# Patient Record
Sex: FEMALE | Race: WHITE | NOT HISPANIC OR LATINO | Employment: OTHER | URBAN - METROPOLITAN AREA
[De-identification: names, ages, dates, MRNs, and addresses within clinical notes are randomized per-mention and may not be internally consistent; named-entity substitution may affect disease eponyms.]

---

## 2017-06-09 PROBLEM — E66.9 MILD OBESITY: Status: ACTIVE | Noted: 2017-06-09

## 2017-10-30 DIAGNOSIS — Z12.31 VISIT FOR SCREENING MAMMOGRAM: Primary | ICD-10-CM

## 2017-11-27 ENCOUNTER — HOSPITAL ENCOUNTER (OUTPATIENT)
Dept: RADIOLOGY | Facility: CLINIC | Age: 72
Discharge: HOME | End: 2017-11-27
Attending: SPECIALIST
Payer: MEDICARE

## 2017-11-27 DIAGNOSIS — Z12.31 VISIT FOR SCREENING MAMMOGRAM: ICD-10-CM

## 2017-11-27 PROCEDURE — 77063 BREAST TOMOSYNTHESIS BI: CPT | Mod: 26,,, | Performed by: RADIOLOGY

## 2017-11-27 PROCEDURE — 77067 SCR MAMMO BI INCL CAD: CPT | Mod: TC,PO

## 2017-11-27 PROCEDURE — 77067 SCR MAMMO BI INCL CAD: CPT | Mod: 26,,, | Performed by: RADIOLOGY

## 2018-02-14 DIAGNOSIS — E78.00 HYPERCHOLESTEROLEMIA: ICD-10-CM

## 2018-02-15 RX ORDER — ATORVASTATIN CALCIUM 80 MG/1
TABLET, FILM COATED ORAL
Qty: 90 TABLET | Refills: 3 | Status: SHIPPED | OUTPATIENT
Start: 2018-02-15 | End: 2018-02-28 | Stop reason: SDUPTHER

## 2018-02-28 ENCOUNTER — OFFICE VISIT (OUTPATIENT)
Dept: CARDIOLOGY | Facility: CLINIC | Age: 73
End: 2018-02-28
Attending: INTERNAL MEDICINE
Payer: MEDICARE

## 2018-02-28 VITALS
DIASTOLIC BLOOD PRESSURE: 73 MMHG | BODY MASS INDEX: 32.65 KG/M2 | HEART RATE: 91 BPM | HEIGHT: 65 IN | SYSTOLIC BLOOD PRESSURE: 118 MMHG | WEIGHT: 196 LBS

## 2018-02-28 DIAGNOSIS — I70.1 ATHEROSCLEROSIS OF RENAL ARTERY: ICD-10-CM

## 2018-02-28 DIAGNOSIS — I15.0 RENOVASCULAR HYPERTENSION: ICD-10-CM

## 2018-02-28 DIAGNOSIS — E78.00 HYPERCHOLESTEROLEMIA: ICD-10-CM

## 2018-02-28 DIAGNOSIS — I65.23 BILATERAL CAROTID ARTERY STENOSIS: ICD-10-CM

## 2018-02-28 DIAGNOSIS — E66.9 MILD OBESITY: ICD-10-CM

## 2018-02-28 DIAGNOSIS — I35.9 AORTIC VALVE DISEASE: ICD-10-CM

## 2018-02-28 DIAGNOSIS — I77.1 SUBCLAVIAN ARTERY STENOSIS, LEFT: ICD-10-CM

## 2018-02-28 PROCEDURE — 99214 OFFICE O/P EST MOD 30 MIN: CPT | Mod: S$GLB,,, | Performed by: INTERNAL MEDICINE

## 2018-02-28 RX ORDER — ATORVASTATIN CALCIUM 80 MG/1
80 TABLET, FILM COATED ORAL DAILY
Qty: 90 TABLET | Refills: 3 | Status: SHIPPED | OUTPATIENT
Start: 2018-02-28 | End: 2018-08-31 | Stop reason: SDUPTHER

## 2018-02-28 RX ORDER — ASPIRIN 81 MG/1
81 TABLET ORAL DAILY
Qty: 90 TABLET | Refills: 3 | COMMUNITY
Start: 2018-02-28 | End: 2018-08-31 | Stop reason: SDUPTHER

## 2018-02-28 RX ORDER — AMLODIPINE BESYLATE 10 MG/1
10 TABLET ORAL DAILY
Qty: 90 TABLET | Refills: 3 | Status: SHIPPED | OUTPATIENT
Start: 2018-02-28 | End: 2018-08-31 | Stop reason: SDUPTHER

## 2018-02-28 RX ORDER — RAMIPRIL 10 MG/1
10 CAPSULE ORAL DAILY
Qty: 90 CAPSULE | Refills: 3 | Status: SHIPPED | OUTPATIENT
Start: 2018-02-28 | End: 2018-08-31 | Stop reason: SDUPTHER

## 2018-02-28 NOTE — PROGRESS NOTES
Subjective:     Albina Armenta is a 72 y.o. female with hypertension and hypercholesterolemia. She is mildly obese. She used to smoke but quit in 2007. Tells me she has a carotid bruit but ultrasounds have not revealed any significant stenoses. She was noted to have weak pulses in the left arm with 100 mmHg lower pressure in the left arm compared to the right. A Carotid Duplex study on 7/23/2015 revealed moderate plaquing in the left internal carotid with 50-60% stenosis. There was retrograde flow in the left vertebral consistent with left subclavian stenosis. An echocardiogram revealed aortic valve sclerosis. Because of severe hypertension she had a Renal Duplex study that suggested severe right renal artery stenosis. She underwent renal angiography on 8/20/2015 that revealed a severe right renal artery lesion that was stented with a good result. She has mild fatigue and discomfort in the left arm with activities. No exertional chest pain or exertional dyspnea. No palpitations or weak spells. No bleeding. Feelig well overall.    Hypertension   This is a chronic problem. The current episode started more than 1 year ago. The problem has been gradually improving since onset. The problem is controlled (usually 120-135/70-80 mmHg at home). Pertinent negatives include no anxiety, blurred vision, chest pain, headaches, malaise/fatigue, neck pain, orthopnea, palpitations, peripheral edema, PND, shortness of breath or sweats. There is no history of chronic renal disease.   Hyperlipidemia   This is a chronic problem. The current episode started more than 1 year ago. The problem is controlled. Recent lipid tests were reviewed and are normal. Exacerbating diseases include obesity. She has no history of chronic renal disease, diabetes, hypothyroidism, liver disease or nephrotic syndrome. Pertinent negatives include no chest pain, focal sensory loss, focal weakness, leg pain, myalgias or shortness of breath.       Review  of Systems   Constitution: Negative for chills, fever, weakness and malaise/fatigue.   HENT: Negative for nosebleeds.    Eyes: Negative for blurred vision and double vision.   Cardiovascular: Negative for chest pain, claudication, dyspnea on exertion, irregular heartbeat, leg swelling, near-syncope, orthopnea, palpitations, paroxysmal nocturnal dyspnea and syncope.   Respiratory: Negative for cough, hemoptysis, shortness of breath and wheezing.    Endocrine: Negative for cold intolerance and heat intolerance.   Hematologic/Lymphatic: Negative for bleeding problem. Does not bruise/bleed easily.   Skin: Negative for color change and rash.   Musculoskeletal: Negative for arthritis, back pain, falls, myalgias and neck pain.   Gastrointestinal: Negative for flatus, heartburn, hematemesis, hematochezia, hemorrhoids, jaundice, melena, nausea and vomiting.   Genitourinary: Negative for hematuria and menorrhagia.   Neurological: Negative for dizziness, focal weakness, headaches, light-headedness, loss of balance, numbness, paresthesias and vertigo.   Psychiatric/Behavioral: Negative for altered mental status, depression and memory loss. The patient is not nervous/anxious.    Allergic/Immunologic: Negative for hives and persistent infections.       Current Outpatient Prescriptions on File Prior to Visit   Medication Sig Dispense Refill    amlodipine (NORVASC) 10 MG tablet Take 1 tablet (10 mg total) by mouth once daily. 90 tablet 3    aspirin (ECOTRIN) 81 MG EC tablet Take 1 tablet (81 mg total) by mouth once daily. 90 tablet 3    atorvastatin (LIPITOR) 80 MG tablet TAKE 1 TABLET ONE TIME DAILY 90 tablet 3    carvedilol (COREG) 6.25 MG tablet Take 1 tablet (6.25 mg total) by mouth 2 (two) times daily. 180 tablet 3    citalopram (CELEXA) 10 MG tablet Take 10 mg by mouth once daily.      hydrochlorothiazide (MICROZIDE) 12.5 mg capsule Take 1 capsule (12.5 mg total) by mouth once daily. 90 capsule 3     "promethazine-codeine 6.25-10 mg/5 ml (PHENERGAN WITH CODEINE) 6.25-10 mg/5 mL syrup   0    ramipril (ALTACE) 10 MG capsule Take 1 capsule (10 mg total) by mouth once daily. 90 capsule 3     No current facility-administered medications on file prior to visit.        /73   Pulse 91   Ht 5' 5" (1.651 m)   Wt 88.9 kg (196 lb)   BMI 32.62 kg/m²       Objective:     Physical Exam   Constitutional: She is oriented to person, place, and time. She appears well-developed and well-nourished. She does not appear ill. No distress.   HENT:   Head: Normocephalic and atraumatic.   Nose: Nose normal.   Mouth/Throat: No oropharyngeal exudate.   Eyes: Right eye exhibits no discharge. Left eye exhibits no discharge. Right conjunctiva is not injected. Left conjunctiva is not injected. Right pupil is round. Left pupil is round. Pupils are equal.   Neck: No JVD present. Carotid bruit is present (bilateral soft bruits). No thyromegaly present.   Cardiovascular: Normal rate, regular rhythm, S1 normal and S2 normal.   No extrasystoles are present. PMI is not displaced.  Exam reveals gallop and S4. Exam reveals no S3.    Murmur heard.   Harsh crescendo-decrescendo midsystolic murmur is present with a grade of 3/6  at the upper right sternal border radiating to the neck  Pulses:       Carotid pulses are on the right side with bruit, and on the left side with bruit.       Radial pulses are 2+ on the right side, and 0 on the left side.        Femoral pulses are 2+ on the right side, and 2+ on the left side with bruit.       Popliteal pulses are 2+ on the right side, and 2+ on the left side.        Dorsalis pedis pulses are 1+ on the right side, and 1+ on the left side.        Posterior tibial pulses are 2+ on the right side, and 2+ on the left side.   Pulmonary/Chest: Effort normal and breath sounds normal.   Abdominal: Soft. Normal appearance. There is no hepatosplenomegaly. There is no tenderness.   Musculoskeletal:        Right " ankle: She exhibits no swelling, no ecchymosis and no deformity.        Left ankle: She exhibits no swelling, no ecchymosis and no deformity.   Lymphadenopathy:        Head (right side): No submandibular adenopathy present.        Head (left side): No submandibular adenopathy present.     She has no cervical adenopathy.   Neurological: She is alert and oriented to person, place, and time. She is not disoriented. No cranial nerve deficit or sensory deficit.   Skin: Skin is warm and intact. No rash noted. She is not diaphoretic. No cyanosis. Nails show no clubbing.   Psychiatric: She has a normal mood and affect. Her speech is normal and behavior is normal. Judgment and thought content normal. Cognition and memory are normal.       Assessment:     1. Bilateral carotid artery stenosis    2. Subclavian artery stenosis, left    3. Atherosclerosis of renal artery    4. Aortic valve disease    5. Renovascular hypertension    6. Hypercholesterolemia    7. Mild obesity        Plan:     1. Carotid Artery Stenosis   2005: Carotid bruit heard.   7/23/2015: Carotid Duplex: LIZETH: Moderate plaquing. LICA: Moderate plaquing - 1.4 m/s - 50-60%. Left vertebral: retrograde flow.   7/6/2016: Carotid Duplex: LIZETH: Moderate plaquing. LICA: Severe plaquing - 1.3 m/s - 50-60%. Left Vertebral: Retrograde flow.   6/30/2017: Carotid Duplex: LIZETH: Moderate plaquing - 1.1 m/s - <50%. LICA: Severe plaquing - 1.7 m/s - 60-70%.   7/2018: Plan next Carotid Duplex. Then yearly.    2. Left Subclavian Stenosis   2015: Right arm 200/90 mmHg. Left Arm: 110/60 mmHg.    7/23/2015: Carotid Duplex: LIZETH: Moderate plaquing. LICA: Moderate plaquing -1.4 m/s - 50-60%. Left vertebral: Retrograde flow.   Mild left arm symptoms.   Medical therapy for now.     3. Renal Artery Stenosis   7/27/2015: Renal Duplex: Right: Severe - 3.2 m/s. Left: About 60% stenosis - 1.7 m/s.   8/20/2015: [facility]: Renal Angio: Right: 80%. Stented. Left: 30%.   10/2015: Stopped  clopidogrel.   On aspirin 81 mg Q24.   Blood pressure very well controlled.      4. Aortic Valve Disease   7/16/2015: Loud systolic murmur.   7/23/2015: Echo: Normal left ventricular size and systolic function. Mild LVH. Mild diastolic dysfunction. Mild aortic sclerosis.   Followed.    5. Hypertension   1995: Diagnosed.   On amlodipine 10 mg Q24, ramipril 10 mg Q24 and hctz 12.5 mg Q24.   Keeping log at home.   Running fine.    6. Hypercholesterolemia   2005: Diagnosed. Began statin.   On simvastatin 20 mg Q24.   7/2015: Changed to atorvastatin 80 mg Q24.   8/13/2015: Chol 168. HDL 56. LDL 94. .   On atorvastatin 80 mg Q24.    Well controlled.    7. Mild Obesity   6/9/2017: 86 kg. BMI 31.   Encouraged to lose weight.    8. Primary Care   Dr. Shyam Gilmore.    F/u 6 months.    Thanh Acuna M.D.        7/11/2018 8:39 PM, Addendum:    7/11/2018: Carotid Duplex: LIZETH: Severe plaquing - 1.1 m/s - 50-60%. LICA: Severe plaquing - 2.1 m/s - 60-70%. Left vertebral retrograde.    I discussed the above test result and the implications of the findings over the phone.    Thanh Acuna M.D.      Copy:    Dr. Shyam Gilmore.

## 2018-07-11 ENCOUNTER — CLINICAL SUPPORT (OUTPATIENT)
Dept: CARDIOLOGY | Facility: CLINIC | Age: 73
End: 2018-07-11
Payer: MEDICARE

## 2018-07-11 DIAGNOSIS — I65.29 CAROTID ARTERY STENOSIS: ICD-10-CM

## 2018-07-11 PROCEDURE — 93880 EXTRACRANIAL BILAT STUDY: CPT | Mod: S$GLB,,, | Performed by: INTERNAL MEDICINE

## 2018-07-12 ENCOUNTER — TELEPHONE (OUTPATIENT)
Dept: CARDIOLOGY | Facility: CLINIC | Age: 73
End: 2018-07-12

## 2018-07-12 DIAGNOSIS — I65.23 BILATERAL CAROTID ARTERY STENOSIS: ICD-10-CM

## 2018-08-31 ENCOUNTER — OFFICE VISIT (OUTPATIENT)
Dept: CARDIOLOGY | Facility: CLINIC | Age: 73
End: 2018-08-31
Attending: INTERNAL MEDICINE
Payer: MEDICARE

## 2018-08-31 VITALS
HEIGHT: 65 IN | SYSTOLIC BLOOD PRESSURE: 119 MMHG | DIASTOLIC BLOOD PRESSURE: 69 MMHG | BODY MASS INDEX: 32.49 KG/M2 | HEART RATE: 83 BPM | WEIGHT: 195 LBS

## 2018-08-31 DIAGNOSIS — E78.00 HYPERCHOLESTEROLEMIA: ICD-10-CM

## 2018-08-31 DIAGNOSIS — I15.0 RENOVASCULAR HYPERTENSION: ICD-10-CM

## 2018-08-31 DIAGNOSIS — I65.23 BILATERAL CAROTID ARTERY STENOSIS: ICD-10-CM

## 2018-08-31 DIAGNOSIS — I77.1 SUBCLAVIAN ARTERY STENOSIS, LEFT: ICD-10-CM

## 2018-08-31 DIAGNOSIS — I70.1 ATHEROSCLEROSIS OF RENAL ARTERY: ICD-10-CM

## 2018-08-31 DIAGNOSIS — I35.9 AORTIC VALVE DISEASE: ICD-10-CM

## 2018-08-31 DIAGNOSIS — E66.9 MILD OBESITY: ICD-10-CM

## 2018-08-31 PROCEDURE — 99214 OFFICE O/P EST MOD 30 MIN: CPT | Mod: S$GLB,,, | Performed by: INTERNAL MEDICINE

## 2018-08-31 PROCEDURE — 3074F SYST BP LT 130 MM HG: CPT | Mod: CPTII,S$GLB,, | Performed by: INTERNAL MEDICINE

## 2018-08-31 PROCEDURE — 3078F DIAST BP <80 MM HG: CPT | Mod: CPTII,S$GLB,, | Performed by: INTERNAL MEDICINE

## 2018-08-31 RX ORDER — HYDROCHLOROTHIAZIDE 12.5 MG/1
12.5 CAPSULE ORAL DAILY
Qty: 90 CAPSULE | Refills: 3 | Status: SHIPPED | OUTPATIENT
Start: 2018-08-31 | End: 2019-03-01 | Stop reason: SDUPTHER

## 2018-08-31 RX ORDER — ASPIRIN 81 MG/1
81 TABLET ORAL DAILY
Qty: 90 TABLET | Refills: 3 | COMMUNITY
Start: 2018-08-31 | End: 2019-03-01 | Stop reason: SDUPTHER

## 2018-08-31 RX ORDER — RAMIPRIL 10 MG/1
10 CAPSULE ORAL DAILY
Qty: 90 CAPSULE | Refills: 3 | Status: SHIPPED | OUTPATIENT
Start: 2018-08-31 | End: 2019-03-01 | Stop reason: SDUPTHER

## 2018-08-31 RX ORDER — AMLODIPINE BESYLATE 10 MG/1
10 TABLET ORAL DAILY
Qty: 90 TABLET | Refills: 3 | Status: SHIPPED | OUTPATIENT
Start: 2018-08-31 | End: 2019-03-01 | Stop reason: SDUPTHER

## 2018-08-31 RX ORDER — ATORVASTATIN CALCIUM 80 MG/1
80 TABLET, FILM COATED ORAL DAILY
Qty: 90 TABLET | Refills: 3 | Status: SHIPPED | OUTPATIENT
Start: 2018-08-31 | End: 2019-03-01 | Stop reason: SDUPTHER

## 2018-08-31 NOTE — PROGRESS NOTES
Subjective:     Albina Armenta is a 73 y.o. female with hypertension and hypercholesterolemia. She is mildly obese. She used to smoke but quit in 2007. Tells me she has a carotid bruit but ultrasounds have not revealed any significant stenoses. She was noted to have weak pulses in the left arm with 100 mmHg lower pressure in the left arm compared to the right. A Carotid Duplex study on 7/23/2015 revealed moderate plaquing in the left internal carotid with 50-60% stenosis. There was retrograde flow in the left vertebral consistent with left subclavian stenosis. An echocardiogram revealed aortic valve sclerosis. Because of severe hypertension she had a Renal Duplex study that suggested severe right renal artery stenosis. She underwent renal angiography on 8/20/2015 that revealed a severe right renal artery lesion that was stented with a good result. She has mild fatigue and discomfort in the left arm with activities. No exertional chest pain or exertional dyspnea. No palpitations or weak spells. No bleeding. Feelig well overall.      Hypertension   This is a chronic problem. The current episode started more than 1 year ago. The problem has been gradually improving since onset. The problem is controlled (usually 120-130/70-80 mmHg at home). Pertinent negatives include no anxiety, blurred vision, chest pain, headaches, malaise/fatigue, neck pain, orthopnea, palpitations, peripheral edema, PND, shortness of breath or sweats. There is no history of chronic renal disease.   Hyperlipidemia   This is a chronic problem. The current episode started more than 1 year ago. The problem is controlled. Recent lipid tests were reviewed and are normal. Exacerbating diseases include obesity. She has no history of chronic renal disease, diabetes, hypothyroidism, liver disease or nephrotic syndrome. Pertinent negatives include no chest pain, focal sensory loss, focal weakness, leg pain, myalgias or shortness of breath.        Review of Systems   Constitution: Negative for chills, fever, weakness and malaise/fatigue.   HENT: Negative for nosebleeds.    Eyes: Negative for blurred vision and double vision.   Cardiovascular: Negative for chest pain, claudication, dyspnea on exertion, irregular heartbeat, leg swelling, near-syncope, orthopnea, palpitations, paroxysmal nocturnal dyspnea and syncope.   Respiratory: Negative for cough, hemoptysis, shortness of breath and wheezing.    Endocrine: Negative for cold intolerance and heat intolerance.   Hematologic/Lymphatic: Negative for bleeding problem. Does not bruise/bleed easily.   Skin: Negative for color change and rash.   Musculoskeletal: Negative for arthritis, back pain, falls, myalgias and neck pain.   Gastrointestinal: Negative for flatus, heartburn, hematemesis, hematochezia, hemorrhoids, jaundice, melena, nausea and vomiting.   Genitourinary: Negative for hematuria and menorrhagia.   Neurological: Negative for dizziness, focal weakness, headaches, light-headedness, loss of balance, numbness, paresthesias and vertigo.   Psychiatric/Behavioral: Negative for altered mental status, depression and memory loss. The patient is not nervous/anxious.    Allergic/Immunologic: Negative for hives and persistent infections.       Current Outpatient Medications on File Prior to Visit   Medication Sig Dispense Refill    amLODIPine (NORVASC) 10 MG tablet Take 1 tablet (10 mg total) by mouth once daily. 90 tablet 3    aspirin (ECOTRIN) 81 MG EC tablet Take 1 tablet (81 mg total) by mouth once daily. 90 tablet 3    atorvastatin (LIPITOR) 80 MG tablet Take 1 tablet (80 mg total) by mouth once daily. 90 tablet 3    citalopram (CELEXA) 10 MG tablet Take 10 mg by mouth once daily.      hydrochlorothiazide (MICROZIDE) 12.5 mg capsule Take 1 capsule (12.5 mg total) by mouth once daily. 90 capsule 3    promethazine-codeine 6.25-10 mg/5 ml (PHENERGAN WITH CODEINE) 6.25-10 mg/5 mL syrup   0     "ramipril (ALTACE) 10 MG capsule Take 1 capsule (10 mg total) by mouth once daily. 90 capsule 3     No current facility-administered medications on file prior to visit.        /69   Pulse 83   Ht 5' 5" (1.651 m)   Wt 88.5 kg (195 lb)   BMI 32.45 kg/m²       Objective:     Physical Exam   Constitutional: She is oriented to person, place, and time. She appears well-developed and well-nourished. She does not appear ill. No distress.   HENT:   Head: Normocephalic and atraumatic.   Nose: Nose normal.   Mouth/Throat: No oropharyngeal exudate.   Eyes: Right eye exhibits no discharge. Left eye exhibits no discharge. Right conjunctiva is not injected. Left conjunctiva is not injected. Right pupil is round. Left pupil is round. Pupils are equal.   Neck: No JVD present. Carotid bruit is present (bilateral soft bruits). No thyromegaly present.   Cardiovascular: Normal rate, regular rhythm, S1 normal and S2 normal.  No extrasystoles are present. PMI is not displaced. Exam reveals gallop and S4. Exam reveals no S3.   Murmur heard.   Harsh crescendo-decrescendo midsystolic murmur is present with a grade of 3/6 at the upper right sternal border radiating to the neck.  Pulses:       Carotid pulses are on the right side with bruit, and on the left side with bruit.       Radial pulses are 2+ on the right side, and 0 on the left side.        Femoral pulses are 2+ on the right side, and 2+ on the left side with bruit.       Popliteal pulses are 2+ on the right side, and 2+ on the left side.        Dorsalis pedis pulses are 1+ on the right side, and 1+ on the left side.        Posterior tibial pulses are 2+ on the right side, and 2+ on the left side.   Pulmonary/Chest: Effort normal and breath sounds normal.   Abdominal: Soft. Normal appearance. There is no hepatosplenomegaly. There is no tenderness.   Musculoskeletal:        Right ankle: She exhibits no swelling, no ecchymosis and no deformity.        Left ankle: She exhibits " no swelling, no ecchymosis and no deformity.   Lymphadenopathy:        Head (right side): No submandibular adenopathy present.        Head (left side): No submandibular adenopathy present.     She has no cervical adenopathy.   Neurological: She is alert and oriented to person, place, and time. She is not disoriented. No cranial nerve deficit or sensory deficit.   Skin: Skin is warm and intact. No rash noted. She is not diaphoretic. Nails show no clubbing.   Psychiatric: She has a normal mood and affect. Her speech is normal and behavior is normal. Judgment and thought content normal. Cognition and memory are normal.       Assessment:     1. Bilateral carotid artery stenosis    2. Subclavian artery stenosis, left    3. Atherosclerosis of renal artery    4. Aortic valve disease    5. Renovascular hypertension    6. Hypercholesterolemia    7. Mild obesity        Plan:     1. Carotid Artery Stenosis   2005: Carotid bruit heard.   7/23/2015: Carotid Duplex: LIZETH: Moderate plaquing. LICA: Moderate plaquing - 1.4 m/s - 50-60%. Left vertebral: retrograde flow.   7/6/2016: Carotid Duplex: LIZETH: Moderate plaquing. LICA: Severe plaquing - 1.3 m/s - 50-60%. Left Vertebral: Retrograde flow.   6/30/2017: Carotid Duplex: LIZETH: Moderate plaquing - 1.1 m/s - <50%. LICA: Severe plaquing - 1.7 m/s - 60-70%.   7/11/2018: Carotid Duplex: LIZETH: Severe plaquing - 1.1 m/s - 50-60%. LICA: Severe plaquing - 2.1 m/s - 60-70%. Left vertebral retrograde.   7/2019: Plan next Carotid Duplex. Then yearly.    2. Left Subclavian Stenosis   2015: Right arm 200/90 mmHg. Left Arm: 110/60 mmHg.    7/23/2015: Carotid Duplex: Left vertebral: Retrograde flow.   Mild left arm symptoms.   Medical therapy.     3. Renal Artery Stenosis   7/27/2015: Renal Duplex: Right: Severe - 3.2 m/s. Left: About 60% stenosis - 1.7 m/s.   8/20/2015: [facility]: Renal Angio: Right: 80%. Stented. Left: 30%.   10/2015: Stopped clopidogrel.   On aspirin 81 mg Q24.   Blood pressure  well controlled.      4. Aortic Valve Disease   7/16/2015: Loud systolic murmur.   7/23/2015: Echo: Normal left ventricular size and systolic function. Mild LVH. Mild diastolic dysfunction. Mild aortic sclerosis.   Followed.    5. Hypertension   1995: Diagnosed.   On amlodipine 10 mg Q24, ramipril 10 mg Q24 and hctz 12.5 mg Q24.   Keeping log at home.   Well controlled.    6. Hypercholesterolemia   2005: Diagnosed. Began statin.   On simvastatin 20 mg Q24.   7/2015: Changed to atorvastatin 80 mg Q24.   8/13/2015: Chol 168. HDL 56. LDL 94. .   On atorvastatin 80 mg Q24.    Well controlled.    7. Mild Obesity   6/9/2017: 86 kg. BMI 31.   Encouraged to lose weight.    8. Primary Care   Dr. Shyam Gilmore.    F/u 6 months.    Thanh Acuna M.D.

## 2018-09-04 RX ORDER — CARVEDILOL 6.25 MG/1
TABLET ORAL
Qty: 180 TABLET | Refills: 3 | Status: SHIPPED | OUTPATIENT
Start: 2018-09-04 | End: 2019-03-01

## 2018-11-13 DIAGNOSIS — Z12.39 SCREENING BREAST EXAMINATION: Primary | ICD-10-CM

## 2018-11-28 ENCOUNTER — HOSPITAL ENCOUNTER (OUTPATIENT)
Dept: RADIOLOGY | Facility: CLINIC | Age: 73
Discharge: HOME | End: 2018-11-28
Attending: SPECIALIST
Payer: MEDICARE

## 2018-11-28 DIAGNOSIS — Z12.39 SCREENING BREAST EXAMINATION: ICD-10-CM

## 2018-11-28 PROCEDURE — 77063 BREAST TOMOSYNTHESIS BI: CPT | Mod: 26,,, | Performed by: RADIOLOGY

## 2018-11-28 PROCEDURE — 77067 SCR MAMMO BI INCL CAD: CPT | Mod: 26,,, | Performed by: RADIOLOGY

## 2018-11-28 PROCEDURE — 77063 BREAST TOMOSYNTHESIS BI: CPT | Mod: TC,PO

## 2018-11-28 PROCEDURE — 77067 SCR MAMMO BI INCL CAD: CPT | Mod: TC,PO

## 2019-03-01 ENCOUNTER — OFFICE VISIT (OUTPATIENT)
Dept: CARDIOLOGY | Facility: CLINIC | Age: 74
End: 2019-03-01
Attending: INTERNAL MEDICINE
Payer: MEDICARE

## 2019-03-01 VITALS
DIASTOLIC BLOOD PRESSURE: 77 MMHG | BODY MASS INDEX: 31.65 KG/M2 | HEART RATE: 86 BPM | SYSTOLIC BLOOD PRESSURE: 116 MMHG | HEIGHT: 65 IN | WEIGHT: 190 LBS

## 2019-03-01 DIAGNOSIS — I35.9 AORTIC VALVE DISEASE: ICD-10-CM

## 2019-03-01 DIAGNOSIS — E66.9 MILD OBESITY: ICD-10-CM

## 2019-03-01 DIAGNOSIS — I65.23 BILATERAL CAROTID ARTERY STENOSIS: ICD-10-CM

## 2019-03-01 DIAGNOSIS — I15.0 RENOVASCULAR HYPERTENSION: ICD-10-CM

## 2019-03-01 DIAGNOSIS — E78.00 HYPERCHOLESTEROLEMIA: ICD-10-CM

## 2019-03-01 DIAGNOSIS — I70.1 ATHEROSCLEROSIS OF RENAL ARTERY: ICD-10-CM

## 2019-03-01 DIAGNOSIS — I77.1 SUBCLAVIAN ARTERY STENOSIS, LEFT: ICD-10-CM

## 2019-03-01 PROCEDURE — 3074F PR MOST RECENT SYSTOLIC BLOOD PRESSURE < 130 MM HG: ICD-10-PCS | Mod: CPTII,S$GLB,, | Performed by: INTERNAL MEDICINE

## 2019-03-01 PROCEDURE — 99214 OFFICE O/P EST MOD 30 MIN: CPT | Mod: S$GLB,,, | Performed by: INTERNAL MEDICINE

## 2019-03-01 PROCEDURE — 3074F SYST BP LT 130 MM HG: CPT | Mod: CPTII,S$GLB,, | Performed by: INTERNAL MEDICINE

## 2019-03-01 PROCEDURE — 3078F DIAST BP <80 MM HG: CPT | Mod: CPTII,S$GLB,, | Performed by: INTERNAL MEDICINE

## 2019-03-01 PROCEDURE — 99214 PR OFFICE/OUTPT VISIT, EST, LEVL IV, 30-39 MIN: ICD-10-PCS | Mod: S$GLB,,, | Performed by: INTERNAL MEDICINE

## 2019-03-01 PROCEDURE — 3078F PR MOST RECENT DIASTOLIC BLOOD PRESSURE < 80 MM HG: ICD-10-PCS | Mod: CPTII,S$GLB,, | Performed by: INTERNAL MEDICINE

## 2019-03-01 RX ORDER — RAMIPRIL 10 MG/1
10 CAPSULE ORAL DAILY
Qty: 90 CAPSULE | Refills: 3 | Status: ON HOLD | OUTPATIENT
Start: 2019-03-01 | End: 2019-08-15 | Stop reason: HOSPADM

## 2019-03-01 RX ORDER — ASPIRIN 81 MG/1
81 TABLET ORAL DAILY
Qty: 90 TABLET | Refills: 3 | Status: ON HOLD | COMMUNITY
Start: 2019-03-01 | End: 2019-08-15 | Stop reason: HOSPADM

## 2019-03-01 RX ORDER — ATORVASTATIN CALCIUM 80 MG/1
80 TABLET, FILM COATED ORAL DAILY
Qty: 90 TABLET | Refills: 3 | Status: SHIPPED | OUTPATIENT
Start: 2019-03-01 | End: 2020-03-17

## 2019-03-01 RX ORDER — AMLODIPINE BESYLATE 10 MG/1
10 TABLET ORAL DAILY
Qty: 90 TABLET | Refills: 3 | Status: ON HOLD | OUTPATIENT
Start: 2019-03-01 | End: 2019-08-15 | Stop reason: HOSPADM

## 2019-03-01 RX ORDER — HYDROCHLOROTHIAZIDE 12.5 MG/1
12.5 CAPSULE ORAL DAILY
Qty: 90 CAPSULE | Refills: 3 | Status: ON HOLD | OUTPATIENT
Start: 2019-03-01 | End: 2019-08-15 | Stop reason: HOSPADM

## 2019-03-01 NOTE — PROGRESS NOTES
Subjective:     Albina Armenta is a 73 y.o. female with hypertension and hypercholesterolemia. She is mildly obese. She used to smoke but quit in 2007. Tells me she has a carotid bruit but ultrasounds have not revealed any significant stenoses. She was noted to have weak pulses in the left arm with 100 mmHg lower pressure in the left arm compared to the right. A Carotid Duplex study on 7/23/2015 revealed moderate plaquing in the left internal carotid with 50-60% stenosis. There was retrograde flow in the left vertebral consistent with left subclavian stenosis. An echocardiogram revealed aortic valve sclerosis. Because of severe hypertension she had a Renal Duplex study that suggested severe right renal artery stenosis. She underwent renal angiography on 8/20/2015 that revealed a severe right renal artery lesion that was stented with a good result. She has mild fatigue and discomfort in the left arm with activities. No exertional chest pain or exertional dyspnea. No palpitations or weak spells. No bleeding. Feelig well overall.      Hypertension   This is a chronic problem. The current episode started more than 1 year ago. The problem has been gradually improving since onset. The problem is controlled (usually 120-130/70-80 mmHg at home). Pertinent negatives include no anxiety, blurred vision, chest pain, headaches, malaise/fatigue, neck pain, orthopnea, palpitations, peripheral edema, PND, shortness of breath or sweats. There is no history of chronic renal disease.   Hyperlipidemia   This is a chronic problem. The current episode started more than 1 year ago. The problem is controlled. Recent lipid tests were reviewed and are normal. Exacerbating diseases include obesity. She has no history of chronic renal disease, diabetes, hypothyroidism, liver disease or nephrotic syndrome. Pertinent negatives include no chest pain, focal sensory loss, focal weakness, leg pain, myalgias or shortness of breath.        Review of Systems   Constitution: Negative for chills, fever, weakness and malaise/fatigue.   HENT: Negative for nosebleeds.    Eyes: Negative for blurred vision, double vision, vision loss in left eye and vision loss in right eye.   Cardiovascular: Negative for chest pain, claudication, dyspnea on exertion, irregular heartbeat, leg swelling, near-syncope, orthopnea, palpitations, paroxysmal nocturnal dyspnea and syncope.   Respiratory: Negative for cough, hemoptysis, shortness of breath and wheezing.    Endocrine: Negative for cold intolerance and heat intolerance.   Hematologic/Lymphatic: Negative for bleeding problem. Does not bruise/bleed easily.   Skin: Negative for color change and rash.   Musculoskeletal: Negative for arthritis, back pain, falls, myalgias and neck pain.   Gastrointestinal: Negative for flatus, heartburn, hematemesis, hematochezia, hemorrhoids, jaundice, melena, nausea and vomiting.   Genitourinary: Negative for hematuria and menorrhagia.   Neurological: Negative for dizziness, focal weakness, headaches, light-headedness, loss of balance, numbness, paresthesias, tremors and vertigo.   Psychiatric/Behavioral: Negative for altered mental status, depression and memory loss. The patient is not nervous/anxious.    Allergic/Immunologic: Negative for hives and persistent infections.       Current Outpatient Medications on File Prior to Visit   Medication Sig Dispense Refill    amLODIPine (NORVASC) 10 MG tablet Take 1 tablet (10 mg total) by mouth once daily. 90 tablet 3    aspirin (ECOTRIN) 81 MG EC tablet Take 1 tablet (81 mg total) by mouth once daily. 90 tablet 3    atorvastatin (LIPITOR) 80 MG tablet Take 1 tablet (80 mg total) by mouth once daily. 90 tablet 3    carvedilol (COREG) 6.25 MG tablet TAKE 1 TABLET (6.25 MG TOTAL) BY MOUTH 2 (TWO) TIMES DAILY. 180 tablet 3    citalopram (CELEXA) 10 MG tablet Take 10 mg by mouth once daily.      hydroCHLOROthiazide (MICROZIDE) 12.5 mg  "capsule Take 1 capsule (12.5 mg total) by mouth once daily. 90 capsule 3    promethazine-codeine 6.25-10 mg/5 ml (PHENERGAN WITH CODEINE) 6.25-10 mg/5 mL syrup   0    ramipril (ALTACE) 10 MG capsule Take 1 capsule (10 mg total) by mouth once daily. 90 capsule 3     No current facility-administered medications on file prior to visit.        /77   Pulse 86   Ht 5' 5" (1.651 m)   Wt 86.2 kg (190 lb)   BMI 31.62 kg/m²       Objective:     Physical Exam   Constitutional: She is oriented to person, place, and time. She appears well-developed and well-nourished. She does not appear ill. No distress.   HENT:   Head: Normocephalic and atraumatic.   Nose: Nose normal.   Mouth/Throat: No oropharyngeal exudate.   Eyes: Right eye exhibits no discharge. Left eye exhibits no discharge. Right conjunctiva is not injected. Left conjunctiva is not injected. Right pupil is round. Left pupil is round. Pupils are equal.   Neck: No JVD present. Carotid bruit is present (bilateral soft bruits). No thyromegaly present.   Cardiovascular: Normal rate, regular rhythm, S1 normal and S2 normal.  No extrasystoles are present. PMI is not displaced. Exam reveals gallop and S4. Exam reveals no S3.   Murmur heard.   Harsh crescendo-decrescendo midsystolic murmur is present with a grade of 3/6 at the upper right sternal border radiating to the neck.  Pulses:       Carotid pulses are on the right side with bruit, and on the left side with bruit.       Radial pulses are 2+ on the right side, and 0 on the left side.        Femoral pulses are 2+ on the right side, and 2+ on the left side with bruit.       Popliteal pulses are 2+ on the right side, and 2+ on the left side.        Dorsalis pedis pulses are 1+ on the right side, and 1+ on the left side.        Posterior tibial pulses are 2+ on the right side, and 2+ on the left side.   Pulmonary/Chest: Effort normal and breath sounds normal.   Abdominal: Soft. Normal appearance. There is no " hepatosplenomegaly. There is no tenderness.   Musculoskeletal:        Right ankle: She exhibits no swelling, no ecchymosis and no deformity.        Left ankle: She exhibits no swelling, no ecchymosis and no deformity.   Lymphadenopathy:        Head (right side): No submandibular adenopathy present.        Head (left side): No submandibular adenopathy present.     She has no cervical adenopathy.   Neurological: She is alert and oriented to person, place, and time. She is not disoriented. No cranial nerve deficit or sensory deficit.   Skin: Skin is warm and intact. No rash noted. She is not diaphoretic.   Psychiatric: She has a normal mood and affect. Her speech is normal and behavior is normal. Judgment and thought content normal. Cognition and memory are normal.       Assessment:     1. Bilateral carotid artery stenosis    2. Subclavian artery stenosis, left    3. Atherosclerosis of renal artery    4. Aortic valve disease    5. Renovascular hypertension    6. Hypercholesterolemia    7. Mild obesity        Plan:     1. Carotid Artery Stenosis   2005: Carotid bruit heard.   7/23/2015: Carotid Duplex: LZIETH: Moderate plaquing. LICA: Moderate plaquing - 1.4 m/s - 50-60%. Left vertebral: retrograde flow.   7/6/2016: Carotid Duplex: LIZETH: Moderate plaquing. LICA: Severe plaquing - 1.3 m/s - 50-60%. Left Vertebral: Retrograde flow.   6/30/2017: Carotid Duplex: LIZETH: Moderate plaquing - 1.1 m/s - <50%. LICA: Severe plaquing - 1.7 m/s - 60-70%.   7/11/2018: Carotid Duplex: LIZETH: Severe plaquing - 1.1 m/s - 50-60%. LICA: Severe plaquing - 2.1 m/s - 60-70%. Left vertebral retrograde.   7/2019: Plan next Carotid Duplex. Then yearly.    2. Left Subclavian Stenosis   2015: Right arm 200/90 mmHg. Left Arm: 110/60 mmHg.    7/23/2015: Carotid Duplex: Left vertebral: Retrograde flow.   Mild left arm symptoms.   Medical therapy.     3. Renal Artery Stenosis   7/27/2015: Renal Duplex: Right: Severe - 3.2 m/s. Left: About 60% stenosis -  1.7 m/s.   8/20/2015: [facility]: Renal Angio: Right: 80%. Stented. Left: 30%.   10/2015: Stopped clopidogrel.   On aspirin 81 mg Q24.   Blood pressure well controlled.      4. Aortic Valve Disease   7/16/2015: Loud systolic murmur.   7/23/2015: Echo: Normal left ventricular size and systolic function. Mild LVH. Mild diastolic dysfunction. Mild aortic sclerosis.   Followed.    5. Hypertension   1995: Diagnosed.   On amlodipine 10 mg Q24, ramipril 10 mg Q24 and hctz 12.5 mg Q24.   Keeping log at home.   Well controlled.    6. Hypercholesterolemia   2005: Diagnosed. Began statin.   On simvastatin 20 mg Q24.   7/2015: Changed to atorvastatin 80 mg Q24.   8/13/2015: Chol 168. HDL 56. LDL 94. .   On atorvastatin 80 mg Q24.    Well controlled.    7. Mild Obesity   6/9/2017: 86 kg. BMI 31.   Encouraged to lose weight.    8. Primary Care   Dr. Shyam Gilmore.    F/u 6 months.    Thanh Acuna M.D.       7/4/2019 3:00 PM, Addendum:    7/3/2019: Carotid Duplex: LIZETH: Moderate plaquing - 1.0 m/s - <50%. LICA: Severe plaquing - 3.6 m/s - 80-90%. Left vertebral retrograde.    Marked progression on left side.    She is advised 4V and then possible CEA.    I discussed the above test result and the implications of the findings over the phone.    Thanh Acuna M.D.      Copy:    Dr. Shyam Gilmore.

## 2019-05-28 DIAGNOSIS — R06.02 SHORTNESS OF BREATH: Primary | ICD-10-CM

## 2019-05-29 DIAGNOSIS — R22.1 NECK MASS: Primary | ICD-10-CM

## 2019-05-30 ENCOUNTER — LAB VISIT (OUTPATIENT)
Dept: LAB | Facility: HOSPITAL | Age: 74
End: 2019-05-30
Attending: SPECIALIST
Payer: MEDICARE

## 2019-05-30 DIAGNOSIS — R22.1 NECK MASS: Primary | ICD-10-CM

## 2019-05-30 LAB
BUN SERPL-MCNC: 22 MG/DL (ref 8–23)
CREAT SERPL-MCNC: 0.7 MG/DL (ref 0.5–1.4)
EST. GFR  (AFRICAN AMERICAN): >60 ML/MIN/1.73 M^2
EST. GFR  (NON AFRICAN AMERICAN): >60 ML/MIN/1.73 M^2

## 2019-05-30 PROCEDURE — 82565 ASSAY OF CREATININE: CPT

## 2019-05-30 PROCEDURE — 84520 ASSAY OF UREA NITROGEN: CPT

## 2019-05-30 PROCEDURE — 36415 COLL VENOUS BLD VENIPUNCTURE: CPT

## 2019-05-31 ENCOUNTER — HOSPITAL ENCOUNTER (OUTPATIENT)
Dept: RADIOLOGY | Facility: HOSPITAL | Age: 74
Discharge: HOME | End: 2019-05-31
Attending: SPECIALIST
Payer: MEDICARE

## 2019-05-31 DIAGNOSIS — R06.02 SHORTNESS OF BREATH: ICD-10-CM

## 2019-06-04 ENCOUNTER — HOSPITAL ENCOUNTER (OUTPATIENT)
Dept: RADIOLOGY | Facility: HOSPITAL | Age: 74
Discharge: HOME | End: 2019-06-04
Attending: SPECIALIST
Payer: MEDICARE

## 2019-06-04 ENCOUNTER — HOSPITAL ENCOUNTER (OUTPATIENT)
Dept: RADIOLOGY | Facility: HOSPITAL | Age: 74
Discharge: HOME | End: 2019-06-04
Attending: OTOLARYNGOLOGY
Payer: MEDICARE

## 2019-06-04 DIAGNOSIS — R22.1 NECK MASS: ICD-10-CM

## 2019-06-04 PROCEDURE — 71250 CT THORAX DX C-: CPT | Mod: 26,,, | Performed by: RADIOLOGY

## 2019-06-04 PROCEDURE — 71250 CT THORAX DX C-: CPT | Mod: TC

## 2019-06-04 PROCEDURE — 70491 CT SOFT TISSUE NECK W/DYE: CPT | Mod: TC

## 2019-06-04 PROCEDURE — 71250 CT CHEST WITHOUT CONTRAST: ICD-10-PCS | Mod: 26,,, | Performed by: RADIOLOGY

## 2019-06-04 PROCEDURE — 25500020 PHARM REV CODE 255: Performed by: OTOLARYNGOLOGY

## 2019-06-04 PROCEDURE — 70491 CT SOFT TISSUE NECK WITH CONTRAST: ICD-10-PCS | Mod: 26,,, | Performed by: RADIOLOGY

## 2019-06-04 PROCEDURE — 70491 CT SOFT TISSUE NECK W/DYE: CPT | Mod: 26,,, | Performed by: RADIOLOGY

## 2019-06-04 RX ADMIN — IOHEXOL 75 ML: 350 INJECTION, SOLUTION INTRAVENOUS at 11:06

## 2019-07-03 ENCOUNTER — CLINICAL SUPPORT (OUTPATIENT)
Dept: CARDIOLOGY | Facility: CLINIC | Age: 74
End: 2019-07-03
Payer: MEDICARE

## 2019-07-03 DIAGNOSIS — I77.1 SUBCLAVIAN ARTERY STENOSIS, LEFT: ICD-10-CM

## 2019-07-03 DIAGNOSIS — I65.23 BILATERAL CAROTID ARTERY STENOSIS: ICD-10-CM

## 2019-07-03 DIAGNOSIS — I65.29 CAROTID ARTERY STENOSIS: ICD-10-CM

## 2019-07-03 PROCEDURE — 93880 EXTRACRANIAL BILAT STUDY: CPT | Mod: S$GLB,,, | Performed by: INTERNAL MEDICINE

## 2019-07-03 PROCEDURE — 93880 PR DUPLEX SCAN EXTRACRANIAL,BILAT: ICD-10-PCS | Mod: S$GLB,,, | Performed by: INTERNAL MEDICINE

## 2019-07-08 ENCOUNTER — OFFICE VISIT (OUTPATIENT)
Dept: CARDIOLOGY | Facility: CLINIC | Age: 74
End: 2019-07-08
Attending: INTERNAL MEDICINE
Payer: MEDICARE

## 2019-07-08 VITALS
DIASTOLIC BLOOD PRESSURE: 75 MMHG | SYSTOLIC BLOOD PRESSURE: 134 MMHG | WEIGHT: 188 LBS | HEIGHT: 65 IN | BODY MASS INDEX: 31.32 KG/M2 | HEART RATE: 102 BPM

## 2019-07-08 DIAGNOSIS — I65.23 BILATERAL CAROTID ARTERY STENOSIS: ICD-10-CM

## 2019-07-08 DIAGNOSIS — E78.00 HYPERCHOLESTEROLEMIA: ICD-10-CM

## 2019-07-08 DIAGNOSIS — I35.9 AORTIC VALVE DISEASE: ICD-10-CM

## 2019-07-08 DIAGNOSIS — I70.1 ATHEROSCLEROSIS OF RENAL ARTERY: ICD-10-CM

## 2019-07-08 DIAGNOSIS — I15.0 RENOVASCULAR HYPERTENSION: ICD-10-CM

## 2019-07-08 DIAGNOSIS — I77.1 SUBCLAVIAN ARTERY STENOSIS, LEFT: ICD-10-CM

## 2019-07-08 DIAGNOSIS — E66.9 MILD OBESITY: ICD-10-CM

## 2019-07-08 PROCEDURE — 3075F PR MOST RECENT SYSTOLIC BLOOD PRESS GE 130-139MM HG: ICD-10-PCS | Mod: CPTII,S$GLB,, | Performed by: INTERNAL MEDICINE

## 2019-07-08 PROCEDURE — 3078F DIAST BP <80 MM HG: CPT | Mod: CPTII,S$GLB,, | Performed by: INTERNAL MEDICINE

## 2019-07-08 PROCEDURE — 99214 PR OFFICE/OUTPT VISIT, EST, LEVL IV, 30-39 MIN: ICD-10-PCS | Mod: S$GLB,,, | Performed by: INTERNAL MEDICINE

## 2019-07-08 PROCEDURE — 3078F PR MOST RECENT DIASTOLIC BLOOD PRESSURE < 80 MM HG: ICD-10-PCS | Mod: CPTII,S$GLB,, | Performed by: INTERNAL MEDICINE

## 2019-07-08 PROCEDURE — 99214 OFFICE O/P EST MOD 30 MIN: CPT | Mod: S$GLB,,, | Performed by: INTERNAL MEDICINE

## 2019-07-08 PROCEDURE — 3075F SYST BP GE 130 - 139MM HG: CPT | Mod: CPTII,S$GLB,, | Performed by: INTERNAL MEDICINE

## 2019-07-08 NOTE — PROGRESS NOTES
Subjective:     Albina Armenta is a 74 y.o. female with hypertension and hypercholesterolemia. She is mildly obese. She used to smoke but quit in 2007. Tells me she has a carotid bruit but ultrasounds have not revealed any significant stenoses. She was noted to have weak pulses in the left arm with 100 mmHg lower pressure in the left arm compared to the right. A Carotid Duplex study on 7/23/2015 revealed moderate plaquing in the left internal carotid with 50-60% stenosis. There was retrograde flow in the left vertebral consistent with left subclavian stenosis. On 7/3/2019 she had a Carotid Duplex that revealed marked progression on the left side with a peak velocituy 3.6 m/s corresponding to an 80-90% stenosis. The left vertebral had retrograde flow. An Echocardiogram revealed aortic valve sclerosis. Because of severe hypertension she had a Renal Duplex study that suggested severe right renal artery stenosis. She underwent renal angiography on 8/20/2015 that revealed a severe right renal artery lesion that was stented with a good result. She has mild fatigue and discomfort in the left arm with activities. No exertional chest pain or exertional dyspnea. No palpitations or weak spells. No bleeding. Feelig well overall.      Hypertension   This is a chronic problem. The current episode started more than 1 year ago. The problem has been gradually improving since onset. The problem is controlled (usually 120-130/70-80 mmHg at home). Pertinent negatives include no anxiety, blurred vision, chest pain, headaches, malaise/fatigue, neck pain, orthopnea, palpitations, peripheral edema, PND, shortness of breath or sweats. There is no history of chronic renal disease.   Hyperlipidemia   This is a chronic problem. The current episode started more than 1 year ago. The problem is controlled. Recent lipid tests were reviewed and are normal. Exacerbating diseases include obesity. She has no history of chronic renal disease,  diabetes, hypothyroidism, liver disease or nephrotic syndrome. Pertinent negatives include no chest pain, focal sensory loss, focal weakness, leg pain, myalgias or shortness of breath.       Review of Systems   Constitution: Negative for chills, fever and malaise/fatigue.   HENT: Negative for nosebleeds.    Eyes: Negative for blurred vision, double vision, vision loss in left eye and vision loss in right eye.   Cardiovascular: Negative for chest pain, claudication, dyspnea on exertion, irregular heartbeat, leg swelling, near-syncope, orthopnea, palpitations, paroxysmal nocturnal dyspnea and syncope.   Respiratory: Negative for cough, hemoptysis, shortness of breath and wheezing.    Endocrine: Negative for cold intolerance and heat intolerance.   Hematologic/Lymphatic: Negative for bleeding problem. Does not bruise/bleed easily.   Skin: Negative for color change and rash.   Musculoskeletal: Negative for arthritis, back pain, falls, myalgias and neck pain.   Gastrointestinal: Negative for flatus, heartburn, hematemesis, hematochezia, hemorrhoids, jaundice, melena, nausea and vomiting.   Genitourinary: Negative for hematuria and menorrhagia.   Neurological: Negative for dizziness, focal weakness, headaches, light-headedness, loss of balance, numbness, paresthesias, tremors, vertigo and weakness.   Psychiatric/Behavioral: Negative for altered mental status, depression and memory loss. The patient is not nervous/anxious.    Allergic/Immunologic: Negative for hives and persistent infections.       Current Outpatient Medications on File Prior to Visit   Medication Sig Dispense Refill    amLODIPine (NORVASC) 10 MG tablet Take 1 tablet (10 mg total) by mouth once daily. 90 tablet 3    aspirin (ECOTRIN) 81 MG EC tablet Take 1 tablet (81 mg total) by mouth once daily. 90 tablet 3    atorvastatin (LIPITOR) 80 MG tablet Take 1 tablet (80 mg total) by mouth once daily. 90 tablet 3    citalopram (CELEXA) 10 MG tablet Take 10  "mg by mouth once daily.      hydroCHLOROthiazide (MICROZIDE) 12.5 mg capsule Take 1 capsule (12.5 mg total) by mouth once daily. 90 capsule 3    promethazine-codeine 6.25-10 mg/5 ml (PHENERGAN WITH CODEINE) 6.25-10 mg/5 mL syrup   0    ramipril (ALTACE) 10 MG capsule Take 1 capsule (10 mg total) by mouth once daily. 90 capsule 3     No current facility-administered medications on file prior to visit.        /75   Pulse 102   Ht 5' 5" (1.651 m)   Wt 85.3 kg (188 lb)   BMI 31.28 kg/m²       Objective:     Physical Exam   Constitutional: She is oriented to person, place, and time. She appears well-developed and well-nourished. She does not appear ill. No distress.   HENT:   Head: Normocephalic and atraumatic.   Nose: Nose normal.   Mouth/Throat: No oropharyngeal exudate.   Eyes: Right eye exhibits no discharge. Left eye exhibits no discharge. Right conjunctiva is not injected. Left conjunctiva is not injected. Right pupil is round. Left pupil is round. Pupils are equal.   Neck: No JVD present. Carotid bruit is present (bilateral soft bruits). No thyromegaly present.   Cardiovascular: Normal rate, regular rhythm, S1 normal and S2 normal.  No extrasystoles are present. PMI is not displaced. Exam reveals gallop and S4. Exam reveals no S3.   Murmur heard.   Harsh crescendo-decrescendo midsystolic murmur is present with a grade of 3/6 at the upper right sternal border radiating to the neck.  Pulses:       Carotid pulses are on the right side with bruit, and on the left side with bruit.       Radial pulses are 2+ on the right side, and 0 on the left side.        Femoral pulses are 2+ on the right side, and 2+ on the left side with bruit.       Popliteal pulses are 2+ on the right side, and 2+ on the left side.        Dorsalis pedis pulses are 1+ on the right side, and 1+ on the left side.        Posterior tibial pulses are 2+ on the right side, and 2+ on the left side.   Pulmonary/Chest: Effort normal and breath " sounds normal.   Abdominal: Soft. Normal appearance. There is no hepatosplenomegaly. There is no tenderness.   Musculoskeletal:        Right ankle: She exhibits no swelling, no ecchymosis and no deformity.        Left ankle: She exhibits no swelling, no ecchymosis and no deformity.   Lymphadenopathy:        Head (right side): No submandibular adenopathy present.        Head (left side): No submandibular adenopathy present.     She has no cervical adenopathy.   Neurological: She is alert and oriented to person, place, and time. She is not disoriented. No cranial nerve deficit or sensory deficit.   Skin: Skin is warm and intact. No rash noted. She is not diaphoretic.   Psychiatric: She has a normal mood and affect. Her speech is normal and behavior is normal. Judgment and thought content normal. Cognition and memory are normal.       Assessment:     1. Bilateral carotid artery stenosis    2. Subclavian artery stenosis, left    3. Atherosclerosis of renal artery    4. Aortic valve disease    5. Renovascular hypertension    6. Hypercholesterolemia    7. Mild obesity        Plan:     1. Carotid Artery Stenosis   2005: Carotid bruit heard.   7/23/2015: Carotid Duplex: LIZETH: Moderate plaquing. LICA: Moderate plaquing - 1.4 m/s - 50-60%. Left vertebral: retrograde flow.   7/6/2016: Carotid Duplex: LIZETH: Moderate plaquing. LICA: Severe plaquing - 1.3 m/s - 50-60%. Left Vertebral: Retrograde flow.   6/30/2017: Carotid Duplex: LIEZTH: Moderate plaquing - 1.1 m/s - <50%. LICA: Severe plaquing - 1.7 m/s - 60-70%.   7/11/2018: Carotid Duplex: LIZETH: Severe plaquing - 1.1 m/s - 50-60%. LICA: Severe plaquing - 2.1 m/s - 60-70%. Left vertebral retrograde.   7/3/2019: Carotid Duplex: LIZETH: Moderate plaquing - 1.0 m/s - <50%. LICA: Severe plaquing - 3.6 m/s - 80-90%. Left vertebral retrograde.    Marked progression on left side.    She is advised 4V and then possible CEA.     2. Left Subclavian Stenosis   2015: Right arm 200/90 mmHg.  Left Arm: 110/60 mmHg.    7/23/2015: Carotid Duplex: Left vertebral: Retrograde flow.   Mild left arm symptoms.   Medical therapy.     3. Renal Artery Stenosis   7/27/2015: Renal Duplex: Right: Severe - 3.2 m/s. Left: About 60% stenosis - 1.7 m/s.   8/20/2015: [facility]: Renal Angio: Right: 80%. Stented. Left: 30%.   10/2015: Stopped clopidogrel.   On aspirin 81 mg Q24.   Blood pressure well controlled.      4. Aortic Valve Disease   7/16/2015: Loud systolic murmur.   7/23/2015: Echo: Normal left ventricular size and systolic function. Mild LVH. Mild diastolic dysfunction. Mild aortic sclerosis.   Followed.    5. Hypertension   1995: Diagnosed.   On amlodipine 10 mg Q24, ramipril 10 mg Q24 and hctz 12.5 mg Q24.   Keeping log at home.   Well controlled.    6. Hypercholesterolemia   2005: Diagnosed. Began statin.   On simvastatin 20 mg Q24.   7/2015: Changed to atorvastatin 80 mg Q24.   8/13/2015: Chol 168. HDL 56. LDL 94. .   On atorvastatin 80 mg Q24.    Well controlled.    7. Mild Obesity   6/9/2017: 86 kg. BMI 31.   Encouraged to lose weight.    8. Primary Care   Dr. Shyam Gilmore.    The planned procedure was discussed in detail with the patient and the family members present. Risk, benefit and alternatives were reviewed. All questions answered. Consent was then signed.    If further questions or concerns arise the patient was encouraged to contact me prior to the planned procedure.     F/u 1 month.    Thanh Acuna M.D.

## 2019-08-12 ENCOUNTER — HOSPITAL ENCOUNTER (INPATIENT)
Facility: HOSPITAL | Age: 74
LOS: 4 days | Discharge: HOME HEALTH | DRG: 066 | End: 2019-08-16
Attending: EMERGENCY MEDICINE | Admitting: INTERNAL MEDICINE
Payer: MEDICARE

## 2019-08-12 DIAGNOSIS — I63.9 CVA (CEREBRAL VASCULAR ACCIDENT): ICD-10-CM

## 2019-08-12 DIAGNOSIS — I95.9 HYPOTENSION, UNSPECIFIED HYPOTENSION TYPE: ICD-10-CM

## 2019-08-12 DIAGNOSIS — E87.6 HYPOKALEMIA: ICD-10-CM

## 2019-08-12 DIAGNOSIS — R55 SYNCOPE: ICD-10-CM

## 2019-08-12 DIAGNOSIS — R55 SYNCOPE, UNSPECIFIED SYNCOPE TYPE: Primary | ICD-10-CM

## 2019-08-12 LAB
ABO + RH BLD: NORMAL
ALBUMIN SERPL BCP-MCNC: 3 G/DL (ref 3.5–5.2)
ALP SERPL-CCNC: 75 U/L (ref 55–135)
ALT SERPL W/O P-5'-P-CCNC: 18 U/L (ref 10–44)
ANION GAP SERPL CALC-SCNC: 10 MMOL/L (ref 8–16)
ANION GAP SERPL CALC-SCNC: 7 MMOL/L (ref 8–16)
AST SERPL-CCNC: 22 U/L (ref 10–40)
BASOPHILS # BLD AUTO: 0.05 K/UL (ref 0–0.2)
BASOPHILS NFR BLD: 0.4 % (ref 0–1.9)
BILIRUB SERPL-MCNC: 0.8 MG/DL (ref 0.1–1)
BILIRUB UR QL STRIP: NEGATIVE
BLD GP AB SCN CELLS X3 SERPL QL: NORMAL
BNP SERPL-MCNC: 40 PG/ML (ref 0–99)
BUN SERPL-MCNC: 15 MG/DL (ref 8–23)
BUN SERPL-MCNC: 18 MG/DL (ref 8–23)
CA-I BLDV-SCNC: 1.1 MMOL/L (ref 1.06–1.42)
CALCIUM SERPL-MCNC: 7.7 MG/DL (ref 8.7–10.5)
CALCIUM SERPL-MCNC: 7.9 MG/DL (ref 8.7–10.5)
CHLORIDE SERPL-SCNC: 104 MMOL/L (ref 95–110)
CHLORIDE SERPL-SCNC: 104 MMOL/L (ref 95–110)
CLARITY UR: CLEAR
CO2 SERPL-SCNC: 25 MMOL/L (ref 23–29)
CO2 SERPL-SCNC: 27 MMOL/L (ref 23–29)
COLOR UR: YELLOW
CREAT SERPL-MCNC: 0.6 MG/DL (ref 0.5–1.4)
CREAT SERPL-MCNC: 0.6 MG/DL (ref 0.5–1.4)
DIFFERENTIAL METHOD: ABNORMAL
EOSINOPHIL # BLD AUTO: 0.1 K/UL (ref 0–0.5)
EOSINOPHIL NFR BLD: 0.5 % (ref 0–8)
ERYTHROCYTE [DISTWIDTH] IN BLOOD BY AUTOMATED COUNT: 14.5 % (ref 11.5–14.5)
EST. GFR  (AFRICAN AMERICAN): >60 ML/MIN/1.73 M^2
EST. GFR  (AFRICAN AMERICAN): >60 ML/MIN/1.73 M^2
EST. GFR  (NON AFRICAN AMERICAN): >60 ML/MIN/1.73 M^2
EST. GFR  (NON AFRICAN AMERICAN): >60 ML/MIN/1.73 M^2
GLUCOSE SERPL-MCNC: 115 MG/DL (ref 70–110)
GLUCOSE SERPL-MCNC: 138 MG/DL (ref 70–110)
GLUCOSE UR QL STRIP: NEGATIVE
HCT VFR BLD AUTO: 32.3 % (ref 37–48.5)
HGB BLD-MCNC: 10 G/DL (ref 12–16)
HGB UR QL STRIP: NEGATIVE
IMM GRANULOCYTES # BLD AUTO: 0.06 K/UL (ref 0–0.04)
IMM GRANULOCYTES NFR BLD AUTO: 0.5 % (ref 0–0.5)
KETONES UR QL STRIP: NEGATIVE
LEUKOCYTE ESTERASE UR QL STRIP: NEGATIVE
LYMPHOCYTES # BLD AUTO: 1.4 K/UL (ref 1–4.8)
LYMPHOCYTES NFR BLD: 11 % (ref 18–48)
MCH RBC QN AUTO: 27.7 PG (ref 27–31)
MCHC RBC AUTO-ENTMCNC: 31 G/DL (ref 32–36)
MCV RBC AUTO: 90 FL (ref 82–98)
MONOCYTES # BLD AUTO: 1.2 K/UL (ref 0.3–1)
MONOCYTES NFR BLD: 9.1 % (ref 4–15)
NEUTROPHILS # BLD AUTO: 10 K/UL (ref 1.8–7.7)
NEUTROPHILS NFR BLD: 78.5 % (ref 38–73)
NITRITE UR QL STRIP: NEGATIVE
NRBC BLD-RTO: 0 /100 WBC
PH UR STRIP: 7 [PH] (ref 5–8)
PLATELET # BLD AUTO: 272 K/UL (ref 150–350)
PMV BLD AUTO: 11.3 FL (ref 9.2–12.9)
POTASSIUM SERPL-SCNC: 2.8 MMOL/L (ref 3.5–5.1)
POTASSIUM SERPL-SCNC: 3.2 MMOL/L (ref 3.5–5.1)
PROT SERPL-MCNC: 5.6 G/DL (ref 6–8.4)
PROT UR QL STRIP: ABNORMAL
RBC # BLD AUTO: 3.61 M/UL (ref 4–5.4)
SODIUM SERPL-SCNC: 138 MMOL/L (ref 136–145)
SODIUM SERPL-SCNC: 139 MMOL/L (ref 136–145)
SP GR UR STRIP: 1.01 (ref 1–1.03)
TROPONIN I SERPL DL<=0.01 NG/ML-MCNC: <0.03 NG/ML (ref 0.02–0.04)
URN SPEC COLLECT METH UR: ABNORMAL
UROBILINOGEN UR STRIP-ACNC: NEGATIVE EU/DL
WBC # BLD AUTO: 12.79 K/UL (ref 3.9–12.7)

## 2019-08-12 PROCEDURE — 80053 COMPREHEN METABOLIC PANEL: CPT

## 2019-08-12 PROCEDURE — 36415 COLL VENOUS BLD VENIPUNCTURE: CPT

## 2019-08-12 PROCEDURE — 82330 ASSAY OF CALCIUM: CPT

## 2019-08-12 PROCEDURE — 86850 RBC ANTIBODY SCREEN: CPT

## 2019-08-12 PROCEDURE — 63600175 PHARM REV CODE 636 W HCPCS: Performed by: EMERGENCY MEDICINE

## 2019-08-12 PROCEDURE — 63600175 PHARM REV CODE 636 W HCPCS: Performed by: NURSE PRACTITIONER

## 2019-08-12 PROCEDURE — 25000003 PHARM REV CODE 250: Performed by: EMERGENCY MEDICINE

## 2019-08-12 PROCEDURE — 96365 THER/PROPH/DIAG IV INF INIT: CPT

## 2019-08-12 PROCEDURE — 25000003 PHARM REV CODE 250: Performed by: INTERNAL MEDICINE

## 2019-08-12 PROCEDURE — 20000000 HC ICU ROOM

## 2019-08-12 PROCEDURE — 83880 ASSAY OF NATRIURETIC PEPTIDE: CPT

## 2019-08-12 PROCEDURE — 51702 INSERT TEMP BLADDER CATH: CPT

## 2019-08-12 PROCEDURE — 84484 ASSAY OF TROPONIN QUANT: CPT | Mod: 91

## 2019-08-12 PROCEDURE — 96375 TX/PRO/DX INJ NEW DRUG ADDON: CPT

## 2019-08-12 PROCEDURE — 99285 EMERGENCY DEPT VISIT HI MDM: CPT

## 2019-08-12 PROCEDURE — 81003 URINALYSIS AUTO W/O SCOPE: CPT

## 2019-08-12 PROCEDURE — 93005 ELECTROCARDIOGRAM TRACING: CPT

## 2019-08-12 PROCEDURE — 80048 BASIC METABOLIC PNL TOTAL CA: CPT

## 2019-08-12 PROCEDURE — 85025 COMPLETE CBC W/AUTO DIFF WBC: CPT

## 2019-08-12 PROCEDURE — 96361 HYDRATE IV INFUSION ADD-ON: CPT

## 2019-08-12 RX ORDER — PANTOPRAZOLE SODIUM 40 MG/1
40 TABLET, DELAYED RELEASE ORAL DAILY
Status: DISCONTINUED | OUTPATIENT
Start: 2019-08-13 | End: 2019-08-16 | Stop reason: HOSPADM

## 2019-08-12 RX ORDER — POTASSIUM CHLORIDE 7.45 MG/ML
40 INJECTION INTRAVENOUS
Status: DISCONTINUED | OUTPATIENT
Start: 2019-08-12 | End: 2019-08-16 | Stop reason: HOSPADM

## 2019-08-12 RX ORDER — ONDANSETRON HYDROCHLORIDE 4 MG/5ML
2 SOLUTION ORAL
Status: DISCONTINUED | OUTPATIENT
Start: 2019-08-12 | End: 2019-08-12

## 2019-08-12 RX ORDER — OMEPRAZOLE 40 MG/1
40 CAPSULE, DELAYED RELEASE ORAL EVERY MORNING
COMMUNITY
End: 2020-10-21 | Stop reason: ALTCHOICE

## 2019-08-12 RX ORDER — ONDANSETRON 2 MG/ML
4 INJECTION INTRAMUSCULAR; INTRAVENOUS
Status: DISCONTINUED | OUTPATIENT
Start: 2019-08-12 | End: 2019-08-12

## 2019-08-12 RX ORDER — SODIUM CHLORIDE 9 MG/ML
1000 INJECTION, SOLUTION INTRAVENOUS
Status: COMPLETED | OUTPATIENT
Start: 2019-08-12 | End: 2019-08-12

## 2019-08-12 RX ORDER — POTASSIUM CHLORIDE 7.45 MG/ML
10 INJECTION INTRAVENOUS ONCE
Status: COMPLETED | OUTPATIENT
Start: 2019-08-12 | End: 2019-08-12

## 2019-08-12 RX ORDER — POTASSIUM CHLORIDE 20 MEQ/15ML
10 SOLUTION ORAL ONCE
Status: DISCONTINUED | OUTPATIENT
Start: 2019-08-12 | End: 2019-08-12

## 2019-08-12 RX ORDER — MAGNESIUM SULFATE HEPTAHYDRATE 40 MG/ML
4 INJECTION, SOLUTION INTRAVENOUS
Status: DISCONTINUED | OUTPATIENT
Start: 2019-08-12 | End: 2019-08-16 | Stop reason: HOSPADM

## 2019-08-12 RX ORDER — SODIUM CHLORIDE 9 MG/ML
500 INJECTION, SOLUTION INTRAVENOUS
Status: COMPLETED | OUTPATIENT
Start: 2019-08-12 | End: 2019-08-12

## 2019-08-12 RX ORDER — ACETAMINOPHEN 325 MG/1
650 TABLET ORAL EVERY 4 HOURS PRN
Status: DISCONTINUED | OUTPATIENT
Start: 2019-08-12 | End: 2019-08-16 | Stop reason: HOSPADM

## 2019-08-12 RX ORDER — POTASSIUM CHLORIDE 20 MEQ/15ML
40 SOLUTION ORAL
Status: DISCONTINUED | OUTPATIENT
Start: 2019-08-12 | End: 2019-08-12

## 2019-08-12 RX ORDER — IPRATROPIUM BROMIDE AND ALBUTEROL SULFATE 2.5; .5 MG/3ML; MG/3ML
3 SOLUTION RESPIRATORY (INHALATION) EVERY 4 HOURS PRN
Status: DISCONTINUED | OUTPATIENT
Start: 2019-08-12 | End: 2019-08-16 | Stop reason: HOSPADM

## 2019-08-12 RX ORDER — CITALOPRAM 10 MG/1
10 TABLET ORAL EVERY MORNING
Status: DISCONTINUED | OUTPATIENT
Start: 2019-08-13 | End: 2019-08-16 | Stop reason: HOSPADM

## 2019-08-12 RX ORDER — SODIUM CHLORIDE 9 MG/ML
INJECTION, SOLUTION INTRAVENOUS CONTINUOUS
Status: DISCONTINUED | OUTPATIENT
Start: 2019-08-12 | End: 2019-08-14

## 2019-08-12 RX ORDER — SODIUM,POTASSIUM PHOSPHATES 280-250MG
2 POWDER IN PACKET (EA) ORAL
Status: DISCONTINUED | OUTPATIENT
Start: 2019-08-12 | End: 2019-08-16 | Stop reason: HOSPADM

## 2019-08-12 RX ORDER — SODIUM CHLORIDE 0.9 % (FLUSH) 0.9 %
10 SYRINGE (ML) INJECTION
Status: DISCONTINUED | OUTPATIENT
Start: 2019-08-12 | End: 2019-08-16 | Stop reason: HOSPADM

## 2019-08-12 RX ORDER — ATORVASTATIN CALCIUM 40 MG/1
80 TABLET, FILM COATED ORAL DAILY
Status: DISCONTINUED | OUTPATIENT
Start: 2019-08-13 | End: 2019-08-16 | Stop reason: HOSPADM

## 2019-08-12 RX ORDER — LANOLIN ALCOHOL/MO/W.PET/CERES
800 CREAM (GRAM) TOPICAL
Status: DISCONTINUED | OUTPATIENT
Start: 2019-08-12 | End: 2019-08-16 | Stop reason: HOSPADM

## 2019-08-12 RX ORDER — MAGNESIUM SULFATE HEPTAHYDRATE 40 MG/ML
2 INJECTION, SOLUTION INTRAVENOUS
Status: DISCONTINUED | OUTPATIENT
Start: 2019-08-12 | End: 2019-08-16 | Stop reason: HOSPADM

## 2019-08-12 RX ORDER — POTASSIUM CHLORIDE 20 MEQ/15ML
40 SOLUTION ORAL
Status: DISCONTINUED | OUTPATIENT
Start: 2019-08-12 | End: 2019-08-16 | Stop reason: HOSPADM

## 2019-08-12 RX ORDER — ONDANSETRON 2 MG/ML
4 INJECTION INTRAMUSCULAR; INTRAVENOUS
Status: COMPLETED | OUTPATIENT
Start: 2019-08-12 | End: 2019-08-12

## 2019-08-12 RX ORDER — ASPIRIN 325 MG
325 TABLET ORAL
Status: COMPLETED | OUTPATIENT
Start: 2019-08-12 | End: 2019-08-12

## 2019-08-12 RX ORDER — ONDANSETRON 2 MG/ML
4 INJECTION INTRAMUSCULAR; INTRAVENOUS EVERY 8 HOURS PRN
Status: DISCONTINUED | OUTPATIENT
Start: 2019-08-12 | End: 2019-08-16 | Stop reason: HOSPADM

## 2019-08-12 RX ORDER — POTASSIUM CHLORIDE 20 MEQ/15ML
60 SOLUTION ORAL
Status: DISCONTINUED | OUTPATIENT
Start: 2019-08-12 | End: 2019-08-12

## 2019-08-12 RX ORDER — ASPIRIN 81 MG/1
81 TABLET ORAL DAILY
Status: DISCONTINUED | OUTPATIENT
Start: 2019-08-13 | End: 2019-08-13

## 2019-08-12 RX ORDER — INSULIN ASPART 100 [IU]/ML
0-5 INJECTION, SOLUTION INTRAVENOUS; SUBCUTANEOUS
Status: DISCONTINUED | OUTPATIENT
Start: 2019-08-12 | End: 2019-08-16 | Stop reason: HOSPADM

## 2019-08-12 RX ADMIN — SODIUM CHLORIDE 1000 ML: 900 INJECTION INTRAVENOUS at 03:08

## 2019-08-12 RX ADMIN — POTASSIUM CHLORIDE 40 MEQ: 7.46 INJECTION, SOLUTION INTRAVENOUS at 11:08

## 2019-08-12 RX ADMIN — POTASSIUM CHLORIDE 10 MEQ: 7.46 INJECTION, SOLUTION INTRAVENOUS at 05:08

## 2019-08-12 RX ADMIN — ASPIRIN 325 MG ORAL TABLET 325 MG: 325 PILL ORAL at 02:08

## 2019-08-12 RX ADMIN — SODIUM CHLORIDE: 0.9 INJECTION, SOLUTION INTRAVENOUS at 05:08

## 2019-08-12 RX ADMIN — SODIUM CHLORIDE 1000 ML: 0.9 INJECTION, SOLUTION INTRAVENOUS at 02:08

## 2019-08-12 RX ADMIN — SODIUM CHLORIDE 500 ML: 0.9 INJECTION, SOLUTION INTRAVENOUS at 04:08

## 2019-08-12 RX ADMIN — CALCIUM GLUCONATE 1 G: 94 INJECTION, SOLUTION INTRAVENOUS at 11:08

## 2019-08-12 RX ADMIN — SODIUM CHLORIDE: 0.9 INJECTION, SOLUTION INTRAVENOUS at 06:08

## 2019-08-12 RX ADMIN — ONDANSETRON 4 MG: 2 INJECTION INTRAMUSCULAR; INTRAVENOUS at 02:08

## 2019-08-12 NOTE — ED NOTES
Is alert, Ox3, JT.   VS stable now, out of trendelenberg and maintaining BP after second liter NS infused.  SR on monitor with frequent multi-focal PVC's.  Resps regular and not labored

## 2019-08-12 NOTE — ED PROVIDER NOTES
Encounter Date: 8/12/2019       History     Chief Complaint   Patient presents with    Loss of Consciousness     presen to the ER with c/o syncope with trauma, home health nurse reports pt did not hit ground,      This 74-year-old female is brought to emergency room from residence by EMS after having had a syncopal episode while showering.  Patient has a history only of seemingly passed out for seconds duration.  Since that time she has had diaphoresis and nausea and was noted to have been hypotensive in the field with a systolic pressure of 80 and diastolic of 40.  She had no associated headache or abdominal pain. No complaints of chest pain. No anemia.  No complaints of any palpitations.  She does admit that she has not been eating or drinking much recently.  She has a past history of a left Bell's palsy.  The patient 5 days ago had a left carotid endarterectomy done at [facility] by Dr. Soto.  She does admit that she has taken all of her routine medications does for today.  Medications include Norvasc, alk takes, hydrochlorothiazide, Phenergan with codeine, atorvastatin and aspirin.        Review of patient's allergies indicates:  No Known Allergies  Past Medical History:   Diagnosis Date    Atherosclerosis of renal artery 8/10/2015    7/27/2015: Renal Duplex: Right: severe - 3.2 m/s. Left: about 60% stenosis - 1.7 m/s.    Carotid bruit 7/16/2015 2005: Carotid bruit heard.    Hypercholesterolemia 7/16/2015 2005: Diagnosed. Started statin.    Hypertension, benign 7/16/2015 1995: Diagnosed.    Mild obesity 6/9/2017 6/9/2017: 86 kg. BMI 31.    Subclavian artery stenosis, left 8/10/2015    7/2015: Diagnosed. 7/23/2015: Carotid Duplex: LIZETH: moderate plaquing. LICA: moderate plaquing -1.4 m/s - 50-60%. Left vertebral: retrograde flow.     Past Surgical History:   Procedure Laterality Date    APPENDECTOMY Right 1959    HYSTERECTOMY      OOPHORECTOMY       No family history on file.  Social  History     Tobacco Use    Smoking status: Former Smoker     Packs/day: 1.00     Years: 47.00     Pack years: 47.00     Start date: 1961     Last attempt to quit: 2008     Years since quittin.6    Smokeless tobacco: Never Used   Substance Use Topics    Alcohol use: Yes     Alcohol/week: 0.0 oz     Comment: Socially.    Drug use: Not on file     Review of Systems   Constitutional: Positive for diaphoresis. Negative for activity change, chills and fever.   HENT: Negative for sore throat.    Eyes: Negative.    Respiratory: Negative for cough, chest tightness, shortness of breath and wheezing.    Cardiovascular: Negative for chest pain, palpitations and leg swelling.   Gastrointestinal: Positive for vomiting. Negative for abdominal distention, abdominal pain and nausea.   Genitourinary: Negative for decreased urine volume, dysuria and flank pain.   Musculoskeletal: Negative for back pain, myalgias and neck pain.   Skin: Negative for rash.   Neurological: Positive for syncope, weakness and light-headedness. Negative for seizures, speech difficulty, numbness and headaches.   Hematological: Does not bruise/bleed easily.       Physical Exam     Initial Vitals [19 1426]   BP Pulse Resp Temp SpO2   92/73 88 20 -- 98 %      MAP       --         Physical Exam    Vitals reviewed.  Constitutional: She appears well-developed and well-nourished. She is not diaphoretic. She appears distressed.   HENT:   Head: Normocephalic and atraumatic.   Nose: Nose normal.   Mouth/Throat: Oropharynx is clear and moist. No oropharyngeal exudate.   Eyes: Conjunctivae are normal. Pupils are equal, round, and reactive to light. Right eye exhibits no discharge. Left eye exhibits no discharge. No scleral icterus.   Neck: Normal range of motion. Neck supple. No thyromegaly present. No tracheal deviation present. No JVD present.   Swelling to the left neck with a healing surgical site from her prior carotid endarterectomy 5 days  ago   Cardiovascular: Normal rate, regular rhythm, normal heart sounds and intact distal pulses. Exam reveals no gallop and no friction rub.    No murmur heard.  Pulmonary/Chest: Breath sounds normal. No stridor. No respiratory distress. She has no wheezes. She has no rhonchi. She has no rales. She exhibits no tenderness.   Abdominal: Soft. Bowel sounds are normal. She exhibits no distension and no mass. There is no tenderness. There is no rebound and no guarding.   Musculoskeletal: Normal range of motion. She exhibits no edema or tenderness.   Lymphadenopathy:     She has no cervical adenopathy.   Neurological: She is alert and oriented to person, place, and time. She has normal strength. No cranial nerve deficit or sensory deficit. GCS score is 15. GCS eye subscore is 4. GCS verbal subscore is 5. GCS motor subscore is 6.   Skin: Skin is warm and dry. Capillary refill takes less than 2 seconds. No rash noted. No erythema. No pallor.   Psychiatric: She has a normal mood and affect. Her behavior is normal. Judgment and thought content normal.         ED Course   Procedures  Labs Reviewed   CBC W/ AUTO DIFFERENTIAL   COMPREHENSIVE METABOLIC PANEL   TROPONIN I   B-TYPE NATRIURETIC PEPTIDE   TYPE & SCREEN          Imaging Results    None                       Attending Attestation:             Attending ED Notes:   This 74-year-old female was approximately 5 days s/p left carotid endarterectomy, had a syncopal episode her shower at home.  In the pre-hospital setting the patient was hypotensive with a systolic pressure of 80.  In the field the patient was being given normal saline IV and route.  At the time of the patient's presentation to the emergency room she received less than 500 cc.  As soon as she arrived in the ED her systolic pressures varied between 40 and 60.  The patient had additional IV saline given, 1 L with improved her blood pressure.  Systolic pressures improved to 118.  The diagnostic studies showed a  low potassium at 2.8 for which she started receiving potassium riders.  Temperature is only remarkable for mildly elevated blood sugar 138.  H and H is 10 and 32.  The cardiac enzyme tripped, troponin was normal.  The patient's chest x-ray was negative. EKG showed a left bundle with some inferior lateral ischemia.  Because of the of presentation and findings the patient will be admitted to ICU by hospital medicine service.  Dr. Greene will be admitting the patient.             Clinical Impression:       ICD-10-CM ICD-9-CM   1. Syncope, unspecified syncope type R55 780.2   2. Hypotension, unspecified hypotension type I95.9 458.9   3. Hypokalemia E87.6 276.8                                Balaji Murillo Jr., MD  08/12/19 2858

## 2019-08-12 NOTE — ED NOTES
Following staff in room assisting in pt care, nurse Hunter, nurse Radha, nurse juan jose, ER tech, pt remains in trendelenburg,

## 2019-08-13 ENCOUNTER — CLINICAL SUPPORT (OUTPATIENT)
Dept: CARDIOLOGY | Facility: HOSPITAL | Age: 74
DRG: 066 | End: 2019-08-13
Attending: EMERGENCY MEDICINE
Payer: MEDICARE

## 2019-08-13 VITALS — BODY MASS INDEX: 31.99 KG/M2 | HEIGHT: 64 IN | WEIGHT: 187.38 LBS

## 2019-08-13 PROBLEM — E87.6 HYPOKALEMIA: Status: ACTIVE | Noted: 2019-08-13

## 2019-08-13 PROBLEM — I95.9 HYPOTENSION: Status: ACTIVE | Noted: 2019-08-13

## 2019-08-13 PROBLEM — I63.9 CVA (CEREBRAL VASCULAR ACCIDENT): Status: ACTIVE | Noted: 2019-08-13

## 2019-08-13 LAB
ALBUMIN SERPL BCP-MCNC: 3 G/DL (ref 3.5–5.2)
ALBUMIN SERPL BCP-MCNC: 3.5 G/DL (ref 3.5–5.2)
ALP SERPL-CCNC: 77 U/L (ref 55–135)
ALP SERPL-CCNC: 98 U/L (ref 55–135)
ALT SERPL W/O P-5'-P-CCNC: 19 U/L (ref 10–44)
ALT SERPL W/O P-5'-P-CCNC: 24 U/L (ref 10–44)
ANION GAP SERPL CALC-SCNC: 4 MMOL/L (ref 8–16)
ANION GAP SERPL CALC-SCNC: 8 MMOL/L (ref 8–16)
AORTIC ROOT ANNULUS: 2.96 CM
AORTIC VALVE CUSP SEPERATION: 1.56 CM
AST SERPL-CCNC: 24 U/L (ref 10–40)
AST SERPL-CCNC: 30 U/L (ref 10–40)
AV INDEX (PROSTH): 0.5
AV MEAN GRADIENT: 10 MMHG
AV PEAK GRADIENT: 16 MMHG
AV VALVE AREA: 1.6 CM2
AV VELOCITY RATIO: 42.36
BASOPHILS # BLD AUTO: 0.04 K/UL (ref 0–0.2)
BASOPHILS NFR BLD: 0.4 % (ref 0–1.9)
BILIRUB SERPL-MCNC: 0.4 MG/DL (ref 0.1–1)
BILIRUB SERPL-MCNC: 0.6 MG/DL (ref 0.1–1)
BSA FOR ECHO PROCEDURE: 1.96 M2
BUN SERPL-MCNC: 11 MG/DL (ref 8–23)
BUN SERPL-MCNC: 8 MG/DL (ref 8–23)
CALCIUM SERPL-MCNC: 8.2 MG/DL (ref 8.7–10.5)
CALCIUM SERPL-MCNC: 8.5 MG/DL (ref 8.7–10.5)
CHLORIDE SERPL-SCNC: 103 MMOL/L (ref 95–110)
CHLORIDE SERPL-SCNC: 107 MMOL/L (ref 95–110)
CK MB SERPL-MCNC: 7.3 NG/ML (ref 0.1–6.5)
CK MB SERPL-MCNC: 8.9 NG/ML (ref 0.1–6.5)
CO2 SERPL-SCNC: 27 MMOL/L (ref 23–29)
CO2 SERPL-SCNC: 28 MMOL/L (ref 23–29)
CREAT SERPL-MCNC: 0.5 MG/DL (ref 0.5–1.4)
CREAT SERPL-MCNC: 0.6 MG/DL (ref 0.5–1.4)
CV ECHO LV RWT: 48.38 CM
DIFFERENTIAL METHOD: ABNORMAL
DOP CALC AO PEAK VEL: 2.02 M/S
DOP CALC AO VTI: 40.76 CM
DOP CALC LVOT AREA: 3.2 CM2
DOP CALC LVOT DIAMETER: 2.01 CM
DOP CALC LVOT PEAK VEL: 85.56 M/S
DOP CALC LVOT STROKE VOLUME: 65.17 CM3
DOP CALCLVOT PEAK VEL VTI: 20.55 CM
E WAVE DECELERATION TIME: 190.62 MSEC
E/A RATIO: 0.59
E/E' RATIO: 9 M/S
ECHO LV POSTERIOR WALL: 127 CM (ref 0.6–1.1)
EOSINOPHIL # BLD AUTO: 0.2 K/UL (ref 0–0.5)
EOSINOPHIL NFR BLD: 2.2 % (ref 0–8)
ERYTHROCYTE [DISTWIDTH] IN BLOOD BY AUTOMATED COUNT: 14.7 % (ref 11.5–14.5)
EST. GFR  (AFRICAN AMERICAN): >60 ML/MIN/1.73 M^2
EST. GFR  (AFRICAN AMERICAN): >60 ML/MIN/1.73 M^2
EST. GFR  (NON AFRICAN AMERICAN): >60 ML/MIN/1.73 M^2
EST. GFR  (NON AFRICAN AMERICAN): >60 ML/MIN/1.73 M^2
FRACTIONAL SHORTENING: 22 % (ref 28–44)
GLUCOSE SERPL-MCNC: 118 MG/DL (ref 70–110)
GLUCOSE SERPL-MCNC: 123 MG/DL (ref 70–110)
GLUCOSE SERPL-MCNC: 138 MG/DL (ref 70–110)
GLUCOSE SERPL-MCNC: 162 MG/DL (ref 70–110)
GLUCOSE SERPL-MCNC: 95 MG/DL (ref 70–110)
HCT VFR BLD AUTO: 29.7 % (ref 37–48.5)
HGB BLD-MCNC: 9.2 G/DL (ref 12–16)
IMM GRANULOCYTES # BLD AUTO: 0.04 K/UL (ref 0–0.04)
IMM GRANULOCYTES NFR BLD AUTO: 0.4 % (ref 0–0.5)
INTERVENTRICULAR SEPTUM: 1.27 CM (ref 0.6–1.1)
IVRT: 62.16 MSEC
LEFT ATRIUM SIZE: 3.08 CM
LEFT INTERNAL DIMENSION IN SYSTOLE: 4.09 CM (ref 2.1–4)
LEFT VENTRICLE DIASTOLIC VOLUME INDEX: 55.7 ML/M2
LEFT VENTRICLE DIASTOLIC VOLUME: 105.98 ML
LEFT VENTRICLE MASS INDEX: ABNORMAL G/M2
LEFT VENTRICLE SYSTOLIC VOLUME INDEX: 30.8 ML/M2
LEFT VENTRICLE SYSTOLIC VOLUME: 58.6 ML
LEFT VENTRICULAR INTERNAL DIMENSION IN DIASTOLE: 5.25 CM (ref 3.5–6)
LEFT VENTRICULAR MASS: ABNORMAL G
LV LATERAL E/E' RATIO: 9 M/S
LV SEPTAL E/E' RATIO: 9 M/S
LYMPHOCYTES # BLD AUTO: 2.3 K/UL (ref 1–4.8)
LYMPHOCYTES NFR BLD: 21.3 % (ref 18–48)
MAGNESIUM SERPL-MCNC: 1.6 MG/DL (ref 1.6–2.6)
MCH RBC QN AUTO: 27.5 PG (ref 27–31)
MCHC RBC AUTO-ENTMCNC: 31 G/DL (ref 32–36)
MCV RBC AUTO: 89 FL (ref 82–98)
MONOCYTES # BLD AUTO: 1 K/UL (ref 0.3–1)
MONOCYTES NFR BLD: 9.2 % (ref 4–15)
MV PEAK A VEL: 1.23 M/S
MV PEAK E VEL: 0.72 M/S
NEUTROPHILS # BLD AUTO: 7.2 K/UL (ref 1.8–7.7)
NEUTROPHILS NFR BLD: 66.5 % (ref 38–73)
NRBC BLD-RTO: 0 /100 WBC
PHOSPHATE SERPL-MCNC: 2.2 MG/DL (ref 2.7–4.5)
PISA TR MAX VEL: 2.74 M/S
PLATELET # BLD AUTO: 261 K/UL (ref 150–350)
PMV BLD AUTO: 11.6 FL (ref 9.2–12.9)
POTASSIUM SERPL-SCNC: 3.4 MMOL/L (ref 3.5–5.1)
POTASSIUM SERPL-SCNC: 3.4 MMOL/L (ref 3.5–5.1)
PROT SERPL-MCNC: 5.5 G/DL (ref 6–8.4)
PROT SERPL-MCNC: 6.5 G/DL (ref 6–8.4)
PV PEAK VELOCITY: 106.58 CM/S
RBC # BLD AUTO: 3.35 M/UL (ref 4–5.4)
RIGHT VENTRICULAR END-DIASTOLIC DIMENSION: 200 CM
SODIUM SERPL-SCNC: 138 MMOL/L (ref 136–145)
SODIUM SERPL-SCNC: 139 MMOL/L (ref 136–145)
TDI LATERAL: 0.08 M/S
TDI SEPTAL: 0.08 M/S
TDI: 0.08 M/S
TR MAX PG: 30 MMHG
WBC # BLD AUTO: 10.82 K/UL (ref 3.9–12.7)

## 2019-08-13 PROCEDURE — 95819 EEG AWAKE AND ASLEEP: CPT

## 2019-08-13 PROCEDURE — 20000000 HC ICU ROOM

## 2019-08-13 PROCEDURE — 82553 CREATINE MB FRACTION: CPT

## 2019-08-13 PROCEDURE — 94761 N-INVAS EAR/PLS OXIMETRY MLT: CPT

## 2019-08-13 PROCEDURE — 80053 COMPREHEN METABOLIC PANEL: CPT

## 2019-08-13 PROCEDURE — 94640 AIRWAY INHALATION TREATMENT: CPT

## 2019-08-13 PROCEDURE — 25000003 PHARM REV CODE 250: Performed by: INTERNAL MEDICINE

## 2019-08-13 PROCEDURE — 25000003 PHARM REV CODE 250

## 2019-08-13 PROCEDURE — 63600175 PHARM REV CODE 636 W HCPCS: Performed by: NURSE PRACTITIONER

## 2019-08-13 PROCEDURE — 84100 ASSAY OF PHOSPHORUS: CPT

## 2019-08-13 PROCEDURE — 85025 COMPLETE CBC W/AUTO DIFF WBC: CPT

## 2019-08-13 PROCEDURE — 25500020 PHARM REV CODE 255: Performed by: INTERNAL MEDICINE

## 2019-08-13 PROCEDURE — 25000003 PHARM REV CODE 250: Performed by: NURSE PRACTITIONER

## 2019-08-13 PROCEDURE — 93306 TTE W/DOPPLER COMPLETE: CPT

## 2019-08-13 PROCEDURE — 83735 ASSAY OF MAGNESIUM: CPT

## 2019-08-13 PROCEDURE — 82553 CREATINE MB FRACTION: CPT | Mod: 91

## 2019-08-13 PROCEDURE — 36415 COLL VENOUS BLD VENIPUNCTURE: CPT

## 2019-08-13 PROCEDURE — 99900035 HC TECH TIME PER 15 MIN (STAT)

## 2019-08-13 PROCEDURE — 80053 COMPREHEN METABOLIC PANEL: CPT | Mod: 91

## 2019-08-13 PROCEDURE — 92610 EVALUATE SWALLOWING FUNCTION: CPT

## 2019-08-13 RX ORDER — LACTULOSE 10 G/15ML
20 SOLUTION ORAL 3 TIMES DAILY
Status: DISPENSED | OUTPATIENT
Start: 2019-08-13 | End: 2019-08-15

## 2019-08-13 RX ORDER — MUPIROCIN 20 MG/G
OINTMENT TOPICAL 2 TIMES DAILY
Status: DISCONTINUED | OUTPATIENT
Start: 2019-08-13 | End: 2019-08-16 | Stop reason: HOSPADM

## 2019-08-13 RX ORDER — CHLORHEXIDINE GLUCONATE ORAL RINSE 1.2 MG/ML
15 SOLUTION DENTAL 2 TIMES DAILY
Status: DISCONTINUED | OUTPATIENT
Start: 2019-08-13 | End: 2019-08-16 | Stop reason: HOSPADM

## 2019-08-13 RX ORDER — ASPIRIN 325 MG
325 TABLET ORAL DAILY
Status: DISCONTINUED | OUTPATIENT
Start: 2019-08-13 | End: 2019-08-16 | Stop reason: HOSPADM

## 2019-08-13 RX ADMIN — MAGNESIUM SULFATE 2 G: 2 INJECTION INTRAVENOUS at 06:08

## 2019-08-13 RX ADMIN — ASPIRIN 325 MG ORAL TABLET 81 MG: 325 PILL ORAL at 01:08

## 2019-08-13 RX ADMIN — ASPIRIN 81 MG: 81 TABLET, COATED ORAL at 08:08

## 2019-08-13 RX ADMIN — SODIUM PHOSPHATE, MONOBASIC, MONOHYDRATE AND SODIUM PHOSPHATE, DIBASIC, ANHYDROUS 20.01 MMOL: 276; 142 INJECTION, SOLUTION INTRAVENOUS at 08:08

## 2019-08-13 RX ADMIN — LACTULOSE 20 G: 20 SOLUTION ORAL at 09:08

## 2019-08-13 RX ADMIN — PANTOPRAZOLE SODIUM 40 MG: 40 TABLET, DELAYED RELEASE ORAL at 08:08

## 2019-08-13 RX ADMIN — CHLORHEXIDINE GLUCONATE 15 ML: 1.2 RINSE ORAL at 09:08

## 2019-08-13 RX ADMIN — MUPIROCIN: 20 OINTMENT TOPICAL at 09:08

## 2019-08-13 RX ADMIN — ATORVASTATIN CALCIUM 80 MG: 40 TABLET, FILM COATED ORAL at 08:08

## 2019-08-13 RX ADMIN — IOHEXOL 100 ML: 350 INJECTION, SOLUTION INTRAVENOUS at 11:08

## 2019-08-13 NOTE — PROGRESS NOTES
[facility] Medicine  Progress Note    Patient Name: Albina Armenta  MRN: [id number]  Patient Class: IP- Inpatient   Admission Date: 8/12/2019  Length of Stay: 1 days  Attending Physician: Rashaun Greene MD  Primary Care Provider: Shyam Gilmore MD        Subjective:     Principal Problem:CVA (cerebral vascular accident)        HPI:  Patient is brought to the ED via EMS after having a near syncopal event while in the shower. The patient was at home in the shower with her home health nurse when she suddenly became weak and slid to the shower floor. The patient remembers the event and she reports she did not lose consciousness. The caregiver who was present also reports that the patient remained alert and never loss consciousness and she did not hit her head. EMS was called and the patient was found to have BP ~80/40. She was started on IVF at that time with improvement of her BP. By the time the patient arrived to the ED she received ~500cc of IVF. BP was reached low as 60s systolic in ED but improved with IVF.     The patient reports she had a Left carotid endarterectomy 5 days ago at [facility]. The daughter who is at the bedside reports that the patient has had difficulty swallowing since the surgery due to damaged nerves in her neck. She was evaluated by ST and has been tolerating mostly liquid and soft diet.  The patient reports she has had decreased oral intake. She also reports she has been taking HCTZ that the daughter states was recently discontinued by the doctor.  The patient denies fever, chills, chest pain, sob, headache, visual disturbances, focal neuro deficits, vomiting, abdominal pain, dysuria, or diarrhea. She does endorse intermittent nausea. She has a history of L Hurt's Palsy, DM, HTN.     Overview/Hospital Course:  CT brain showed lesions on L occipital and parietal lobe  Awaiting CTA report  Patient/PCP/Daughter updated about the new findings  She denies any  symptoms except dysphagia      Interval History:     Review of Systems   Constitutional: Negative for activity change and appetite change.   HENT: Negative for congestion and dental problem.    Eyes: Negative for discharge and itching.   Respiratory: Negative for shortness of breath.    Cardiovascular: Negative for chest pain.   Gastrointestinal: Negative for abdominal distention and abdominal pain.        Dysphagia   Endocrine: Negative for cold intolerance.   Genitourinary: Negative for difficulty urinating and dysuria.   Musculoskeletal: Negative for arthralgias and back pain.   Skin: Negative for color change.   Neurological: Negative for dizziness and facial asymmetry.   Hematological: Negative for adenopathy.   Psychiatric/Behavioral: Negative for agitation and behavioral problems.     Objective:     Vital Signs (Most Recent):  Temp: 98.2 °F (36.8 °C) (08/13/19 0715)  Pulse: 85 (08/13/19 1115)  Resp: 17 (08/13/19 1115)  BP: (!) 112/56 (08/13/19 1115)  SpO2: (!) 94 % (08/13/19 1115) Vital Signs (24h Range):  Temp:  [97.9 °F (36.6 °C)-98.5 °F (36.9 °C)] 98.2 °F (36.8 °C)  Pulse:  [82-98] 85  Resp:  [13-35] 17  SpO2:  [91 %-100 %] 94 %  BP: ()/(49-73) 112/56     Weight: 85 kg (187 lb 6.3 oz)  Body mass index is 32.17 kg/m².    Intake/Output Summary (Last 24 hours) at 8/13/2019 1152  Last data filed at 8/13/2019 0620  Gross per 24 hour   Intake 4258.33 ml   Output 1700 ml   Net 2558.33 ml      Physical Exam   Constitutional: She is oriented to person, place, and time. No distress.   Eyes: Pupils are equal, round, and reactive to light. EOM are normal.   Neck: Neck supple.   Cardiovascular: Normal rate.   Pulmonary/Chest: Effort normal and breath sounds normal.   Abdominal: Soft. Bowel sounds are normal.   Musculoskeletal: Normal range of motion. She exhibits no edema.   Neurological: She is alert and oriented to person, place, and time.   Skin: Skin is warm.   Psychiatric: She has a normal mood and affect.    Nursing note and vitals reviewed.      Significant Labs:   CBC:   Recent Labs   Lab 08/12/19  1500 08/13/19  0333   WBC 12.79* 10.82   HGB 10.0* 9.2*   HCT 32.3* 29.7*    261     CMP:   Recent Labs   Lab 08/12/19  1500 08/12/19 2128 08/13/19  0333    138 139   K 2.8* 3.2* 3.4*    104 107   CO2 25 27 28   * 115* 95   BUN 18 15 11   CREATININE 0.6 0.6 0.6   CALCIUM 7.7* 7.9* 8.2*   PROT 5.6*  --  5.5*   ALBUMIN 3.0*  --  3.0*   BILITOT 0.8  --  0.6   ALKPHOS 75  --  77   AST 22  --  24   ALT 18  --  19   ANIONGAP 10 7* 4*   EGFRNONAA >60.0 >60.0 >60.0       Significant Imaging: I have reviewed all pertinent imaging results/findings within the past 24 hours.      Assessment/Plan:      * CVA (cerebral vascular accident)  CT with concern for subacute vs chronic infarct. No reported history of CVA. Left carotid endarterectomy 5 days ago  Awaiting CTA Head and neck results  Pt Neurologically intact      Hypokalemia  Likely secondary to diuretic (HCTZ)  Replacement in ED  Trend electrolytes and replace per sliding scale      Hypotension  Possibly secondary to hypovolemia due to poor oral intake and diuretics  IV hydration- monitor for fluid overload        Syncope  Syncope due to CVA in the background of dehydration/Hypotension          VTE Risk Mitigation (From admission, onward)        Ordered     IP VTE HIGH RISK PATIENT  Once      08/12/19 2050     Place ROM hose  Until discontinued      08/12/19 2050                Denilson Haryd MD  Department of Hospital Medicine   [facility]

## 2019-08-13 NOTE — PT/OT/SLP PROGRESS
Physical Therapy      Patient Name:  Albina Armenta   MRN:  [id number]    Patient not seen today secondary to Other (Comment)(pt awaiting results of further tests. Hold until results in due to occlusion of L carotid artery.). Will follow-up 08/14/19  .    Dyan Salazar, PT

## 2019-08-13 NOTE — ASSESSMENT & PLAN NOTE
CT with concern for subacute vs chronic infarct. No reported history of CVA. Left carotid endarterectomy 5 days ago  Awaiting CTA Head and neck results  Pt Neurologically intact

## 2019-08-13 NOTE — SUBJECTIVE & OBJECTIVE
Interval History:     Review of Systems   Constitutional: Negative for activity change and appetite change.   HENT: Negative for congestion and dental problem.    Eyes: Negative for discharge and itching.   Respiratory: Negative for shortness of breath.    Cardiovascular: Negative for chest pain.   Gastrointestinal: Negative for abdominal distention and abdominal pain.        Dysphagia   Endocrine: Negative for cold intolerance.   Genitourinary: Negative for difficulty urinating and dysuria.   Musculoskeletal: Negative for arthralgias and back pain.   Skin: Negative for color change.   Neurological: Negative for dizziness and facial asymmetry.   Hematological: Negative for adenopathy.   Psychiatric/Behavioral: Negative for agitation and behavioral problems.     Objective:     Vital Signs (Most Recent):  Temp: 98.2 °F (36.8 °C) (08/13/19 0715)  Pulse: 85 (08/13/19 1115)  Resp: 17 (08/13/19 1115)  BP: (!) 112/56 (08/13/19 1115)  SpO2: (!) 94 % (08/13/19 1115) Vital Signs (24h Range):  Temp:  [97.9 °F (36.6 °C)-98.5 °F (36.9 °C)] 98.2 °F (36.8 °C)  Pulse:  [82-98] 85  Resp:  [13-35] 17  SpO2:  [91 %-100 %] 94 %  BP: ()/(49-73) 112/56     Weight: 85 kg (187 lb 6.3 oz)  Body mass index is 32.17 kg/m².    Intake/Output Summary (Last 24 hours) at 8/13/2019 1152  Last data filed at 8/13/2019 0620  Gross per 24 hour   Intake 4258.33 ml   Output 1700 ml   Net 2558.33 ml      Physical Exam   Constitutional: She is oriented to person, place, and time. No distress.   Eyes: Pupils are equal, round, and reactive to light. EOM are normal.   Neck: Neck supple.   Cardiovascular: Normal rate.   Pulmonary/Chest: Effort normal and breath sounds normal.   Abdominal: Soft. Bowel sounds are normal.   Musculoskeletal: Normal range of motion. She exhibits no edema.   Neurological: She is alert and oriented to person, place, and time.   Skin: Skin is warm.   Psychiatric: She has a normal mood and affect.   Nursing note and vitals  reviewed.      Significant Labs:   CBC:   Recent Labs   Lab 08/12/19  1500 08/13/19  0333   WBC 12.79* 10.82   HGB 10.0* 9.2*   HCT 32.3* 29.7*    261     CMP:   Recent Labs   Lab 08/12/19 1500 08/12/19 2128 08/13/19  0333    138 139   K 2.8* 3.2* 3.4*    104 107   CO2 25 27 28   * 115* 95   BUN 18 15 11   CREATININE 0.6 0.6 0.6   CALCIUM 7.7* 7.9* 8.2*   PROT 5.6*  --  5.5*   ALBUMIN 3.0*  --  3.0*   BILITOT 0.8  --  0.6   ALKPHOS 75  --  77   AST 22  --  24   ALT 18  --  19   ANIONGAP 10 7* 4*   EGFRNONAA >60.0 >60.0 >60.0       Significant Imaging: I have reviewed all pertinent imaging results/findings within the past 24 hours.

## 2019-08-13 NOTE — ASSESSMENT & PLAN NOTE
Possibly secondary to hypovolemia due to poor oral intake and diuretics  IV hydration- monitor for fluid overload  Trend CE  2-D echo

## 2019-08-13 NOTE — SUBJECTIVE & OBJECTIVE
Past Medical History:   Diagnosis Date    Atherosclerosis of renal artery 8/10/2015    7/27/2015: Renal Duplex: Right: severe - 3.2 m/s. Left: about 60% stenosis - 1.7 m/s.    Carotid bruit 7/16/2015 2005: Carotid bruit heard.    Coronary artery disease     Hypercholesterolemia 7/16/2015 2005: Diagnosed. Started statin.    Hypertension, benign 7/16/2015 1995: Diagnosed.    Mild obesity 6/9/2017 6/9/2017: 86 kg. BMI 31.    Subclavian artery stenosis, left 8/10/2015    7/2015: Diagnosed. 7/23/2015: Carotid Duplex: LIZETH: moderate plaquing. LICA: moderate plaquing -1.4 m/s - 50-60%. Left vertebral: retrograde flow.       Past Surgical History:   Procedure Laterality Date    APPENDECTOMY Right 1959    HYSTERECTOMY      OOPHORECTOMY      VASCULAR SURGERY         Review of patient's allergies indicates:  No Known Allergies    No current facility-administered medications on file prior to encounter.      Current Outpatient Medications on File Prior to Encounter   Medication Sig    amLODIPine (NORVASC) 10 MG tablet Take 1 tablet (10 mg total) by mouth once daily. (Patient taking differently: Take 10 mg by mouth every morning. )    ascorbic acid/multivit-min (EMERGEN-C ORAL) Take 1 packet by mouth every morning.    aspirin (ECOTRIN) 81 MG EC tablet Take 1 tablet (81 mg total) by mouth once daily. (Patient taking differently: Take 81 mg by mouth every morning. )    atorvastatin (LIPITOR) 80 MG tablet Take 1 tablet (80 mg total) by mouth once daily. (Patient taking differently: Take 80 mg by mouth every morning. )    citalopram (CELEXA) 10 MG tablet Take 10 mg by mouth every morning.     hydroCHLOROthiazide (MICROZIDE) 12.5 mg capsule Take 1 capsule (12.5 mg total) by mouth once daily. (Patient taking differently: Take 12.5 mg by mouth every morning. )    nut.tx.impaired digestive fxn (VITAL HIGH PROTEIN ORAL) Take 1 tablet by mouth every morning.    omeprazole (PRILOSEC) 40 MG capsule Take 40 mg  by mouth every morning.    ramipril (ALTACE) 10 MG capsule Take 1 capsule (10 mg total) by mouth once daily. (Patient taking differently: Take 10 mg by mouth every morning. )     Family History     Problem Relation (Age of Onset)    Hypertension Mother, Father        Tobacco Use    Smoking status: Former Smoker     Packs/day: 1.00     Years: 47.00     Pack years: 47.00     Types: Cigarettes     Start date: 1961     Last attempt to quit: 2008     Years since quittin.6    Smokeless tobacco: Never Used   Substance and Sexual Activity    Alcohol use: Not Currently     Alcohol/week: 0.0 oz     Comment: Socially.    Drug use: Not Currently    Sexual activity: Not Currently     Review of Systems   Constitutional: Positive for diaphoresis and fatigue. Negative for fever and unexpected weight change.   HENT: Positive for trouble swallowing. Negative for drooling, ear discharge, ear pain, facial swelling, nosebleeds, rhinorrhea, sore throat, tinnitus and voice change.    Eyes: Negative for photophobia and visual disturbance.   Respiratory: Negative for cough, choking, chest tightness, shortness of breath and wheezing.    Cardiovascular: Negative for chest pain, palpitations and leg swelling.   Gastrointestinal: Positive for nausea. Negative for abdominal pain, blood in stool, constipation, diarrhea and vomiting.   Endocrine: Negative for polydipsia, polyphagia and polyuria.   Genitourinary: Negative for difficulty urinating, dysuria, flank pain, frequency, hematuria, urgency, vaginal bleeding and vaginal discharge.   Musculoskeletal: Negative for gait problem, myalgias, neck pain and neck stiffness.   Skin: Negative for rash and wound.   Neurological: Positive for syncope and weakness. Negative for dizziness, tremors, seizures, facial asymmetry, speech difficulty, light-headedness, numbness and headaches.   Hematological: Does not bruise/bleed easily.   Psychiatric/Behavioral: Negative for confusion, sleep  disturbance and suicidal ideas. The patient is not nervous/anxious.      Objective:     Vital Signs (Most Recent):  Temp: 98.3 °F (36.8 °C) (08/12/19 2300)  Pulse: 87 (08/13/19 0030)  Resp: (!) 21 (08/13/19 0030)  BP: (!) 104/51 (08/13/19 0030)  SpO2: (!) 93 % (08/13/19 0030) Vital Signs (24h Range):  Temp:  [97.9 °F (36.6 °C)-98.5 °F (36.9 °C)] 98.3 °F (36.8 °C)  Pulse:  [82-98] 87  Resp:  [13-35] 21  SpO2:  [93 %-100 %] 93 %  BP: ()/(49-73) 104/51     Weight: 85 kg (187 lb 6.3 oz)  Body mass index is 32.17 kg/m².    Physical Exam   Constitutional: She is oriented to person, place, and time. She appears well-developed and well-nourished.   HENT:   Head: Normocephalic and atraumatic.   Eyes: Pupils are equal, round, and reactive to light. Conjunctivae, EOM and lids are normal.   Neck: Trachea normal, normal range of motion and full passive range of motion without pain. No tracheal tenderness present. Edema present. No tracheal deviation present.   Surgical incision to left carotid with mild edema. No redness, drainage. Skin intact.    Cardiovascular: Normal rate, regular rhythm, S1 normal, S2 normal, normal heart sounds, intact distal pulses and normal pulses.   Pulmonary/Chest: Effort normal and breath sounds normal. No stridor. No respiratory distress. She has no wheezes.   Abdominal: Soft. Bowel sounds are normal. There is no tenderness.   Musculoskeletal: Normal range of motion.   Neurological: She is alert and oriented to person, place, and time. She has normal strength. No cranial nerve deficit or sensory deficit. GCS eye subscore is 4. GCS verbal subscore is 5. GCS motor subscore is 6.   Skin: Skin is warm and dry. Capillary refill takes less than 2 seconds.   Surgical incision to left neck intact  with mild edema. No redness or drainage.          CRANIAL NERVES     CN III, IV, VI   Pupils are equal, round, and reactive to light.  Extraocular motions are normal.        Significant Labs:   CBC:   Recent  Labs   Lab 08/12/19  1500   WBC 12.79*   HGB 10.0*   HCT 32.3*        CMP:   Recent Labs   Lab 08/12/19  1500 08/12/19 2128    138   K 2.8* 3.2*    104   CO2 25 27   * 115*   BUN 18 15   CREATININE 0.6 0.6   CALCIUM 7.7* 7.9*   PROT 5.6*  --    ALBUMIN 3.0*  --    BILITOT 0.8  --    ALKPHOS 75  --    AST 22  --    ALT 18  --    ANIONGAP 10 7*   EGFRNONAA >60.0 >60.0     Cardiac Markers:   Recent Labs   Lab 08/12/19  1500   BNP 40     Magnesium: No results for input(s): MG in the last 48 hours.  Troponin:   Recent Labs   Lab 08/12/19  1500 08/12/19  1800 08/12/19 2128   TROPONINI <0.030 <0.030 <0.030     Urine Studies:   Recent Labs   Lab 08/12/19  1520   COLORU Yellow   APPEARANCEUA Clear   PHUR 7.0   SPECGRAV 1.010   PROTEINUA Trace*   GLUCUA Negative   KETONESU Negative   BILIRUBINUA Negative   OCCULTUA Negative   NITRITE Negative   UROBILINOGEN Negative   LEUKOCYTESUR Negative       Significant Imaging: I have reviewed all pertinent imaging results/findings within the past 24 hours.

## 2019-08-13 NOTE — ASSESSMENT & PLAN NOTE
Likely secondary to diuretic (HCTZ)  Replacement in ED  Trend electrolytes and replace per sliding scale

## 2019-08-13 NOTE — HPI
Patient is brought to the ED via EMS after having a near syncopal event while in the shower. The patient was at home in the shower with her home health nurse when she suddenly became weak and slid to the shower floor. The patient remembers the event and she reports she did not lose consciousness. The caregiver who was present also reports that the patient remained alert and never loss consciousness and she did not hit her head. EMS was called and the patient was found to have BP ~80/40. She was started on IVF at that time with improvement of her BP. By the time the patient arrived to the ED she received ~500cc of IVF. BP was reached low as 60s systolic in ED but improved with IVF.     The patient reports she had a Left carotid endarterectomy 5 days ago at [facility]. The daughter who is at the bedside reports that the patient has had difficulty swallowing since the surgery due to damaged nerves in her neck. She was evaluated by ST and has been tolerating mostly liquid and soft diet.  The patient reports she has had decreased oral intake. She also reports she has been taking HCTZ that the daughter states was recently discontinued by the doctor.  The patient denies fever, chills, chest pain, sob, headache, visual disturbances, focal neuro deficits, vomiting, abdominal pain, dysuria, or diarrhea. She does endorse intermittent nausea. She has a history of L Hurt's Palsy, DM, HTN.

## 2019-08-13 NOTE — CONSULTS
[facility]  Neurology  Consult Note    Patient Name: Albina Armenta  MRN: [id number]  Admission Date: 8/12/2019  Hospital Length of Stay: 1 days  Code Status: Full Code   Attending Provider: Rashaun Greene MD   Consulting Provider: Karen Shaffer NP  Primary Care Physician: Shyam Gilmore MD  Principal Problem:CVA (cerebral vascular accident)    Inpatient consult to Neurology  Consult performed by: Karen Shaffer NP  Consult ordered by: Ele Walker NP        Subjective:     Chief Complaint:  Near syncope    HPI: Patient presented to the ER with near syncope during her shower, BP was hypotensive 80/40's, she has not been eating or drinking well. She does report a recent L CEA last Wednesday. She denies focal weakness or any other complaints and is feeling better.   H/o chronic Bell's Palsy to the R side  Denies h/o afib    Labs:   Crt 0.6    Imaging:  MRI brain: Degradation by motion.  Multiple foci of restricted diffusion within both cerebral hemispheres and right cerebellar hemisphere compatible with regions of ischemia/infarction.  Involvement of multiple vascular distributions is suspicious for embolic phenomenon.  There are associated changes of mild local mass effect.    CTA head/neck: Recent surgical changes within the left side of the neck in keeping with history of endarterectomy.  A kinked appearance of the left ICA results in approximately 70-80% maximum diameter narrowing.  There are otherwise scattered atheromatous changes in the left ICA.  Approximately 50% maximum diameter stenosis involving the proximal segment of right ICA.  Evidence of hemodynamically significant narrowing involving the origin of the right brachiocephalic artery and left common carotid artery.  There is high-grade stenosis or occlusion of the proximal segment of the left subclavian artery.  Areas of focal occlusion involving the right vertebral artery.  There is atheromatous narrowing of the origin of the  left vertebral artery.  Atheromatous changes observed involving the intracranial arteries without evidence of high-grade stenosis or occlusion.  A small right A1 segment and absence of right P1 segment are likely anatomic variance.      Past Medical History:   Diagnosis Date    Atherosclerosis of renal artery 8/10/2015    7/27/2015: Renal Duplex: Right: severe - 3.2 m/s. Left: about 60% stenosis - 1.7 m/s.    Carotid bruit 7/16/2015 2005: Carotid bruit heard.    Coronary artery disease     CVA (cerebral vascular accident) 8/13/2019    Hypercholesterolemia 7/16/2015 2005: Diagnosed. Started statin.    Hypertension, benign 7/16/2015 1995: Diagnosed.    Mild obesity 6/9/2017 6/9/2017: 86 kg. BMI 31.    Subclavian artery stenosis, left 8/10/2015    7/2015: Diagnosed. 7/23/2015: Carotid Duplex: LIZETH: moderate plaquing. LICA: moderate plaquing -1.4 m/s - 50-60%. Left vertebral: retrograde flow.       Past Surgical History:   Procedure Laterality Date    APPENDECTOMY Right 1959    HYSTERECTOMY      OOPHORECTOMY      VASCULAR SURGERY         Review of patient's allergies indicates:  No Known Allergies    Current Neurological Medications: MAR reviewed    No current facility-administered medications on file prior to encounter.      Current Outpatient Medications on File Prior to Encounter   Medication Sig    amLODIPine (NORVASC) 10 MG tablet Take 1 tablet (10 mg total) by mouth once daily. (Patient taking differently: Take 10 mg by mouth every morning. )    ascorbic acid/multivit-min (EMERGEN-C ORAL) Take 1 packet by mouth every morning.    aspirin (ECOTRIN) 81 MG EC tablet Take 1 tablet (81 mg total) by mouth once daily. (Patient taking differently: Take 81 mg by mouth every morning. )    atorvastatin (LIPITOR) 80 MG tablet Take 1 tablet (80 mg total) by mouth once daily. (Patient taking differently: Take 80 mg by mouth every morning. )    citalopram (CELEXA) 10 MG tablet Take 10 mg by mouth  every morning.     hydroCHLOROthiazide (MICROZIDE) 12.5 mg capsule Take 1 capsule (12.5 mg total) by mouth once daily. (Patient taking differently: Take 12.5 mg by mouth every morning. )    nut.tx.impaired digestive fxn (VITAL HIGH PROTEIN ORAL) Take 1 tablet by mouth every morning.    omeprazole (PRILOSEC) 40 MG capsule Take 40 mg by mouth every morning.    ramipril (ALTACE) 10 MG capsule Take 1 capsule (10 mg total) by mouth once daily. (Patient taking differently: Take 10 mg by mouth every morning. )      Family History     Problem Relation (Age of Onset)    Hypertension Mother, Father        Tobacco Use    Smoking status: Former Smoker     Packs/day: 1.00     Years: 47.00     Pack years: 47.00     Types: Cigarettes     Start date: 1961     Last attempt to quit: 2008     Years since quittin.6    Smokeless tobacco: Never Used   Substance and Sexual Activity    Alcohol use: Not Currently     Alcohol/week: 0.0 oz     Comment: Socially.    Drug use: Not Currently    Sexual activity: Not Currently     Review of Systems   Constitutional: Negative.    HENT: Negative.    Eyes: Negative.    Respiratory: Negative.    Cardiovascular: Negative.    Gastrointestinal: Negative.    Endocrine: Negative.    Genitourinary: Negative.    Musculoskeletal: Negative.    Skin: Negative.    Allergic/Immunologic: Negative.    Neurological: Positive for weakness and light-headedness.        Near syncope   Hematological: Negative.    Psychiatric/Behavioral: Negative.      Objective:     Vital Signs (Most Recent):  Temp: 98.2 °F (36.8 °C) (19 1500)  Pulse: 91 (19 1500)  Resp: 20 (19 1500)  BP: (!) 113/56 (19 1500)  SpO2: 98 % (19 1500) Vital Signs (24h Range):  Temp:  [98 °F (36.7 °C)-98.5 °F (36.9 °C)] 98.2 °F (36.8 °C)  Pulse:  [80-98] 91  Resp:  [13-35] 20  SpO2:  [91 %-100 %] 98 %  BP: ()/(50-62) 113/56     Weight: 85 kg (187 lb 6.3 oz)  Body mass index is 32.17 kg/m².    Physical  Exam   Constitutional: She is oriented to person, place, and time. She appears well-developed and well-nourished.   HENT:   Head: Normocephalic and atraumatic.   Eyes: Pupils are equal, round, and reactive to light. EOM are normal.   Neck: Normal range of motion.   Incision noted to L side of neck   Cardiovascular: Normal rate, regular rhythm and normal heart sounds.   Pulmonary/Chest: Effort normal and breath sounds normal.   Abdominal: Soft.   Musculoskeletal: Normal range of motion.   Neurological: She is alert and oriented to person, place, and time. She has normal strength.   Skin: Skin is warm and dry.   Psychiatric: She has a normal mood and affect. Her speech is normal.   Vitals reviewed.      NEUROLOGICAL EXAMINATION:     MENTAL STATUS   Oriented to person, place, and time.   Speech: speech is normal   Level of consciousness: alert    CRANIAL NERVES     CN III, IV, VI   Pupils are equal, round, and reactive to light.  Extraocular motions are normal.     CN V   Right facial sensation deficit: R bells palsy noted.    MOTOR EXAM     Strength   Strength 5/5 throughout.       Significant Labs: All pertinent lab results from the past 24 hours have been reviewed.    Significant Imaging: I have reviewed all pertinent imaging results/findings within the past 24 hours.    Assessment and Plan:   Near Syncope   Embolic stroke  Chronic Bell's Palsy  1. MRI reviewed  2. CTA head and neck reviewed, vascular consulted  3. LDL and HgbA1c: P  4. EEG: P  5. PT/OT/ST eval and treat  6. Recent L CEA [facility] vascular at [facility]  7. No driving till after clinic f/u  8. ECHO: P  9. Every 2 hour neurochecks  10. Continue ASA  11. No reported h/o afib    Hypotension  1. Defer to     Stroke education was provided.  If patient has acute neurological changes including weakness, confusion, speech changes, facial droop, difficulty walking, and sensory changes immediately call 911.  Follow up Neurology in 2 weeks at [phone].  Medication  side effects discussed with the patient and/or caregiver.      Active Diagnoses:    Diagnosis Date Noted POA    PRINCIPAL PROBLEM:  CVA (cerebral vascular accident) [I63.9] 08/13/2019 Unknown    Hypotension [I95.9] 08/13/2019 Yes    Hypokalemia [E87.6] 08/13/2019 Unknown    Syncope [R55] 08/12/2019 Yes      Problems Resolved During this Admission:       VTE Risk Mitigation (From admission, onward)        Ordered     IP VTE HIGH RISK PATIENT  Once      08/12/19 2050     Place ROM hose  Until discontinued      08/12/19 2050          Thank you for your consult. I will follow-up with patient. Please contact us if you have any additional questions.    Karen Shaffer, ARTURO  Neurology  [facility]    I, Dr. Jose Paez, have personally seen and examined the patient with my advanced provider and agree with above. I personally did a focused exam, and reviewed all necessary clinical information. I discussed my management plan with my NP and agree with above. Patient stable neurologically. Patient and family updated.

## 2019-08-13 NOTE — H&P
Critical access hospital Medicine  History & Physical    Patient Name: Albina Armenta  MRN: [id number]  Admission Date: 8/12/2019  Attending Physician: Rashaun Greene MD  Primary Care Provider: Shyam Gilmore MD         Patient information was obtained from patient, relative(s) and ER records.     Subjective:     Principal Problem:<principal problem not specified>    Chief Complaint:   Chief Complaint   Patient presents with    Loss of Consciousness     presen to the ER with c/o syncope with trauma, home health nurse reports pt did not hit ground,         HPI: Patient is brought to the ED via EMS after having a near syncopal event while in the shower. The patient was at home in the shower with her home health nurse when she suddenly became weak and slid to the shower floor. The patient remembers the event and she reports she did not lose consciousness. The caregiver who was present also reports that the patient remained alert and never loss consciousness and she did not hit her head. EMS was called and the patient was found to have BP ~80/40. She was started on IVF at that time with improvement of her BP. By the time the patient arrived to the ED she received ~500cc of IVF. BP was reached low as 60s systolic in ED but improved with IVF.     The patient reports she had a Left carotid endarterectomy 5 days ago at [facility]. The daughter who is at the bedside reports that the patient has had difficulty swallowing since the surgery due to damaged nerves in her neck. She was evaluated by ST and has been tolerating mostly liquid and soft diet.  The patient reports she has had decreased oral intake. She also reports she has been taking HCTZ that the daughter states was recently discontinued by the doctor.  The patient denies fever, chills, chest pain, sob, headache, visual disturbances, focal neuro deficits, vomiting, abdominal pain, dysuria, or diarrhea. She does endorse intermittent nausea. She has  a history of L Hurt's Palsy, DM, HTN.     Past Medical History:   Diagnosis Date    Atherosclerosis of renal artery 8/10/2015    7/27/2015: Renal Duplex: Right: severe - 3.2 m/s. Left: about 60% stenosis - 1.7 m/s.    Carotid bruit 7/16/2015 2005: Carotid bruit heard.    Coronary artery disease     Hypercholesterolemia 7/16/2015 2005: Diagnosed. Started statin.    Hypertension, benign 7/16/2015 1995: Diagnosed.    Mild obesity 6/9/2017 6/9/2017: 86 kg. BMI 31.    Subclavian artery stenosis, left 8/10/2015    7/2015: Diagnosed. 7/23/2015: Carotid Duplex: LIZETH: moderate plaquing. LICA: moderate plaquing -1.4 m/s - 50-60%. Left vertebral: retrograde flow.       Past Surgical History:   Procedure Laterality Date    APPENDECTOMY Right 1959    HYSTERECTOMY      OOPHORECTOMY      VASCULAR SURGERY         Review of patient's allergies indicates:  No Known Allergies    No current facility-administered medications on file prior to encounter.      Current Outpatient Medications on File Prior to Encounter   Medication Sig    amLODIPine (NORVASC) 10 MG tablet Take 1 tablet (10 mg total) by mouth once daily. (Patient taking differently: Take 10 mg by mouth every morning. )    ascorbic acid/multivit-min (EMERGEN-C ORAL) Take 1 packet by mouth every morning.    aspirin (ECOTRIN) 81 MG EC tablet Take 1 tablet (81 mg total) by mouth once daily. (Patient taking differently: Take 81 mg by mouth every morning. )    atorvastatin (LIPITOR) 80 MG tablet Take 1 tablet (80 mg total) by mouth once daily. (Patient taking differently: Take 80 mg by mouth every morning. )    citalopram (CELEXA) 10 MG tablet Take 10 mg by mouth every morning.     hydroCHLOROthiazide (MICROZIDE) 12.5 mg capsule Take 1 capsule (12.5 mg total) by mouth once daily. (Patient taking differently: Take 12.5 mg by mouth every morning. )    nut.tx.impaired digestive fxn (VITAL HIGH PROTEIN ORAL) Take 1 tablet by mouth every morning.     omeprazole (PRILOSEC) 40 MG capsule Take 40 mg by mouth every morning.    ramipril (ALTACE) 10 MG capsule Take 1 capsule (10 mg total) by mouth once daily. (Patient taking differently: Take 10 mg by mouth every morning. )     Family History     Problem Relation (Age of Onset)    Hypertension Mother, Father        Tobacco Use    Smoking status: Former Smoker     Packs/day: 1.00     Years: 47.00     Pack years: 47.00     Types: Cigarettes     Start date: 1961     Last attempt to quit: 2008     Years since quittin.6    Smokeless tobacco: Never Used   Substance and Sexual Activity    Alcohol use: Not Currently     Alcohol/week: 0.0 oz     Comment: Socially.    Drug use: Not Currently    Sexual activity: Not Currently     Review of Systems   Constitutional: Positive for diaphoresis and fatigue. Negative for fever and unexpected weight change.   HENT: Positive for trouble swallowing. Negative for drooling, ear discharge, ear pain, facial swelling, nosebleeds, rhinorrhea, sore throat, tinnitus and voice change.    Eyes: Negative for photophobia and visual disturbance.   Respiratory: Negative for cough, choking, chest tightness, shortness of breath and wheezing.    Cardiovascular: Negative for chest pain, palpitations and leg swelling.   Gastrointestinal: Positive for nausea. Negative for abdominal pain, blood in stool, constipation, diarrhea and vomiting.   Endocrine: Negative for polydipsia, polyphagia and polyuria.   Genitourinary: Negative for difficulty urinating, dysuria, flank pain, frequency, hematuria, urgency, vaginal bleeding and vaginal discharge.   Musculoskeletal: Negative for gait problem, myalgias, neck pain and neck stiffness.   Skin: Negative for rash and wound.   Neurological: Positive for syncope and weakness. Negative for dizziness, tremors, seizures, facial asymmetry, speech difficulty, light-headedness, numbness and headaches.   Hematological: Does not bruise/bleed easily.    Psychiatric/Behavioral: Negative for confusion, sleep disturbance and suicidal ideas. The patient is not nervous/anxious.      Objective:     Vital Signs (Most Recent):  Temp: 98.3 °F (36.8 °C) (08/12/19 2300)  Pulse: 87 (08/13/19 0030)  Resp: (!) 21 (08/13/19 0030)  BP: (!) 104/51 (08/13/19 0030)  SpO2: (!) 93 % (08/13/19 0030) Vital Signs (24h Range):  Temp:  [97.9 °F (36.6 °C)-98.5 °F (36.9 °C)] 98.3 °F (36.8 °C)  Pulse:  [82-98] 87  Resp:  [13-35] 21  SpO2:  [93 %-100 %] 93 %  BP: ()/(49-73) 104/51     Weight: 85 kg (187 lb 6.3 oz)  Body mass index is 32.17 kg/m².    Physical Exam   Constitutional: She is oriented to person, place, and time. She appears well-developed and well-nourished.   HENT:   Head: Normocephalic and atraumatic.   Eyes: Pupils are equal, round, and reactive to light. Conjunctivae, EOM and lids are normal.   Neck: Trachea normal, normal range of motion and full passive range of motion without pain. No tracheal tenderness present. Edema present. No tracheal deviation present.   Surgical incision to left carotid with mild edema. No redness, drainage. Skin intact.    Cardiovascular: Normal rate, regular rhythm, S1 normal, S2 normal, normal heart sounds, intact distal pulses and normal pulses.   Pulmonary/Chest: Effort normal and breath sounds normal. No stridor. No respiratory distress. She has no wheezes.   Abdominal: Soft. Bowel sounds are normal. There is no tenderness.   Musculoskeletal: Normal range of motion.   Neurological: She is alert and oriented to person, place, and time. She has normal strength. No cranial nerve deficit or sensory deficit. GCS eye subscore is 4. GCS verbal subscore is 5. GCS motor subscore is 6.   Skin: Skin is warm and dry. Capillary refill takes less than 2 seconds.   Surgical incision to left neck intact  with mild edema. No redness or drainage.          CRANIAL NERVES     CN III, IV, VI   Pupils are equal, round, and reactive to light.  Extraocular  motions are normal.        Significant Labs:   CBC:   Recent Labs   Lab 08/12/19  1500   WBC 12.79*   HGB 10.0*   HCT 32.3*        CMP:   Recent Labs   Lab 08/12/19  1500 08/12/19  2128    138   K 2.8* 3.2*    104   CO2 25 27   * 115*   BUN 18 15   CREATININE 0.6 0.6   CALCIUM 7.7* 7.9*   PROT 5.6*  --    ALBUMIN 3.0*  --    BILITOT 0.8  --    ALKPHOS 75  --    AST 22  --    ALT 18  --    ANIONGAP 10 7*   EGFRNONAA >60.0 >60.0     Cardiac Markers:   Recent Labs   Lab 08/12/19  1500   BNP 40     Magnesium: No results for input(s): MG in the last 48 hours.  Troponin:   Recent Labs   Lab 08/12/19  1500 08/12/19  1800 08/12/19 2128   TROPONINI <0.030 <0.030 <0.030     Urine Studies:   Recent Labs   Lab 08/12/19  1520   COLORU Yellow   APPEARANCEUA Clear   PHUR 7.0   SPECGRAV 1.010   PROTEINUA Trace*   GLUCUA Negative   KETONESU Negative   BILIRUBINUA Negative   OCCULTUA Negative   NITRITE Negative   UROBILINOGEN Negative   LEUKOCYTESUR Negative       Significant Imaging: I have reviewed all pertinent imaging results/findings within the past 24 hours.    Assessment/Plan:     CVA (cerebral vascular accident)  CT with concern for subacute vs chronic infarct. No reported history of CVA. Left carotid endarterectomy 5 days ago  Consult Dr. Jose sotelo  Neuro checks  MRI/MRA brain  2-D echo with bubble study  U/S bilateral carotids  PT/OT/ST consult  Accu-cheks with SSI      Hypokalemia  Likely secondary to diuretic (HCTZ)  Replacement in ED  Trend electrolytes and replace per sliding scale      Hypotension  Possibly secondary to hypovolemia due to poor oral intake and diuretics  IV hydration- monitor for fluid overload  Trend CE  2-D echo      Syncope  Admit to ICU  Continuous cardiac monitoring  IV hydration- about 3L in ED; maintenance at 100ml/hr  Trend cardiac enzymes  2-D echo  U/S bilateral carotids          VTE Risk Mitigation (From admission, onward)        Ordered     IP VTE HIGH RISK PATIENT   Once      08/12/19 2050     Place ROM hose  Until discontinued      08/12/19 2050             Ele Walker NP  Department of Hospital Medicine   [facility]

## 2019-08-13 NOTE — PLAN OF CARE
08/13/19 1404   Discharge Assessment   Assessment Type Discharge Planning Assessment   Confirmed/corrected address and phone number on facesheet? Yes   Assessment information obtained from? Patient;Other  (daughter Nica also present during assessment)   Prior to hospitilization cognitive status: Alert/Oriented   Prior to hospitalization functional status: Independent   Current cognitive status: Alert/Oriented   Current Functional Status: Needs Assistance   Lives With alone   Able to Return to Prior Arrangements other (see comments)  (Pt feels she can return to prior living arrangements, but both pt and dgtr feel she will need extra assistance from home health)   Readmission Within the Last 30 Days other (see comments)  (had surgery on Wednesday)   Patient currently being followed by outpatient case management? No   Patient currently receives any other outside agency services? No   Equipment Currently Used at Home none   Do you have any problems affording any of your prescribed medications? No   Is the patient taking medications as prescribed? yes   Does the patient have transportation home? Yes   Does the patient receive services at the [facility]? No     SW met with pt and daughter Nica at pt's bedside. Note that pt lives alone and she and dgtr both feel she will need extra help post-d/c. It is too soon at this point to determine d/c plan. Pt choice form will need to be signed if home health is indicated.

## 2019-08-13 NOTE — HOSPITAL COURSE
74-year-old female with history of left carotid stenosis status post recent carotid endarterectomy who presented the hospital with a near syncopal event.  She was admitted for workup and treatment including neurological evaluation.  CTA head and neck revealed left carotid artery stenosis status post recent surgical procedure.  MRI brain revealed multiple bilateral acute infarcts in the brain consistent with embolic etiology.  2D echocardiogram was performed revealing diastolic dysfunction, normal left ventricular and right ventricular systolic function, and no source of cardioembolism.  Transesophageal echocardiogram was negative for embolic source.  The patient received IV fluids and subsequently orthostatic vital signs have normalized today.  The patient worked with PT/OT and is appropriate for discharge home with home health services.  She had rolling walker and bedside commode ordered at discharge. The patient will resume medical regimen including statin and aspirin. At this time she will stop antihypertensive regimen including Norvasc, HCTZ, and ACE-inhibitor.  She will follow up with PCP in 1-2 weeks for monitoring.  She will also follow up with cardiology for loop recorder.  She will follow up with Neurology and vascular surgery.  At this time the patient medically stable for discharge home.  I discussed the discharge plan with the patient and family at bedside.  Stroke education was provided at discharge. The patient and family are in understanding and agreement with discharge plan at this time.

## 2019-08-13 NOTE — ASSESSMENT & PLAN NOTE
Admit to ICU  Continuous cardiac monitoring  IV hydration- about 3L in ED; maintenance at 100ml/hr  Trend cardiac enzymes  2-D echo  U/S bilateral carotids

## 2019-08-13 NOTE — PLAN OF CARE
Problem: SLP Goal  Goal: SLP Goal  1. Tolerate least restrictive diet w/out overt s/s of aspiration or changes in vocal quality     Outcome: Ongoing (interventions implemented as appropriate)  Initial screening completed however shortened as Pt transporting to MRI. Rec: Pt continue current diet

## 2019-08-13 NOTE — ASSESSMENT & PLAN NOTE
CT with concern for subacute vs chronic infarct. No reported history of CVA. Left carotid endarterectomy 5 days ago  Consult Dr. Jose sotelo  Neuro checks  MRI/MRA brain  2-D echo with bubble study  U/S bilateral carotids  PT/OT/ST consult  Accu-cheks with SSI

## 2019-08-13 NOTE — PT/OT/SLP EVAL
"Speech Language Pathology Evaluation  Bedside Swallow    Patient Name:  Albina Armenta   MRN:  [id number]  Admitting Diagnosis: CVA (cerebral vascular accident)    Recommendations:                 General Recommendations:  Ongoing BSA  Diet recommendations:   ,   Continue current diet   Aspiration Precautions: Standard aspiration precautions   General Precautions: Standard, aspiration, fall    History:     Past Medical History:   Diagnosis Date    Atherosclerosis of renal artery 8/10/2015    7/27/2015: Renal Duplex: Right: severe - 3.2 m/s. Left: about 60% stenosis - 1.7 m/s.    Carotid bruit 7/16/2015 2005: Carotid bruit heard.    Coronary artery disease     CVA (cerebral vascular accident) 8/13/2019    Hypercholesterolemia 7/16/2015 2005: Diagnosed. Started statin.    Hypertension, benign 7/16/2015 1995: Diagnosed.    Mild obesity 6/9/2017 6/9/2017: 86 kg. BMI 31.    Subclavian artery stenosis, left 8/10/2015    7/2015: Diagnosed. 7/23/2015: Carotid Duplex: LIZETH: moderate plaquing. LICA: moderate plaquing -1.4 m/s - 50-60%. Left vertebral: retrograde flow.       Past Surgical History:   Procedure Laterality Date    APPENDECTOMY Right 1959    HYSTERECTOMY      OOPHORECTOMY      VASCULAR SURGERY         Chest X-Rays: 8/12, "no acute cardiac or pulmonary process"    CT   Impression       1. Small hypoattenuating regions in the left posterior parietal and occipital lobes compatible with cortical ischemic insults.  While probably subacute or chronic, age is indeterminate.  2. No other significant findings.         Prior diet: Pt has been on diet of full liquids since CEA ~5 days ago.      Subjective     "I didn't realize I was having trouble swallowing. I haven't even been able to try solid foods yet"  "oh that's from my reflux"- Pt in response to wet vocal quality   Patient goals: none stated      Pain/Comfort:  · Pain Rating 1: 0/10    Objective:       Pt seen in ICU. 3rd attempt today as " Pt previously in echo x2. Very limited assessment as transport present at bedside for MRI as evaluation was initiated. Upon entry, OT leaving room and promoting Pt w/ frequent coughs during PO intake of soup. Notable wet vocal quality which Pt states is associated with reflux.     Oral Musculature Evaluation  · Oral Musculature: facial asymmetry present  · Dentition: present and adequate  · Secretion Management: problems swallowing secretions(Notable wetness)  · Mucosal Quality: good  · Mandibular Strength and Mobility: WFL  · Oral Labial Strength and Mobility: Decreased retraction on right   · Lingual Strength and Mobility: (deviation to right)  · Velar Elevation: (Did not assess)  · Buccal Strength and Mobility: flaccid(on right side)   · Volitional Cough: (Audible, present)  · Volitional Swallow: (Hyolaryngeal movement present to digital palpation)  · Voice Prior to PO Intake: (Wet vocal quality)    CRANIAL NERVE EXAM TO BE COMPLETED 8/14     Bedside Swallow Eval:   Consistencies Assessed:  · Thin liquids 3oz via sequential cup sips x2   · Puree tsp x2    Oral Phase:   · Pt with adequate oral containment and coordination across consistencies. Timely mastication, adequate bolus formation and oral clearance which resulted in no evidence of appreciable oral residue with solid consistencies.    Pharyngeal Phase:   · Adequate hyolaryngeal movement to digital palpation. Pt tolerated 3oz thin liquid via sequential sips with no overt s/s of aspiration or changes in baseline vocal quality, passing 3oz water challenge. Therefore, risk of aspiration not indicated. Multiple swallows not observed across consistencies trialled.     Compensatory Strategies  · None       Assessment:     Albina Armenta is a 74 y.o. female with an SLP diagnosis of Dysphagia s/p CEA, post op day 5.  She presents with no s/s of aspiration with limited PO trials however, significant wet vocal quality is appreciated. Limited assessment as this  was 3rd attempt of day and transport present at bedside for MRI. Will follow 8/14.     Goals:   Multidisciplinary Problems     SLP Goals        Problem: SLP Goal    Goal Priority Disciplines Outcome   SLP Goal     SLP Ongoing (interventions implemented as appropriate)   Description:  1. Tolerate least restrictive diet w/out overt s/s of aspiration or changes in vocal quality                       Plan:     · Patient to be seen:  3 x/week   · Plan of Care expires:  08/20/19  · Plan of Care reviewed with:  patient   · SLP Follow-Up:  Yes      Time Tracking:     SLP Treatment Date:   08/13/19  Speech Start Time:  1158  Speech Stop Time:  1207     Speech Total Time (min):  9 min    Billable Minutes: Eval Swallow and Oral Function 9    Carlos A Holt CCC-SLP  08/13/2019

## 2019-08-13 NOTE — PT/OT/SLP PROGRESS
Occupational Therapy      Patient Name:  Albina Armenta   MRN:  [id number]    Patient not seen today secondary to awaiting test results.  Concern due to L carotid artery occlusion.  Will follow up 8/14.    Everton Rothman OT  8/13/2019

## 2019-08-13 NOTE — PROGRESS NOTES
"[facility]  Adult Nutrition  Progress Note    SUMMARY       Recommendations    Recommendation/Intervention: 1.) Rec'd advancing PO diet as per ST recommendations 2.) RD to add Ensure Enlive TID (1050 kcal, 60 g pro) to aid PO intake  Goals: 1.) Continued intake >75% of meals 2.) Tolerance of diet texture 3.) >50% intake ONS 4.) No further wt loss  Nutrition Goal Status: new  Communication of RD Recs: reviewed with RN    Reason for Assessment    Reason For Assessment: other (see comments)(ICU protocol)  Diagnosis: other (see comments)(syncope)  Relevant Medical History: Hx: HTN, elevated cholesterol, atherosclerosis  Interdisciplinary Rounds: attended    Nutrition Risk Screen    Nutrition Risk Screen: no indicators present    Nutrition/Diet History    Patient Reported Diet/Restrictions/Preferences: soft  Typical Food/Fluid Intake: liquid/soft diet for past 5 days r/t dysphagia  Supplemental Drinks or Food Habits: Ensure Plus  Food Allergies: NKFA  Factors Affecting Nutritional Intake: difficulty/impaired swallowing    Anthropometrics    Temp: 98.2 °F (36.8 °C)  Height Method: Estimated  Height: 5' 4" (162.6 cm)  Height (inches): 64 in  Weight Method: Bed Scale  Weight: 85 kg (187 lb 6.3 oz)  Weight (lb): 187.39 lb  Ideal Body Weight (IBW), Female: 120 lb  % Ideal Body Weight, Female (lb): 156.16 lb  BMI (Calculated): 32.2  BMI Grade: 30 - 34.9- obesity - grade I  Weight Loss: unintentional(reports 12# wt loss over past 5 days--severe wt loss)  Usual Body Weight (UBW), k.4 kg  Weight Change Amount: 12 lb  % Usual Body Weight: 94.22  % Weight Change From Usual Weight: -5.97 %       Lab/Procedures/Meds    Pertinent Labs Reviewed: reviewed  Pertinent Labs Comments: K 3.4, alb 3, Ca 8.2, glu 95, phos 2.2, h/h 9.2, 29.7%  Pertinent Medications Reviewed: reviewed     Current Meds     aspirin  81 mg Oral Daily    atorvastatin  80 mg Oral Daily    chlorhexidine  15 mL Mouth/Throat BID    citalopram  10 " mg Oral QAM    mupirocin   Nasal BID    pantoprazole  40 mg Oral Daily       Estimated/Assessed Needs    Weight Used For Calorie Calculations: 85 kg (187 lb 6.3 oz)  Energy Calorie Requirements (kcal): 1533-8029 (20-25 kcal/kg)  Energy Need Method: Kcal/kg  Protein Requirements: 82 (1.5 g/kg) per IBW  Weight Used For Protein Calculations: 55 kg (121 lb 4.1 oz)(IBW)     Estimated Fluid Requirement Method: RDA Method  RDA Method (mL): 1700         Nutrition Prescription Ordered    Current Diet Order: full liquids    Evaluation of Received Nutrient/Fluid Intake    Energy Calories Required: not meeting needs  Protein Required: not meeting needs  Tolerance: tolerating  % Meal Intake: 75 - 100 %    Nutrition Risk    Level of Risk/Frequency of Follow-up: moderate - high     Assessment and Plan    Pt assessed 2' ICU status. Noted admitted for syncopal episode. Neuro consulted. Pt s/p carotid endarterectomy five days PTA; experiencing dysphagia s/p procedure 2' damage to nerves per daughter. PO intake poor x 5 days resulting in unintentional wt loss. Able to tolerate full liquids this AM. Denies any coughing/choking with meals. No N/V. Reports no BM in 5 days. ST consulted for evaluation of swallow function. Will add Ensure supplement to aid PO intake. RD to follow for diet progression/tolerance.    Monitor and Evaluation    Food and Nutrient Intake: food and beverage intake, energy intake  Food and Nutrient Adminstration: diet order  Anthropometric Measurements: weight change  Biochemical Data, Medical Tests and Procedures: gastrointestinal profile, electrolyte and renal panel, glucose/endocrine profile  Nutrition-Focused Physical Findings: overall appearance       Nutrition Follow-Up    RD Follow-up?: Yes      Lizy Frye  08/13/2019  10:55 AM

## 2019-08-13 NOTE — ASSESSMENT & PLAN NOTE
Possibly secondary to hypovolemia due to poor oral intake and diuretics  IV hydration- monitor for fluid overload

## 2019-08-13 NOTE — PLAN OF CARE
08/13/19 1453   Patient Assessment/Suction   Level of Consciousness (AVPU) alert   Respiratory Effort Normal;Unlabored   Expansion/Accessory Muscles/Retractions no retractions;no use of accessory muscles   PRE-TX-O2   O2 Device (Oxygen Therapy) room air   SpO2 95 %   Pulse Oximetry Type Intermittent   $ Pulse Oximetry - Multiple Charge Pulse Oximetry - Multiple   Pulse 80   Resp 18   Aerosol Therapy   $ Aerosol Therapy Charges PRN treatment not required

## 2019-08-13 NOTE — PLAN OF CARE
Problem: Swallowing Impairment  Goal: Improved Swallowing Without Aspiration  Pt with ongoing dysphagia x 5 days PTA. Tolerating full liquids at this time. Noted ST eval ordered. RD to follow ST recs. Will add ONS to aid PO intake.

## 2019-08-14 PROBLEM — Z98.890 HISTORY OF CAROTID ENDARTERECTOMY: Status: ACTIVE | Noted: 2019-08-14

## 2019-08-14 LAB
ALBUMIN SERPL BCP-MCNC: 2.8 G/DL (ref 3.5–5.2)
ALP SERPL-CCNC: 79 U/L (ref 55–135)
ALT SERPL W/O P-5'-P-CCNC: 21 U/L (ref 10–44)
ANION GAP SERPL CALC-SCNC: 6 MMOL/L (ref 8–16)
AST SERPL-CCNC: 25 U/L (ref 10–40)
BILIRUB SERPL-MCNC: 0.4 MG/DL (ref 0.1–1)
BUN SERPL-MCNC: 5 MG/DL (ref 8–23)
CALCIUM SERPL-MCNC: 7.9 MG/DL (ref 8.7–10.5)
CHLORIDE SERPL-SCNC: 106 MMOL/L (ref 95–110)
CHOLEST SERPL-MCNC: 102 MG/DL (ref 120–199)
CHOLEST/HDLC SERPL: 3 {RATIO} (ref 2–5)
CO2 SERPL-SCNC: 28 MMOL/L (ref 23–29)
CREAT SERPL-MCNC: 0.4 MG/DL (ref 0.5–1.4)
ERYTHROCYTE [DISTWIDTH] IN BLOOD BY AUTOMATED COUNT: 14.6 % (ref 11.5–14.5)
EST. GFR  (AFRICAN AMERICAN): >60 ML/MIN/1.73 M^2
EST. GFR  (NON AFRICAN AMERICAN): >60 ML/MIN/1.73 M^2
ESTIMATED AVG GLUCOSE: 123 MG/DL (ref 68–131)
GLUCOSE SERPL-MCNC: 107 MG/DL (ref 70–110)
HBA1C MFR BLD HPLC: 5.9 % (ref 4.5–6.2)
HCT VFR BLD AUTO: 30.3 % (ref 37–48.5)
HDLC SERPL-MCNC: 34 MG/DL (ref 40–75)
HDLC SERPL: 33.3 % (ref 20–50)
HGB BLD-MCNC: 9.4 G/DL (ref 12–16)
LDLC SERPL CALC-MCNC: 52.6 MG/DL (ref 63–159)
MAGNESIUM SERPL-MCNC: 1.8 MG/DL (ref 1.6–2.6)
MCH RBC QN AUTO: 27.7 PG (ref 27–31)
MCHC RBC AUTO-ENTMCNC: 31 G/DL (ref 32–36)
MCV RBC AUTO: 89 FL (ref 82–98)
NONHDLC SERPL-MCNC: 68 MG/DL
PLATELET # BLD AUTO: 262 K/UL (ref 150–350)
PMV BLD AUTO: 11 FL (ref 9.2–12.9)
POTASSIUM SERPL-SCNC: 3.3 MMOL/L (ref 3.5–5.1)
POTASSIUM SERPL-SCNC: 3.7 MMOL/L (ref 3.5–5.1)
PROT SERPL-MCNC: 5.5 G/DL (ref 6–8.4)
RBC # BLD AUTO: 3.39 M/UL (ref 4–5.4)
SODIUM SERPL-SCNC: 140 MMOL/L (ref 136–145)
TRIGL SERPL-MCNC: 77 MG/DL (ref 30–150)
WBC # BLD AUTO: 7.9 K/UL (ref 3.9–12.7)

## 2019-08-14 PROCEDURE — 99900035 HC TECH TIME PER 15 MIN (STAT)

## 2019-08-14 PROCEDURE — 25000003 PHARM REV CODE 250: Performed by: INTERNAL MEDICINE

## 2019-08-14 PROCEDURE — 80053 COMPREHEN METABOLIC PANEL: CPT

## 2019-08-14 PROCEDURE — 20000000 HC ICU ROOM

## 2019-08-14 PROCEDURE — 83735 ASSAY OF MAGNESIUM: CPT

## 2019-08-14 PROCEDURE — 80061 LIPID PANEL: CPT

## 2019-08-14 PROCEDURE — 63600175 PHARM REV CODE 636 W HCPCS: Performed by: NURSE PRACTITIONER

## 2019-08-14 PROCEDURE — 85027 COMPLETE CBC AUTOMATED: CPT

## 2019-08-14 PROCEDURE — 63600175 PHARM REV CODE 636 W HCPCS: Performed by: INTERNAL MEDICINE

## 2019-08-14 PROCEDURE — 84132 ASSAY OF SERUM POTASSIUM: CPT

## 2019-08-14 PROCEDURE — 36415 COLL VENOUS BLD VENIPUNCTURE: CPT

## 2019-08-14 PROCEDURE — 25000003 PHARM REV CODE 250

## 2019-08-14 PROCEDURE — 94761 N-INVAS EAR/PLS OXIMETRY MLT: CPT

## 2019-08-14 PROCEDURE — 83036 HEMOGLOBIN GLYCOSYLATED A1C: CPT

## 2019-08-14 PROCEDURE — 25000003 PHARM REV CODE 250: Performed by: NURSE PRACTITIONER

## 2019-08-14 RX ORDER — POTASSIUM CHLORIDE 20 MEQ/1
40 TABLET, EXTENDED RELEASE ORAL ONCE
Status: COMPLETED | OUTPATIENT
Start: 2019-08-14 | End: 2019-08-14

## 2019-08-14 RX ORDER — POTASSIUM CHLORIDE 7.45 MG/ML
10 INJECTION INTRAVENOUS ONCE
Status: COMPLETED | OUTPATIENT
Start: 2019-08-14 | End: 2019-08-14

## 2019-08-14 RX ADMIN — POTASSIUM CHLORIDE 40 MEQ: 10 INJECTION, SOLUTION INTRAVENOUS at 10:08

## 2019-08-14 RX ADMIN — PANTOPRAZOLE SODIUM 40 MG: 40 TABLET, DELAYED RELEASE ORAL at 08:08

## 2019-08-14 RX ADMIN — LACTULOSE 20 G: 20 SOLUTION ORAL at 08:08

## 2019-08-14 RX ADMIN — CITALOPRAM HYDROBROMIDE 10 MG: 10 TABLET ORAL at 08:08

## 2019-08-14 RX ADMIN — MUPIROCIN: 20 OINTMENT TOPICAL at 09:08

## 2019-08-14 RX ADMIN — CHLORHEXIDINE GLUCONATE 15 ML: 1.2 RINSE ORAL at 08:08

## 2019-08-14 RX ADMIN — POTASSIUM CHLORIDE 40 MEQ: 1500 TABLET, EXTENDED RELEASE ORAL at 08:08

## 2019-08-14 RX ADMIN — ATORVASTATIN CALCIUM 80 MG: 40 TABLET, FILM COATED ORAL at 08:08

## 2019-08-14 RX ADMIN — MAGNESIUM SULFATE 2 G: 2 INJECTION INTRAVENOUS at 10:08

## 2019-08-14 RX ADMIN — LACTULOSE 20 G: 20 SOLUTION ORAL at 09:08

## 2019-08-14 RX ADMIN — POTASSIUM CHLORIDE 40 MEQ: 20 SOLUTION ORAL at 11:08

## 2019-08-14 RX ADMIN — ASPIRIN 325 MG ORAL TABLET 325 MG: 325 PILL ORAL at 08:08

## 2019-08-14 RX ADMIN — POTASSIUM CHLORIDE 10 MEQ: 7.46 INJECTION, SOLUTION INTRAVENOUS at 10:08

## 2019-08-14 RX ADMIN — CHLORHEXIDINE GLUCONATE 15 ML: 1.2 RINSE ORAL at 09:08

## 2019-08-14 NOTE — CONSULTS
[facility]  Vascular Surgery  Consult Note    Consults  Subjective:     Chief Complaint/Reason for Admission:  Hypokalemia and recent carotid endarterectomy    History of Present Illness:  Patient is a 74-year-old female who was admitted to the hospital after experiencing syncope in the shower and slid down the wall but denies loss of consciousness.  Has not had any focal neurologic changes consistent with a atheroembolic event from the carotids.  She had a carotid endarterectomy about a week ago at [facility] on the left side.  The operative notes have been reviewed and appears she had a severe stenosis and had endarterectomy with bovine pericardium patch angioplasty on the left side by Dr. Soto.  She has a history of Bell's palsy prior to surgery.  Since the surgery she has had some dysphagia consistent with glossopharyngeal palsy but that is improving she also has some tongue deviation to the left side.  Overall she does not appear to be having symptoms from the left carotid and since she has been in the hospital and gotten hydrated and had her potassium replaced she is actually feeling well with a normal blood pressure.  CTA and MRI of the neck and head demonstrated a 70-80% stenosis at the distal edge of the carotid end arterectomy which appears consistent with possibly some mild kinking at the distal edge of the patch angioplasty.  The MRI did demonstrate some bilateral ischemic areas and is having a CHERELLE tomorrow to rule out atheroembolic disease to both hemispheres.    Medications Prior to Admission   Medication Sig Dispense Refill Last Dose    amLODIPine (NORVASC) 10 MG tablet Take 1 tablet (10 mg total) by mouth once daily. (Patient taking differently: Take 10 mg by mouth every morning. ) 90 tablet 3 8/12/2019 at 09:00    ascorbic acid/multivit-min (EMERGEN-C ORAL) Take 1 packet by mouth every morning.   8/12/2019 at 09:00    aspirin (ECOTRIN) 81 MG EC tablet Take 1 tablet (81 mg  total) by mouth once daily. (Patient taking differently: Take 81 mg by mouth every morning. ) 90 tablet 3 8/12/2019 at 09:00    atorvastatin (LIPITOR) 80 MG tablet Take 1 tablet (80 mg total) by mouth once daily. (Patient taking differently: Take 80 mg by mouth every morning. ) 90 tablet 3 8/12/2019 at 09:00    citalopram (CELEXA) 10 MG tablet Take 10 mg by mouth every morning.    8/12/2019 at 09:00    hydroCHLOROthiazide (MICROZIDE) 12.5 mg capsule Take 1 capsule (12.5 mg total) by mouth once daily. (Patient taking differently: Take 12.5 mg by mouth every morning. ) 90 capsule 3 8/12/2019 at 09:00    nut.tx.impaired digestive fxn (VITAL HIGH PROTEIN ORAL) Take 1 tablet by mouth every morning.   8/12/2019 at 09:00    omeprazole (PRILOSEC) 40 MG capsule Take 40 mg by mouth every morning.   8/12/2019 at 09:00    ramipril (ALTACE) 10 MG capsule Take 1 capsule (10 mg total) by mouth once daily. (Patient taking differently: Take 10 mg by mouth every morning. ) 90 capsule 3 8/12/2019 at 09:00       Review of patient's allergies indicates:  No Known Allergies    Past Medical History:   Diagnosis Date    Atherosclerosis of renal artery 8/10/2015    7/27/2015: Renal Duplex: Right: severe - 3.2 m/s. Left: about 60% stenosis - 1.7 m/s.    Carotid bruit 7/16/2015 2005: Carotid bruit heard.    Coronary artery disease     CVA (cerebral vascular accident) 8/13/2019    Hypercholesterolemia 7/16/2015 2005: Diagnosed. Started statin.    Hypertension, benign 7/16/2015 1995: Diagnosed.    Mild obesity 6/9/2017 6/9/2017: 86 kg. BMI 31.    Subclavian artery stenosis, left 8/10/2015    7/2015: Diagnosed. 7/23/2015: Carotid Duplex: LIZETH: moderate plaquing. LICA: moderate plaquing -1.4 m/s - 50-60%. Left vertebral: retrograde flow.     Past Surgical History:   Procedure Laterality Date    APPENDECTOMY Right 1959    HYSTERECTOMY      OOPHORECTOMY      VASCULAR SURGERY       Family History     Problem Relation  (Age of Onset)    Hypertension Mother, Father        Tobacco Use    Smoking status: Former Smoker     Packs/day: 1.00     Years: 47.00     Pack years: 47.00     Types: Cigarettes     Start date: 1961     Last attempt to quit: 2008     Years since quittin.6    Smokeless tobacco: Never Used   Substance and Sexual Activity    Alcohol use: Not Currently     Alcohol/week: 0.0 oz     Comment: Socially.    Drug use: Not Currently    Sexual activity: Not Currently     Review of Systems   Constitutional: Negative.    HENT: Positive for trouble swallowing.    Respiratory: Negative.    Cardiovascular: Negative.    Gastrointestinal: Negative.    Musculoskeletal: Negative.    Skin: Negative.      Objective:     Vital Signs (Most Recent):  Temp: 98 °F (36.7 °C) (19)  Pulse: 85 (19)  Resp: 20 (19 161)  BP: 137/74 (19 161)  SpO2: 97 % (19 1534) Vital Signs (24h Range):  Temp:  [97.2 °F (36.2 °C)-98.3 °F (36.8 °C)] 98 °F (36.7 °C)  Pulse:  [79-95] 85  Resp:  [16-29] 20  SpO2:  [83 %-100 %] 97 %  BP: (114-150)/(56-89) 137/74     Weight: 85 kg (187 lb 6.3 oz)  Body mass index is 32.17 kg/m².        Physical Exam   Constitutional: She appears well-developed.   HENT:   Tongue deviates to the left   Neck: Normal range of motion. Neck supple. Carotid bruit is not present.   Left neck incision dressed with a clear dressing.  The edema consistent with normal postoperative swelling after left carotid endarterectomy.   Cardiovascular: Normal rate and regular rhythm.   Pulmonary/Chest: Breath sounds normal.   Abdominal: Soft. Bowel sounds are normal.   Musculoskeletal: Normal range of motion.       Significant Labs:  BMP:   Recent Labs   Lab 19  0333  19  0307   GLU 95   < > 107      < > 140   K 3.4*   < > 3.3*      < > 106   CO2 28   < > 28   BUN 11   < > 5*   CREATININE 0.6   < > 0.4*   CALCIUM 8.2*   < > 7.9*   MG 1.6  --   --     < > = values in this  interval not displayed.     CBC:   Recent Labs   Lab 08/14/19  0307   WBC 7.90   RBC 3.39*   HGB 9.4*   HCT 30.3*      MCV 89   MCH 27.7   MCHC 31.0*       Significant Diagnostics:  CT: No results found in the last 24 hours.  MRI: No results found in the last 24 hours.    Assessment/Plan:   Postop left carotid end arterectomy 1 week ago.  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 I will sign off. Please contact us if you have any additional questions.    Jamaal Bush MD  Vascular Surgery  [facility] hospital

## 2019-08-14 NOTE — CARE UPDATE
08/13/19 2007   Patient Assessment/Suction   Level of Consciousness (AVPU) alert   Respiratory Effort Normal;Unlabored   Expansion/Accessory Muscles/Retractions no use of accessory muscles   All Lung Fields Breath Sounds clear   PRE-TX-O2   O2 Device (Oxygen Therapy) room air   SpO2 99 %   Pulse Oximetry Type Continuous   $ Pulse Oximetry - Multiple Charge Pulse Oximetry - Multiple   Pulse 86   Resp (!) 21   Aerosol Therapy   $ Aerosol Therapy Charges PRN treatment not required

## 2019-08-14 NOTE — PT/OT/SLP PROGRESS
Occupational Therapy      Patient Name:  Albina Armenta   MRN:  [id number]    Patient not seen today secondary to awaiting vascular consult.    Everton Rothman OT  8/14/2019

## 2019-08-14 NOTE — PROGRESS NOTES
[facility] Medicine  Progress Note    Patient Name: Albina Armenta  MRN: [id number]  Patient Class: IP- Inpatient   Admission Date: 8/12/2019  Length of Stay: 2 days  Attending Physician: Rashaun Greene MD  Primary Care Provider: Shyam Gilmore MD        Subjective:     Principal Problem:CVA (cerebral vascular accident)        HPI:  Patient is brought to the ED via EMS after having a near syncopal event while in the shower. The patient was at home in the shower with her home health nurse when she suddenly became weak and slid to the shower floor. The patient remembers the event and she reports she did not lose consciousness. The caregiver who was present also reports that the patient remained alert and never loss consciousness and she did not hit her head. EMS was called and the patient was found to have BP ~80/40. She was started on IVF at that time with improvement of her BP. By the time the patient arrived to the ED she received ~500cc of IVF. BP was reached low as 60s systolic in ED but improved with IVF.     The patient reports she had a Left carotid endarterectomy 5 days ago at [facility]. The daughter who is at the bedside reports that the patient has had difficulty swallowing since the surgery due to damaged nerves in her neck. She was evaluated by ST and has been tolerating mostly liquid and soft diet.  The patient reports she has had decreased oral intake. She also reports she has been taking HCTZ that the daughter states was recently discontinued by the doctor.  The patient denies fever, chills, chest pain, sob, headache, visual disturbances, focal neuro deficits, vomiting, abdominal pain, dysuria, or diarrhea. She does endorse intermittent nausea. She has a history of L Hurt's Palsy, DM, HTN.     Overview/Hospital Course:  MRI confirmed from the multiple infarcts in the brain  Patient will have a CHERELLE  while she is in the hospital  She denies any other  complaints      Interval History:     Review of Systems   Constitutional: Negative for activity change and appetite change.   HENT: Negative for congestion and dental problem.    Eyes: Negative for discharge and itching.   Respiratory: Negative for shortness of breath.    Cardiovascular: Negative for chest pain.   Gastrointestinal: Negative for abdominal distention and abdominal pain.   Endocrine: Negative for cold intolerance.   Genitourinary: Negative for difficulty urinating and dysuria.   Musculoskeletal: Negative for arthralgias and back pain.   Skin: Negative for color change.   Neurological: Negative for dizziness and facial asymmetry.   Hematological: Negative for adenopathy.   Psychiatric/Behavioral: Negative for agitation and behavioral problems.     Objective:     Vital Signs (Most Recent):  Temp: 98 °F (36.7 °C) (08/14/19 1611)  Pulse: 92 (08/14/19 1700)  Resp: (!) 21 (08/14/19 1700)  BP: 135/67 (08/14/19 1700)  SpO2: 97 % (08/14/19 1534) Vital Signs (24h Range):  Temp:  [97.2 °F (36.2 °C)-98.3 °F (36.8 °C)] 98 °F (36.7 °C)  Pulse:  [79-95] 92  Resp:  [16-29] 21  SpO2:  [83 %-100 %] 97 %  BP: (114-150)/(56-89) 135/67     Weight: 85 kg (187 lb 6.3 oz)  Body mass index is 32.17 kg/m².    Intake/Output Summary (Last 24 hours) at 8/14/2019 1745  Last data filed at 8/14/2019 0600  Gross per 24 hour   Intake 2400 ml   Output 950 ml   Net 1450 ml      Physical Exam   Constitutional: She is oriented to person, place, and time. No distress.   Eyes: Pupils are equal, round, and reactive to light. EOM are normal.   Neck: Neck supple.   Cardiovascular: Normal rate.   Pulmonary/Chest: Effort normal and breath sounds normal.   Abdominal: Soft. Bowel sounds are normal.   Musculoskeletal: Normal range of motion. She exhibits no edema.   Neurological: She is alert and oriented to person, place, and time.   Skin: Skin is warm.   Psychiatric: She has a normal mood and affect.   Nursing note and vitals  reviewed.      Significant Labs:   CBC:   Recent Labs   Lab 08/13/19  0333 08/14/19  0307   WBC 10.82 7.90   HGB 9.2* 9.4*   HCT 29.7* 30.3*    262     CMP:   Recent Labs   Lab 08/13/19  0333 08/13/19  1744 08/14/19  0307    138 140   K 3.4* 3.4* 3.3*    103 106   CO2 28 27 28   GLU 95 123* 107   BUN 11 8 5*   CREATININE 0.6 0.5 0.4*   CALCIUM 8.2* 8.5* 7.9*   PROT 5.5* 6.5 5.5*   ALBUMIN 3.0* 3.5 2.8*   BILITOT 0.6 0.4 0.4   ALKPHOS 77 98 79   AST 24 30 25   ALT 19 24 21   ANIONGAP 4* 8 6*   EGFRNONAA >60.0 >60.0 >60.0       Significant Imaging: I have reviewed all pertinent imaging results/findings within the past 24 hours.      Assessment/Plan:      * CVA (cerebral vascular accident)  Imaging showed multiple infarcts in the brain concerning for embolic phenomenon .   Left carotid endarterectomy 6 days ago  Pt Neurologically intact        History of carotid endarterectomy    Patient had left carotid endarterectomy 6 days ago  Patient has significant swelling on her neck  When she opens her mouth and her tongue deviates to the left  Patient has possible glossopharyngeal or hypoglossal palsy    Hypokalemia  Likely secondary to diuretic (HCTZ)  Replacement in ED  Trend electrolytes and replace per sliding scale      Hypotension  Possibly secondary to hypovolemia due to poor oral intake and diuretics  Can D/C iv fluids today        Syncope  Syncope due to CVA in the background of dehydration/Hypotension        Hypertension          VTE Risk Mitigation (From admission, onward)        Ordered     IP VTE HIGH RISK PATIENT  Once      08/12/19 2050     Place ROM hose  Until discontinued      08/12/19 2050                Denilson Hardy MD  Department of Hospital Medicine   [facility]

## 2019-08-14 NOTE — PLAN OF CARE
Problem: Adult Inpatient Plan of Care  Goal: Plan of Care Review  Outcome: Ongoing (interventions implemented as appropriate)     08/14/19 1459   Plan of Care Review   Plan of Care Reviewed With patient;daughter   Progress improving

## 2019-08-14 NOTE — ASSESSMENT & PLAN NOTE
Imaging showed multiple infarcts in the brain concerning for embolic phenomenon .   Left carotid endarterectomy 6 days ago  Pt Neurologically intact

## 2019-08-14 NOTE — PT/OT/SLP PROGRESS
Physical Therapy      Patient Name:  Albina Armenta   MRN:  [id number]    Patient not seen today secondary to Other (Comment)(waiting for vascular consult). Will follow-up tomorrow (8/14/19).    Sofia Russell, PT

## 2019-08-14 NOTE — PLAN OF CARE
08/14/19 0741   Patient Assessment/Suction   Respiratory Effort Normal;Unlabored   Expansion/Accessory Muscles/Retractions no retractions;no use of accessory muscles   PRE-TX-O2   O2 Device (Oxygen Therapy) room air   SpO2 (!) 94 %   Pulse Oximetry Type Continuous   $ Pulse Oximetry - Multiple Charge Pulse Oximetry - Multiple   Pulse 83   Resp 16   Aerosol Therapy   $ Aerosol Therapy Charges PRN treatment not required  (pt sleeping comfortably at this time)

## 2019-08-14 NOTE — PROGRESS NOTES
[facility]  Neurology  Progress Note    Patient Name: Albina Armenta  MRN: [id number]  Admission Date: 8/12/2019  Hospital Length of Stay: 2 days  Code Status: Full Code   Attending Provider: Rashaun Greene MD  Primary Care Physician: Shyam Gilmore MD   Principal Problem:CVA (cerebral vascular accident)    Subjective:     Interval History: Patient seen and examined, she was seen walking around in her room. Neurologically stable, she is awaiting vascular to see her. Continue q2hr Neurochecks, and maintain systolic -140's per Dr. Jose Paez    Current Neurological Medications: MAR reviewed, continue ASA    HPI: Patient presented to the ER with near syncope during her shower, BP was hypotensive 80/40's, she has not been eating or drinking well. She does report a recent L CEA last Wednesday. She denies focal weakness or any other complaints and is feeling better.   H/o chronic Bell's Palsy to the R side  Denies h/o afib     Labs:   Crt 0.6  LDL 52.6  HgbA1c 5.9     Imaging:  MRI brain: Degradation by motion.  Multiple foci of restricted diffusion within both cerebral hemispheres and right cerebellar hemisphere compatible with regions of ischemia/infarction.  Involvement of multiple vascular distributions is suspicious for embolic phenomenon.  There are associated changes of mild local mass effect.     CTA head/neck: Recent surgical changes within the left side of the neck in keeping with history of endarterectomy.  A kinked appearance of the left ICA results in approximately 70-80% maximum diameter narrowing.  There are otherwise scattered atheromatous changes in the left ICA.  Approximately 50% maximum diameter stenosis involving the proximal segment of right ICA.  Evidence of hemodynamically significant narrowing involving the origin of the right brachiocephalic artery and left common carotid artery.  There is high-grade stenosis or occlusion of the proximal segment of the left subclavian  artery.  Areas of focal occlusion involving the right vertebral artery.  There is atheromatous narrowing of the origin of the left vertebral artery.  Atheromatous changes observed involving the intracranial arteries without evidence of high-grade stenosis or occlusion.  A small right A1 segment and absence of right P1 segment are likely anatomic variance.    ECHO:   · Concentric left ventricular hypertrophy.  · Low normal left ventricular systolic function. The estimated ejection fraction is 52%  · Grade I (mild) left ventricular diastolic dysfunction consistent with impaired relaxation.  · Mild-to-moderate mitral regurgitation.  · Mild tricuspid regurgitation.  · Normal left atrial pressure.  · The mitral valve shows mild prolapse of the anterior mitral leaflet.  · Normal right ventricular systolic function.         Current Facility-Administered Medications   Medication Dose Route Frequency Provider Last Rate Last Dose    0.9%  NaCl infusion   Intravenous Continuous Ele Walker  mL/hr at 08/13/19 0600      acetaminophen tablet 650 mg  650 mg Oral Q4H PRN Ele Walker NP        albuterol-ipratropium 2.5 mg-0.5 mg/3 mL nebulizer solution 3 mL  3 mL Nebulization Q4H PRN Ele Walker NP        aspirin tablet 325 mg  325 mg Oral Daily Denilson Hardy MD   325 mg at 08/14/19 0856    atorvastatin tablet 80 mg  80 mg Oral Daily Ele Walker NP   80 mg at 08/14/19 0856    calcium gluconate 1g in normal saline 0.9 % 100mL (ready to mix system)  1 g Intravenous PRN Ele Walker NP        calcium gluconate 1g in normal saline 0.9 % 100mL (ready to mix system)  1 g Intravenous PRN Ele Walker NP   1 g at 08/12/19 2310    calcium gluconate 1g in normal saline 0.9 % 100mL (ready to mix system)  1 g Intravenous PRN Ele Walker NP        chlorhexidine 0.12 % solution 15 mL  15 mL Mouth/Throat BID Marcie Bullard PharmD   15 mL at 08/14/19 0856    citalopram tablet 10 mg  10  mg Oral QAM Ele Walker NP   10 mg at 08/14/19 0856    dextrose 50% injection 12.5 g  12.5 g Intravenous PRN Ele Walker NP        dextrose 50% injection 25 g  25 g Intravenous PRN Ele Walker NP        insulin aspart U-100 pen 0-5 Units  0-5 Units Subcutaneous QID (AC + HS) PRN Ele Walker NP        lactulose 20 gram/30 mL solution Soln 20 g  20 g Oral TID Denilson Hardy MD   20 g at 08/14/19 0856    magnesium oxide tablet 800 mg  800 mg Oral PRN Rashaun Greene MD        magnesium oxide tablet 800 mg  800 mg Oral PRN Rashaun Greene MD        magnesium sulfate 2g in water 50mL IVPB (premix)  2 g Intravenous PRN Ele Walker NP   2 g at 08/13/19 0620    magnesium sulfate 2g in water 50mL IVPB (premix)  4 g Intravenous PRN Ele Walker NP        mupirocin 2 % ointment   Nasal BID Marcie Bullard PharmD        ondansetron injection 4 mg  4 mg Intravenous Q8H PRN Ele Walker NP        pantoprazole EC tablet 40 mg  40 mg Oral Daily Ele Walker NP   40 mg at 08/14/19 0857    potassium chloride 10 mEq in 100 mL IVPB  40 mEq Intravenous PRN Ele Walker  mL/hr at 08/12/19 2309 40 mEq at 08/12/19 2309    And    potassium chloride 10 mEq in 100 mL IVPB  40 mEq Intravenous PRN Ele Walker NP        And    potassium chloride 10 mEq in 100 mL IVPB  40 mEq Intravenous PRN Ele Walker NP        potassium chloride 10% oral solution 40 mEq  40 mEq Oral PRN Rashaun Greene MD        potassium, sodium phosphates 280-160-250 mg packet 2 packet  2 packet Oral PRN Rashaun Greene MD        potassium, sodium phosphates 280-160-250 mg packet 2 packet  2 packet Oral PRN Rashaun Greene MD        potassium, sodium phosphates 280-160-250 mg packet 2 packet  2 packet Oral PRN Rashaun Greene MD        sodium chloride 0.9% flush 10 mL  10 mL Intravenous PRN Ele Walker NP        sodium phosphate 15 mmol in dextrose 5 % 250 mL  IVPB  15 mmol Intravenous PRN Ele Walker NP        sodium phosphate 20.01 mmol in dextrose 5 % 250 mL IVPB  20.01 mmol Intravenous PRN Ele Walker NP 62.5 mL/hr at 08/13/19 0839 20.01 mmol at 08/13/19 0839    sodium phosphate 30 mmol in dextrose 5 % 250 mL IVPB  30 mmol Intravenous PRN Ele Walker NP           Review of Systems as per HPI  Objective:     Vital Signs (Most Recent):  Temp: 98 °F (36.7 °C) (08/14/19 0400)  Pulse: 87 (08/14/19 0600)  Resp: 17 (08/14/19 0600)  BP: (!) 114/56 (08/14/19 0600)  SpO2: (!) 83 % (08/14/19 0600) Vital Signs (24h Range):  Temp:  [97.2 °F (36.2 °C)-98.3 °F (36.8 °C)] 98 °F (36.7 °C)  Pulse:  [79-95] 87  Resp:  [17-21] 17  SpO2:  [83 %-100 %] 83 %  BP: (113-150)/(56-70) 114/56     Weight: 85 kg (187 lb 6.3 oz)  Body mass index is 32.17 kg/m².    Physical Exam   Constitutional: She is oriented to person, place, and time. She appears well-developed and well-nourished.   HENT:   Head: Normocephalic and atraumatic.   Eyes: Pupils are equal, round, and reactive to light. EOM are normal.   Neck: Normal range of motion. Neck supple.   Cardiovascular: Normal rate, regular rhythm and normal heart sounds.   Pulmonary/Chest: Effort normal and breath sounds normal.   Abdominal: Soft.   Musculoskeletal: Normal range of motion.   Neurological: She is alert and oriented to person, place, and time. She has normal strength. She has a normal Finger-Nose-Finger Test. Gait normal.   Skin: Skin is warm and dry.   Psychiatric: She has a normal mood and affect. Her speech is normal.   Nursing note and vitals reviewed.      NEUROLOGICAL EXAMINATION:     MENTAL STATUS   Oriented to person, place, and time.   Speech: speech is normal   Level of consciousness: alert    CRANIAL NERVES     CN II   Visual fields full to confrontation.     CN III, IV, VI   Pupils are equal, round, and reactive to light.  Extraocular motions are normal.     CN V   Facial sensation intact.     CN VII    Right facial weakness: R facial droop due to Bell's Palsy.    MOTOR EXAM     Strength   Strength 5/5 throughout.     SENSORY EXAM   Light touch normal.     GAIT AND COORDINATION     Gait  Gait: normal     Coordination   Finger to nose coordination: normal      Significant Labs: All pertinent lab results from the past 24 hours have been reviewed.    Significant Imaging: I have reviewed all pertinent imaging results/findings within the past 24 hours.    Assessment and Plan:   Near Syncope   Acute CVA per MRI   Chronic Bell's Palsy  1. MRI reviewed  2. CTA head and neck reviewed, vascular consulted  3. LDL and HgbA1c: P  4. EEG: reviewed, prelim no seizures  5. PT/OT/ST eval and treat  6. Recent L CEA with vascular at [facility]  7. No driving till after clinic f/u  8. ECHO: reviewed  9. Every 2 hour neurochecks  10. Continue ASA  11. No reported h/o afib  12. CHERELLE, cardiology consult placed  13. Vascular consult placed for stenosis     Hypotension  1. Defer to      Stroke education was provided.  If patient has acute neurological changes including weakness, confusion, speech changes, facial droop, difficulty walking, and sensory changes immediately call 911.  Follow up Neurology in 2 weeks at [phone].  Medication side effects discussed with the patient and/or caregiver.  Active Diagnoses:    Diagnosis Date Noted POA    PRINCIPAL PROBLEM:  CVA (cerebral vascular accident) [I63.9] 08/13/2019 Unknown    Hypotension [I95.9] 08/13/2019 Yes    Hypokalemia [E87.6] 08/13/2019 Unknown    Syncope [R55] 08/12/2019 Yes      Problems Resolved During this Admission:       VTE Risk Mitigation (From admission, onward)        Ordered     IP VTE HIGH RISK PATIENT  Once      08/12/19 2050     Place ROM hose  Until discontinued      08/12/19 2050          Karen Shaffer, ARTURO  Neurology  [facility]    I, Dr. Jose Paez, have personally seen and examined the patient with my advanced provider and agree with above. I  personally did a focused exam, and reviewed all necessary clinical information. I discussed my management plan with my NP and agree with above. Daughter and patient updated. Non focal. Consult vascular and needs CHERELLE.

## 2019-08-14 NOTE — PLAN OF CARE
Problem: Adult Inpatient Plan of Care  Goal: Plan of Care Review  Outcome: Ongoing (interventions implemented as appropriate)  Treatment plan discussed with patient and fanily with time allowed for questions and answers    Comments: Rested well, denies any neuro changes, NIH scale completed. Ambulates in room with minimal assist, BM X 1 this am.

## 2019-08-14 NOTE — PLAN OF CARE
Problem: SLP Goal  Goal: SLP Goal  1. Tolerate least restrictive diet w/out overt s/s of aspiration or changes in vocal quality      Outcome: Outcome(s) achieved Date Met: 08/14/19  Baseline diet of puree and thin liquids with soft solids for pleasure

## 2019-08-14 NOTE — ASSESSMENT & PLAN NOTE
Patient had left carotid endarterectomy 6 days ago  Patient has significant swelling on her neck  When she opens her mouth and her tongue deviates to the left  Patient has possible glossopharyngeal or hypoglossal palsy

## 2019-08-14 NOTE — PT/OT/SLP PROGRESS
"Speech Language Pathology Treatment    Patient Name:  Albina Armenta   MRN:  [id number]  Admitting Diagnosis: CVA (cerebral vascular accident)    Recommendations:                 General Recommendations:  Follow-up not indicated  Diet recommendations:  Puree, Liquid Diet Level: Thin   Aspiration Precautions: 1 bite/sip at a time, Check for pocketing/oral residue and HOB to 90 degrees   General Precautions: Standard, aspiration, fall  Communication strategies:  none    Subjective     "I'll say anything to get out of here"  Patient goals: discharge      Pain/Comfort:  · Pain Rating 1: 0/10    Objective:     Has the patient been evaluated by SLP for swallowing?   Yes  Keep patient NPO? No   Current Respiratory Status: room air      Upon entry, Pt upright in bedside chair with pureed solid and thin liquid breakfast tray. Family and family friend present at bedside. Cranial nerve exam or facial and oral musculature completed which was unremarkable taken into account chronic [place] Palsy to right side. Pt noted with subtle wet vocal quality prior to 3oz challenge. Following sequential sips of thin liquid no overt s/s of aspiration and no wetness in vocal quality appreciated. Therefore, it is suspected wet vocal quality is reflective of decreased secretion management. Discussed self monitoring of vocal quality with initiation of volitional throat clear or cough w/ subsequent swallow to eliminate. Understanding verbalized by daughter and Pt. Thin puree (yogurt) consumed x4 with intact oral phase and again, no overt s/s of airway compromise. Soft solid presented with Pt demonstrated impaired oral preparation resulting in mild oral pocketing of left sulci. Pt with intact sensation and present ability to clear. Discussed recommendation to continue puree solids with allowance of soft solids for pleasure. Reinforced importance of oral hygiene, monitoring for pocketing, and benefits of access to visual feedback during meals. " NSG notified of recommendations. Secure chat sent to RD with request to send macaamari with lunch.     Assessment:     Albina Armenta is a 74 y.o. female with an SLP diagnosis of baseline oral dysphagia. No further skilled SLP services warranted.     Goals:   Multidisciplinary Problems     SLP Goals     Not on file          Multidisciplinary Problems (Resolved)        Problem: SLP Goal    Goal Priority Disciplines Outcome   SLP Goal   (Resolved)     SLP Outcome(s) achieved   Description:  1. Tolerate least restrictive diet w/out overt s/s of aspiration or changes in vocal quality                       Plan:     · Patient to be seen:  3 x/week   · Plan of Care expires:  08/20/19  · Plan of Care reviewed with:  patient   · SLP Follow-Up:  No        Time Tracking:     SLP Treatment Date:   08/14/19  Speech Start Time:  0812  Speech Stop Time:  0833     Speech Total Time (min):  21 min    Billable Minutes: Treatment Swallowing Dysfunction 21    Carlos A Holt CCC-SLP  08/14/2019

## 2019-08-14 NOTE — SUBJECTIVE & OBJECTIVE
Interval History:     Review of Systems   Constitutional: Negative for activity change and appetite change.   HENT: Negative for congestion and dental problem.    Eyes: Negative for discharge and itching.   Respiratory: Negative for shortness of breath.    Cardiovascular: Negative for chest pain.   Gastrointestinal: Negative for abdominal distention and abdominal pain.   Endocrine: Negative for cold intolerance.   Genitourinary: Negative for difficulty urinating and dysuria.   Musculoskeletal: Negative for arthralgias and back pain.   Skin: Negative for color change.   Neurological: Negative for dizziness and facial asymmetry.   Hematological: Negative for adenopathy.   Psychiatric/Behavioral: Negative for agitation and behavioral problems.     Objective:     Vital Signs (Most Recent):  Temp: 98 °F (36.7 °C) (08/14/19 1611)  Pulse: 92 (08/14/19 1700)  Resp: (!) 21 (08/14/19 1700)  BP: 135/67 (08/14/19 1700)  SpO2: 97 % (08/14/19 1534) Vital Signs (24h Range):  Temp:  [97.2 °F (36.2 °C)-98.3 °F (36.8 °C)] 98 °F (36.7 °C)  Pulse:  [79-95] 92  Resp:  [16-29] 21  SpO2:  [83 %-100 %] 97 %  BP: (114-150)/(56-89) 135/67     Weight: 85 kg (187 lb 6.3 oz)  Body mass index is 32.17 kg/m².    Intake/Output Summary (Last 24 hours) at 8/14/2019 1745  Last data filed at 8/14/2019 0600  Gross per 24 hour   Intake 2400 ml   Output 950 ml   Net 1450 ml      Physical Exam   Constitutional: She is oriented to person, place, and time. No distress.   Eyes: Pupils are equal, round, and reactive to light. EOM are normal.   Neck: Neck supple.   Cardiovascular: Normal rate.   Pulmonary/Chest: Effort normal and breath sounds normal.   Abdominal: Soft. Bowel sounds are normal.   Musculoskeletal: Normal range of motion. She exhibits no edema.   Neurological: She is alert and oriented to person, place, and time.   Skin: Skin is warm.   Psychiatric: She has a normal mood and affect.   Nursing note and vitals reviewed.      Significant Labs:    CBC:   Recent Labs   Lab 08/13/19  0333 08/14/19  0307   WBC 10.82 7.90   HGB 9.2* 9.4*   HCT 29.7* 30.3*    262     CMP:   Recent Labs   Lab 08/13/19  0333 08/13/19  1744 08/14/19  0307    138 140   K 3.4* 3.4* 3.3*    103 106   CO2 28 27 28   GLU 95 123* 107   BUN 11 8 5*   CREATININE 0.6 0.5 0.4*   CALCIUM 8.2* 8.5* 7.9*   PROT 5.5* 6.5 5.5*   ALBUMIN 3.0* 3.5 2.8*   BILITOT 0.6 0.4 0.4   ALKPHOS 77 98 79   AST 24 30 25   ALT 19 24 21   ANIONGAP 4* 8 6*   EGFRNONAA >60.0 >60.0 >60.0       Significant Imaging: I have reviewed all pertinent imaging results/findings within the past 24 hours.

## 2019-08-14 NOTE — ASSESSMENT & PLAN NOTE
Possibly secondary to hypovolemia due to poor oral intake and diuretics  Can D/C iv fluids today

## 2019-08-15 LAB
ALBUMIN SERPL BCP-MCNC: 3.2 G/DL (ref 3.5–5.2)
ALP SERPL-CCNC: 94 U/L (ref 55–135)
ALT SERPL W/O P-5'-P-CCNC: 21 U/L (ref 10–44)
ANION GAP SERPL CALC-SCNC: 8 MMOL/L (ref 8–16)
AST SERPL-CCNC: 23 U/L (ref 10–40)
BILIRUB SERPL-MCNC: 0.6 MG/DL (ref 0.1–1)
BUN SERPL-MCNC: <5 MG/DL (ref 8–23)
CALCIUM SERPL-MCNC: 8.6 MG/DL (ref 8.7–10.5)
CHLORIDE SERPL-SCNC: 104 MMOL/L (ref 95–110)
CO2 SERPL-SCNC: 26 MMOL/L (ref 23–29)
CREAT SERPL-MCNC: 0.5 MG/DL (ref 0.5–1.4)
ERYTHROCYTE [DISTWIDTH] IN BLOOD BY AUTOMATED COUNT: 14.6 % (ref 11.5–14.5)
EST. GFR  (AFRICAN AMERICAN): >60 ML/MIN/1.73 M^2
EST. GFR  (NON AFRICAN AMERICAN): >60 ML/MIN/1.73 M^2
GLUCOSE SERPL-MCNC: 119 MG/DL (ref 70–110)
HCT VFR BLD AUTO: 31.6 % (ref 37–48.5)
HGB BLD-MCNC: 10 G/DL (ref 12–16)
INR PPP: 1.2
MCH RBC QN AUTO: 28.1 PG (ref 27–31)
MCHC RBC AUTO-ENTMCNC: 31.6 G/DL (ref 32–36)
MCV RBC AUTO: 89 FL (ref 82–98)
PLATELET # BLD AUTO: 297 K/UL (ref 150–350)
PMV BLD AUTO: 11.4 FL (ref 9.2–12.9)
POTASSIUM SERPL-SCNC: 3.8 MMOL/L (ref 3.5–5.1)
PROT SERPL-MCNC: 6.2 G/DL (ref 6–8.4)
PROTHROMBIN TIME: 14.5 SEC (ref 11.7–14)
RBC # BLD AUTO: 3.56 M/UL (ref 4–5.4)
SODIUM SERPL-SCNC: 138 MMOL/L (ref 136–145)
WBC # BLD AUTO: 16.77 K/UL (ref 3.9–12.7)

## 2019-08-15 PROCEDURE — 25000003 PHARM REV CODE 250

## 2019-08-15 PROCEDURE — 97165 OT EVAL LOW COMPLEX 30 MIN: CPT

## 2019-08-15 PROCEDURE — 25000003 PHARM REV CODE 250: Performed by: NURSE PRACTITIONER

## 2019-08-15 PROCEDURE — 63600175 PHARM REV CODE 636 W HCPCS: Performed by: NURSE PRACTITIONER

## 2019-08-15 PROCEDURE — 63600175 PHARM REV CODE 636 W HCPCS: Performed by: INTERNAL MEDICINE

## 2019-08-15 PROCEDURE — 97116 GAIT TRAINING THERAPY: CPT

## 2019-08-15 PROCEDURE — 20000000 HC ICU ROOM

## 2019-08-15 PROCEDURE — 80053 COMPREHEN METABOLIC PANEL: CPT

## 2019-08-15 PROCEDURE — 99900035 HC TECH TIME PER 15 MIN (STAT)

## 2019-08-15 PROCEDURE — 85027 COMPLETE CBC AUTOMATED: CPT

## 2019-08-15 PROCEDURE — 85610 PROTHROMBIN TIME: CPT

## 2019-08-15 PROCEDURE — 97162 PT EVAL MOD COMPLEX 30 MIN: CPT

## 2019-08-15 PROCEDURE — 94761 N-INVAS EAR/PLS OXIMETRY MLT: CPT

## 2019-08-15 PROCEDURE — 36415 COLL VENOUS BLD VENIPUNCTURE: CPT

## 2019-08-15 PROCEDURE — 97530 THERAPEUTIC ACTIVITIES: CPT

## 2019-08-15 PROCEDURE — 25000003 PHARM REV CODE 250: Performed by: INTERNAL MEDICINE

## 2019-08-15 RX ORDER — LABETALOL HYDROCHLORIDE 5 MG/ML
10 INJECTION, SOLUTION INTRAVENOUS EVERY 4 HOURS PRN
Status: DISCONTINUED | OUTPATIENT
Start: 2019-08-15 | End: 2019-08-16 | Stop reason: HOSPADM

## 2019-08-15 RX ORDER — SODIUM CHLORIDE 9 MG/ML
INJECTION, SOLUTION INTRAVENOUS CONTINUOUS
Status: DISCONTINUED | OUTPATIENT
Start: 2019-08-15 | End: 2019-08-16 | Stop reason: HOSPADM

## 2019-08-15 RX ORDER — ASPIRIN 325 MG
325 TABLET ORAL DAILY
Refills: 0 | COMMUNITY
Start: 2019-08-16 | End: 2019-08-16

## 2019-08-15 RX ADMIN — SODIUM CHLORIDE: 0.9 INJECTION, SOLUTION INTRAVENOUS at 03:08

## 2019-08-15 RX ADMIN — ASPIRIN 325 MG ORAL TABLET 325 MG: 325 PILL ORAL at 12:08

## 2019-08-15 RX ADMIN — ONDANSETRON 4 MG: 2 INJECTION INTRAMUSCULAR; INTRAVENOUS at 08:08

## 2019-08-15 RX ADMIN — ATORVASTATIN CALCIUM 80 MG: 40 TABLET, FILM COATED ORAL at 12:08

## 2019-08-15 RX ADMIN — MUPIROCIN: 20 OINTMENT TOPICAL at 04:08

## 2019-08-15 RX ADMIN — CHLORHEXIDINE GLUCONATE 15 ML: 1.2 RINSE ORAL at 09:08

## 2019-08-15 RX ADMIN — PANTOPRAZOLE SODIUM 40 MG: 40 TABLET, DELAYED RELEASE ORAL at 12:08

## 2019-08-15 NOTE — PT/OT/SLP PROGRESS
Physical Therapy Treatment    Patient Name:  Albina Armenta   MRN:  [id number]    Recommendations:     Discharge Recommendations:  home   Discharge Equipment Recommendations: none   Barriers to discharge: None    Assessment:     Albina Armenta is a 74 y.o. female admitted with a medical diagnosis of CVA (cerebral vascular accident).  She presents with the following impairments/functional limitations:  gait instability, impaired endurance, impaired functional mobilty, decreased safety awareness. Pt's BP during transfers were as follows: supine 120/71, sitting EOB 99/63, standing 99/64, post-gait standing 105/63, return to supine 145/73.     Rehab Prognosis: Good; patient would benefit from acute skilled PT services to address these deficits and reach maximum level of function.    Recent Surgery: Procedure(s) (LRB):  ECHOCARDIOGRAM, TRANSESOPHAGEAL (N/A)      Plan:     During this hospitalization, patient to be seen 5 x/week to address the identified rehab impairments via gait training, therapeutic activities, therapeutic exercises and progress toward the following goals:    · Plan of Care Expires:  09/15/19    Subjective     Chief Complaint: vertigo-like symptoms  Patient/Family Comments/goals: go home  Pain/Comfort:  · Pain Rating 1: 0/10      Objective:     Communicated with RN prior to session.  Patient found supine with telemetry, pulse ox (continuous), peripheral IV upon PT entry to room.     General Precautions: Standard,     Orthopedic Precautions:    Braces:       Functional Mobility:  · Bed Mobility:     · Supine to Sit: supervision  · Transfers:     · Sit to Stand:  supervision with no AD  · Gait: 180' with SBA. See assessment for vital signs       AM-PAC 6 CLICK MOBILITY  Turning over in bed (including adjusting bedclothes, sheets and blankets)?: 4  Sitting down on and standing up from a chair with arms (e.g., wheelchair, bedside commode, etc.): 4  Moving from lying on back to sitting on the  side of the bed?: 3  Moving to and from a bed to a chair (including a wheelchair)?: 3  Need to walk in hospital room?: 4  Climbing 3-5 steps with a railing?: 2  Basic Mobility Total Score: 20       Therapeutic Activities and Exercises:   bed mobility, transfers, education on safe transfers mobility    Patient left supine with call button in reach and RN notified..    GOALS:   Multidisciplinary Problems     Physical Therapy Goals        Problem: Physical Therapy Goal    Goal Priority Disciplines Outcome Goal Variances Interventions   Physical Therapy Goal     PT, PT/OT      Description:  Goals to be met by: D/C     Patient will increase functional independence with mobility by performin. Supine to sit with Stand-by Assistance  2. Gait  x 150' feet with Stand-by Assistance using Rolling Walker.                       Time Tracking:     PT Received On: 08/15/19  PT Start Time: 109     PT Stop Time: 132  PT Total Time (min): 23 min     Billable Minutes: Gait Training 13 and Therapeutic Activity 10    Treatment Type: Treatment  PT/PTA: PT     PTA Visit Number: 0     Sofia Russell, PT  08/15/2019

## 2019-08-15 NOTE — PROGRESS NOTES
[facility] Medicine  Progress Note    Patient Name: Albina Armenta  MRN: [id number]  Patient Class: IP- Inpatient   Admission Date: 8/12/2019  Length of Stay: 3 days  Attending Physician: Milton Be MD  Primary Care Provider: Shyam Gilmore MD    Subjective:     Principal Problem:CVA (cerebral vascular accident)    HPI:  Patient is brought to the ED via EMS after having a near syncopal event while in the shower. The patient was at home in the shower with her home health nurse when she suddenly became weak and slid to the shower floor. The patient remembers the event and she reports she did not lose consciousness. The caregiver who was present also reports that the patient remained alert and never loss consciousness and she did not hit her head. EMS was called and the patient was found to have BP ~80/40. She was started on IVF at that time with improvement of her BP. By the time the patient arrived to the ED she received ~500cc of IVF. BP was reached low as 60s systolic in ED but improved with IVF.     The patient reports she had a Left carotid endarterectomy 5 days ago at [facility]. The daughter who is at the bedside reports that the patient has had difficulty swallowing since the surgery due to damaged nerves in her neck. She was evaluated by ST and has been tolerating mostly liquid and soft diet.  The patient reports she has had decreased oral intake. She also reports she has been taking HCTZ that the daughter states was recently discontinued by the doctor.  The patient denies fever, chills, chest pain, sob, headache, visual disturbances, focal neuro deficits, vomiting, abdominal pain, dysuria, or diarrhea. She does endorse intermittent nausea. She has a history of L Hurt's Palsy, DM, HTN.     Overview/Hospital Course:  74-year-old female with history of left carotid stenosis status post recent carotid endarterectomy who presented the hospital with a near syncopal event.   She was admitted for workup and treatment including neurological evaluation.  CTA head and neck revealed left carotid artery stenosis status post recent surgical procedure.  MRI brain revealed multiple bilateral acute infarcts in the brain consistent with embolic etiology.  2D echocardiogram was performed revealing diastolic dysfunction, normal left ventricular and right ventricular systolic function, and no source of cardioembolism.  Cardiology was consulted for transesophageal echocardiogram, however this morning the patient refused the procedure.  Will discuss further with the patient's daughter, PCP, Cardiology, and Neurology.  Plan for IV fluids over night for persistent orthostatic symptoms with repeat orthostatic vital signs in a.m..  If patient agreeable, transesophageal echocardiogram tomorrow.  If patient wishes to forego CHERELLE, possible discharge tomorrow.     Interval History:  The patient reports persistent dizziness, mild intensity at rest, worsens with position changes, occurring intermittently.  She denies headache.  No focal weakness or numbness.  She denies dysphagia or aphasia.  No chest pain, fever, shortness of breath, or nausea.    Objective:     Vital Signs (Most Recent):  Temp: 97.9 °F (36.6 °C) (08/15/19 0701)  Pulse: 92 (08/15/19 1154)  Resp: (!) 29 (08/15/19 1154)  BP: 116/87 (08/15/19 1101)  SpO2: 97 % (08/15/19 1154) Vital Signs (24h Range):  Temp:  [97.9 °F (36.6 °C)-100 °F (37.8 °C)] 97.9 °F (36.6 °C)  Pulse:  [] 92  Resp:  [18-44] 29  SpO2:  [94 %-98 %] 97 %  BP: (102-155)/(54-87) 116/87     Weight: 85.9 kg (189 lb 6 oz)  Body mass index is 32.51 kg/m².    Intake/Output Summary (Last 24 hours) at 8/15/2019 1413  Last data filed at 8/15/2019 0900  Gross per 24 hour   Intake 2128 ml   Output 1700 ml   Net 428 ml      Physical Exam   General:  Comfortable appearing, nontoxic, no apparent distress, frail  Head and eyes:  Anicteric sclerae, no conjunctival discharge, PERRLA,  EOMI  ENT:  Dry mucous membranes  Skin:  Dry and warm with no jaundice; left neck with well healing postsurgical site without significant erythema, bleeding, or drainage  Cardiovascular:  2+ radial pulses, regular rate and rhythm  Pulmonary:  Comfortable work of breathing, lungs are clear to auscultation bilaterally  GI:  Abdomen soft and nontender, nondistended  Psych:  Mood is calm, affect normal, insight fair  Neuro:  Mild left facial droop, alert and oriented, fluent speech, symmetrical movements of extremities against gravity    Significant Labs:   A1C:   Recent Labs   Lab 08/14/19  0308   HGBA1C 5.9     CBC:   Recent Labs   Lab 08/14/19  0307 08/15/19  0321   WBC 7.90 16.77*   HGB 9.4* 10.0*   HCT 30.3* 31.6*    297     CMP:   Recent Labs   Lab 08/13/19  1744 08/14/19 0307 08/14/19  2110 08/15/19  0321    140  --  138   K 3.4* 3.3* 3.7 3.8    106  --  104   CO2 27 28  --  26   * 107  --  119*   BUN 8 5*  --  <5*   CREATININE 0.5 0.4*  --  0.5   CALCIUM 8.5* 7.9*  --  8.6*   PROT 6.5 5.5*  --  6.2   ALBUMIN 3.5 2.8*  --  3.2*   BILITOT 0.4 0.4  --  0.6   ALKPHOS 98 79  --  94   AST 30 25  --  23   ALT 24 21  --  21   ANIONGAP 8 6*  --  8   EGFRNONAA >60.0 >60.0  --  >60.0     Lipid Panel:   Recent Labs   Lab 08/14/19 0307   CHOL 102*   HDL 34*   LDLCALC 52.6*   TRIG 77   CHOLHDL 33.3     Magnesium:   Recent Labs   Lab 08/14/19 2110   MG 1.8       Significant Imaging: I have reviewed all pertinent imaging results/findings within the past 24 hours.      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  Stroke education.   Appreciate consultants.  I discussed the case with Cardiology Dr Nayak and Neurology NP Read.  The patient refused CHERELLE today. I discussed the case with the patient's PCP Dr Gilmore as well as her daughter Nica.  All are in agreement that CHERELLE is best course of action.  Await patient final decision.   Continue medical treatment including statin and aspirin.  Continue permissive hypertension with cessation of home antihypertensive regimen.  IV labetalol as needed for blood pressure > 180/105  Continuous IV fluids overnight.  Checking orthostatic vital signs.  Repeat a.m. labs.  Replace electrolytes as needed.  PT/OT/SLP evaluations - the patient is doing well enough that she would likely be stable for discharge home with home health services in the near future  Outpatient vascular follow-up  VTE prophylaxis with SCDs; avoid anticoagulant secondary to possible risk of hemorrhagic conversion of multiple acute strokes; consider prophylactic dose Lovenox if okay with Neurology  This patient is high risk secondary to acute illness with risk to life; acute neurological status changes; invasive transesophageal echocardiogram planned in near future        Milton Be MD  Department of Hospital Medicine   [facility]

## 2019-08-15 NOTE — PROGRESS NOTES
[facility]  Neurology  Progress Note    Patient Name: Albina Armenta  MRN: [id number]  Admission Date: 8/12/2019  Hospital Length of Stay: 3 days  Code Status: Full Code   Attending Provider: Rashaun Greene MD  Primary Care Physician: Shyam Gilmore MD   Principal Problem:CVA (cerebral vascular accident)    Subjective:     Interval History: Patient seen and examined, she is refusing CHERELLE, reports some intermittent dizziness today - has had previously but never diagnosed with vertigo. Stable neurological exam  Dr. Jose Paez spoke with patient and she is going to think about the CHERELLE, will let nursing know her decision.     Current Neurological Medications: MAR reviewed    HPI: Patient presented to the ER with near syncope during her shower, BP was hypotensive 80/40's, she has not been eating or drinking well. She does report a recent L CEA last Wednesday. She denies focal weakness or any other complaints and is feeling better.   H/o chronic Bell's Palsy to the R side  Denies h/o afib     Labs:   Crt 0.6  LDL 52.6  HgbA1c 5.9     Imaging:  MRI brain: Degradation by motion.  Multiple foci of restricted diffusion within both cerebral hemispheres and right cerebellar hemisphere compatible with regions of ischemia/infarction.  Involvement of multiple vascular distributions is suspicious for embolic phenomenon.  There are associated changes of mild local mass effect.     CTA head/neck: Recent surgical changes within the left side of the neck in keeping with history of endarterectomy.  A kinked appearance of the left ICA results in approximately 70-80% maximum diameter narrowing.  There are otherwise scattered atheromatous changes in the left ICA.  Approximately 50% maximum diameter stenosis involving the proximal segment of right ICA.  Evidence of hemodynamically significant narrowing involving the origin of the right brachiocephalic artery and left common carotid artery.  There is high-grade stenosis  or occlusion of the proximal segment of the left subclavian artery.  Areas of focal occlusion involving the right vertebral artery.  There is atheromatous narrowing of the origin of the left vertebral artery.  Atheromatous changes observed involving the intracranial arteries without evidence of high-grade stenosis or occlusion.  A small right A1 segment and absence of right P1 segment are likely anatomic variance.     ECHO:   · Concentric left ventricular hypertrophy.  · Low normal left ventricular systolic function. The estimated ejection fraction is 52%  · Grade I (mild) left ventricular diastolic dysfunction consistent with impaired relaxation.  · Mild-to-moderate mitral regurgitation.  · Mild tricuspid regurgitation.  · Normal left atrial pressure.  · The mitral valve shows mild prolapse of the anterior mitral leaflet.  · Normal right ventricular systolic function.       Current Facility-Administered Medications   Medication Dose Route Frequency Provider Last Rate Last Dose    acetaminophen tablet 650 mg  650 mg Oral Q4H PRN Ele Walker NP        albuterol-ipratropium 2.5 mg-0.5 mg/3 mL nebulizer solution 3 mL  3 mL Nebulization Q4H PRN Ele Walker NP        aspirin tablet 325 mg  325 mg Oral Daily Denilson Hardy MD   325 mg at 08/14/19 0856    atorvastatin tablet 80 mg  80 mg Oral Daily Ele Walker NP   80 mg at 08/14/19 0856    calcium gluconate 1g in normal saline 0.9 % 100mL (ready to mix system)  1 g Intravenous PRN Ele Walker NP        calcium gluconate 1g in normal saline 0.9 % 100mL (ready to mix system)  1 g Intravenous PRN Ele Walker NP   1 g at 08/12/19 2310    calcium gluconate 1g in normal saline 0.9 % 100mL (ready to mix system)  1 g Intravenous PRN Ele Walker NP        chlorhexidine 0.12 % solution 15 mL  15 mL Mouth/Throat BID Marcie Bullard PharmD   15 mL at 08/14/19 2100    citalopram tablet 10 mg  10 mg Oral QAM Ele Walker NP   10  mg at 08/14/19 0856    dextrose 50% injection 12.5 g  12.5 g Intravenous PRN Ele Walker NP        dextrose 50% injection 25 g  25 g Intravenous PRN Ele Walker NP        insulin aspart U-100 pen 0-5 Units  0-5 Units Subcutaneous QID (AC + HS) PRN Ele Walker NP        magnesium oxide tablet 800 mg  800 mg Oral PRN Rashaun Greene MD        magnesium oxide tablet 800 mg  800 mg Oral PRN Rashaun Greene MD        magnesium sulfate 2g in water 50mL IVPB (premix)  2 g Intravenous PRN Ele Walker NP   2 g at 08/14/19 2243    magnesium sulfate 2g in water 50mL IVPB (premix)  4 g Intravenous PRN Ele Walker NP        mupirocin 2 % ointment   Nasal BID Marcie Bullard PharmD        ondansetron injection 4 mg  4 mg Intravenous Q8H PRN Ele Walker NP   4 mg at 08/15/19 0802    pantoprazole EC tablet 40 mg  40 mg Oral Daily Ele Walker NP   40 mg at 08/14/19 0857    potassium chloride 10 mEq in 100 mL IVPB  40 mEq Intravenous PRN Ele Walker  mL/hr at 08/12/19 2309 40 mEq at 08/12/19 2309    And    potassium chloride 10 mEq in 100 mL IVPB  40 mEq Intravenous PRN Ele Walker NP        And    potassium chloride 10 mEq in 100 mL IVPB  40 mEq Intravenous PRN Ele Walker  mL/hr at 08/14/19 2227 40 mEq at 08/14/19 2227    potassium chloride 10% oral solution 40 mEq  40 mEq Oral PRN Rashaun Greene MD   40 mEq at 08/14/19 2342    potassium, sodium phosphates 280-160-250 mg packet 2 packet  2 packet Oral PRN Rashaun Greene MD        potassium, sodium phosphates 280-160-250 mg packet 2 packet  2 packet Oral PRN Rashaun Greene MD        potassium, sodium phosphates 280-160-250 mg packet 2 packet  2 packet Oral PRN Rashaun Greene MD        sodium chloride 0.9% flush 10 mL  10 mL Intravenous PRN Ele Walker NP        sodium phosphate 15 mmol in dextrose 5 % 250 mL IVPB  15 mmol Intravenous PRN Ele Walker  NP        sodium phosphate 20.01 mmol in dextrose 5 % 250 mL IVPB  20.01 mmol Intravenous PRN Ele Walker NP 62.5 mL/hr at 08/13/19 0839 20.01 mmol at 08/13/19 0839    sodium phosphate 30 mmol in dextrose 5 % 250 mL IVPB  30 mmol Intravenous PRN Ele Walker NP           Review of Systems as per HPI  Objective:     Vital Signs (Most Recent):  Temp: 97.9 °F (36.6 °C) (08/15/19 0701)  Pulse: 89 (08/15/19 0701)  Resp: 20 (08/15/19 0701)  BP: (!) 125/58 (08/15/19 0701)  SpO2: 95 % (08/15/19 0701) Vital Signs (24h Range):  Temp:  [97.9 °F (36.6 °C)-100 °F (37.8 °C)] 97.9 °F (36.6 °C)  Pulse:  [] 89  Resp:  [18-38] 20  SpO2:  [94 %-98 %] 95 %  BP: (119-155)/(54-80) 125/58     Weight: 85.9 kg (189 lb 6 oz)  Body mass index is 32.51 kg/m².    Physical Exam   Constitutional: She is oriented to person, place, and time. She appears well-developed and well-nourished.   HENT:   Head: Normocephalic and atraumatic.   Eyes: Pupils are equal, round, and reactive to light. EOM are normal.   Neck: Normal range of motion. Neck supple.   Cardiovascular: Normal rate, regular rhythm and normal heart sounds.   Pulmonary/Chest: Effort normal and breath sounds normal.   Abdominal: Soft.   Musculoskeletal: Normal range of motion.   Neurological: She is alert and oriented to person, place, and time. She has normal strength. She has a normal Finger-Nose-Finger Test.   Skin: Skin is warm and dry.   Psychiatric: She has a normal mood and affect. Her speech is normal.       NEUROLOGICAL EXAMINATION:     MENTAL STATUS   Oriented to person, place, and time.   Speech: speech is normal   Level of consciousness: alert    CRANIAL NERVES     CN III, IV, VI   Pupils are equal, round, and reactive to light.  Extraocular motions are normal.        Chronic R facial droop due to Bell's Palsy     MOTOR EXAM     Strength   Strength 5/5 throughout.     SENSORY EXAM   Light touch normal.     GAIT AND COORDINATION      Coordination   Finger to  nose coordination: normal      Significant Labs: All pertinent lab results from the past 24 hours have been reviewed.    Significant Imaging: I have reviewed all pertinent imaging results/findings within the past 24 hours.    Assessment and Plan:   Near Syncope   Acute CVA per MRI   Chronic Bell's Palsy  1. MRI reviewed  2. CTA head and neck reviewed, vascular consulted  3. LDL and HgbA1c: reviewed  4. EEG: reviewed, prelim no seizures  5. PT/OT/ST eval and treat  6. Recent L CEA with vascular at [facility]  7. No driving till after clinic f/u  8. ECHO: reviewed  9. Every 2 hour neurochecks  10. Continue ASA  11. No reported h/o afib  12. CHERELLE, cardiology consult placed - patient refusing  13. Vascular consult placed for stenosis, Dr. Bush wants patient to follow outpatient with Dr. Soto, no intervention needed inpatient     Hypotension  1. Defer to      Stroke education was provided.  If patient has acute neurological changes including weakness, confusion, speech changes, facial droop, difficulty walking, and sensory changes immediately call 911.  Follow up Neurology in 2 weeks at [phone].  Medication side effects discussed with the patient and/or caregiver.  Active Diagnoses:    Diagnosis Date Noted POA    PRINCIPAL PROBLEM:  CVA (cerebral vascular accident) [I63.9] 08/13/2019 Unknown    History of carotid endarterectomy [Z98.890] 08/14/2019 Not Applicable    Hypotension [I95.9] 08/13/2019 Yes    Hypokalemia [E87.6] 08/13/2019 Unknown    Syncope [R55] 08/12/2019 Yes    Hypertension [I10] 07/16/2015 Yes      Problems Resolved During this Admission:       VTE Risk Mitigation (From admission, onward)        Ordered     IP VTE HIGH RISK PATIENT  Once      08/12/19 2050     Place ROM hose  Until discontinued      08/12/19 2050          Karen Shaffer NP  Neurology  [facility]    I, Dr. Jose Paez, have personally seen and examined the patient with my advanced provider and agree with above. I  personally did a focused exam, and reviewed all necessary clinical information. I discussed my management plan with my NP and agree with above. Will need CHERELLE, patient understands risks and benefits. Still thinking about it if she wants to move forward.

## 2019-08-15 NOTE — PT/OT/SLP DISCHARGE
Physical Therapy Discharge Summary    Name: Albina Armenta  MRN: [id number]   Principal Problem: CVA (cerebral vascular accident)     Patient Discharged from acute Physical Therapy on 8/15/19.  Please refer to prior PT noted date on 8/15/19 for functional status.     Assessment:     Patient has met all goals and is not appropriate for therapy.    Objective:     GOALS:   Multidisciplinary Problems     Physical Therapy Goals     Not on file          Multidisciplinary Problems (Resolved)        Problem: Physical Therapy Goal    Goal Priority Disciplines Outcome Goal Variances Interventions   Physical Therapy Goal   (Resolved)     PT, PT/OT Outcome(s) achieved     Description:  Goals to be met by: D/C     Patient will increase functional independence with mobility by performin. Supine to sit with Stand-by Assistance  2. Gait  x 150' feet with Stand-by Assistance using Rolling Walker.                       Reasons for Discontinuation of Therapy Services  Satisfactory goal achievement.      Plan:     Patient Discharged to: Home no PT services needed.    Sofia Russell, PT  8/15/2019

## 2019-08-15 NOTE — SUBJECTIVE & OBJECTIVE
Interval History:  The patient reports persistent dizziness, mild intensity at rest, worsens with position changes, occurring intermittently.  She denies headache.  No focal weakness or numbness.  She denies dysphagia or aphasia.  No chest pain, fever, shortness of breath, or nausea.    Objective:     Vital Signs (Most Recent):  Temp: 97.9 °F (36.6 °C) (08/15/19 0701)  Pulse: 92 (08/15/19 1154)  Resp: (!) 29 (08/15/19 1154)  BP: 116/87 (08/15/19 1101)  SpO2: 97 % (08/15/19 1154) Vital Signs (24h Range):  Temp:  [97.9 °F (36.6 °C)-100 °F (37.8 °C)] 97.9 °F (36.6 °C)  Pulse:  [] 92  Resp:  [18-44] 29  SpO2:  [94 %-98 %] 97 %  BP: (102-155)/(54-87) 116/87     Weight: 85.9 kg (189 lb 6 oz)  Body mass index is 32.51 kg/m².    Intake/Output Summary (Last 24 hours) at 8/15/2019 1413  Last data filed at 8/15/2019 0900  Gross per 24 hour   Intake 2128 ml   Output 1700 ml   Net 428 ml      Physical Exam   General:  Comfortable appearing, nontoxic, no apparent distress, frail  Head and eyes:  Anicteric sclerae, no conjunctival discharge, PERRLA, EOMI  ENT:  Dry mucous membranes  Skin:  Dry and warm with no jaundice  Cardiovascular:  2+ radial pulses, regular rate and rhythm  Pulmonary:  Comfortable work of breathing, lungs are clear to auscultation bilaterally  GI:  Abdomen soft and nontender, nondistended  Psych:  Mood is calm, affect normal, insight fair  Neuro:  Symmetrical facial expression, alert and oriented, fluent speech, symmetrical movements of extremities against gravity    Significant Labs:   A1C:   Recent Labs   Lab 08/14/19  0308   HGBA1C 5.9     CBC:   Recent Labs   Lab 08/14/19  0307 08/15/19  0321   WBC 7.90 16.77*   HGB 9.4* 10.0*   HCT 30.3* 31.6*    297     CMP:   Recent Labs   Lab 08/13/19  1744 08/14/19  0307 08/14/19  2110 08/15/19  0321    140  --  138   K 3.4* 3.3* 3.7 3.8    106  --  104   CO2 27 28  --  26   * 107  --  119*   BUN 8 5*  --  <5*   CREATININE 0.5 0.4*  --   0.5   CALCIUM 8.5* 7.9*  --  8.6*   PROT 6.5 5.5*  --  6.2   ALBUMIN 3.5 2.8*  --  3.2*   BILITOT 0.4 0.4  --  0.6   ALKPHOS 98 79  --  94   AST 30 25  --  23   ALT 24 21  --  21   ANIONGAP 8 6*  --  8   EGFRNONAA >60.0 >60.0  --  >60.0     Lipid Panel:   Recent Labs   Lab 08/14/19  0307   CHOL 102*   HDL 34*   LDLCALC 52.6*   TRIG 77   CHOLHDL 33.3     Magnesium:   Recent Labs   Lab 08/14/19  2110   MG 1.8       Significant Imaging: I have reviewed all pertinent imaging results/findings within the past 24 hours.

## 2019-08-15 NOTE — PLAN OF CARE
Problem: Physical Therapy Goal  Goal: Physical Therapy Goal  Goals to be met by: D/C     Patient will increase functional independence with mobility by performin. Supine to sit with Stand-by Assistance  2. Gait  x 150' feet with Stand-by Assistance using Rolling Walker.      Outcome: Outcome(s) achieved Date Met: 08/15/19  Pt has met established PT goals

## 2019-08-15 NOTE — TREATMENT PLAN
Patient refuses a CHERELLE at this time and wants to eat and go home. Discussed with the patient and daughter again. She is aware of the risks of not having the test.

## 2019-08-15 NOTE — NURSING
Rescheduled CHERELLE for tomorrow AM with Dr. Nayak.   Dr. Milton Be hospitalist today.  Up to [facility] frequently with minimal assist.

## 2019-08-15 NOTE — PT/OT/SLP EVAL
Occupational Therapy   Evaluation    Name: Albina Armenta  MRN: [id number]  Admitting Diagnosis:  CVA (cerebral vascular accident)      Recommendations:     Discharge Recommendations: home  Discharge Equipment Recommendations:  none  Barriers to discharge:  None    Assessment:     Albina Armenta is a 74 y.o. female with a medical diagnosis of CVA (cerebral vascular accident).    Today's evaluation today was limited by pt c/o dizziness and pt found with orthostatic hypotension when moving from a lying to seated position. BP lying was 127/56, sitting 99/64.  Pt returned to supine with /67 once back in supine.  Further assessment is warranted to make appropriate d/c recommendations.  This pt is expected to be able to d/c home with home health/family assist, but this is to be determined after further assessment.  Performance deficits affecting function: impaired functional mobilty, impaired self care skills.      Rehab Prognosis: Good; patient would benefit from acute skilled OT services to address these deficits and reach maximum level of function.       Plan:     Patient to be seen 3 x/week to address the above listed problems via self-care/home management, therapeutic activities, therapeutic exercises  · Plan of Care Expires: 09/15/19  · Plan of Care Reviewed with: patient, daughter    Subjective     Chief Complaint: dizziness  Patient/Family Comments/goals: to go home as soon as possible    Occupational Profile:  Living Environment: Pt lives alone in a single story home with neighbors and friends who check on her frequently.    Previous level of function: She is active, driving, and goes out into the community.  She is completely independent with ADLs at baseline.  Equipment Used at Home:  none  Assistance upon Discharge: daughter present for today's evaluation and reports she plans to have extra assistance for the pt at home upon discharge    Pain/Comfort:  · Pain Rating 1: 0/10  · Pain Rating  Post-Intervention 1: 0/10    Objective:     Communicated with: RN prior to session.  Patient found supine with telemetry, pulse ox (continuous), peripheral IV upon OT entry to room.    General Precautions: Standard, aspiration   Orthopedic Precautions:N/A   Braces: N/A     Occupational Performance:    Bed Mobility:    · Patient completed Supine to Sit with stand by assistance    Functional Mobility/Transfers:  · Not assessed due to symptomatic orthostatic hypotension    Activities of Daily Living:  · Not assessed due to symptomatic orthostatic hypotension    Cognitive/Visual Perceptual:  Cognitive/Psychosocial Skills:     -       Oriented to: Person, Place, Time and Situation   -       Follows Commands/attention:Follows multistep  commands  -       Communication: clear/fluent  -       Memory: No Deficits noted  -       Safety awareness/insight to disability: intact   -       Mood/Affect/Coping skills/emotional control: Anxious    Physical Exam:  Upper Extremity Range of Motion:     -       Right Upper Extremity: WNL  -       Left Upper Extremity: WNL  Upper Extremity Strength:    -       Right Upper Extremity: WNL  -       Left Upper Extremity: WNL   Strength:    -       Right Upper Extremity: WNL  -       Left Upper Extremity: WNL  Fine Motor Coordination:    -       Intact    AMPAC 6 Click ADL:  AMPAC Total Score: 19    Treatment & Education:  Pt/daughter educated on OT role/POC  Education:    Patient left supine with all lines intact, call button in reach, bed alarm on, RN notified and daughter present    GOALS:   Multidisciplinary Problems     Occupational Therapy Goals        Problem: Occupational Therapy Goal    Goal Priority Disciplines Outcome Interventions   Occupational Therapy Goal     OT, PT/OT     Description:  Goals to be met by: discharge    Patient will increase functional independence with ADLs by performing:    LE Dressing with Modified [place].  Grooming while standing with Modified  [place].  Toileting from toilet with Modified [place] for hygiene and clothing management.   Toilet transfer to toilet with Modified [place].  Pt will complete functional mobility at household distance with Modified [place].                      History:     Past Medical History:   Diagnosis Date    Atherosclerosis of renal artery 8/10/2015    7/27/2015: Renal Duplex: Right: severe - 3.2 m/s. Left: about 60% stenosis - 1.7 m/s.    Carotid bruit 7/16/2015 2005: Carotid bruit heard.    Coronary artery disease     CVA (cerebral vascular accident) 8/13/2019    Hypercholesterolemia 7/16/2015 2005: Diagnosed. Started statin.    Hypertension, benign 7/16/2015 1995: Diagnosed.    Mild obesity 6/9/2017 6/9/2017: 86 kg. BMI 31.    Subclavian artery stenosis, left 8/10/2015    7/2015: Diagnosed. 7/23/2015: Carotid Duplex: LIZETH: moderate plaquing. LICA: moderate plaquing -1.4 m/s - 50-60%. Left vertebral: retrograde flow.       Past Surgical History:   Procedure Laterality Date    APPENDECTOMY Right 1959    HYSTERECTOMY      OOPHORECTOMY      VASCULAR SURGERY         Time Tracking:     OT Date of Treatment: 08/15/19  OT Start Time: 0934  OT Stop Time: 0948  OT Total Time (min): 14 min    Co-evaluation with PT  Billable Minutes:Evaluation 14    Everton Rothman OT  8/15/2019

## 2019-08-15 NOTE — TREATMENT PLAN
CHERELLE requested for evaluation of cardiac sources of embolism.   H&P, consult notes, diagnostics reviewed.   Patient was seen and examined.   No obvious absolute contraindications.   Will proceed with CHERELLE In AM with anesthesia help for sedation.   Indications,s risks, benefits as well as alternatives to the procedure were discussed with the patient and informed consent would be obtained.

## 2019-08-15 NOTE — NURSING
"Patient adamantly says she does not want to have "the test", referring to planned CHERELLE this am. Daughter here, discussion held.  Dr. Dang called and at bedside. Risks of not having test discussed and pt. Verbalizes understanding.  "

## 2019-08-15 NOTE — PT/OT/SLP EVAL
Physical Therapy Evaluation    Patient Name:  Albina Armenta   MRN:  [id number]    Recommendations:     Discharge Recommendations:  home   Discharge Equipment Recommendations: none   Barriers to discharge: None    Assessment:     Albina Armenta is a 74 y.o. female admitted with a medical diagnosis of CVA (cerebral vascular accident).  She presents with the following impairments/functional limitations:  gait instability, impaired endurance, impaired functional mobilty, decreased safety awareness. Upon entering pt's room she stated she is experiencing vertigo-like symptoms that she has experienced in the past. BP taken during transfers with values as follows: supine 127/72, sitting 106/70, after 2 minutes of sitting 99/64, return to supine 108/67. No report of increase or decrease of vertigo-like symptoms during transfers.     Rehab Prognosis: Good; patient would benefit from acute skilled PT services to address these deficits and reach maximum level of function.    Recent Surgery: Procedure(s) (LRB):  ECHOCARDIOGRAM, TRANSESOPHAGEAL (N/A)      Plan:     During this hospitalization, patient to be seen 5 x/week to address the identified rehab impairments via gait training, therapeutic activities, therapeutic exercises and progress toward the following goals:    · Plan of Care Expires:  09/15/19    Subjective     Chief Complaint: vertigo-like symptoms  Patient/Family Comments/goals: go home  Pain/Comfort:  · Pain Rating 1: 0/10    Patients cultural, spiritual, Yarsanism conflicts given the current situation:      Living Environment:  Pt lives alone in single story house with support from family members   Prior to admission, patients level of function was independent. She participates in water aerobics daily. Equipment used at home: none.  DME owned (not currently used): none.  Upon discharge, patient will have assistance from family members.    Objective:     Communicated with RN prior to session.  Patient  found supine with telemetry, peripheral IV, pulse ox (continuous)  upon PT entry to room.    General Precautions: Standard,     Orthopedic Precautions:    Braces:       Exams:  · Cognitive Exam:  Patient is oriented to Person, Place, Time and Situation  · RUE Strength: WNL  · LUE Strength: WNL  · RLE Strength: WNL  · LLE Strength: WNL    Functional Mobility:  · Bed Mobility:     · Supine to Sit: contact guard assistance  · Balance: sitting balance: good      Therapeutic Activities and Exercises:   bed mobility, pt educated on visually focusing on an object during transfers to decrease dizziness     AM-PAC 6 CLICK MOBILITY  Total Score:17     Patient left supine with call button in reach and RN notified.    GOALS:   Multidisciplinary Problems     Physical Therapy Goals        Problem: Physical Therapy Goal    Goal Priority Disciplines Outcome Goal Variances Interventions   Physical Therapy Goal     PT, PT/OT      Description:  Goals to be met by: D/C     Patient will increase functional independence with mobility by performin. Supine to sit with Stand-by Assistance  2. Gait  x 150' feet with Stand-by Assistance using Rolling Walker.                       History:     Past Medical History:   Diagnosis Date    Atherosclerosis of renal artery 8/10/2015    2015: Renal Duplex: Right: severe - 3.2 m/s. Left: about 60% stenosis - 1.7 m/s.    Carotid bruit 2015: Carotid bruit heard.    Coronary artery disease     CVA (cerebral vascular accident) 2019    Hypercholesterolemia 2015: Diagnosed. Started statin.    Hypertension, benign 2015: Diagnosed.    Mild obesity 2017: 86 kg. BMI 31.    Subclavian artery stenosis, left 8/10/2015    2015: Diagnosed. 2015: Carotid Duplex: LIZETH: moderate plaquing. LICA: moderate plaquing -1.4 m/s - 50-60%. Left vertebral: retrograde flow.       Past Surgical History:   Procedure Laterality Date     APPENDECTOMY Right 1959    HYSTERECTOMY      OOPHORECTOMY      VASCULAR SURGERY         Time Tracking:     PT Received On: 08/15/19  PT Start Time: 0932     PT Stop Time: 0946  PT Total Time (min): 14 min     Billable Minutes: Evaluation 14      Sofia Russell, PT  08/15/2019

## 2019-08-16 ENCOUNTER — ANESTHESIA EVENT (OUTPATIENT)
Dept: CARDIOLOGY | Facility: HOSPITAL | Age: 74
DRG: 066 | End: 2019-08-16
Payer: MEDICARE

## 2019-08-16 ENCOUNTER — CLINICAL SUPPORT (OUTPATIENT)
Dept: CARDIOLOGY | Facility: HOSPITAL | Age: 74
DRG: 066 | End: 2019-08-16
Attending: INTERNAL MEDICINE
Payer: MEDICARE

## 2019-08-16 ENCOUNTER — ANESTHESIA (OUTPATIENT)
Dept: CARDIOLOGY | Facility: HOSPITAL | Age: 74
DRG: 066 | End: 2019-08-16
Payer: MEDICARE

## 2019-08-16 VITALS
HEART RATE: 84 BPM | SYSTOLIC BLOOD PRESSURE: 127 MMHG | TEMPERATURE: 98 F | OXYGEN SATURATION: 98 % | WEIGHT: 186.75 LBS | BODY MASS INDEX: 31.88 KG/M2 | RESPIRATION RATE: 22 BRPM | DIASTOLIC BLOOD PRESSURE: 55 MMHG | HEIGHT: 64 IN

## 2019-08-16 VITALS — WEIGHT: 186.75 LBS | BODY MASS INDEX: 31.88 KG/M2 | HEIGHT: 64 IN

## 2019-08-16 LAB
ANION GAP SERPL CALC-SCNC: 9 MMOL/L (ref 8–16)
BSA FOR ECHO PROCEDURE: 1.96 M2
BUN SERPL-MCNC: 5 MG/DL (ref 8–23)
CALCIUM SERPL-MCNC: 8.6 MG/DL (ref 8.7–10.5)
CHLORIDE SERPL-SCNC: 107 MMOL/L (ref 95–110)
CO2 SERPL-SCNC: 27 MMOL/L (ref 23–29)
CREAT SERPL-MCNC: 0.4 MG/DL (ref 0.5–1.4)
EST. GFR  (AFRICAN AMERICAN): >60 ML/MIN/1.73 M^2
EST. GFR  (NON AFRICAN AMERICAN): >60 ML/MIN/1.73 M^2
GLUCOSE SERPL-MCNC: 124 MG/DL (ref 70–110)
MAGNESIUM SERPL-MCNC: 1.9 MG/DL (ref 1.6–2.6)
POTASSIUM SERPL-SCNC: 3.9 MMOL/L (ref 3.5–5.1)
SODIUM SERPL-SCNC: 143 MMOL/L (ref 136–145)

## 2019-08-16 PROCEDURE — 94640 AIRWAY INHALATION TREATMENT: CPT

## 2019-08-16 PROCEDURE — 25000003 PHARM REV CODE 250: Performed by: INTERNAL MEDICINE

## 2019-08-16 PROCEDURE — 25000003 PHARM REV CODE 250: Performed by: NURSE PRACTITIONER

## 2019-08-16 PROCEDURE — 27000671 HC TUBING MICROBORE EXT: Performed by: ANESTHESIOLOGY

## 2019-08-16 PROCEDURE — 94761 N-INVAS EAR/PLS OXIMETRY MLT: CPT

## 2019-08-16 PROCEDURE — 36415 COLL VENOUS BLD VENIPUNCTURE: CPT

## 2019-08-16 PROCEDURE — 27000675 HC TUBING MICRODRIP: Performed by: ANESTHESIOLOGY

## 2019-08-16 PROCEDURE — 93320 DOPPLER ECHO COMPLETE: CPT

## 2019-08-16 PROCEDURE — 80048 BASIC METABOLIC PNL TOTAL CA: CPT

## 2019-08-16 PROCEDURE — 83735 ASSAY OF MAGNESIUM: CPT

## 2019-08-16 PROCEDURE — 25000003 PHARM REV CODE 250

## 2019-08-16 PROCEDURE — 63600175 PHARM REV CODE 636 W HCPCS: Performed by: NURSE ANESTHETIST, CERTIFIED REGISTERED

## 2019-08-16 PROCEDURE — 63600175 PHARM REV CODE 636 W HCPCS: Performed by: INTERNAL MEDICINE

## 2019-08-16 PROCEDURE — 27000284 HC CANNULA NASAL: Performed by: ANESTHESIOLOGY

## 2019-08-16 RX ORDER — ASPIRIN 325 MG
325 TABLET ORAL DAILY
Qty: 30 TABLET | Refills: 11 | Status: SHIPPED | OUTPATIENT
Start: 2019-08-16 | End: 2019-10-31

## 2019-08-16 RX ORDER — PROPOFOL 10 MG/ML
VIAL (ML) INTRAVENOUS
Status: DISCONTINUED | OUTPATIENT
Start: 2019-08-16 | End: 2019-08-16

## 2019-08-16 RX ADMIN — CHLORHEXIDINE GLUCONATE 15 ML: 1.2 RINSE ORAL at 11:08

## 2019-08-16 RX ADMIN — CITALOPRAM HYDROBROMIDE 10 MG: 10 TABLET ORAL at 11:08

## 2019-08-16 RX ADMIN — MUPIROCIN: 20 OINTMENT TOPICAL at 11:08

## 2019-08-16 RX ADMIN — PROPOFOL 50 MG: 10 INJECTION, EMULSION INTRAVENOUS at 09:08

## 2019-08-16 RX ADMIN — SODIUM CHLORIDE: 0.9 INJECTION, SOLUTION INTRAVENOUS at 02:08

## 2019-08-16 RX ADMIN — PROPOFOL 20 MG: 10 INJECTION, EMULSION INTRAVENOUS at 09:08

## 2019-08-16 RX ADMIN — PROPOFOL 30 MG: 10 INJECTION, EMULSION INTRAVENOUS at 09:08

## 2019-08-16 RX ADMIN — ASPIRIN 325 MG ORAL TABLET 325 MG: 325 PILL ORAL at 11:08

## 2019-08-16 RX ADMIN — ATORVASTATIN CALCIUM 80 MG: 40 TABLET, FILM COATED ORAL at 11:08

## 2019-08-16 RX ADMIN — SODIUM CHLORIDE: 0.9 INJECTION, SOLUTION INTRAVENOUS at 11:08

## 2019-08-16 RX ADMIN — PANTOPRAZOLE SODIUM 40 MG: 40 TABLET, DELAYED RELEASE ORAL at 11:08

## 2019-08-16 RX ADMIN — SODIUM CHLORIDE: 0.9 INJECTION, SOLUTION INTRAVENOUS at 08:08

## 2019-08-16 NOTE — TREATMENT PLAN
Patient changed her mind again and she wanted to proceed with a CHERELLE. Will proceed with CHERELLE.

## 2019-08-16 NOTE — PROGRESS NOTES
[facility]  Neurology  Progress Note    Patient Name: Albina Armenta  MRN: [id number]  Admission Date: 8/12/2019  Hospital Length of Stay: 4 days  Code Status: Full Code   Attending Provider: Milton Be MD  Primary Care Physician: Shyam Gilmore MD   Principal Problem:CVA (cerebral vascular accident)    Subjective:     Interval History: Patient seen and examined prior to CHERELLE, informed me she will be going home today.   CHERELLE prelim neg    Current Neurological Medications: MAR reviewed    HPI: Patient presented to the ER with near syncope during her shower, BP was hypotensive 80/40's, she has not been eating or drinking well. She does report a recent L CEA last Wednesday. She denies focal weakness or any other complaints and is feeling better.   H/o chronic Bell's Palsy to the R side  Denies h/o afib     Labs:   Crt 0.6  LDL 52.6  HgbA1c 5.9     Imaging:  MRI brain: Degradation by motion.  Multiple foci of restricted diffusion within both cerebral hemispheres and right cerebellar hemisphere compatible with regions of ischemia/infarction.  Involvement of multiple vascular distributions is suspicious for embolic phenomenon.  There are associated changes of mild local mass effect.     CTA head/neck: Recent surgical changes within the left side of the neck in keeping with history of endarterectomy.  A kinked appearance of the left ICA results in approximately 70-80% maximum diameter narrowing.  There are otherwise scattered atheromatous changes in the left ICA.  Approximately 50% maximum diameter stenosis involving the proximal segment of right ICA.  Evidence of hemodynamically significant narrowing involving the origin of the right brachiocephalic artery and left common carotid artery.  There is high-grade stenosis or occlusion of the proximal segment of the left subclavian artery.  Areas of focal occlusion involving the right vertebral artery.  There is atheromatous narrowing of the origin of  the left vertebral artery.  Atheromatous changes observed involving the intracranial arteries without evidence of high-grade stenosis or occlusion.  A small right A1 segment and absence of right P1 segment are likely anatomic variance.     ECHO:   · Concentric left ventricular hypertrophy.  · Low normal left ventricular systolic function. The estimated ejection fraction is 52%  · Grade I (mild) left ventricular diastolic dysfunction consistent with impaired relaxation.  · Mild-to-moderate mitral regurgitation.  · Mild tricuspid regurgitation.  · Normal left atrial pressure.  · The mitral valve shows mild prolapse of the anterior mitral leaflet.  · Normal right ventricular systolic function.    Current Facility-Administered Medications   Medication Dose Route Frequency Provider Last Rate Last Dose    0.9%  NaCl infusion   Intravenous Continuous Milton Be  mL/hr at 08/16/19 1000      acetaminophen tablet 650 mg  650 mg Oral Q4H PRN Ele Walker NP        albuterol-ipratropium 2.5 mg-0.5 mg/3 mL nebulizer solution 3 mL  3 mL Nebulization Q4H PRN Ele Walker NP        aspirin tablet 325 mg  325 mg Oral Daily Denilson Hardy MD   325 mg at 08/15/19 1254    atorvastatin tablet 80 mg  80 mg Oral Daily Ele Walker NP   80 mg at 08/15/19 1254    calcium gluconate 1g in normal saline 0.9 % 100mL (ready to mix system)  1 g Intravenous PRN Ele Walker NP        calcium gluconate 1g in normal saline 0.9 % 100mL (ready to mix system)  1 g Intravenous PRN Ele Walker NP   1 g at 08/12/19 2310    calcium gluconate 1g in normal saline 0.9 % 100mL (ready to mix system)  1 g Intravenous PRN Ele Walker NP        chlorhexidine 0.12 % solution 15 mL  15 mL Mouth/Throat BID Marcie Bullard PharmD   15 mL at 08/15/19 2118    citalopram tablet 10 mg  10 mg Oral QAM Ele Walker NP   10 mg at 08/14/19 0856    dextrose 50% injection 12.5 g  12.5 g Intravenous PRN Ele  ARTURO Walker        dextrose 50% injection 25 g  25 g Intravenous PRN Ele Walker NP        insulin aspart U-100 pen 0-5 Units  0-5 Units Subcutaneous QID (AC + HS) PRN Ele Walker NP        labetalol injection 10 mg  10 mg Intravenous Q4H PRN Milton Be MD        magnesium oxide tablet 800 mg  800 mg Oral PRN Rashaun Greene MD        magnesium oxide tablet 800 mg  800 mg Oral PRN Rashaun Greene MD        magnesium sulfate 2g in water 50mL IVPB (premix)  2 g Intravenous PRN Ele Walker NP   2 g at 08/14/19 2243    magnesium sulfate 2g in water 50mL IVPB (premix)  4 g Intravenous PRN Ele Walker NP        mupirocin 2 % ointment   Nasal BID Marcie Bullard PharmD        ondansetron injection 4 mg  4 mg Intravenous Q8H PRN Ele Walker NP   4 mg at 08/15/19 0802    pantoprazole EC tablet 40 mg  40 mg Oral Daily Ele Walker NP   40 mg at 08/15/19 1254    potassium chloride 10 mEq in 100 mL IVPB  40 mEq Intravenous PRN Ele Walker  mL/hr at 08/12/19 2309 40 mEq at 08/12/19 2309    And    potassium chloride 10 mEq in 100 mL IVPB  40 mEq Intravenous PRN Ele Walker NP        And    potassium chloride 10 mEq in 100 mL IVPB  40 mEq Intravenous PRN Ele Walker  mL/hr at 08/14/19 2227 40 mEq at 08/14/19 2227    potassium chloride 10% oral solution 40 mEq  40 mEq Oral PRN Rashaun Greene MD   40 mEq at 08/14/19 2342    potassium, sodium phosphates 280-160-250 mg packet 2 packet  2 packet Oral PRN Rashaun Greene MD        potassium, sodium phosphates 280-160-250 mg packet 2 packet  2 packet Oral PRN Rashaun Greene MD        potassium, sodium phosphates 280-160-250 mg packet 2 packet  2 packet Oral PRN Rashaun Greene MD        sodium chloride 0.9% flush 10 mL  10 mL Intravenous PRN Ele Walker NP        sodium phosphate 15 mmol in dextrose 5 % 250 mL IVPB  15 mmol Intravenous PRN Ele Walker NP         sodium phosphate 20.01 mmol in dextrose 5 % 250 mL IVPB  20.01 mmol Intravenous PRN Ele Walker NP 62.5 mL/hr at 08/13/19 0839 20.01 mmol at 08/13/19 0839    sodium phosphate 30 mmol in dextrose 5 % 250 mL IVPB  30 mmol Intravenous PRN Ele Walker NP           Review of Systems as per HPI  Objective:     Vital Signs (Most Recent):  Temp: 97.5 °F (36.4 °C) (08/16/19 0918)  Pulse: 82 (08/16/19 0955)  Resp: (!) 45 (08/16/19 0955)  BP: 134/64 (08/16/19 0955)  SpO2: 98 % (08/16/19 0955) Vital Signs (24h Range):  Temp:  [97.5 °F (36.4 °C)-98.6 °F (37 °C)] 97.5 °F (36.4 °C)  Pulse:  [77-99] 82  Resp:  [15-45] 45  SpO2:  [86 %-100 %] 98 %  BP: ()/(50-90) 134/64     Weight: 84.7 kg (186 lb 11.7 oz)  Body mass index is 32.05 kg/m².    Physical Exam   Constitutional: She is oriented to person, place, and time. She appears well-developed and well-nourished.   HENT:   Head: Normocephalic and atraumatic.   Eyes: Pupils are equal, round, and reactive to light. EOM are normal.   Neck: Normal range of motion. Neck supple.   Cardiovascular: Normal rate, regular rhythm and normal heart sounds.   Pulmonary/Chest: Effort normal and breath sounds normal.   Abdominal: Soft.   Musculoskeletal: Normal range of motion.   Neurological: She is alert and oriented to person, place, and time. She has normal strength.   Skin: Skin is warm and dry.   Psychiatric: She has a normal mood and affect. Her speech is normal.   Vitals reviewed.      NEUROLOGICAL EXAMINATION:     MENTAL STATUS   Oriented to person, place, and time.   Speech: speech is normal   Level of consciousness: alert    CRANIAL NERVES     CN II   Visual fields full to confrontation.     CN III, IV, VI   Pupils are equal, round, and reactive to light.  Extraocular motions are normal.     CN V   Facial sensation intact.        Chronic R facial droop due to Bell's Palsy     MOTOR EXAM     Strength   Strength 5/5 throughout.     SENSORY EXAM   Light touch  normal.           Assessment and Plan:   Near Syncope   Acute CVA per MRI   Chronic Bell's Palsy  1. MRI reviewed  2. CTA head and neck reviewed, vascular consulted  3. LDL and HgbA1c: reviewed  4. EEG: reviewed, prelim no seizures  5. PT/OT/ST eval and treat  6. Recent L CEA with vascular at [facility]  7. No driving till after clinic f/u  8. ECHO: reviewed  9. Every 2 hour neurochecks  10. Continue ASA  11. No reported h/o afib  12. CHERELLE, cardiology consult placed - this am  13. Vascular consult placed for stenosis, Dr. Bush wants patient to follow outpatient with Dr. Soto, no intervention needed inpatient     Hypotension  1. Defer to      Stroke education was provided.  If patient has acute neurological changes including weakness, confusion, speech changes, facial droop, difficulty walking, and sensory changes immediately call 911.  Follow up Neurology in 2 weeks at [phone].  Medication side effects discussed with the patient and/or caregiver.  Active Diagnoses:    Diagnosis Date Noted POA    PRINCIPAL PROBLEM:  CVA (cerebral vascular accident) [I63.9] 08/13/2019 Yes    History of carotid endarterectomy [Z98.890] 08/14/2019 Not Applicable    Hypotension [I95.9] 08/13/2019 Yes    Hypokalemia [E87.6] 08/13/2019 Yes    Syncope [R55] 08/12/2019 Yes    Hypertension [I10] 07/16/2015 Yes      Problems Resolved During this Admission:       VTE Risk Mitigation (From admission, onward)        Ordered     IP VTE HIGH RISK PATIENT  Once      08/12/19 2050     Place ROM hose  Until discontinued      08/12/19 2050          Karen Shaffer NP  Neurology  [facility]    I, Dr. Jose Paez, have personally seen and examined the patient with my advanced provider and agree with above. I personally did a focused exam, and reviewed all necessary clinical information. I discussed my management plan with my NP and agree with above. Stable follow up outpatient. Continue stroke prevention.

## 2019-08-16 NOTE — PT/OT/SLP PROGRESS
Occupational Therapy      Patient Name:  Albina Armenta   MRN:  [id number]    Patient not seen today secondary to Other (Comment)(Pt with d/c orders, having lengthy conversation with MD about d/c.). Therapist unable to return.    Christina Calero OT  8/16/2019

## 2019-08-16 NOTE — PLAN OF CARE
CM received orders for pt to be discharged with home health with a walker and BSC. Pt went to talk to pt and dtg Nica and gave list of Home Healths. Pt and dtg chose [facility] and Pt signed Patient Choice Form and will give to CM Director Adwoa Cevallos to give to medical records. Cm received the approval to pull DME Walker [facility] Cm took the DME to pt's room and explained safety features and how to use. Gave the Education packet and the number of [employer] to call if they have any questions. Gordon was leaving and GORDON Kelley is going to send information for cm to [employer] [facility].

## 2019-08-16 NOTE — ANESTHESIA POSTPROCEDURE EVALUATION
Anesthesia Post Evaluation    Patient: Albina Armenta    Procedure(s) Performed: * No procedures listed *    Final Anesthesia Type: MAC  Patient location during evaluation: ICU  Patient participation: Yes- Able to Participate  Level of consciousness: awake and alert  Post-procedure vital signs: reviewed and stable  Pain management: adequate  Airway patency: patent  PONV status at discharge: No PONV  Anesthetic complications: no      Cardiovascular status: blood pressure returned to baseline  Respiratory status: unassisted, room air and spontaneous ventilation  Hydration status: euvolemic  Follow-up not needed.          Vitals Value Taken Time   /70 8/16/2019  9:38 AM   Temp 36.4 °C (97.5 °F) 8/16/2019  9:18 AM   Pulse 78 8/16/2019  9:41 AM   Resp 22 8/16/2019  9:41 AM   SpO2 100 % 8/16/2019  9:41 AM   Vitals shown include unvalidated device data.      No case tracking events are documented in the log.      Pain/Jerry Score: No data recorded

## 2019-08-16 NOTE — TRANSFER OF CARE
"Anesthesia Transfer of Care Note    Patient: Albina Armenta    Procedure(s) Performed: * No procedures listed *    Patient location: ICU    Anesthesia Type: MAC    Transport from OR: Transported from OR on 2-3 L/min O2 by NC with adequate spontaneous ventilation    Post pain: adequate analgesia    Post assessment: no apparent anesthetic complications    Post vital signs: stable    Level of consciousness: awake    Nausea/Vomiting: no nausea/vomiting    Complications: none    Transfer of care protocol was followed      Last vitals:   Visit Vitals  BP (!) 109/55   Pulse 79   Temp 36.4 °C (97.5 °F) (Skin)   Resp 18   Ht 5' 4" (1.626 m)   Wt 84.7 kg (186 lb 11.7 oz)   LMP  (LMP Unknown)   SpO2 97%   Breastfeeding? No   BMI 32.05 kg/m²     "

## 2019-08-16 NOTE — TREATMENT PLAN
Patient was seen ad examined. H&P reviewed. Patient seen and examined. Will proceed with CHERELLE with anesthesia help for sedation.

## 2019-08-16 NOTE — PLAN OF CARE
08/16/19 1549   Post-Acute Status   Post-Acute Authorization Home Health/Hospice   Home Health/Hospice Status Referrals Sent   CM sent referral for HH to [facility]. CM will follow for acceptance.

## 2019-08-16 NOTE — PROCEDURES
Indication:   Embolic CVA    Counseling:   Patient as well as her family member were informed of the risks, benefits as well as alternatives to the procedure and informed consent was obtained.     Procedure:  After obtaining informed consent the patient was sedated using Anesthesia help. A CHERELLE probe was advanced without difficulty and images were obtained. Agitated saline was injected and bubble study completed. Patient tolerated the procedure well and no immediate complications were noted.     Complications:   None immediate.    Findings:  1. Aortic valve is structurally normal with good leaflet excursion. No significant regurgitation.   2. Mitral valve is structurally normal with good leaflet excursion. Mild regurgitation.   3. Tricuspid valve is structurally normal with good leaflet excursion. No significant regurgitation.  4. Pulmonary valve is structurally normal with good leaflet excursion. No significant regurgitation.  5. No obvious shunt across the interatrial septum by color flow Doppler and agitated saline.   6. Mild atherosclerotic plaque noted in the descending thoracic aorta.   7. No obvious left atrial appendage thrombus.     Conclusions:  1. Bubble study negative for intracardiac shunt.    2. No obvious intracardiac sources of embolism.   3. Mild mitral regurgitation.

## 2019-08-16 NOTE — ANESTHESIA PREPROCEDURE EVALUATION
08/16/2019  Albina Armenta is a 74 y.o., female.    Anesthesia Evaluation    I have reviewed the Patient Summary Reports.    I have reviewed the Nursing Notes.   I have reviewed the Medications.     Review of Systems  Anesthesia Hx:  No problems with previous Anesthesia    Social:  Former Smoker    Pulmonary:   Sleep Apnea (Clinical Dx never tested)    Hepatic/GI:   GERD, well controlled    Neurological:   CVA (Noncommunicative, Left sided weakness 2 years old), residual symptoms        Physical Exam  General:  Well nourished    Airway/Jaw/Neck:  Airway Findings: Mouth Opening: Normal Tongue: Normal  General Airway Assessment: Adult  Mallampati: III  Improves to III with phonation.  TM Distance: Normal, at least 6 cm  Jaw/Neck Findings:     Neck ROM: Normal ROM      Dental:  Dental Findings: Edentulous, In tact, Prominent Incisors   Chest/Lungs:  Chest/Lungs Findings: Clear to auscultation, Normal Respiratory Rate     Heart/Vascular:  Heart Findings: Rate: Normal  Rhythm: Regular Rhythm        Mental Status:  Mental Status Findings:  Flat Affect, Cooperative         Anesthesia Plan  Type of Anesthesia, risks & benefits discussed:  Anesthesia Type:  MAC  Patient's Preference:   Intra-op Monitoring Plan: standard ASA monitors  Intra-op Monitoring Plan Comments:   Post Op Pain Control Plan: per primary service following discharge from PACU  Post Op Pain Control Plan Comments:   Induction:    Beta Blocker:         Informed Consent:  Anesthesia consent signed with patient.  ASA Score: 3     Day of Surgery Review of History & Physical:            Ready For Surgery From Anesthesia Perspective.

## 2019-08-16 NOTE — PLAN OF CARE
Problem: Swallowing Impairment  Goal: Improved Swallowing Without Aspiration    Intervention: Optimize Eating and Swallowing  Pt tolerating diet texture without difficulty; states swallowing improving daily.

## 2019-08-16 NOTE — DISCHARGE SUMMARY
[facility] Medicine  Discharge Summary      Patient Name: Albina Armenta  MRN: [id number]  Admission Date: 8/12/2019  Hospital Length of Stay: 4 days  Discharge Date and Time:  08/16/2019 3:36 PM  Attending Physician: Milton Be MD   Discharging Provider: Milton Be MD  Primary Care Provider: Shyam Gilmore MD    HPI:   Patient is brought to the ED via EMS after having a near syncopal event while in the shower. The patient was at home in the shower with her home health nurse when she suddenly became weak and slid to the shower floor. The patient remembers the event and she reports she did not lose consciousness. The caregiver who was present also reports that the patient remained alert and never loss consciousness and she did not hit her head. EMS was called and the patient was found to have BP ~80/40. She was started on IVF at that time with improvement of her BP. By the time the patient arrived to the ED she received ~500cc of IVF. BP was reached low as 60s systolic in ED but improved with IVF.     The patient reports she had a Left carotid endarterectomy 5 days ago at [facility]. The daughter who is at the bedside reports that the patient has had difficulty swallowing since the surgery due to damaged nerves in her neck. She was evaluated by ST and has been tolerating mostly liquid and soft diet.  The patient reports she has had decreased oral intake. She also reports she has been taking HCTZ that the daughter states was recently discontinued by the doctor.  The patient denies fever, chills, chest pain, sob, headache, visual disturbances, focal neuro deficits, vomiting, abdominal pain, dysuria, or diarrhea. She does endorse intermittent nausea. She has a history of L Hurt's Palsy, DM, HTN.     Hospital Course:   74-year-old female with history of left carotid stenosis status post recent carotid endarterectomy who presented the hospital with a near syncopal event.   She was admitted for workup and treatment including neurological evaluation.  CTA head and neck revealed left carotid artery stenosis status post recent surgical procedure.  MRI brain revealed multiple bilateral acute infarcts in the brain consistent with embolic etiology.  2D echocardiogram was performed revealing diastolic dysfunction, normal left ventricular and right ventricular systolic function, and no source of cardioembolism.  Transesophageal echocardiogram was negative for embolic source.  The patient received IV fluids and subsequently orthostatic vital signs have normalized today.  The patient worked with PT/OT and is appropriate for discharge home with home health services.  She had rolling walker and bedside commode ordered at discharge. The patient will resume medical regimen including statin and aspirin. At this time she will stop antihypertensive regimen including Norvasc, HCTZ, and ACE-inhibitor.  She will follow up with PCP in 1-2 weeks for monitoring.  She will also follow up with cardiology for loop recorder.  She will follow up with Neurology and vascular surgery.  At this time the patient medically stable for discharge home.  I discussed the discharge plan with the patient and family at bedside.  Stroke education was provided at discharge. The patient and family are in understanding and agreement with discharge plan at this time.      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  Presyncope likely multifactorial secondary to orthostatic hypotension from dehydration, possible effect of CVA  Hypotension secondary to hypovolemia likely secondary to diuretic  Hypokalemia likely secondary to diuretic  Chronic anemia likely inflammatory  Hypertension and hypertensive heart disease with diastolic dysfunction  Left carotid artery stenosis status post recent carotid endarterectomy  Chronic Bell's palsy  Physical deconditioning     Discharge physical  exam:  General:  Comfortable appearing, nontoxic, no apparent distress, frail  ENT:  Moist mucous membranes  Skin:  Dry and warm with no jaundice; left neck with well healing postsurgical site without significant erythema, bleeding, or drainage  Cardiovascular:  2+ radial pulses  Pulmonary:  Comfortable work of breathing  Psych:  Mood is calm, affect normal, insight fair  Neuro:  Mild left facial droop, alert and oriented, fluent speech, symmetrical movements of extremities against gravity    Consults:   Consults (From admission, onward)        Status Ordering Provider     Inpatient consult to Anesthesiology  Once     Provider:  Benjamin Moody MD    Acknowledged JOSAFAT DANG     Inpatient consult to Cardiology  Once     Provider:  Josafat Dang MD    Acknowledged JESS LUCIO     Inpatient consult to Hospitalist  Once     Provider:  Rashaun Greene MD    Completed JESUS MORIN JR     Inpatient consult to Neurology  Once     Provider:  Steffen Paez MD    Completed DO BLACK     Inpatient consult to Vascular Surgery  Once     Provider:  Jamaal Bush MD    Completed STEFFEN SYLVESTER          No new Assessment & Plan notes have been filed under this hospital service since the last note was generated.  Service: Hospital Medicine    Final Active Diagnoses:    Diagnosis Date Noted POA    PRINCIPAL PROBLEM:  CVA (cerebral vascular accident) [I63.9] 08/13/2019 Yes    History of carotid endarterectomy [Z98.890] 08/14/2019 Not Applicable    Hypotension [I95.9] 08/13/2019 Yes    Hypokalemia [E87.6] 08/13/2019 Yes    Syncope [R55] 08/12/2019 Yes    Hypertension [I10] 07/16/2015 Yes      Problems Resolved During this Admission:       Discharged Condition: good    Disposition: Home-Health Care [facility]    Follow Up:  Follow-up Information     [facility] .    Specialty:  Home Health Services  Contact information:  [address] [facility]  "[id number]  [phone]             Devante Soto Jr, MD In 2 weeks.    Specialties:  Vascular Surgery, General Surgery  Contact information:  [address]  [address]  [facility] [id number] [phone]             Shyam Gilmore MD In 1 week.    Specialty:  Nephrology  Contact information:  [address]  [facility] [id number] [phone]             Jos Paez MD In 2 weeks.    Specialties:  Vascular Neurology, Neurology  Contact information:  [address]  [address]  [facility] [address]  [phone]             Thanh Acuna MD In 2 weeks.    Specialty:  Cardiology  Contact information:  [address] [facility] [id number] [phone]                 Patient Instructions:      WALKER FOR HOME USE     Order Specific Question Answer Comments   Type of Walker: Adult (5'4"-6'6")    With wheels? Yes    Height: 5' 4" (1.626 m)    Weight: 84.7 kg (186 lb 11.7 oz)    Length of need (1-99 months): 99    Does patient have medical equipment at home? none    Please check all that apply: Patient's condition impairs ambulation.    Please check all that apply: Patient is unable to safely ambulate without equipment.    Please check all that apply: Walker will be used for gait training.      3 IN 1 COMMODE FOR HOME USE     Order Specific Question Answer Comments   Type: Standard    Height: 5' 4" (1.626 m)    Weight: 84.7 kg (186 lb 11.7 oz)    Does patient have medical equipment at home? none    Length of need (1-99 months): 99      Referral to Home health   Referral Priority: Routine Referral Type: Home Health   Referral Reason: Specialty Services Required   Referred to Provider: [facility] Zayda Jessica Specialty: Home Health Services   Number of Visits Requested: 1     Diet Cardiac   Order Comments: Dysphagia diet:  Pureed solids with thin liquids     Notify your health care provider if you experience any of the following:  severe uncontrolled pain     Notify your health care provider if you " experience any of the following:  persistent dizziness, light-headedness, or visual disturbances     Notify your health care provider if you experience any of the following:  increased confusion or weakness     Activity as tolerated   Order Comments: Ambulate with walker       Significant Diagnostic Studies: Labs:   Lipid Panel   Lab Results   Component Value Date    CHOL 102 (L) 08/14/2019    HDL 34 (L) 08/14/2019    LDLCALC 52.6 (L) 08/14/2019    TRIG 77 08/14/2019    CHOLHDL 33.3 08/14/2019    and A1C:   Recent Labs   Lab 08/14/19  0308   HGBA1C 5.9     Radiology:     Cardiac Graphics: Echocardiogram:   Transthoracic echo (TTE) complete (Cupid Only):   Results for orders placed or performed during the hospital encounter of 08/12/19   Transthoracic echo (TTE) Complete   Result Value Ref Range    BSA 1.96 m2    TDI SEPTAL 0.08 m/s    LV LATERAL E/E' RATIO 9.00 m/s    LV SEPTAL E/E' RATIO 9.00 m/s    AORTIC VALVE CUSP SEPERATION 1.56 cm    TDI LATERAL 0.08 m/s    PV PEAK VELOCITY 106.58 cm/s    LVIDD 5.25 3.5 - 6.0 cm    IVS 1.27 (A) 0.6 - 1.1 cm    .00 (A) 0.6 - 1.1 cm    Ao root annulus 2.96 cm    LVIDS 4.09 (A) 2.1 - 4.0 cm    FS 22 28 - 44 %    LV mass 1,980,322.98 g    LA size 3.08 cm    RVDD 200.00 cm    Left Ventricle Relative Wall Thickness 48.38 cm    AV mean gradient 10 mmHg    AV valve area 1.60 cm2    AV Velocity Ratio 42.36     AV index (prosthetic) 0.50     E/A ratio 0.59     Mean e' 0.08 m/s    E wave decelartion time 190.62 msec    IVRT 62.16 msec    LVOT diameter 2.01 cm    LVOT area 3.2 cm2    LVOT peak marlon 85.56 m/s    LVOT peak VTI 20.55 cm    Ao peak marlon 2.02 m/s    Ao VTI 40.76 cm    LVOT stroke volume 65.17 cm3    AV peak gradient 16 mmHg    E/E' ratio 9.00 m/s    MV Peak E Marlon 0.72 m/s    TR Max Marlon 2.74 m/s    MV Peak A Marlon 1.23 m/s    LV Systolic Volume 58.60 mL    LV Systolic Volume Index 30.8 mL/m2    LV Diastolic Volume 105.98 mL    LV Diastolic Volume Index 55.70 mL/m2    LV Mass  Index 1,040,839 g/m2    Triscuspid Valve Regurgitation Peak Gradient 30 mmHg    Narrative    · Concentric left ventricular hypertrophy.  · Low normal left ventricular systolic function. The estimated ejection   fraction is 52%  · Grade I (mild) left ventricular diastolic dysfunction consistent with   impaired relaxation.  · Mild-to-moderate mitral regurgitation.  · Mild tricuspid regurgitation.  · Normal left atrial pressure.  · The mitral valve shows mild prolapse of the anterior mitral leaflet.  · Normal right ventricular systolic function.          Pending Diagnostic Studies:     None         Medications:  Reconciled Home Medications:      Medication List      START taking these medications    aspirin 325 MG tablet  Take 1 tablet (325 mg total) by mouth once daily.  Replaces:  aspirin 81 MG EC tablet        CHANGE how you take these medications    atorvastatin 80 MG tablet  Commonly known as:  LIPITOR  Take 1 tablet (80 mg total) by mouth once daily.  What changed:  when to take this        CONTINUE taking these medications    citalopram 10 MG tablet  Commonly known as:  CELEXA  Take 10 mg by mouth every morning.     EMERGEN-C ORAL  Take 1 packet by mouth every morning.     omeprazole 40 MG capsule  Commonly known as:  PRILOSEC  Take 40 mg by mouth every morning.     VITAL HIGH PROTEIN ORAL  Take 1 tablet by mouth every morning.        STOP taking these medications    amLODIPine 10 MG tablet  Commonly known as:  NORVASC     aspirin 81 MG EC tablet  Commonly known as:  ECOTRIN  Replaced by:  aspirin 325 MG tablet     hydroCHLOROthiazide 12.5 mg capsule  Commonly known as:  MICROZIDE     ramipril 10 MG capsule  Commonly known as:  ALTACE            Indwelling Lines/Drains at time of discharge:   Lines/Drains/Airways          None          Time spent on the discharge of patient: 34 minutes  Patient was seen and examined on the date of discharge and determined to be suitable for discharge.       Milton Be,  MD  Department of Hospital Medicine  [facility]

## 2019-08-16 NOTE — PROGRESS NOTES
"[facility]  Adult Nutrition  Progress Note    SUMMARY       Recommendations    Recommendation/Intervention: 1.) Continue diet as tolerated 2.) Continue ONS TID to aid PO intake 3.)  to assist with meal choices daily  Goals: 1.) Pt to consume >75% estimated nutritional needs with meals and supplements 2.) No further wt loss   Nutrition Goal Status: goal met  Communication of RD Recs: reviewed with RN    Reason for Assessment    Reason For Assessment: RD follow-up  Diagnosis: other (see comments)(syncope)  Relevant Medical History: Hx: HTN, elevated cholesterol, atherosclerosis  Interdisciplinary Rounds: attended    Nutrition Risk Screen    Nutrition Risk Screen: no indicators present    Nutrition/Diet History    Patient Reported Diet/Restrictions/Preferences: soft  Spiritual, Cultural Beliefs, Mosque Practices, Values that Affect Care: no  Supplemental Drinks or Food Habits: Ensure Plus  Food Allergies: NKFA  Factors Affecting Nutritional Intake: difficulty/impaired swallowing    Anthropometrics    Temp: 97.5 °F (36.4 °C)  Height Method: Estimated  Height: 5' 4" (162.6 cm)  Height (inches): 64 in  Weight Method: Bed Scale  Weight: 84.7 kg (186 lb 11.7 oz)  Weight (lb): 186.73 lb  Ideal Body Weight (IBW), Female: 120 lb  % Ideal Body Weight, Female (lb): 156.16 lb  BMI (Calculated): 32.2  BMI Grade: 30 - 34.9- obesity - grade I  Weight Loss: unintentional(reports 12# wt loss over past 5 days--severe wt loss)  Usual Body Weight (UBW), k.4 kg  Weight Change Amount: 12 lb  % Usual Body Weight: 94.22  % Weight Change From Usual Weight: -5.97 %       Lab/Procedures/Meds    Pertinent Labs Reviewed: reviewed  BMP  Lab Results   Component Value Date     2019    K 3.9 2019     2019    CO2 27 2019    BUN 5 (L) 2019    CREATININE 0.4 (L) 2019    CALCIUM 8.6 (L) 2019    ANIONGAP 9 2019    ESTGFRAFRICA >60.0 2019    EGFRNONAA >60.0 " 08/16/2019     Lab Results   Component Value Date    WBC 16.77 (H) 08/15/2019    HGB 10.0 (L) 08/15/2019    HCT 31.6 (L) 08/15/2019    MCV 89 08/15/2019     08/15/2019       Pertinent Medications Reviewed: reviewed  Scheduled Meds:   aspirin  325 mg Oral Daily    atorvastatin  80 mg Oral Daily    chlorhexidine  15 mL Mouth/Throat BID    citalopram  10 mg Oral QAM    mupirocin   Nasal BID    pantoprazole  40 mg Oral Daily     Continuous Infusions:   sodium chloride 0.9% 100 mL/hr at 08/16/19 1000     Estimated/Assessed Needs    Weight Used For Calorie Calculations: 85 kg (187 lb 6.3 oz)  Energy Calorie Requirements (kcal): 7863-6671 (20-25 kcal/kg)  Energy Need Method: Kcal/kg  Protein Requirements: 82 (1.5 g/kg) per IBW  Weight Used For Protein Calculations: 55 kg (121 lb 4.1 oz)(IBW)     Estimated Fluid Requirement Method: RDA Method  RDA Method (mL): 1700         Nutrition Prescription Ordered    Current Diet Order: cardiac  Nutrition Order Comments: tolerating diet texture; no c/o voiced  Oral Nutrition Supplement: Ensure Enlive TID (1050 kcal, 60 g pro)    Evaluation of Received Nutrient/Fluid Intake    Energy Calories Required: meeting needs  Protein Required: meeting needs  Fluid Required: meeting needs  Comments: tolerating diet w/o difficulty  Tolerance: tolerating  % Meal Intake: 50 - 75 % with 100% intake of Ensure Enlive TID    Nutrition Risk    Level of Risk/Frequency of Follow-up: moderate       Monitor and Evaluation    Food and Nutrient Intake: food and beverage intake, energy intake  Food and Nutrient Adminstration: diet order  Physical Activity and Function: nutrition-related ADLs and IADLs  Anthropometric Measurements: weight change  Biochemical Data, Medical Tests and Procedures: electrolyte and renal panel, gastrointestinal profile, glucose/endocrine profile  Nutrition-Focused Physical Findings: overall appearance       Nutrition Follow-Up    RD Follow-up?: Yes    Lizy  Melva  08/16/2019  10:39 AM

## 2019-08-18 NOTE — PROCEDURES
Physician Requesting: Dr. Be.     Reason for EEG: Syncope.     Clinical History: 74  year old patient with syncopal episode .  Patient is on no AED.     Methods:  EEG was done according to the 10/20 international system.  This is a 20 min routine EEG. Bipolar and referential montages were used.     Findings:      EEG overall symmetric with continuous polymorphic waveforms.      Background rhythm: Alpha dominant while eyes are closed and the patient is awake.    PDR: 7-9 Hz.   Mental status: Patient is awake and drowsy during the study.      Artifact:There is occasional eye movement, lead and ECG artifacts.      Sleep: None.    Epileptiform discharges: None.    Activation procedures:  Photic stimulation was performed with no abnormalities seen.    Abnormal activity: None.    ECG: Regular rhythm.          Impression:  Mild encephalopathy. No Seizures.

## 2019-08-19 NOTE — PT/OT/SLP DISCHARGE
Physical Therapy Discharge Summary    Name: Albina Armenta  MRN: [id number]   Principal Problem: CVA (cerebral vascular accident)     Patient Discharged from acute Physical Therapy on 8/15/2019.  Please refer to prior PT noted date on 8/15/2019 for functional status.     Assessment:     Patient was discharged unexpectedly.  Information required to complete an accurate discharge summary is unknown.  Refer to therapy initial evaluation and last progress note for initial and most recent functional status and goal achievement.  Recommendations made may be found in medical record.    Objective:     GOALS:   Multidisciplinary Problems     Physical Therapy Goals     Not on file          Multidisciplinary Problems (Resolved)        Problem: Physical Therapy Goal    Goal Priority Disciplines Outcome Goal Variances Interventions   Physical Therapy Goal   (Resolved)     PT, PT/OT Outcome(s) achieved     Description:  Goals to be met by: D/C     Patient will increase functional independence with mobility by performin. Supine to sit with Stand-by Assistance  2. Gait  x 150' feet with Stand-by Assistance using Rolling Walker.                       Reasons for Discontinuation of Therapy Services  discharged from the hospital      Plan:     Patient Discharged to: Home with Home Health Service.    Trinidad Etienne, PT  2019

## 2019-09-06 ENCOUNTER — OFFICE VISIT (OUTPATIENT)
Dept: CARDIOLOGY | Facility: CLINIC | Age: 74
End: 2019-09-06
Attending: INTERNAL MEDICINE
Payer: MEDICARE

## 2019-09-06 VITALS
DIASTOLIC BLOOD PRESSURE: 79 MMHG | BODY MASS INDEX: 31.07 KG/M2 | SYSTOLIC BLOOD PRESSURE: 128 MMHG | HEART RATE: 92 BPM | HEIGHT: 64 IN | WEIGHT: 182 LBS

## 2019-09-06 DIAGNOSIS — I35.9 AORTIC VALVE DISEASE: ICD-10-CM

## 2019-09-06 DIAGNOSIS — I77.1 SUBCLAVIAN ARTERY STENOSIS, LEFT: ICD-10-CM

## 2019-09-06 DIAGNOSIS — I65.23 BILATERAL CAROTID ARTERY STENOSIS: ICD-10-CM

## 2019-09-06 DIAGNOSIS — Z86.73 HISTORY OF CEREBROVASCULAR ACCIDENT: ICD-10-CM

## 2019-09-06 DIAGNOSIS — I15.0 RENOVASCULAR HYPERTENSION: ICD-10-CM

## 2019-09-06 DIAGNOSIS — E66.9 MILD OBESITY: ICD-10-CM

## 2019-09-06 DIAGNOSIS — E78.00 HYPERCHOLESTEROLEMIA: ICD-10-CM

## 2019-09-06 DIAGNOSIS — I70.1 ATHEROSCLEROSIS OF RENAL ARTERY: ICD-10-CM

## 2019-09-06 PROCEDURE — 99215 PR OFFICE/OUTPT VISIT, EST, LEVL V, 40-54 MIN: ICD-10-PCS | Mod: S$GLB,,, | Performed by: INTERNAL MEDICINE

## 2019-09-06 PROCEDURE — 3078F PR MOST RECENT DIASTOLIC BLOOD PRESSURE < 80 MM HG: ICD-10-PCS | Mod: CPTII,S$GLB,, | Performed by: INTERNAL MEDICINE

## 2019-09-06 PROCEDURE — 3078F DIAST BP <80 MM HG: CPT | Mod: CPTII,S$GLB,, | Performed by: INTERNAL MEDICINE

## 2019-09-06 PROCEDURE — 3074F PR MOST RECENT SYSTOLIC BLOOD PRESSURE < 130 MM HG: ICD-10-PCS | Mod: CPTII,S$GLB,, | Performed by: INTERNAL MEDICINE

## 2019-09-06 PROCEDURE — 99215 OFFICE O/P EST HI 40 MIN: CPT | Mod: S$GLB,,, | Performed by: INTERNAL MEDICINE

## 2019-09-06 PROCEDURE — 3074F SYST BP LT 130 MM HG: CPT | Mod: CPTII,S$GLB,, | Performed by: INTERNAL MEDICINE

## 2019-09-06 NOTE — PROGRESS NOTES
Subjective:     Albina Armenta is a 74 y.o. female with hypertension and hypercholesterolemia. She is mildly obese. She used to smoke but quit in 2007. She told me she had a carotid bruit but ultrasounds had not revealed any significant stenoses. She was noted to have weak pulses in the left arm with 100 mmHg lower pressure in the left arm compared to the right. A Carotid Duplex study on 7/23/2015 revealed moderate plaquing in the left internal carotid with 50-60% stenosis. There was retrograde flow in the left vertebral consistent with left subclavian stenosis. An Echocardiogram revealed aortic valve sclerosis. Because of severe hypertension she had a Renal Duplex study that suggested severe right renal artery stenosis. She underwent renal angiography on 8/20/2015 that revealed a severe right renal artery lesion that was stented with a good result. She has mild fatigue and discomfort in the left arm with activities. No exertional chest pain or exertional dyspnea. No palpitations or weak spells. No bleeding. Feelig well overall. On 7/3/2019 she had a Carotid Duplex that revealed marked progression on the left side with a peak velocity 3.6 m/s corresponding to an 80-90% stenosis. The left vertebral had retrograde flow. It was felt she should undergo cerebral angiography for further evaluation and she now comes in for elective 4V cerebral angiography. On 7/16/2019 she underwent a 4V angiogram. The left subclavian was occluded. The LICA was 90% stenosed. The LIZETH was 30% narrowed. The left vertebral flow. She underwent left CEA on 8/7/2019. A few day later she was admitted to [facility] with pronounced weakness. She had an MRI there that revealed several small lesions suggesting embolic CVAs. She had a CTA that suggested a 70-80% LICA stenosis and a Carotid Duplex that revealed a peak velocity of 3.9 m/s. Her speech has been effected after the surgery. No exertional chest pain or exertional dyspnea. No palpitations or  weak spells. No bleeding. Feelig well overall.      Hypertension   This is a chronic problem. The current episode started more than 1 year ago. The problem has been gradually improving since onset. The problem is controlled (usually 120-130/70-80 mmHg at home). Pertinent negatives include no anxiety, blurred vision, chest pain, headaches, malaise/fatigue, neck pain, orthopnea, palpitations, peripheral edema, PND, shortness of breath or sweats. There is no history of chronic renal disease.   Hyperlipidemia   This is a chronic problem. The current episode started more than 1 year ago. The problem is controlled. Recent lipid tests were reviewed and are normal. Exacerbating diseases include obesity. She has no history of chronic renal disease, diabetes, hypothyroidism, liver disease or nephrotic syndrome. Pertinent negatives include no chest pain, focal sensory loss, focal weakness, leg pain, myalgias or shortness of breath.   Cerebrovascular Accident   Pertinent negatives include no abdominal pain, anorexia, arthralgias, change in bowel habit, chest pain, chills, congestion, coughing, diaphoresis, fatigue, fever, headaches, joint swelling, myalgias, nausea, neck pain, numbness, rash, sore throat, swollen glands, urinary symptoms, vertigo, visual change, vomiting or weakness.       Review of Systems   Constitution: Negative for chills, diaphoresis, fatigue, fever and malaise/fatigue.   HENT: Negative for congestion, nosebleeds and sore throat.    Eyes: Negative for blurred vision, double vision, vision loss in left eye and vision loss in right eye.   Cardiovascular: Negative for chest pain, claudication, dyspnea on exertion, irregular heartbeat, leg swelling, near-syncope, orthopnea, palpitations, paroxysmal nocturnal dyspnea and syncope.   Respiratory: Negative for cough, hemoptysis, shortness of breath and wheezing.    Endocrine: Negative for cold intolerance and heat intolerance.   Hematologic/Lymphatic: Negative for  "bleeding problem. Does not bruise/bleed easily.   Skin: Negative for color change and rash.   Musculoskeletal: Negative for arthralgias, arthritis, back pain, falls, joint swelling, myalgias and neck pain.   Gastrointestinal: Negative for abdominal pain, anorexia, change in bowel habit, flatus, heartburn, hematemesis, hematochezia, hemorrhoids, jaundice, melena, nausea and vomiting.   Genitourinary: Negative for hematuria and menorrhagia.   Neurological: Negative for dizziness, focal weakness, headaches, light-headedness, loss of balance, numbness, paresthesias, tremors, vertigo and weakness.   Psychiatric/Behavioral: Negative for altered mental status, depression and memory loss. The patient is not nervous/anxious.    Allergic/Immunologic: Negative for hives and persistent infections.       Current Outpatient Medications on File Prior to Visit   Medication Sig Dispense Refill    ascorbic acid/multivit-min (EMERGEN-C ORAL) Take 1 packet by mouth every morning.      aspirin 325 MG tablet Take 1 tablet (325 mg total) by mouth once daily. 30 tablet 11    atorvastatin (LIPITOR) 80 MG tablet Take 1 tablet (80 mg total) by mouth once daily. (Patient taking differently: Take 80 mg by mouth every morning. ) 90 tablet 3    citalopram (CELEXA) 10 MG tablet Take 10 mg by mouth every morning.       nut.tx.impaired digestive fxn (VITAL HIGH PROTEIN ORAL) Take 1 tablet by mouth every morning.      omeprazole (PRILOSEC) 40 MG capsule Take 40 mg by mouth every morning.       No current facility-administered medications on file prior to visit.        /79   Pulse 92   Ht 5' 4" (1.626 m)   Wt 82.6 kg (182 lb)   LMP  (LMP Unknown)   BMI 31.24 kg/m²       Objective:     Physical Exam   Constitutional: She is oriented to person, place, and time. She appears well-developed and well-nourished. She does not appear ill. No distress.   HENT:   Head: Normocephalic and atraumatic.   Nose: Nose normal.   Mouth/Throat: No " oropharyngeal exudate.   Eyes: Right eye exhibits no discharge. Left eye exhibits no discharge. Right conjunctiva is not injected. Left conjunctiva is not injected. Right pupil is round. Left pupil is round. Pupils are equal.   Neck: No JVD present. Carotid bruit is present (bilateral soft bruits). No thyromegaly present.   Cardiovascular: Normal rate, regular rhythm, S1 normal and S2 normal.  No extrasystoles are present. PMI is not displaced. Exam reveals gallop and S4. Exam reveals no S3.   Murmur heard.   Harsh crescendo-decrescendo midsystolic murmur is present with a grade of 3/6 at the upper right sternal border radiating to the neck.  Pulses:       Carotid pulses are on the right side with bruit, and on the left side with bruit.       Radial pulses are 2+ on the right side, and 0 on the left side.        Femoral pulses are 2+ on the right side, and 2+ on the left side with bruit.       Popliteal pulses are 2+ on the right side, and 2+ on the left side.        Dorsalis pedis pulses are 1+ on the right side, and 1+ on the left side.        Posterior tibial pulses are 2+ on the right side, and 2+ on the left side.   Pulmonary/Chest: Effort normal and breath sounds normal.   Abdominal: Soft. Normal appearance. There is no hepatosplenomegaly. There is no tenderness.   Musculoskeletal:        Right ankle: She exhibits no swelling, no ecchymosis and no deformity.        Left ankle: She exhibits no swelling, no ecchymosis and no deformity.   Lymphadenopathy:        Head (right side): No submandibular adenopathy present.        Head (left side): No submandibular adenopathy present.     She has no cervical adenopathy.   Neurological: She is alert and oriented to person, place, and time. She is not disoriented. No cranial nerve deficit or sensory deficit.   Skin: Skin is warm and intact. No rash noted. She is not diaphoretic.   Psychiatric: She has a normal mood and affect. Her speech is normal and behavior is normal.  Judgment and thought content normal. Cognition and memory are normal.       Assessment:     1. Bilateral carotid artery stenosis    2. History of cerebrovascular accident    3. Subclavian artery stenosis, left    4. Atherosclerosis of renal artery    5. Aortic valve disease    6. Renovascular hypertension    7. Hypercholesterolemia    8. Mild obesity        Plan:     1. Carotid Artery Stenosis   2005: Carotid bruit heard.   7/23/2015: Carotid Duplex: LIZETH: Moderate plaquing. LICA: Moderate plaquing - 1.4 m/s - 50-60%. Left vertebral: retrograde flow.   7/6/2016: Carotid Duplex: LIZETH: Moderate plaquing. LICA: Severe plaquing - 1.3 m/s - 50-60%. Left Vertebral: Retrograde flow.   6/30/2017: Carotid Duplex: LIZETH: Moderate plaquing - 1.1 m/s - <50%. LICA: Severe plaquing - 1.7 m/s - 60-70%.   7/11/2018: Carotid Duplex: LIZETH: Severe plaquing - 1.1 m/s - 50-60%. LICA: Severe plaquing - 2.1 m/s - 60-70%. Left vertebral retrograde.   7/3/2019: Carotid Duplex: LIZETH: Moderate plaquing - 1.0 m/s - <50%. LICA: Severe plaquing - 3.6 m/s - 80-90%. Left vertebral retrograde.   7/16/2019: [facility]: AA, 4V: Left subclavian: Occluded. LICA: 90%. LIZETH: 30%. Retrograde left vertebral flow.   8/7/2019: [facility]: Left CEA.   8/12/2019: [facility]: Carotid Duplex: LICA: 3.9 m/s.   8/13/2019: [facility]: CTA: LICA: 70-80%.   8/13/2019: [facility]: MRI: Several multifocal CVAs   Finding following CEA of major concern. Discussed in detail with patient and with daughter.   Possibly related to swelling or kinking of vessel.   9/15/2019: Repeat Carotid Duplex.    2. Left Subclavian Stenosis   2015: Right arm 200/90 mmHg. Left Arm: 110/60 mmHg.    7/23/2015: Carotid Duplex: Left vertebral: Retrograde flow.   Mild left arm symptoms.   Medical therapy.    3. Renal Artery Stenosis   7/27/2015: Renal Duplex: Right: Severe - 3.2 m/s. Left: About 60% stenosis - 1.7 m/s.   8/20/2015: [facility]: Renal Angio: Right: 80%. Stented. Left: 30%.   10/2015: Stopped clopidogrel.   On aspirin  81 mg Q24.   Blood pressure well controlled.     4. Aortic Valve Disease   7/16/2015: Loud systolic murmur.   7/23/2015: Echo: Normal left ventricular size and systolic function. Mild LVH. Mild diastolic dysfunction. Mild aortic sclerosis.   Followed.    5. Hypertension   1995: Diagnosed.   Used to be on amlodipine 10 mg Q24, ramipril 10 mg Q24 and hctz 12.5 mg Q24.   Now well controlled off medications.   Keeping log at home.   Well controlled.    6. Hypercholesterolemia   2005: Diagnosed. Began statin.   On simvastatin 20 mg Q24.   7/2015: Changed to atorvastatin 80 mg Q24.   8/13/2015: Chol 168. HDL 56. LDL 94. .   On atorvastatin 80 mg Q24.    Well controlled.    7. Mild Obesity   6/9/2017: 86 kg. BMI 31.   Encouraged to lose weight.    8. Primary Care   Dr. Shyam Gilmore.    F/u 1 month.    Thanh Acuna M.D.      Copy:    Dr. Devante Soto.    Dr. Shyam Gilmore.      9/25/2019 6:22 PM, Addendum:    9/25/2019: Carotid Duplex: LIZETH: Moderate plaquing - 1.0 m/s - <50%. LICA: CEA. Above CEA site - 3.7 m/s - 80-90%. Left vertebral retrograde.    Needs re 4V to assess LICA.    I discussed the above test result and the implications of the findings over the phone.    Thanh Acuna M.D.      Copy:    Dr. Shyam Gilmore.    Dr. Devante Soto.       9/26/2019 2:58 PM, Addendum:    I reviewed the CTA from [facility] in detail with the radiologist at [facility].     The images suggest a flap at the end of the endartherectomy site causing at least a moderate stenosis and a severe stenosis higher up possibly at the site of where the vessel was clamped with sarkis flow irregularity more distal.    The MRA also reveals subacute CVAs at least a few days old.    The patient's daugher Nica called me and I had a lengthy discussion with her about the findings and the plan.    #[phone].    Thanh Acuna M.D.

## 2019-09-25 ENCOUNTER — CLINICAL SUPPORT (OUTPATIENT)
Dept: CARDIOLOGY | Facility: CLINIC | Age: 74
End: 2019-09-25
Attending: INTERNAL MEDICINE
Payer: MEDICARE

## 2019-09-25 LAB
LEFT CBA DIAS: 29 CM/S
LEFT CBA SYS: 100 CM/S
LEFT CCA DIST DIAS: 40 CM/S
LEFT CCA DIST SYS: 110 CM/S
LEFT CCA MID DIAS: 30 CM/S
LEFT CCA MID SYS: 96 CM/S
LEFT CCA PROX DIAS: 29 CM/S
LEFT CCA PROX SYS: 94 CM/S
LEFT ECA DIAS: 22 CM/S
LEFT ECA SYS: 116 CM/S
LEFT ICA DIST DIAS: 47 CM/S
LEFT ICA DIST SYS: 180 CM/S
LEFT ICA MID DIAS: 107 CM/S
LEFT ICA MID SYS: 367 CM/S
LEFT ICA PROX DIAS: 37 CM/S
LEFT ICA PROX SYS: 118 CM/S
LEFT VERTEBRAL DIAS: 10 CM/S
LEFT VERTEBRAL SYS: 29 CM/S
OHS CV CAROTID RIGHT ICA EDV HIGHEST: 36
OHS CV CAROTID ULTRASOUND LEFT ICA/CCA RATIO: 3.34
OHS CV CAROTID ULTRASOUND RIGHT ICA/CCA RATIO: 0.88
OHS CV PV CAROTID LEFT HIGHEST CCA: 110
OHS CV PV CAROTID LEFT HIGHEST ICA: 367
OHS CV PV CAROTID RIGHT HIGHEST CCA: 104
OHS CV PV CAROTID RIGHT HIGHEST ICA: 92
OHS CV US CAROTID LEFT HIGHEST EDV: 107
RIGHT CBA DIAS: 27 CM/S
RIGHT CBA SYS: 91 CM/S
RIGHT CCA DIST DIAS: 30 CM/S
RIGHT CCA DIST SYS: 101 CM/S
RIGHT CCA MID DIAS: 27 CM/S
RIGHT CCA MID SYS: 93 CM/S
RIGHT CCA PROX DIAS: 28 CM/S
RIGHT CCA PROX SYS: 104 CM/S
RIGHT ECA DIAS: 26 CM/S
RIGHT ECA SYS: 133 CM/S
RIGHT ICA DIST DIAS: 36 CM/S
RIGHT ICA DIST SYS: 88 CM/S
RIGHT ICA MID DIAS: 24 CM/S
RIGHT ICA MID SYS: 75 CM/S
RIGHT ICA PROX DIAS: 32 CM/S
RIGHT ICA PROX SYS: 92 CM/S
RIGHT VERTEBRAL DIAS: 21 CM/S
RIGHT VERTEBRAL SYS: 63 CM/S

## 2019-09-26 ENCOUNTER — TELEPHONE (OUTPATIENT)
Dept: CARDIOLOGY | Facility: CLINIC | Age: 74
End: 2019-09-26

## 2019-09-26 NOTE — TELEPHONE ENCOUNTER
----- Message from Con Jacobs sent at 9/26/2019 10:59 AM CDT -----  Nica-alvarez [phone] regarding results from test yesterday

## 2019-09-30 RX ORDER — AMLODIPINE BESYLATE 10 MG/1
TABLET ORAL
Qty: 90 TABLET | Refills: 3 | Status: SHIPPED | OUTPATIENT
Start: 2019-09-30 | End: 2020-01-08

## 2019-10-16 ENCOUNTER — TELEPHONE (OUTPATIENT)
Dept: CARDIOLOGY | Facility: CLINIC | Age: 74
End: 2019-10-16

## 2019-10-16 NOTE — PT/OT/SLP DISCHARGE
Occupational Therapy Discharge Summary    Albina Armenta  MRN: [id number]   Principal Problem: History of cerebrovascular accident      Patient Discharged from acute Occupational Therapy on 8/16/2019.  Please refer to prior OT note dated 8/15/2019 for functional status.    Assessment:      Patient has not met goals.    Objective:     GOALS:   Multidisciplinary Problems     Occupational Therapy Goals        Problem: Occupational Therapy Goal    Goal Priority Disciplines Outcome Interventions   Occupational Therapy Goal     OT, PT/OT     Description:  Goals to be met by: discharge    Patient will increase functional independence with ADLs by performing:    LE Dressing with Modified [place].  Grooming while standing with Modified [place].  Toileting from toilet with Modified [place] for hygiene and clothing management.   Toilet transfer to toilet with Modified [place].  Pt will complete functional mobility at household distance with Modified [place].                      Reasons for Discontinuation of Therapy Services  Patient d/c home.      Plan:     Patient Discharged to: Home no OT services needed    Ryan Kovacs OT  10/16/2019

## 2019-10-16 NOTE — TELEPHONE ENCOUNTER
Please call patient to discuss findings of the 4 vessel.    Patient is requesting a disc of the 4 vessel done 7/16/19.

## 2019-10-31 ENCOUNTER — OFFICE VISIT (OUTPATIENT)
Dept: CARDIOLOGY | Facility: CLINIC | Age: 74
End: 2019-10-31
Attending: INTERNAL MEDICINE
Payer: MEDICARE

## 2019-10-31 VITALS
DIASTOLIC BLOOD PRESSURE: 57 MMHG | WEIGHT: 179 LBS | SYSTOLIC BLOOD PRESSURE: 89 MMHG | HEART RATE: 78 BPM | BODY MASS INDEX: 30.73 KG/M2

## 2019-10-31 DIAGNOSIS — Z86.73 HISTORY OF CEREBROVASCULAR ACCIDENT: ICD-10-CM

## 2019-10-31 DIAGNOSIS — I35.9 AORTIC VALVE DISEASE: ICD-10-CM

## 2019-10-31 DIAGNOSIS — I15.0 RENOVASCULAR HYPERTENSION: ICD-10-CM

## 2019-10-31 DIAGNOSIS — E78.00 HYPERCHOLESTEROLEMIA: ICD-10-CM

## 2019-10-31 DIAGNOSIS — I70.1 ATHEROSCLEROSIS OF RENAL ARTERY: ICD-10-CM

## 2019-10-31 DIAGNOSIS — I77.1 SUBCLAVIAN ARTERY STENOSIS, LEFT: ICD-10-CM

## 2019-10-31 DIAGNOSIS — E66.9 MILD OBESITY: ICD-10-CM

## 2019-10-31 DIAGNOSIS — I65.23 BILATERAL CAROTID ARTERY STENOSIS: ICD-10-CM

## 2019-10-31 PROCEDURE — 3078F DIAST BP <80 MM HG: CPT | Mod: CPTII,S$GLB,, | Performed by: INTERNAL MEDICINE

## 2019-10-31 PROCEDURE — 3074F SYST BP LT 130 MM HG: CPT | Mod: CPTII,S$GLB,, | Performed by: INTERNAL MEDICINE

## 2019-10-31 PROCEDURE — 99215 OFFICE O/P EST HI 40 MIN: CPT | Mod: S$GLB,,, | Performed by: INTERNAL MEDICINE

## 2019-10-31 PROCEDURE — 99215 PR OFFICE/OUTPT VISIT, EST, LEVL V, 40-54 MIN: ICD-10-PCS | Mod: S$GLB,,, | Performed by: INTERNAL MEDICINE

## 2019-10-31 PROCEDURE — 3074F PR MOST RECENT SYSTOLIC BLOOD PRESSURE < 130 MM HG: ICD-10-PCS | Mod: CPTII,S$GLB,, | Performed by: INTERNAL MEDICINE

## 2019-10-31 PROCEDURE — 3078F PR MOST RECENT DIASTOLIC BLOOD PRESSURE < 80 MM HG: ICD-10-PCS | Mod: CPTII,S$GLB,, | Performed by: INTERNAL MEDICINE

## 2019-10-31 RX ORDER — CLOPIDOGREL BISULFATE 75 MG/1
75 TABLET ORAL DAILY
Qty: 90 TABLET | Refills: 0 | Status: SHIPPED | OUTPATIENT
Start: 2019-10-31 | End: 2020-01-02

## 2019-10-31 RX ORDER — ASPIRIN 81 MG/1
81 TABLET ORAL DAILY
Qty: 90 TABLET | Refills: 3 | COMMUNITY
Start: 2019-10-31 | End: 2019-10-31 | Stop reason: SDUPTHER

## 2019-10-31 RX ORDER — ASPIRIN 81 MG/1
81 TABLET ORAL DAILY
Qty: 90 TABLET | Refills: 3 | Status: SHIPPED | OUTPATIENT
Start: 2019-10-31

## 2019-10-31 NOTE — PROGRESS NOTES
Subjective:     Albina Armenta is a 74 y.o. female with hypertension and hypercholesterolemia. She is mildly obese. She used to smoke but quit in 2007. She told me she had a carotid bruit but ultrasounds had not revealed any significant stenoses. She was noted to have weak pulses in the left arm with 100 mmHg lower pressure in the left arm compared to the right. A Carotid Duplex study on 7/23/2015 revealed moderate plaquing in the left internal carotid with 50-60% stenosis. There was retrograde flow in the left vertebral consistent with left subclavian stenosis. An Echocardiogram revealed aortic valve sclerosis. Because of severe hypertension she had a Renal Duplex study that suggested severe right renal artery stenosis. She underwent renal angiography on 8/20/2015 that revealed a severe right renal artery lesion that was stented with a good result. She has mild fatigue and discomfort in the left arm with activities. No exertional chest pain or exertional dyspnea. No palpitations or weak spells. No bleeding. Feelig well overall. On 7/3/2019 she had a Carotid Duplex that revealed marked progression on the left side with a peak velocity 3.6 m/s corresponding to an 80-90% stenosis. The left vertebral had retrograde flow. It was felt she should undergo cerebral angiography for further evaluation and she now comes in for elective 4V cerebral angiography. On 7/16/2019 she underwent a 4V angiogram. The left subclavian was occluded. The LICA was 90% stenosed. The LIZETH was 30% narrowed. The left vertebral flow. She underwent left CEA on 8/7/2019. A few day later she was admitted to [facility] with pronounced weakness. She had an MRI there that revealed several small lesions suggesting embolic CVAs. She had a CTA that suggested a 70-80% LICA stenosis and a Carotid Duplex that revealed a peak velocity of 3.9 m/s. Her speech has been effected after the surgery. On 9/25/2019 she had a follow up Carotid Duplex that revealed the  LIZETH to have moderate plaquing but normal velocity of 1.0 m/s correlating with a <50% stenosis. The LICA revealed features of previous CEA. Above the CEA site the velocity \was 3.7 m/s correlating with a stenosis in the 80-90% range. The left vertebral had retrograde flow. A review of the CTA from [facility] suggested a flap at the end of the endarterectomy site causing at least a moderate stenosis and a severe stenosis higher up possibly at the site of where the vessel was clamped with sarkis flow irregularity more distal. The MRA revealed subacute CVAs at least a few days old. It was felt she should undergo a 4V angiogram. On 10/15/2019 she had an AA & 4V. The BCT had an osteal 90% lesion and the LIZETH a 30% stenosis. The LCCA had an osteal 50% lesion and the LICA an 90% stenosis. She was seen by Dr. Devante Soto for a vascular surgery consultation and it was felt that the best course of action would be to proceed with stenting of the LICA as the degree of stenosis is so severe. Her speech is still affected. No exertional chest pain or exertional dyspnea. No palpitations or weak spells. No bleeding. Feeling fair overall.       Cerebrovascular Accident   Pertinent negatives include no abdominal pain, anorexia, arthralgias, change in bowel habit, chest pain, chills, congestion, coughing, diaphoresis, fatigue, fever, headaches, joint swelling, myalgias, nausea, neck pain, numbness, rash, sore throat, swollen glands, urinary symptoms, vertigo, visual change, vomiting or weakness.   Hypertension   This is a chronic problem. The current episode started more than 1 year ago. The problem has been gradually improving since onset. The problem is controlled (usually 120-130/70-80 mmHg at home). Pertinent negatives include no anxiety, blurred vision, chest pain, headaches, malaise/fatigue, neck pain, orthopnea, palpitations, peripheral edema, PND, shortness of breath or sweats. There is no history of chronic renal disease.    Hyperlipidemia   This is a chronic problem. The current episode started more than 1 year ago. The problem is controlled. Recent lipid tests were reviewed and are normal. Exacerbating diseases include obesity. She has no history of chronic renal disease, diabetes, hypothyroidism, liver disease or nephrotic syndrome. Pertinent negatives include no chest pain, focal sensory loss, focal weakness, leg pain, myalgias or shortness of breath.       Review of Systems   Constitution: Negative for chills, diaphoresis, fatigue, fever and malaise/fatigue.   HENT: Negative for congestion, nosebleeds and sore throat.    Eyes: Negative for blurred vision, double vision, vision loss in left eye and vision loss in right eye.   Cardiovascular: Negative for chest pain, claudication, dyspnea on exertion, irregular heartbeat, leg swelling, near-syncope, orthopnea, palpitations, paroxysmal nocturnal dyspnea and syncope.   Respiratory: Negative for cough, hemoptysis, shortness of breath and wheezing.    Endocrine: Negative for cold intolerance and heat intolerance.   Hematologic/Lymphatic: Negative for bleeding problem. Does not bruise/bleed easily.   Skin: Negative for color change and rash.   Musculoskeletal: Negative for arthralgias, arthritis, back pain, falls, joint swelling, myalgias and neck pain.   Gastrointestinal: Negative for abdominal pain, anorexia, change in bowel habit, flatus, heartburn, hematemesis, hematochezia, hemorrhoids, jaundice, melena, nausea and vomiting.   Genitourinary: Negative for hematuria and menorrhagia.   Neurological: Negative for dizziness, focal weakness, headaches, light-headedness, loss of balance, numbness, paresthesias, tremors, vertigo and weakness.   Psychiatric/Behavioral: Negative for altered mental status, depression and memory loss. The patient is not nervous/anxious.    Allergic/Immunologic: Negative for hives and persistent infections.       Current Outpatient Medications on File Prior to  Visit   Medication Sig Dispense Refill    ascorbic acid/multivit-min (EMERGEN-C ORAL) Take 1 packet by mouth every morning.      aspirin 325 MG tablet Take 1 tablet (325 mg total) by mouth once daily. 30 tablet 11    atorvastatin (LIPITOR) 80 MG tablet Take 1 tablet (80 mg total) by mouth once daily. (Patient taking differently: Take 80 mg by mouth every morning. ) 90 tablet 3    citalopram (CELEXA) 10 MG tablet Take 10 mg by mouth every morning.       nut.tx.impaired digestive fxn (VITAL HIGH PROTEIN ORAL) Take 1 tablet by mouth every morning.      omeprazole (PRILOSEC) 40 MG capsule Take 40 mg by mouth every morning.      amLODIPine (NORVASC) 10 MG tablet TAKE 1 TABLET EVERY DAY 90 tablet 3     No current facility-administered medications on file prior to visit.        BP (!) 89/57   Pulse 78   Wt 81.2 kg (179 lb)   LMP  (LMP Unknown)   BMI 30.73 kg/m²       Objective:     Physical Exam   Constitutional: She is oriented to person, place, and time. She appears well-developed and well-nourished. She does not appear ill. No distress.   HENT:   Head: Normocephalic and atraumatic.   Nose: Nose normal.   Mouth/Throat: No oropharyngeal exudate.   Eyes: Right eye exhibits no discharge. Left eye exhibits no discharge. Right conjunctiva is not injected. Left conjunctiva is not injected. Right pupil is round. Left pupil is round. Pupils are equal.   Neck: No JVD present. Carotid bruit is present (bilateral soft bruits). No thyromegaly present.   Cardiovascular: Normal rate, regular rhythm, S1 normal and S2 normal.  No extrasystoles are present. PMI is not displaced. Exam reveals gallop and S4. Exam reveals no S3.   Murmur heard.   Harsh crescendo-decrescendo midsystolic murmur is present with a grade of 3/6 at the upper right sternal border radiating to the neck.  Pulses:       Carotid pulses are on the right side with bruit, and on the left side with bruit.       Radial pulses are 2+ on the right side, and 0 on  the left side.        Femoral pulses are 2+ on the right side, and 2+ on the left side with bruit.       Popliteal pulses are 2+ on the right side, and 2+ on the left side.        Dorsalis pedis pulses are 1+ on the right side, and 1+ on the left side.        Posterior tibial pulses are 2+ on the right side, and 2+ on the left side.   Pulmonary/Chest: Effort normal and breath sounds normal.   Abdominal: Soft. Normal appearance. There is no hepatosplenomegaly. There is no tenderness.   Musculoskeletal:        Right ankle: She exhibits no swelling, no ecchymosis and no deformity.        Left ankle: She exhibits no swelling, no ecchymosis and no deformity.   Lymphadenopathy:        Head (right side): No submandibular adenopathy present.        Head (left side): No submandibular adenopathy present.     She has no cervical adenopathy.   Neurological: She is alert and oriented to person, place, and time. She is not disoriented. No cranial nerve deficit or sensory deficit.   Skin: Skin is warm and intact. No rash noted. She is not diaphoretic.   Psychiatric: She has a normal mood and affect. Her speech is normal and behavior is normal. Judgment and thought content normal. Cognition and memory are normal.       Assessment:     1. Bilateral carotid artery stenosis    2. History of cerebrovascular accident    3. Subclavian artery stenosis, left    4. Atherosclerosis of renal artery    5. Aortic valve disease    6. Renovascular hypertension    7. Hypercholesterolemia    8. Mild obesity        Plan:     1. Carotid Artery Stenosis  2005: Carotid bruit heard.  7/23/2015: Carotid Duplex: LIZETH: Moderate plaquing. LICA: Moderate plaquing - 1.4 m/s - 50-60%. Left vertebral: retrograde flow.  7/6/2016: Carotid Duplex: LIZETH: Moderate plaquing. LICA: Severe plaquing - 1.3 m/s - 50-60%. Left Vertebral: Retrograde flow.  6/30/2017: Carotid Duplex: LIZETH: Moderate plaquing - 1.1 m/s - <50%. LICA: Severe plaquing - 1.7 m/s -  60-70%.  7/11/2018: Carotid Duplex: LIZETH: Severe plaquing - 1.1 m/s - 50-60%. LICA: Severe plaquing - 2.1 m/s - 60-70%. Left vertebral retrograde.  7/3/2019: Carotid Duplex: LIZETH: Moderate plaquing - 1.0 m/s - <50%. LICA: Severe plaquing - 3.6 m/s - 80-90%. Left vertebral retrograde.  7/16/2019: [facility]: AA, 4V: Left subclavian: Occluded. LICA: 90%. LIZETH: 30%. Retrograde left vertebral flow.   8/7/2019: [facility]: Left CEA.   8/12/2019: [facility]: Carotid Duplex: LICA: 3.9 m/s.   8/13/2019: [facility]: CTA: LICA: 70-80%.   8/13/2019: [facility]: MRI: Several multifocal CVAs.  The images suggest a flap at the end of the endarterectomy site causing at least a moderate stenosis and a severe stenosis higher up possibly at the site of where the vessel was clamped with sarkis flow irregularity more distal. The MRA also reveals subacute CVAs at least a few days old.   9/25/2019: Carotid Duplex: LIZETH: Moderate plaquing - 1.0 m/s - <50%. LICA: CEA. Above CEA site - 3.7 m/s - 80-90%. Left vertebral retrograde.  10/15/2019: AA, 4V: Right BCT: Osteal 90%. LIZETH: 30%. LCCA: Osteal 50%. LICA: 90%.  10/15/2019: Dr. Devante Soto: Favor LICA stenting.  Plan is stenting of left LICA.  Discussed in detail with patient and with daughter.  On aspirin 81 mg Q24 and begin clopidogrel 75 mg Q24.      2. Left Subclavian Stenosis  2015: Right arm 200/90 mmHg. Left Arm: 110/60 mmHg.   7/23/2015: Carotid Duplex: Left vertebral: Retrograde flow.  Mild left arm symptoms.  Medical therapy.    3. Renal Artery Stenosis  7/27/2015: Renal Duplex: Right: Severe - 3.2 m/s. Left: About 60% stenosis - 1.7 m/s.  8/20/2015: [facility]: Renal Angio: Right: 80%. Stented. Left: 30%.  10/2015: Stopped clopidogrel.  On aspirin 81 mg Q24.  Blood pressure well controlled.     4. Aortic Valve Disease  7/16/2015: Loud systolic murmur.  7/23/2015: Echo: Normal left ventricular size and systolic function. Mild LVH. Mild diastolic dysfunction. Mild aortic sclerosis.  Followed.    5. Hypertension  1995:  Diagnosed.  Used to be on amlodipine 10 mg Q24, ramipril 10 mg Q24 and hctz 12.5 mg Q24.   Now well controlled off medications.  Keeping log at home.  Well controlled.    6. Hypercholesterolemia  2005: Diagnosed. Began statin.  On simvastatin 20 mg Q24.  7/2015: Changed to atorvastatin 80 mg Q24.  8/13/2015: Chol 168. HDL 56. LDL 94. .  On atorvastatin 80 mg Q24.   Well controlled.    7. Mild Obesity  6/9/2017: 86 kg. BMI 31.  Encouraged to lose weight.    8. Primary Care  Dr. Shyam Gilmore.      The planned procedure was discussed in detail with the patient and the family members present. Risk, benefit and alternatives were reviewed. All questions answered. Consent was then signed.    If further questions or concerns arise the patient was encouraged to contact me prior to the planned procedure.     F/u 4 weeks.    Thanh Acuna M.D.

## 2019-11-22 ENCOUNTER — OFFICE VISIT (OUTPATIENT)
Dept: CARDIOLOGY | Facility: CLINIC | Age: 74
End: 2019-11-22
Attending: INTERNAL MEDICINE
Payer: MEDICARE

## 2019-11-22 ENCOUNTER — HOSPITAL ENCOUNTER (OUTPATIENT)
Dept: RADIOLOGY | Facility: CLINIC | Age: 74
Discharge: HOME | End: 2019-11-22
Attending: SPECIALIST
Payer: MEDICARE

## 2019-11-22 VITALS
BODY MASS INDEX: 30.73 KG/M2 | WEIGHT: 180 LBS | DIASTOLIC BLOOD PRESSURE: 60 MMHG | HEART RATE: 96 BPM | SYSTOLIC BLOOD PRESSURE: 115 MMHG | HEIGHT: 64 IN

## 2019-11-22 DIAGNOSIS — I15.0 RENOVASCULAR HYPERTENSION: ICD-10-CM

## 2019-11-22 DIAGNOSIS — E78.00 HYPERCHOLESTEROLEMIA: ICD-10-CM

## 2019-11-22 DIAGNOSIS — I77.1 SUBCLAVIAN ARTERY STENOSIS, LEFT: ICD-10-CM

## 2019-11-22 DIAGNOSIS — Z86.73 HISTORY OF CEREBROVASCULAR ACCIDENT: ICD-10-CM

## 2019-11-22 DIAGNOSIS — I65.22 STENOSIS OF LEFT CAROTID ARTERY: ICD-10-CM

## 2019-11-22 DIAGNOSIS — I70.1 ATHEROSCLEROSIS OF RENAL ARTERY: ICD-10-CM

## 2019-11-22 DIAGNOSIS — Z12.31 VISIT FOR SCREENING MAMMOGRAM: ICD-10-CM

## 2019-11-22 DIAGNOSIS — I35.9 AORTIC VALVE DISEASE: ICD-10-CM

## 2019-11-22 DIAGNOSIS — Z12.31 VISIT FOR SCREENING MAMMOGRAM: Primary | ICD-10-CM

## 2019-11-22 PROBLEM — I95.9 HYPOTENSION: Status: RESOLVED | Noted: 2019-08-13 | Resolved: 2019-11-22

## 2019-11-22 PROBLEM — E87.6 HYPOKALEMIA: Status: RESOLVED | Noted: 2019-08-13 | Resolved: 2019-11-22

## 2019-11-22 PROBLEM — R55 SYNCOPE: Status: RESOLVED | Noted: 2019-08-12 | Resolved: 2019-11-22

## 2019-11-22 PROCEDURE — 77067 SCR MAMMO BI INCL CAD: CPT | Mod: 26,,, | Performed by: RADIOLOGY

## 2019-11-22 PROCEDURE — 99024 PR POST-OP FOLLOW-UP VISIT: ICD-10-PCS | Mod: S$GLB,,, | Performed by: INTERNAL MEDICINE

## 2019-11-22 PROCEDURE — 3074F PR MOST RECENT SYSTOLIC BLOOD PRESSURE < 130 MM HG: ICD-10-PCS | Mod: CPTII,S$GLB,, | Performed by: INTERNAL MEDICINE

## 2019-11-22 PROCEDURE — 77067 SCR MAMMO BI INCL CAD: CPT | Mod: TC,PO

## 2019-11-22 PROCEDURE — 3078F PR MOST RECENT DIASTOLIC BLOOD PRESSURE < 80 MM HG: ICD-10-PCS | Mod: CPTII,S$GLB,, | Performed by: INTERNAL MEDICINE

## 2019-11-22 PROCEDURE — 1159F PR MEDICATION LIST DOCUMENTED IN MEDICAL RECORD: ICD-10-PCS | Mod: S$GLB,,, | Performed by: INTERNAL MEDICINE

## 2019-11-22 PROCEDURE — 99024 POSTOP FOLLOW-UP VISIT: CPT | Mod: S$GLB,,, | Performed by: INTERNAL MEDICINE

## 2019-11-22 PROCEDURE — 3078F DIAST BP <80 MM HG: CPT | Mod: CPTII,S$GLB,, | Performed by: INTERNAL MEDICINE

## 2019-11-22 PROCEDURE — 77067 MAMMO DIGITAL SCREENING BILAT WITH TOMOSYNTHESIS_CAD: ICD-10-PCS | Mod: 26,,, | Performed by: RADIOLOGY

## 2019-11-22 PROCEDURE — 77063 MAMMO DIGITAL SCREENING BILAT WITH TOMOSYNTHESIS_CAD: ICD-10-PCS | Mod: 26,,, | Performed by: RADIOLOGY

## 2019-11-22 PROCEDURE — 3074F SYST BP LT 130 MM HG: CPT | Mod: CPTII,S$GLB,, | Performed by: INTERNAL MEDICINE

## 2019-11-22 PROCEDURE — 1159F MED LIST DOCD IN RCRD: CPT | Mod: S$GLB,,, | Performed by: INTERNAL MEDICINE

## 2019-11-22 PROCEDURE — 77063 BREAST TOMOSYNTHESIS BI: CPT | Mod: 26,,, | Performed by: RADIOLOGY

## 2019-11-22 NOTE — PROGRESS NOTES
Subjective:     Albina Armenta is a 74 y.o. female with hypertension and hypercholesterolemia. She is mildly obese. She used to smoke but quit in 2007. She told me she had a carotid bruit but ultrasounds had not revealed any significant stenoses. She was noted to have weak pulses in the left arm with 100 mmHg lower pressure in the left arm compared to the right. A Carotid Duplex study on 7/23/2015 revealed moderate plaquing in the left internal carotid with 50-60% stenosis. There was retrograde flow in the left vertebral consistent with left subclavian stenosis. An Echocardiogram revealed aortic valve sclerosis. Because of severe hypertension she had a Renal Duplex study that suggested severe right renal artery stenosis. She underwent renal angiography on 8/20/2015 that revealed a severe right renal artery lesion that was stented with a good result. She has mild fatigue and discomfort in the left arm with activities. No exertional chest pain or exertional dyspnea. No palpitations or weak spells. No bleeding. Feelig well overall. On 7/3/2019 she had a Carotid Duplex that revealed marked progression on the left side with a peak velocity 3.6 m/s corresponding to an 80-90% stenosis. The left vertebral had retrograde flow. It was felt she should undergo cerebral angiography for further evaluation and she now comes in for elective 4V cerebral angiography. On 7/16/2019 she underwent a 4V angiogram. The left subclavian was occluded. The LICA was 90% stenosed. The LIZETH was 30% narrowed. The left vertebral flow. She underwent left CEA on 8/7/2019. A few day later she was admitted to [facility] with pronounced weakness. She had an MRI there that revealed several small lesions suggesting embolic CVAs. She had a CTA that suggested a 70-80% LICA stenosis and a Carotid Duplex that revealed a peak velocity of 3.9 m/s. Her speech has been effected after the surgery. On 9/25/2019 she had a follow up Carotid Duplex that revealed the  LIZETH to have moderate plaquing but normal velocity of 1.0 m/s correlating with a <50% stenosis. The LICA revealed features of previous CEA. Above the CEA site the velocity \was 3.7 m/s correlating with a stenosis in the 80-90% range. The left vertebral had retrograde flow. A review of the CTA from [facility] suggested a flap at the end of the endarterectomy site causing at least a moderate stenosis and a severe stenosis higher up possibly at the site of where the vessel was clamped with sarkis flow irregularity more distal. The MRA revealed subacute CVAs at least a few days old. It was felt she should undergo a 4V angiogram. On 10/15/2019 she had an AA & 4V. The BCT had an osteal 90% lesion and the LIZETH a 30% stenosis. The LCCA had an osteal 50% lesion and the LICA an 90% stenosis. She was seen by Dr. Devante Soto for a vascular surgery consultation and it was felt that the best course of action would be to proceed with stenting of the LICA as the degree of stenosis was so severe. On 11/5/2019 the LICA was stented with a good result obtained. Her speech is still affected. No exertional chest pain or exertional dyspnea. No palpitations or weak spells. No bleeding. Feeling fair overall.       Hypertension   This is a chronic problem. The current episode started more than 1 year ago. The problem has been gradually improving since onset. The problem is controlled (usually 190-110/60-80 mmHg at home in the right arm but she has a 90% BCT stenosis). Pertinent negatives include no anxiety, blurred vision, chest pain, headaches, malaise/fatigue, neck pain, orthopnea, palpitations, peripheral edema, PND, shortness of breath or sweats. There is no history of chronic renal disease.   Hyperlipidemia   This is a chronic problem. The current episode started more than 1 year ago. The problem is controlled. Recent lipid tests were reviewed and are normal. Exacerbating diseases include obesity. She has no history of chronic renal disease,  diabetes, hypothyroidism, liver disease or nephrotic syndrome. Pertinent negatives include no chest pain, focal sensory loss, focal weakness, leg pain, myalgias or shortness of breath.   Cerebrovascular Accident   Pertinent negatives include no abdominal pain, anorexia, arthralgias, change in bowel habit, chest pain, chills, congestion, coughing, diaphoresis, fatigue, fever, headaches, joint swelling, myalgias, nausea, neck pain, numbness, rash, sore throat, swollen glands, urinary symptoms, vertigo, visual change, vomiting or weakness.       Review of Systems   Constitution: Negative for chills, diaphoresis, fatigue, fever and malaise/fatigue.   HENT: Negative for congestion, nosebleeds and sore throat.    Eyes: Negative for blurred vision, double vision, vision loss in left eye and vision loss in right eye.   Cardiovascular: Negative for chest pain, claudication, dyspnea on exertion, irregular heartbeat, leg swelling, near-syncope, orthopnea, palpitations, paroxysmal nocturnal dyspnea and syncope.   Respiratory: Negative for cough, hemoptysis, shortness of breath and wheezing.    Endocrine: Negative for cold intolerance and heat intolerance.   Hematologic/Lymphatic: Negative for bleeding problem. Does not bruise/bleed easily.   Skin: Negative for color change and rash.   Musculoskeletal: Negative for arthralgias, arthritis, back pain, falls, joint swelling, myalgias and neck pain.   Gastrointestinal: Negative for abdominal pain, anorexia, change in bowel habit, flatus, heartburn, hematemesis, hematochezia, hemorrhoids, jaundice, melena, nausea and vomiting.   Genitourinary: Negative for hematuria and menorrhagia.   Neurological: Negative for dizziness, focal weakness, headaches, light-headedness, loss of balance, numbness, paresthesias, tremors, vertigo and weakness.   Psychiatric/Behavioral: Negative for altered mental status, depression and memory loss. The patient is not nervous/anxious.   "  Allergic/Immunologic: Negative for hives and persistent infections.       Current Outpatient Medications on File Prior to Visit   Medication Sig Dispense Refill    amLODIPine (NORVASC) 10 MG tablet TAKE 1 TABLET EVERY DAY 90 tablet 3    ascorbic acid/multivit-min (EMERGEN-C ORAL) Take 1 packet by mouth every morning.      aspirin (ECOTRIN) 81 MG EC tablet Take 1 tablet (81 mg total) by mouth once daily. 90 tablet 3    atorvastatin (LIPITOR) 80 MG tablet Take 1 tablet (80 mg total) by mouth once daily. (Patient taking differently: Take 80 mg by mouth every morning. ) 90 tablet 3    citalopram (CELEXA) 10 MG tablet Take 10 mg by mouth every morning.       clopidogrel (PLAVIX) 75 mg tablet Take 1 tablet (75 mg total) by mouth once daily. 90 tablet 0    nut.tx.impaired digestive fxn (VITAL HIGH PROTEIN ORAL) Take 1 tablet by mouth every morning.      omeprazole (PRILOSEC) 40 MG capsule Take 40 mg by mouth every morning.       No current facility-administered medications on file prior to visit.        /60   Pulse 96   Ht 5' 4" (1.626 m)   Wt 81.6 kg (180 lb)   LMP  (LMP Unknown)   BMI 30.90 kg/m²       Objective:     Physical Exam   Constitutional: She is oriented to person, place, and time. She appears well-developed and well-nourished. She does not appear ill. No distress.   HENT:   Head: Normocephalic and atraumatic.   Nose: Nose normal.   Mouth/Throat: No oropharyngeal exudate.   Eyes: Right eye exhibits no discharge. Left eye exhibits no discharge. Right conjunctiva is not injected. Left conjunctiva is not injected. Right pupil is round. Left pupil is round. Pupils are equal.   Neck: No JVD present. Carotid bruit is present (bilateral soft bruits). No thyromegaly present.   Cardiovascular: Normal rate, regular rhythm, S1 normal and S2 normal.  No extrasystoles are present. PMI is not displaced. Exam reveals gallop and S4. Exam reveals no S3.   Murmur heard.   Harsh crescendo-decrescendo " midsystolic murmur is present with a grade of 3/6 at the upper right sternal border radiating to the neck.  Pulses:       Carotid pulses are on the right side with bruit, and on the left side with bruit.       Radial pulses are 2+ on the right side, and 0 on the left side.        Femoral pulses are 2+ on the right side, and 2+ on the left side with bruit.       Popliteal pulses are 2+ on the right side, and 2+ on the left side.        Dorsalis pedis pulses are 1+ on the right side, and 1+ on the left side.        Posterior tibial pulses are 2+ on the right side, and 2+ on the left side.   Pulmonary/Chest: Effort normal and breath sounds normal.   Abdominal: Soft. Normal appearance. There is no hepatosplenomegaly. There is no tenderness.   Musculoskeletal:        Right ankle: She exhibits no swelling, no ecchymosis and no deformity.        Left ankle: She exhibits no swelling, no ecchymosis and no deformity.   Lymphadenopathy:        Head (right side): No submandibular adenopathy present.        Head (left side): No submandibular adenopathy present.     She has no cervical adenopathy.   Neurological: She is alert and oriented to person, place, and time. She is not disoriented. No cranial nerve deficit or sensory deficit.   Skin: Skin is warm and intact. No rash noted. She is not diaphoretic.   Psychiatric: She has a normal mood and affect. Her speech is normal and behavior is normal. Judgment and thought content normal. Cognition and memory are normal.       Assessment:     1. Stenosis of left carotid artery    2. History of cerebrovascular accident    3. Subclavian artery stenosis, left    4. Atherosclerosis of renal artery    5. Aortic valve disease    6. Renovascular hypertension    7. Hypercholesterolemia        Plan:     1. Carotid Artery Stenosis   2005: Carotid bruit heard.   7/23/2015: Carotid Duplex: LIZETH: Moderate plaquing. LICA: Moderate plaquing - 1.4 m/s - 50-60%. Left vertebral: retrograde  flow.   7/6/2016: Carotid Duplex: LIZETH: Moderate plaquing. LICA: Severe plaquing - 1.3 m/s - 50-60%. Left Vertebral: Retrograde flow.   6/30/2017: Carotid Duplex: LIZETH: Moderate plaquing - 1.1 m/s - <50%. LICA: Severe plaquing - 1.7 m/s - 60-70%.   7/11/2018: Carotid Duplex: LIZETH: Severe plaquing - 1.1 m/s - 50-60%. LICA: Severe plaquing - 2.1 m/s - 60-70%. Left vertebral retrograde.   7/3/2019: Carotid Duplex: LIZETH: Moderate plaquing - 1.0 m/s - <50%. LICA: Severe plaquing - 3.6 m/s - 80-90%. Left vertebral retrograde.   7/16/2019: [facility]: AA, 4V: Left subclavian: Occluded. LICA: 90%. LIZETH: 30%. Retrograde left vertebral flow.    8/7/2019: [facility]: Left CEA.    8/12/2019: [facility]: Carotid Duplex: LICA: 3.9 m/s.    8/13/2019: [facility]: CTA: LICA: 70-80%.    8/13/2019: [facility]: MRI: Several multifocal CVAs.   The images suggested a flap at the end of the endarterectomy site causing at least a moderate stenosis and a severe stenosis higher up possibly at the site of where the vessel was clamped with sarkis flow irregularity more distal. The MRA also reveals subacute CVAs at least a few days old.    9/25/2019: Carotid Duplex: LIZETH: Moderate plaquing - 1.0 m/s - <50%. LICA: CEA. Above CEA site - 3.7 m/s - 80-90%. Left vertebral retrograde.   10/15/2019: AA, 4V: Right BCT: Osteal 90%. LIZETH: 30%. LCCA: Osteal 50%. LICA: 90%.   10/15/2019: Dr. Devante Soto: Favor LICA stenting.   11/5/2019: [facility]: LICA Stent.   On aspirin 81 mg Q24.   On clopidogrel 75 mg Q24 until 3/31/2020.      2. Left Subclavian Stenosis   2015: Right arm 200/90 mmHg. Left Arm: 110/60 mmHg.    7/23/2015: Carotid Duplex: Left vertebral: Retrograde flow.   Mild left arm symptoms.   Medical therapy.    3. Renal Artery Stenosis   7/27/2015: Renal Duplex: Right: Severe - 3.2 m/s. Left: About 60% stenosis - 1.7 m/s.   8/20/2015: [facility]: Renal Angio: Right: 80%. Stented. Left: 30%.   On aspirin 81 mg Q24.   Blood pressure well controlled.     4. Aortic Valve Disease   7/16/2015:  Loud systolic murmur.   7/23/2015: Echo: Normal left ventricular size and systolic function. Mild LVH. Mild diastolic dysfunction. Mild aortic sclerosis.   Followed.    5. Hypertension   1995: Diagnosed.   Used to be on amlodipine 10 mg Q24, ramipril 10 mg Q24 and hctz 12.5 mg Q24.    Off antihypertensives.   Keeping log at home.   Appears well controlled.    6. Hypercholesterolemia   2005: Diagnosed. Began statin.   On simvastatin 20 mg Q24.   7/2015: Changed to atorvastatin 80 mg Q24.   8/13/2015: Chol 168. HDL 56. LDL 94. .   On atorvastatin 80 mg Q24.    Well controlled.    7. Mild Obesity   6/9/2017: 86 kg. BMI 31.   Encouraged to lose weight.    8. Primary Care   Dr. Shyam Gilmore.      F/u 2 months.    Thanh Acuna M.D.       1/7/2020 2:18 PM, Addendum:    I have been informed about plans for surgery - [facility] - Dr. Mina Mahoney.    On aspirin 81 mg Q24 indefinitely.    On clopidogrel 75 mg Q24 until 3/31/2020.      Appears cardiac risk with planned surgery is acceptable.    Thanh Acuna M.D.      Copy:    Dr. Mina Mahoney.

## 2019-12-30 DIAGNOSIS — J98.4 DISEASE OF LUNG: Primary | ICD-10-CM

## 2020-01-02 DIAGNOSIS — I65.23 BILATERAL CAROTID ARTERY STENOSIS: ICD-10-CM

## 2020-01-02 RX ORDER — CLOPIDOGREL BISULFATE 75 MG/1
TABLET ORAL
Qty: 90 TABLET | Refills: 0 | Status: SHIPPED | OUTPATIENT
Start: 2020-01-02 | End: 2020-04-13

## 2020-01-06 ENCOUNTER — CLINICAL SUPPORT (OUTPATIENT)
Dept: URGENT CARE | Facility: CLINIC | Age: 75
End: 2020-01-06
Payer: MEDICARE

## 2020-01-06 VITALS
BODY MASS INDEX: 30.82 KG/M2 | SYSTOLIC BLOOD PRESSURE: 148 MMHG | RESPIRATION RATE: 16 BRPM | WEIGHT: 185 LBS | TEMPERATURE: 97 F | OXYGEN SATURATION: 100 % | DIASTOLIC BLOOD PRESSURE: 75 MMHG | HEIGHT: 65 IN
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
## 2020-01-06 DIAGNOSIS — S52.351A CLOSED DISPLACED COMMINUTED FRACTURE OF SHAFT OF RIGHT RADIUS, INITIAL ENCOUNTER: ICD-10-CM

## 2020-01-06 PROCEDURE — 73110 PR  X-RAY WRIST 3+ VW: ICD-10-PCS | Mod: RT,S$GLB,, | Performed by: NURSE PRACTITIONER

## 2020-01-06 PROCEDURE — 29105 PR APPLY LONG ARM SPLINT: ICD-10-PCS | Mod: RT,S$GLB,, | Performed by: NURSE PRACTITIONER

## 2020-01-06 PROCEDURE — 99204 OFFICE O/P NEW MOD 45 MIN: CPT | Mod: 25,S$GLB,, | Performed by: NURSE PRACTITIONER

## 2020-01-06 PROCEDURE — 99204 PR OFFICE/OUTPT VISIT, NEW, LEVL IV, 45-59 MIN: ICD-10-PCS | Mod: 25,S$GLB,, | Performed by: NURSE PRACTITIONER

## 2020-01-06 PROCEDURE — 73130 PR  X-RAY HAND 3+ VW: ICD-10-PCS | Mod: RT,S$GLB,, | Performed by: NURSE PRACTITIONER

## 2020-01-06 PROCEDURE — 73130 X-RAY EXAM OF HAND: CPT | Mod: RT,S$GLB,, | Performed by: NURSE PRACTITIONER

## 2020-01-06 PROCEDURE — 29105 APPLICATION LONG ARM SPLINT: CPT | Mod: RT,S$GLB,, | Performed by: NURSE PRACTITIONER

## 2020-01-06 PROCEDURE — 73110 X-RAY EXAM OF WRIST: CPT | Mod: RT,S$GLB,, | Performed by: NURSE PRACTITIONER

## 2020-01-06 NOTE — PATIENT INSTRUCTIONS
R.I.C.E.    R.I.C.E. stands for Rest, Ice, Compression, and Elevation. Doing these things helps limit pain and swelling after an injury. R.I.C.E. also helps injuries heal faster. Use R.I.C.E. for sprains, strains, and severe bruises or bumps. Follow the tips on this handout and begin R.I.C.E. as soon as possible after an injury.  ? Rest  Pain is your bodys way of telling you to rest an injured area. Whether you have hurt an elbow, hand, foot, or knee, limiting its use will prevent further injury and help you heal.  ? Ice  Applying ice right after an injury helps prevent swelling and reduce pain. Dont place ice directly on your skin.  · Wrap a cold pack or bag of ice in a thin cloth. Place it over the injured area.  · Ice for 10 minutes every 3 hours. Dont ice for more than 20 minutes at a time.  ? Compression  Putting pressure (compression) on an injury helps prevent swelling and provides support.  · Wrap the injured area firmly with an elastic bandage. If your hand or foot tingles, becomes discolored, or feels cold to the touch, the bandage may be too tight. Rewrap it more loosely.  · If your bandage becomes too loose, rewrap it.  · Do not wear an elastic bandage overnight.  ? Elevation  Keeping an injury elevated helps reduce swelling, pain, and throbbing. Elevation is most effective when the injury is kept elevated higher than the heart.     Call your healthcare provider if you notice any of the following:  · Fingers or toes feel numb, are cold to the touch, or change color  · Skin looks shiny or tight  · Pain, swelling, or bruising worsens and is not improved with elevation   Date Last Reviewed: 9/3/2015  © 7898-7380 [employer]. [address] [place] [address], [place] [address]. All rights reserved. This information is not intended as a substitute for professional medical care. Always follow your healthcare professional's instructions.        Fiberglass Splint Care    Follow these guidelines when  caring for your splint:  · It will take up to 2 hours for your fiberglass splint to fully harden. Dont put any pressure on it during that time or it may break.  · To prevent swelling under the splint, do this for the first 2 days (48 hours):  ¨ For a splint on your arm, keep it in a sling or raised to shoulder level when you are sitting or standing. Rest it on your chest or on a pillow at your side when you are lying down.  ¨ For a splint on your foot, keep it propped up above the level of your waist when sitting or lying. Avoid crutch walking as much as possible during this time.  · Keep the splint or cast dry at all times. Bathe with your splint or cast well out of the water. Protect it with a large plastic bag, rubber-banded at the top end. If a fiberglass cast or splint gets wet, you can dry it with a hair dryer.  · Put an ice pack on the injured area. Do this for 20 minutes every 1 to 2 hours the first day for pain relief. You can make an ice pack by wrapping a plastic bag of ice cubes in a thin towel. As the ice melts, be careful that the splint doesnt get wet. Continue using the ice pack 3 to 4 times a day for the next 2 days. Then use the ice pack as needed to ease pain and swelling.  · Your health care provider may prescribe medicines for pain or swelling. Follow your providers instructions for taking these medicines. If no pain medicine was prescribed, you may use acetaminophen or ibuprofen to control pain. If you have chronic liver or kidney disease, talk with your health care provider before using these medicines. Also talk with your provider if youve had a stomach ulcer or GI bleeding.  Follow-up care  Follow up with your health care provider, or as advised.  When to seek medical advice  Call your health care provider right away if any of these occur:  · Bad odor from the splint or wound fluid stains the splint  · The splint cracks or stays wet for more than 24 hours  · Tightness or pressure under  the splint gets worse  · Fingers or toes become swollen, cold, blue, numb, or tingly  · You cant move your fingers or toes  · Pain under the splint gets worse  · The skin around the splint becomes red  · Fever of more than 101°F (38°C)  Date Last Reviewed: 2/17/2015  © 2535-5190 [employer]. [address] [place] [address]. All rights reserved. This information is not intended as a substitute for professional medical care. Always follow your healthcare professional's instructions.        Understanding a Distal Radius Fracture      A fracture is a broken bone. A fracture in the distal radius is a break in the lower end of the radius. This is the larger bone in the forearm. Because the break occurs near the wrist, it is often called a wrist fracture.    The bone may be cracked, or it may be broken into 2 or more pieces. The pieces of bone may be lined up or they may have moved out of place. Sometimes, the bone may break through the skin. Nearby nerves, tissues, and joints also may be damaged. Depending on the severity of the fracture, healing may take several months or longer.  What causes a distal radius fracture?  This type of fracture is most often caused from a fall on an outstretched hand. It can also be caused from a blow, accident, or sports injury.  Symptoms of a distal radius fracture  Symptoms can include pain, swelling, and bruising. If the bone breaks through the skin, external bleeding can also occur. The wrist may look crooked, deformed, or bent. It may be hard to move or use the arm, wrist, and hand for normal tasks and activities.  Treating a distal radius fracture  Treatment depends on how serious the fracture is. If needed, the bone is put back into place. This may be done with or without surgery. If surgery is needed, the surgeon may use devices such as pins, plates, or screws to hold the bone together. You may need to wear a splint or cast for a month or longer to protect  the bone and keep it in place during healing. Other treatments may be also used to help reduce symptoms or regain function. These include:  · Cold packs. Putting an ice pack on the injured area may help reduce swelling and pain.  · Raising the arm and wrist. Keeping the arm and wrist raised above heart level may help reduce swelling.  · Pain medicines. Taking prescription or over-the-counter pain medicines may help reduce pain and swelling.  · Exercises. Doing certain exercises at home or with a physical therapist can help restore strength, flexibility, and range of motion in your arm, wrist, and hand. In general, exercises are not started until after the splint or cast is removed.  Possible complications of a distal radius fracture  These can include:  · Poor healing of the bone  · Weakness, stiffness, or loss of range of motion in the arm, wrist, or hand  · Osteoarthritis in the wrist joint  When to call your healthcare provider  Call your healthcare provider right away if you have any of these:  · Fever of 100.4°F (38°C) or higher, or as directed  · Symptoms that dont get better with treatment, or get worse  · Numbness, coldness, or swelling in your arm, hand, or fingers  · Fingernails that turn blue or gray in color  · A splint or cast that is damaged or feels too tight or loose  · New symptoms   Date Last Reviewed: 3/10/2016  © 4707-4702 [employer]. [address] [place] [address], [place] [address]. All rights reserved. This information is not intended as a substitute for professional medical care. Always follow your healthcare professional's instructions.

## 2020-01-07 ENCOUNTER — TELEPHONE (OUTPATIENT)
Dept: ORTHOPEDICS | Facility: CLINIC | Age: 75
End: 2020-01-07

## 2020-01-07 ENCOUNTER — OFFICE VISIT (OUTPATIENT)
Dept: ORTHOPEDICS | Facility: CLINIC | Age: 75
End: 2020-01-07
Payer: MEDICARE

## 2020-01-07 VITALS
BODY MASS INDEX: 30.82 KG/M2 | DIASTOLIC BLOOD PRESSURE: 73 MMHG | HEIGHT: 65 IN | WEIGHT: 185 LBS | HEART RATE: 84 BPM | SYSTOLIC BLOOD PRESSURE: 144 MMHG

## 2020-01-07 DIAGNOSIS — S52.571A OTHER CLOSED INTRA-ARTICULAR FRACTURE OF DISTAL END OF RIGHT RADIUS, INITIAL ENCOUNTER: Primary | ICD-10-CM

## 2020-01-07 DIAGNOSIS — M25.531 RIGHT WRIST PAIN: ICD-10-CM

## 2020-01-07 DIAGNOSIS — S52.571A CLOSED DIE PUNCH FRACTURE DISTAL RADIUS, RIGHT, INITIAL ENCOUNTER: ICD-10-CM

## 2020-01-07 PROCEDURE — 3077F PR MOST RECENT SYSTOLIC BLOOD PRESSURE >= 140 MM HG: ICD-10-PCS | Mod: S$GLB,,, | Performed by: ORTHOPAEDIC SURGERY

## 2020-01-07 PROCEDURE — 3078F DIAST BP <80 MM HG: CPT | Mod: S$GLB,,, | Performed by: ORTHOPAEDIC SURGERY

## 2020-01-07 PROCEDURE — 1159F MED LIST DOCD IN RCRD: CPT | Mod: S$GLB,,, | Performed by: ORTHOPAEDIC SURGERY

## 2020-01-07 PROCEDURE — 99204 OFFICE O/P NEW MOD 45 MIN: CPT | Mod: 57,25,S$GLB, | Performed by: ORTHOPAEDIC SURGERY

## 2020-01-07 PROCEDURE — 3078F PR MOST RECENT DIASTOLIC BLOOD PRESSURE < 80 MM HG: ICD-10-PCS | Mod: S$GLB,,, | Performed by: ORTHOPAEDIC SURGERY

## 2020-01-07 PROCEDURE — 1101F PT FALLS ASSESS-DOCD LE1/YR: CPT | Mod: S$GLB,,, | Performed by: ORTHOPAEDIC SURGERY

## 2020-01-07 PROCEDURE — 1159F PR MEDICATION LIST DOCUMENTED IN MEDICAL RECORD: ICD-10-PCS | Mod: S$GLB,,, | Performed by: ORTHOPAEDIC SURGERY

## 2020-01-07 PROCEDURE — 1101F PR PT FALLS ASSESS DOC 0-1 FALLS W/OUT INJ PAST YR: ICD-10-PCS | Mod: S$GLB,,, | Performed by: ORTHOPAEDIC SURGERY

## 2020-01-07 PROCEDURE — 1125F PR PAIN SEVERITY QUANTIFIED, PAIN PRESENT: ICD-10-PCS | Mod: S$GLB,,, | Performed by: ORTHOPAEDIC SURGERY

## 2020-01-07 PROCEDURE — 99204 PR OFFICE/OUTPT VISIT, NEW, LEVL IV, 45-59 MIN: ICD-10-PCS | Mod: 57,25,S$GLB, | Performed by: ORTHOPAEDIC SURGERY

## 2020-01-07 PROCEDURE — 3077F SYST BP >= 140 MM HG: CPT | Mod: S$GLB,,, | Performed by: ORTHOPAEDIC SURGERY

## 2020-01-07 PROCEDURE — 1125F AMNT PAIN NOTED PAIN PRSNT: CPT | Mod: S$GLB,,, | Performed by: ORTHOPAEDIC SURGERY

## 2020-01-07 RX ORDER — OXYCODONE AND ACETAMINOPHEN 7.5; 325 MG/1; MG/1
1 TABLET ORAL EVERY 6 HOURS PRN
Qty: 28 TABLET | Refills: 0 | Status: SHIPPED | OUTPATIENT
Start: 2020-01-07 | End: 2020-01-14

## 2020-01-07 RX ORDER — MUPIROCIN 20 MG/G
OINTMENT TOPICAL
Status: CANCELLED | OUTPATIENT
Start: 2020-01-07

## 2020-01-07 RX ORDER — HYDROCODONE BITARTRATE AND ACETAMINOPHEN 5; 325 MG/1; MG/1
1 TABLET ORAL EVERY 6 HOURS PRN
COMMUNITY
End: 2020-01-08

## 2020-01-07 RX ORDER — MIRTAZAPINE 7.5 MG/1
7.5 TABLET, FILM COATED ORAL NIGHTLY PRN
Refills: 0 | COMMUNITY
Start: 2019-11-07 | End: 2020-01-08

## 2020-01-07 NOTE — PROGRESS NOTES
[facility]    Subjective:     Chief Complaint:   Chief Complaint   Patient presents with    Right Wrist - Pain     Right wrist pain x 1.6.19. States that she had a fall and landed on her wrist. States that she went to [facility] and was dx with a fx. No disc but does have report.        Past Medical History:   Diagnosis Date    Atherosclerosis of renal artery 8/10/2015    7/27/2015: Renal Duplex: Right: severe - 3.2 m/s. Left: about 60% stenosis - 1.7 m/s.    Carotid bruit 7/16/2015 2005: Carotid bruit heard.    Coronary artery disease     CVA (cerebral vascular accident) 8/13/2019    Hypercholesterolemia 7/16/2015 2005: Diagnosed. Started statin.    Hypertension, benign 7/16/2015 1995: Diagnosed.    Mild obesity 6/9/2017 6/9/2017: 86 kg. BMI 31.    Subclavian artery stenosis, left 8/10/2015    7/2015: Diagnosed. 7/23/2015: Carotid Duplex: LIZETH: moderate plaquing. LICA: moderate plaquing -1.4 m/s - 50-60%. Left vertebral: retrograde flow.       Past Surgical History:   Procedure Laterality Date    APPENDECTOMY Right 1959    HYSTERECTOMY      OOPHORECTOMY      VASCULAR SURGERY         Current Outpatient Medications   Medication Sig    ascorbic acid/multivit-min (EMERGEN-C ORAL) Take 1 packet by mouth every morning.    atorvastatin (LIPITOR) 80 MG tablet Take 1 tablet (80 mg total) by mouth once daily. (Patient taking differently: Take 80 mg by mouth every morning. )    citalopram (CELEXA) 10 MG tablet Take 10 mg by mouth every morning.     HYDROcodone-acetaminophen (NORCO) 5-325 mg per tablet Take 1 tablet by mouth every 6 (six) hours as needed for Pain.    nut.tx.impaired digestive fxn (VITAL HIGH PROTEIN ORAL) Take 1 tablet by mouth every morning.    omeprazole (PRILOSEC) 40 MG capsule Take 40 mg by mouth every morning.    amLODIPine (NORVASC) 10 MG tablet TAKE 1 TABLET EVERY DAY (Patient not taking: Reported on 1/6/2020)    aspirin (ECOTRIN) 81 MG EC tablet Take 1 tablet (81  mg total) by mouth once daily. (Patient not taking: Reported on 2020)    clopidogrel (PLAVIX) 75 mg tablet TAKE 1 TABLET BY MOUTH EVERY DAY (Patient not taking: Reported on 2020)    mirtazapine (REMERON) 7.5 MG Tab Take 7.5 mg by mouth nightly as needed.     No current facility-administered medications for this visit.        Review of patient's allergies indicates:  No Known Allergies    Family History   Problem Relation Age of Onset    Hypertension Mother     Hypertension Father        Social History     Socioeconomic History    Marital status:      Spouse name: Not on file    Number of children: Not on file    Years of education: Not on file    Highest education level: Not on file   Occupational History    Not on file   Social Needs    Financial resource strain: Not on file    Food insecurity:     Worry: Not on file     Inability: Not on file    Transportation needs:     Medical: Not on file     Non-medical: Not on file   Tobacco Use    Smoking status: Former Smoker     Packs/day: 1.00     Years: 47.00     Pack years: 47.00     Types: Cigarettes     Start date: 1961     Last attempt to quit: 2008     Years since quittin.0    Smokeless tobacco: Never Used   Substance and Sexual Activity    Alcohol use: Not Currently     Alcohol/week: 0.0 standard drinks     Comment: Socially.    Drug use: Not Currently    Sexual activity: Not Currently   Lifestyle    Physical activity:     Days per week: Not on file     Minutes per session: Not on file    Stress: Not on file   Relationships    Social connections:     Talks on phone: Not on file     Gets together: Not on file     Attends Druze service: Not on file     Active member of club or organization: Not on file     Attends meetings of clubs or organizations: Not on file     Relationship status: Not on file   Other Topics Concern    Not on file   Social History Narrative    Not on file       History of present illness:   Patient comes in today for the right wrist.  She fell and injured her right wrist.  She was seen in the urgent care referred on for further care.      Review of Systems:    Constitution: Negative for chills, fever, and sweats.  Negative for unexplained weight loss.    HENT:  Negative for headaches and blurry vision.    Cardiovascular:Negative for chest pain or irregular heart beat. Negative for hypertension.    Respiratory:  Negative for cough and shortness of breath.    Gastrointestinal: Negative for abdominal pain, heartburn, melena, nausea, and vomitting.    Genitourinary:  Negative bladder incontinence and dysuria.    Musculoskeletal:  See HPI for details.     Neurological: Negative for numbness.    Psychiatric/Behavioral: Negative for depression.  The patient is not nervous/anxious.      Endocrine: Negative for polyuria    Hematologic/Lymphatic: Negative for bleeding problem.  Does not bruise/bleed easily.    Skin: Negative for poor would healing and rash    Objective:      Physical Examination:    Vital Signs:    Vitals:    01/07/20 0833   BP: (!) 144/73   Pulse: 84       Body mass index is 30.79 kg/m².    This a well-developed, well nourished patient in no acute distress.  They are alert and oriented and cooperative to examination.        Patient is an obvious deformity of the right wrist.  Her right radial pulse is palpable low though it is mildly diminished.  It is symmetric to the left side.  Sensation is preserved in the hand.  Pertinent New Results:    XRAY Report / Interpretation:   AP and lateral of the right wrist demonstrates a displaced intra-articular distal radius fracture.    Assessment/Plan:      Displaced intra-articular distal radius fracture.  I have offered her open reduction internal fixation.  She will need to speak with her vascular surgeon because she does have peripheral vascular disease and is on blood thinners.  Assuming the blood thinners can be stopped or least switched to a  short-acting blood thinner like Lovenox surgery will be proceeded with on Monday.  Risks and benefits discussed with her in great detail.  She understood and wished to proceed      This note was created using Dragon voice recognition software that occasionally misinterpreted phrases or words.

## 2020-01-07 NOTE — H&P (VIEW-ONLY)
[facility]    Subjective:     Chief Complaint:   Chief Complaint   Patient presents with    Right Wrist - Pain     Right wrist pain x 1.6.19. States that she had a fall and landed on her wrist. States that she went to [facility] and was dx with a fx. No disc but does have report.        Past Medical History:   Diagnosis Date    Atherosclerosis of renal artery 8/10/2015    7/27/2015: Renal Duplex: Right: severe - 3.2 m/s. Left: about 60% stenosis - 1.7 m/s.    Carotid bruit 7/16/2015 2005: Carotid bruit heard.    Coronary artery disease     CVA (cerebral vascular accident) 8/13/2019    Hypercholesterolemia 7/16/2015 2005: Diagnosed. Started statin.    Hypertension, benign 7/16/2015 1995: Diagnosed.    Mild obesity 6/9/2017 6/9/2017: 86 kg. BMI 31.    Subclavian artery stenosis, left 8/10/2015    7/2015: Diagnosed. 7/23/2015: Carotid Duplex: LIZETH: moderate plaquing. LICA: moderate plaquing -1.4 m/s - 50-60%. Left vertebral: retrograde flow.       Past Surgical History:   Procedure Laterality Date    APPENDECTOMY Right 1959    HYSTERECTOMY      OOPHORECTOMY      VASCULAR SURGERY         Current Outpatient Medications   Medication Sig    ascorbic acid/multivit-min (EMERGEN-C ORAL) Take 1 packet by mouth every morning.    atorvastatin (LIPITOR) 80 MG tablet Take 1 tablet (80 mg total) by mouth once daily. (Patient taking differently: Take 80 mg by mouth every morning. )    citalopram (CELEXA) 10 MG tablet Take 10 mg by mouth every morning.     HYDROcodone-acetaminophen (NORCO) 5-325 mg per tablet Take 1 tablet by mouth every 6 (six) hours as needed for Pain.    nut.tx.impaired digestive fxn (VITAL HIGH PROTEIN ORAL) Take 1 tablet by mouth every morning.    omeprazole (PRILOSEC) 40 MG capsule Take 40 mg by mouth every morning.    amLODIPine (NORVASC) 10 MG tablet TAKE 1 TABLET EVERY DAY (Patient not taking: Reported on 1/6/2020)    aspirin (ECOTRIN) 81 MG EC tablet Take 1 tablet (81  mg total) by mouth once daily. (Patient not taking: Reported on 2020)    clopidogrel (PLAVIX) 75 mg tablet TAKE 1 TABLET BY MOUTH EVERY DAY (Patient not taking: Reported on 2020)    mirtazapine (REMERON) 7.5 MG Tab Take 7.5 mg by mouth nightly as needed.     No current facility-administered medications for this visit.        Review of patient's allergies indicates:  No Known Allergies    Family History   Problem Relation Age of Onset    Hypertension Mother     Hypertension Father        Social History     Socioeconomic History    Marital status:      Spouse name: Not on file    Number of children: Not on file    Years of education: Not on file    Highest education level: Not on file   Occupational History    Not on file   Social Needs    Financial resource strain: Not on file    Food insecurity:     Worry: Not on file     Inability: Not on file    Transportation needs:     Medical: Not on file     Non-medical: Not on file   Tobacco Use    Smoking status: Former Smoker     Packs/day: 1.00     Years: 47.00     Pack years: 47.00     Types: Cigarettes     Start date: 1961     Last attempt to quit: 2008     Years since quittin.0    Smokeless tobacco: Never Used   Substance and Sexual Activity    Alcohol use: Not Currently     Alcohol/week: 0.0 standard drinks     Comment: Socially.    Drug use: Not Currently    Sexual activity: Not Currently   Lifestyle    Physical activity:     Days per week: Not on file     Minutes per session: Not on file    Stress: Not on file   Relationships    Social connections:     Talks on phone: Not on file     Gets together: Not on file     Attends Worship service: Not on file     Active member of club or organization: Not on file     Attends meetings of clubs or organizations: Not on file     Relationship status: Not on file   Other Topics Concern    Not on file   Social History Narrative    Not on file       History of present illness:   Patient comes in today for the right wrist.  She fell and injured her right wrist.  She was seen in the urgent care referred on for further care.      Review of Systems:    Constitution: Negative for chills, fever, and sweats.  Negative for unexplained weight loss.    HENT:  Negative for headaches and blurry vision.    Cardiovascular:Negative for chest pain or irregular heart beat. Negative for hypertension.    Respiratory:  Negative for cough and shortness of breath.    Gastrointestinal: Negative for abdominal pain, heartburn, melena, nausea, and vomitting.    Genitourinary:  Negative bladder incontinence and dysuria.    Musculoskeletal:  See HPI for details.     Neurological: Negative for numbness.    Psychiatric/Behavioral: Negative for depression.  The patient is not nervous/anxious.      Endocrine: Negative for polyuria    Hematologic/Lymphatic: Negative for bleeding problem.  Does not bruise/bleed easily.    Skin: Negative for poor would healing and rash    Objective:      Physical Examination:    Vital Signs:    Vitals:    01/07/20 0833   BP: (!) 144/73   Pulse: 84       Body mass index is 30.79 kg/m².    This a well-developed, well nourished patient in no acute distress.  They are alert and oriented and cooperative to examination.        Patient is an obvious deformity of the right wrist.  Her right radial pulse is palpable low though it is mildly diminished.  It is symmetric to the left side.  Sensation is preserved in the hand.  Pertinent New Results:    XRAY Report / Interpretation:   AP and lateral of the right wrist demonstrates a displaced intra-articular distal radius fracture.    Assessment/Plan:      Displaced intra-articular distal radius fracture.  I have offered her open reduction internal fixation.  She will need to speak with her vascular surgeon because she does have peripheral vascular disease and is on blood thinners.  Assuming the blood thinners can be stopped or least switched to a  short-acting blood thinner like Lovenox surgery will be proceeded with on Monday.  Risks and benefits discussed with her in great detail.  She understood and wished to proceed      This note was created using Dragon voice recognition software that occasionally misinterpreted phrases or words.

## 2020-01-08 ENCOUNTER — HOSPITAL ENCOUNTER (OUTPATIENT)
Dept: PREADMISSION TESTING | Facility: HOSPITAL | Age: 75
Discharge: HOME | End: 2020-01-08
Attending: ORTHOPAEDIC SURGERY
Payer: MEDICARE

## 2020-01-08 ENCOUNTER — TELEPHONE (OUTPATIENT)
Dept: ORTHOPEDICS | Facility: CLINIC | Age: 75
End: 2020-01-08

## 2020-01-08 VITALS — WEIGHT: 185 LBS | HEIGHT: 65 IN | BODY MASS INDEX: 30.82 KG/M2

## 2020-01-08 DIAGNOSIS — S52.571A OTHER CLOSED INTRA-ARTICULAR FRACTURE OF DISTAL END OF RIGHT RADIUS, INITIAL ENCOUNTER: ICD-10-CM

## 2020-01-08 DIAGNOSIS — M25.531 RIGHT WRIST PAIN: ICD-10-CM

## 2020-01-08 LAB
ALBUMIN SERPL BCP-MCNC: 3.6 G/DL (ref 3.5–5.2)
ALP SERPL-CCNC: 113 U/L (ref 55–135)
ALT SERPL W/O P-5'-P-CCNC: 17 U/L (ref 10–44)
ANION GAP SERPL CALC-SCNC: 9 MMOL/L (ref 8–16)
AST SERPL-CCNC: 18 U/L (ref 10–40)
BASOPHILS # BLD AUTO: 0.07 K/UL (ref 0–0.2)
BASOPHILS NFR BLD: 0.6 % (ref 0–1.9)
BILIRUB SERPL-MCNC: 0.7 MG/DL (ref 0.1–1)
BUN SERPL-MCNC: 16 MG/DL (ref 8–23)
CALCIUM SERPL-MCNC: 9.2 MG/DL (ref 8.7–10.5)
CHLORIDE SERPL-SCNC: 102 MMOL/L (ref 95–110)
CO2 SERPL-SCNC: 27 MMOL/L (ref 23–29)
CREAT SERPL-MCNC: 0.7 MG/DL (ref 0.5–1.4)
DIFFERENTIAL METHOD: ABNORMAL
EOSINOPHIL # BLD AUTO: 0.4 K/UL (ref 0–0.5)
EOSINOPHIL NFR BLD: 3.3 % (ref 0–8)
ERYTHROCYTE [DISTWIDTH] IN BLOOD BY AUTOMATED COUNT: 17.6 % (ref 11.5–14.5)
EST. GFR  (AFRICAN AMERICAN): >60 ML/MIN/1.73 M^2
EST. GFR  (NON AFRICAN AMERICAN): >60 ML/MIN/1.73 M^2
GLUCOSE SERPL-MCNC: 101 MG/DL (ref 70–110)
HCT VFR BLD AUTO: 32.3 % (ref 37–48.5)
HGB BLD-MCNC: 9.1 G/DL (ref 12–16)
IMM GRANULOCYTES # BLD AUTO: 0.04 K/UL (ref 0–0.04)
LYMPHOCYTES # BLD AUTO: 1.8 K/UL (ref 1–4.8)
LYMPHOCYTES NFR BLD: 15.3 % (ref 18–48)
MCH RBC QN AUTO: 22.1 PG (ref 27–31)
MCHC RBC AUTO-ENTMCNC: 28.2 G/DL (ref 32–36)
MCV RBC AUTO: 78 FL (ref 82–98)
MONOCYTES # BLD AUTO: 1 K/UL (ref 0.3–1)
MONOCYTES NFR BLD: 8.9 % (ref 4–15)
NEUTROPHILS # BLD AUTO: 8.4 K/UL (ref 1.8–7.7)
NEUTROPHILS NFR BLD: 71.6 % (ref 38–73)
NRBC BLD-RTO: 0 /100 WBC
PLATELET # BLD AUTO: 296 K/UL (ref 150–350)
PMV BLD AUTO: 11.3 FL (ref 9.2–12.9)
POTASSIUM SERPL-SCNC: 3.7 MMOL/L (ref 3.5–5.1)
PROT SERPL-MCNC: 6.7 G/DL (ref 6–8.4)
RBC # BLD AUTO: 4.12 M/UL (ref 4–5.4)
SODIUM SERPL-SCNC: 138 MMOL/L (ref 136–145)
WBC # BLD AUTO: 11.72 K/UL (ref 3.9–12.7)

## 2020-01-08 PROCEDURE — 36415 COLL VENOUS BLD VENIPUNCTURE: CPT

## 2020-01-08 PROCEDURE — 80053 COMPREHEN METABOLIC PANEL: CPT

## 2020-01-08 PROCEDURE — 99900103 DSU ONLY-NO CHARGE-INITIAL HR (STAT)

## 2020-01-08 PROCEDURE — 85025 COMPLETE CBC W/AUTO DIFF WBC: CPT

## 2020-01-08 PROCEDURE — 99900104 DSU ONLY-NO CHARGE-EA ADD'L HR (STAT)

## 2020-01-08 NOTE — DISCHARGE INSTRUCTIONS
To confirm, Your doctor has instructed you that surgery is scheduled for: 1/13/2020    Please report to [facility], Registration the morning of surgery. You must check-in and receive a wristband before going to your procedure.    Pre-Op will call the FRIDAY prior to surgery between 1:00 and 6:00 PM with the final arrival time.  Phone number: [phone]    PLEASE NOTE:  The surgery schedule has many variables which may affect the time of your surgery case.  Family members should be available if your surgery time changes.  Plan to be here the day of your procedure between 4-6 hours.    MEDICATIONS:  TAKE ONLY THESE MEDICATIONS WITH A SMALL SIP OF WATER THE MORNING OF YOUR PROCEDURE:  ATORVASTATIN, CITALOPRAM, OXYCODONE IF NEEDED, OMEPRAZOLE      DO NOT TAKE THESE MEDICATIONS 5-7 DAYS PRIOR to your procedure or per your surgeon's request:   ALEVE, ADVIL, IBUPROFEN, FISH OIL VITAMIN E, HERBALS  (May take Tylenol)    ASPIRIN AND PLAVIX PER DR. DRAPER AND DR. FAN    ONLY if you are prescribed any types of blood thinners such as:  Aspirin, Coumadin, Plavix, Pradaxa, Xarelto, Aggrenox, Effient, Eliquis, Savasya, Brilinta, or any other, ask your surgeon whether you should stop taking them and how long before surgery you should stop.  You may also need to verify with the prescribing physician if it is ok to stop your medication.      INSTRUCTIONS IMPORTANT!!  · Do not eat or drink anything between midnight and the time of your procedure- this includes gum, mints, and candy.  · Do not smoke or drink alcoholic beverages 24 hours prior to your procedure.  · Shower the night before AND the morning of your procedure with a Chlorhexidine wash such as Hibiclens or Dial antibacterial soap from the neck down.  Do not get it on your face or in your eyes.  You may use your own shampoo and face wash. This helps your skin to be as bacteria free as possible.    · If you wear contact lenses, dentures, hearing  aids or glasses, bring a container to put them in during surgery and give to a family member for safe keeping.  Please leave all jewelry, piercing's and valuables at home.   · DO NOT remove hair from the surgery site.  Do not shave the incision site unless you are given specific instructions to do so.    · ONLY if you have been diagnosed with sleep apnea please bring your C-PAP machine.  · ONLY if you wear home oxygen please bring your portable oxygen tank the day of your procedure.  · ONLY if you have a history of OPEN HEART SURGERY you will need a clearance from your Cardiologist per Anesthesia.      · ONLY for patients requiring bowel prep, written instructions will be given by your doctor's office.  · ONLY if you have a neuro stimulator, please bring the controller with you the morning of surgery  · ONLY if a type and screen test is needed before surgery, please return: N/A  · If your doctor has scheduled you for an overnight stay, bring a small overnight bag with any personal items you need.  · Make arrangements in advance for transportation home by a responsible adult.  It is not safe to drive a vehicle during the 24 hours after anesthesia.      · Visiting hours are 10:00AM to 8:30PM.  For the safety of all patients, visitors under the age of 12 are not allowed above the first floor of the hospital.    · All [facility] facilities and properties are tobacco free.  Smoking is NOT allowed.       If you have any questions about these instructions, call Pre-Op Admit  Nursing at [phone] or the Pre-Op Day Surgery Unit at [phone].

## 2020-01-08 NOTE — TELEPHONE ENCOUNTER
----- Message from Lizy Mosley sent at 1/8/2020  1:28 PM CST -----  Contact: Pt  Pt states that her Cardiologist recommends that she NOT stop taking the Plavix and Asprin for surgery next week however it is Dr Mahoney's call.  Pt call back #  [phone]

## 2020-01-09 NOTE — TELEPHONE ENCOUNTER
Spoke with pt, as per Dr. Mahoney who spoke with cardiologist, she needs to stay on her Plavix. Called surgery and informed Laura

## 2020-01-09 NOTE — TELEPHONE ENCOUNTER
----- Message from Irina Arias sent at 1/9/2020  2:11 PM CST -----  Contact: patient's friend Nanda  She was calling on her behalf to see if she needs to be off her Plavix before Monday because she is having surgery Monday with Dr. Mahoney. She has not taken it yet today to make sure, call her back today if you can at [phone].

## 2020-01-10 ENCOUNTER — ANESTHESIA EVENT (OUTPATIENT)
Dept: SURGERY | Facility: HOSPITAL | Age: 75
End: 2020-01-10
Payer: MEDICARE

## 2020-01-13 ENCOUNTER — HOSPITAL ENCOUNTER (OUTPATIENT)
Facility: HOSPITAL | Age: 75
Discharge: HOME | End: 2020-01-13
Attending: ORTHOPAEDIC SURGERY | Admitting: ORTHOPAEDIC SURGERY
Payer: MEDICARE

## 2020-01-13 ENCOUNTER — ANESTHESIA (OUTPATIENT)
Dept: SURGERY | Facility: HOSPITAL | Age: 75
End: 2020-01-13
Payer: MEDICARE

## 2020-01-13 DIAGNOSIS — M25.531 RIGHT WRIST PAIN: ICD-10-CM

## 2020-01-13 DIAGNOSIS — S52.571A OTHER CLOSED INTRA-ARTICULAR FRACTURE OF DISTAL END OF RIGHT RADIUS, INITIAL ENCOUNTER: ICD-10-CM

## 2020-01-13 DIAGNOSIS — S52.571A CLOSED DIE PUNCH FRACTURE DISTAL RADIUS, RIGHT, INITIAL ENCOUNTER: Primary | ICD-10-CM

## 2020-01-13 PROCEDURE — 99900104 DSU ONLY-NO CHARGE-EA ADD'L HR (STAT): Performed by: ORTHOPAEDIC SURGERY

## 2020-01-13 PROCEDURE — 64415 NJX AA&/STRD BRCH PLXS IMG: CPT | Performed by: ANESTHESIOLOGY

## 2020-01-13 PROCEDURE — 64415 PR NERVE BLOCK INJ, ANES/STEROID, BRACHIAL PLEXUS, INCL IMAG GUIDANCE: ICD-10-PCS | Mod: 59,RT,, | Performed by: ANESTHESIOLOGY

## 2020-01-13 PROCEDURE — 25000003 PHARM REV CODE 250: Performed by: ORTHOPAEDIC SURGERY

## 2020-01-13 PROCEDURE — 76942 PR U/S GUIDANCE FOR NEEDLE GUIDANCE: ICD-10-PCS | Mod: 26,,, | Performed by: ANESTHESIOLOGY

## 2020-01-13 PROCEDURE — D9220A PRA ANESTHESIA: ICD-10-PCS | Mod: ANES,,, | Performed by: ANESTHESIOLOGY

## 2020-01-13 PROCEDURE — 71000039 HC RECOVERY, EACH ADD'L HOUR: Performed by: ORTHOPAEDIC SURGERY

## 2020-01-13 PROCEDURE — 27200750 HC INSULATED NEEDLE/ STIMUPLEX: Performed by: ANESTHESIOLOGY

## 2020-01-13 PROCEDURE — S0020 INJECTION, BUPIVICAINE HYDRO: HCPCS | Performed by: ANESTHESIOLOGY

## 2020-01-13 PROCEDURE — C9290 INJ, BUPIVACAINE LIPOSOME: HCPCS | Performed by: ANESTHESIOLOGY

## 2020-01-13 PROCEDURE — 36000710: Performed by: ORTHOPAEDIC SURGERY

## 2020-01-13 PROCEDURE — 25000003 PHARM REV CODE 250: Performed by: ANESTHESIOLOGY

## 2020-01-13 PROCEDURE — 71000015 HC POSTOP RECOV 1ST HR: Performed by: ORTHOPAEDIC SURGERY

## 2020-01-13 PROCEDURE — 27201423 OPTIME MED/SURG SUP & DEVICES STERILE SUPPLY: Performed by: ORTHOPAEDIC SURGERY

## 2020-01-13 PROCEDURE — 63600175 PHARM REV CODE 636 W HCPCS

## 2020-01-13 PROCEDURE — 63600175 PHARM REV CODE 636 W HCPCS: Performed by: ANESTHESIOLOGY

## 2020-01-13 PROCEDURE — 25000003 PHARM REV CODE 250: Performed by: NURSE ANESTHETIST, CERTIFIED REGISTERED

## 2020-01-13 PROCEDURE — 63600175 PHARM REV CODE 636 W HCPCS: Performed by: ORTHOPAEDIC SURGERY

## 2020-01-13 PROCEDURE — 37000008 HC ANESTHESIA 1ST 15 MINUTES: Performed by: ORTHOPAEDIC SURGERY

## 2020-01-13 PROCEDURE — 27200651 HC AIRWAY, LMA: Performed by: ANESTHESIOLOGY

## 2020-01-13 PROCEDURE — C1769 GUIDE WIRE: HCPCS | Performed by: ORTHOPAEDIC SURGERY

## 2020-01-13 PROCEDURE — 36000711: Performed by: ORTHOPAEDIC SURGERY

## 2020-01-13 PROCEDURE — 76942 ECHO GUIDE FOR BIOPSY: CPT | Performed by: ANESTHESIOLOGY

## 2020-01-13 PROCEDURE — C1713 ANCHOR/SCREW BN/BN,TIS/BN: HCPCS | Performed by: ORTHOPAEDIC SURGERY

## 2020-01-13 PROCEDURE — 99900103 DSU ONLY-NO CHARGE-INITIAL HR (STAT): Performed by: ORTHOPAEDIC SURGERY

## 2020-01-13 PROCEDURE — D9220A PRA ANESTHESIA: Mod: ANES,,, | Performed by: ANESTHESIOLOGY

## 2020-01-13 PROCEDURE — 76942 ECHO GUIDE FOR BIOPSY: CPT | Mod: 26,,, | Performed by: ANESTHESIOLOGY

## 2020-01-13 PROCEDURE — D9220A PRA ANESTHESIA: ICD-10-PCS | Mod: CRNA,,, | Performed by: NURSE ANESTHETIST, CERTIFIED REGISTERED

## 2020-01-13 PROCEDURE — 37000009 HC ANESTHESIA EA ADD 15 MINS: Performed by: ORTHOPAEDIC SURGERY

## 2020-01-13 PROCEDURE — 64415 NJX AA&/STRD BRCH PLXS IMG: CPT | Mod: 59,RT,, | Performed by: ANESTHESIOLOGY

## 2020-01-13 PROCEDURE — D9220A PRA ANESTHESIA: Mod: CRNA,,, | Performed by: NURSE ANESTHETIST, CERTIFIED REGISTERED

## 2020-01-13 PROCEDURE — 63600175 PHARM REV CODE 636 W HCPCS: Performed by: NURSE ANESTHETIST, CERTIFIED REGISTERED

## 2020-01-13 PROCEDURE — 71000033 HC RECOVERY, INTIAL HOUR: Performed by: ORTHOPAEDIC SURGERY

## 2020-01-13 DEVICE — SCREW BNE N LOK HEXLB 3.5X12: Type: IMPLANTABLE DEVICE | Site: WRIST | Status: FUNCTIONAL

## 2020-01-13 DEVICE — SCREW BNE LOK HEXLB 3.5X10: Type: IMPLANTABLE DEVICE | Site: WRIST | Status: FUNCTIONAL

## 2020-01-13 DEVICE — SCREW BNE LOK HEXLB 3.5X12: Type: IMPLANTABLE DEVICE | Site: WRIST | Status: FUNCTIONAL

## 2020-01-13 DEVICE — PLATE BONE DST VOLAR RAD STND: Type: IMPLANTABLE DEVICE | Site: WRIST | Status: FUNCTIONAL

## 2020-01-13 DEVICE — SCREW BONE LOK CONG 2.3X20MM: Type: IMPLANTABLE DEVICE | Site: WRIST | Status: FUNCTIONAL

## 2020-01-13 DEVICE — SCREW BONE 2.3 X 18MM: Type: IMPLANTABLE DEVICE | Site: WRIST | Status: FUNCTIONAL

## 2020-01-13 RX ORDER — CEFAZOLIN SODIUM 2 G/50ML
2 SOLUTION INTRAVENOUS
Status: COMPLETED | OUTPATIENT
Start: 2020-01-13 | End: 2020-01-13

## 2020-01-13 RX ORDER — LIDOCAINE HCL/PF 100 MG/5ML
SYRINGE (ML) INTRAVENOUS
Status: DISCONTINUED | OUTPATIENT
Start: 2020-01-13 | End: 2020-01-13

## 2020-01-13 RX ORDER — PROPOFOL 10 MG/ML
VIAL (ML) INTRAVENOUS
Status: DISCONTINUED | OUTPATIENT
Start: 2020-01-13 | End: 2020-01-13

## 2020-01-13 RX ORDER — OXYCODONE HYDROCHLORIDE 5 MG/1
5 TABLET ORAL ONCE AS NEEDED
Status: DISCONTINUED | OUTPATIENT
Start: 2020-01-13 | End: 2020-01-13 | Stop reason: HOSPADM

## 2020-01-13 RX ORDER — ONDANSETRON 2 MG/ML
4 INJECTION INTRAMUSCULAR; INTRAVENOUS ONCE AS NEEDED
Status: DISCONTINUED | OUTPATIENT
Start: 2020-01-13 | End: 2020-01-13 | Stop reason: HOSPADM

## 2020-01-13 RX ORDER — LIDOCAINE HYDROCHLORIDE 10 MG/ML
1 INJECTION, SOLUTION EPIDURAL; INFILTRATION; INTRACAUDAL; PERINEURAL ONCE
Status: DISCONTINUED | OUTPATIENT
Start: 2020-01-13 | End: 2020-10-21

## 2020-01-13 RX ORDER — SODIUM CHLORIDE 0.9 % (FLUSH) 0.9 %
3 SYRINGE (ML) INJECTION EVERY 8 HOURS
Status: DISCONTINUED | OUTPATIENT
Start: 2020-01-13 | End: 2020-10-21

## 2020-01-13 RX ORDER — HYDROCODONE BITARTRATE AND ACETAMINOPHEN 5; 325 MG/1; MG/1
1 TABLET ORAL EVERY 6 HOURS PRN
Qty: 40 TABLET | Refills: 0 | Status: SHIPPED | OUTPATIENT
Start: 2020-01-13 | End: 2020-01-28

## 2020-01-13 RX ORDER — MIDAZOLAM HYDROCHLORIDE 1 MG/ML
INJECTION, SOLUTION INTRAMUSCULAR; INTRAVENOUS
Status: DISCONTINUED | OUTPATIENT
Start: 2020-01-13 | End: 2020-01-13

## 2020-01-13 RX ORDER — EPHEDRINE SULFATE 50 MG/ML
INJECTION, SOLUTION INTRAVENOUS
Status: DISCONTINUED | OUTPATIENT
Start: 2020-01-13 | End: 2020-01-13

## 2020-01-13 RX ORDER — SODIUM CHLORIDE, SODIUM LACTATE, POTASSIUM CHLORIDE, CALCIUM CHLORIDE 600; 310; 30; 20 MG/100ML; MG/100ML; MG/100ML; MG/100ML
10 INJECTION, SOLUTION INTRAVENOUS CONTINUOUS
Status: DISCONTINUED | OUTPATIENT
Start: 2020-01-13 | End: 2020-10-21

## 2020-01-13 RX ORDER — BUPIVACAINE HYDROCHLORIDE 5 MG/ML
INJECTION, SOLUTION PERINEURAL
Status: DISCONTINUED | OUTPATIENT
Start: 2020-01-13 | End: 2020-01-13

## 2020-01-13 RX ORDER — PHENYLEPHRINE HYDROCHLORIDE 10 MG/ML
INJECTION INTRAVENOUS
Status: DISCONTINUED | OUTPATIENT
Start: 2020-01-13 | End: 2020-01-13

## 2020-01-13 RX ORDER — GLYCOPYRROLATE 0.2 MG/ML
INJECTION INTRAMUSCULAR; INTRAVENOUS
Status: DISCONTINUED | OUTPATIENT
Start: 2020-01-13 | End: 2020-01-13

## 2020-01-13 RX ORDER — FENTANYL CITRATE 50 UG/ML
INJECTION, SOLUTION INTRAMUSCULAR; INTRAVENOUS
Status: DISCONTINUED | OUTPATIENT
Start: 2020-01-13 | End: 2020-01-13

## 2020-01-13 RX ORDER — FENTANYL CITRATE 50 UG/ML
25 INJECTION, SOLUTION INTRAMUSCULAR; INTRAVENOUS EVERY 5 MIN PRN
Status: DISCONTINUED | OUTPATIENT
Start: 2020-01-13 | End: 2020-01-13 | Stop reason: HOSPADM

## 2020-01-13 RX ORDER — MUPIROCIN 20 MG/G
OINTMENT TOPICAL
Status: DISCONTINUED | OUTPATIENT
Start: 2020-01-13 | End: 2020-01-13 | Stop reason: HOSPADM

## 2020-01-13 RX ORDER — ONDANSETRON 2 MG/ML
INJECTION INTRAMUSCULAR; INTRAVENOUS
Status: DISCONTINUED | OUTPATIENT
Start: 2020-01-13 | End: 2020-01-13

## 2020-01-13 RX ORDER — DEXAMETHASONE SODIUM PHOSPHATE 4 MG/ML
INJECTION, SOLUTION INTRA-ARTICULAR; INTRALESIONAL; INTRAMUSCULAR; INTRAVENOUS; SOFT TISSUE
Status: DISCONTINUED | OUTPATIENT
Start: 2020-01-13 | End: 2020-01-13

## 2020-01-13 RX ADMIN — CEFAZOLIN SODIUM 2 G: 2 SOLUTION INTRAVENOUS at 11:01

## 2020-01-13 RX ADMIN — BUPIVACAINE 10 ML: 13.3 INJECTION, SUSPENSION, LIPOSOMAL INFILTRATION at 10:01

## 2020-01-13 RX ADMIN — PHENYLEPHRINE HYDROCHLORIDE 100 MCG: 10 INJECTION INTRAVENOUS at 11:01

## 2020-01-13 RX ADMIN — GLYCOPYRROLATE 0.2 MG: 0.2 INJECTION, SOLUTION INTRAMUSCULAR; INTRAVENOUS at 10:01

## 2020-01-13 RX ADMIN — LIDOCAINE HYDROCHLORIDE 75 MG: 20 INJECTION, SOLUTION INTRAVENOUS at 10:01

## 2020-01-13 RX ADMIN — PROPOFOL 130 MG: 10 INJECTION, EMULSION INTRAVENOUS at 10:01

## 2020-01-13 RX ADMIN — DEXAMETHASONE SODIUM PHOSPHATE 4 MG: 4 INJECTION, SOLUTION INTRAMUSCULAR; INTRAVENOUS at 11:01

## 2020-01-13 RX ADMIN — MUPIROCIN: 20 OINTMENT TOPICAL at 10:01

## 2020-01-13 RX ADMIN — FENTANYL CITRATE 50 MCG: 50 INJECTION, SOLUTION INTRAMUSCULAR; INTRAVENOUS at 10:01

## 2020-01-13 RX ADMIN — MIDAZOLAM 1 MG: 1 INJECTION INTRAMUSCULAR; INTRAVENOUS at 10:01

## 2020-01-13 RX ADMIN — SODIUM CHLORIDE, SODIUM GLUCONATE, SODIUM ACETATE, POTASSIUM CHLORIDE, MAGNESIUM CHLORIDE, SODIUM PHOSPHATE, DIBASIC, AND POTASSIUM PHOSPHATE: .53; .5; .37; .037; .03; .012; .00082 INJECTION, SOLUTION INTRAVENOUS at 09:01

## 2020-01-13 RX ADMIN — EPHEDRINE SULFATE 15 MG: 50 INJECTION, SOLUTION INTRAMUSCULAR; INTRAVENOUS; SUBCUTANEOUS at 11:01

## 2020-01-13 RX ADMIN — EPHEDRINE SULFATE 10 MG: 50 INJECTION, SOLUTION INTRAMUSCULAR; INTRAVENOUS; SUBCUTANEOUS at 11:01

## 2020-01-13 RX ADMIN — SODIUM CHLORIDE, SODIUM GLUCONATE, SODIUM ACETATE, POTASSIUM CHLORIDE, MAGNESIUM CHLORIDE, SODIUM PHOSPHATE, DIBASIC, AND POTASSIUM PHOSPHATE: .53; .5; .37; .037; .03; .012; .00082 INJECTION, SOLUTION INTRAVENOUS at 11:01

## 2020-01-13 RX ADMIN — BUPIVACAINE HYDROCHLORIDE 10 ML: 5 INJECTION, SOLUTION PERINEURAL at 02:01

## 2020-01-13 RX ADMIN — ONDANSETRON 4 MG: 2 INJECTION, SOLUTION INTRAMUSCULAR; INTRAVENOUS at 11:01

## 2020-01-13 NOTE — OP NOTE
[facility]  Surgery Department  Operative Note    SUMMARY     Date of Procedure: 1/13/2020     Procedure:  Open reduction internal fixation of right distal radius.                        Carpal tunnel release.  Right    Surgeon(s) and Role:     * Mina Mahoney MD - Primary    Assisting Surgeon: Sebastien    Pre-Operative Diagnosis: Other closed intra-articular fracture of distal end of right radius, initial encounter [S52.571A]  Right wrist pain [M25.531]    Post-Operative Diagnosis: Post-Op Diagnosis Codes:     * Other closed intra-articular fracture of distal end of right radius, initial encounter [S52.571A]     * Right wrist pain [M25.531]    Anesthesia: General    Intraoperative Findings:  The patient was placed on the operating table.  Under general anesthesia at a reduction was undertaken.  Unfortunately the fracture could not be reduced.  Therefore we elected to proceed with open reduction internal fixation.  Given the fact this patient is on blood thinners and it was felt by her cardiologist after speaking to him that she should not come off the blood thinners for the procedure, I elected to proceed with a carpal tunnel release at the time of open reduction internal fixation to reduce the risk of damage to the median nerve from bleeding following surgery.  All this had been discussed extensively with the patient prior to the operation and consent for this had been obtained.    Description of the Findings of the Procedure:  The patient was placed on the operating table in supine position.  The right hand was prepped and draped in the usual sterile manner for surgery.  An incision was made over the flexor carpi radialis.  It was carried down sharply through the skin.  The FCR was identified.  It was taken radially.  The flexor pollicis longus was taken ulnarly.  The pronator was elevated meticulously off the radius.  We made extreme attempts to not cause trauma and bleeding.  Copiously  irrigated out the fracture.  The fracture was now anatomically aligned.  An Accu Med plate was placed across the fracture and secured with K-wires.  C-arm confirmed that the plate was in good position.  We now secured the plate to the radius with 4 distal locking screws.  It was now secured to the proximal radius with 2 locking in 1 compression screw.  We had an outstanding reduction.  The hardware was in perfect position.  We copiously irrigated.  We closed the subcu with 3 0 Vicryl and the skin with 4 0 Monocryl.    I now turned my attention the carpal tunnel release.  An incision was made in the palm.  It was carried down sharply through the skin.  The longitudinal fibers the palmaris fascia visualized and divided.  The transverse carpal ligament was easily visualized.  A small rent was made in the transverse carpal ligament and hemostat was inserted under the transverse carpal ligament.  We now used a knife and divided the transverse carpal ligament.  All cutting was done on the ulnar border of the nerve.  We copiously irrigated.  We closed the skin with 4 0 nylons.  Sterile dressings were applied and the patient was placed into a splint.    Complications: No    Estimated Blood Loss (EBL): * No values recorded between 1/13/2020 11:18 AM and 1/13/2020 11:45 AM *           Implants: * No implants in log *    Specimens:   Specimen (12h ago, onward)    None                  Condition: Good    Disposition: PACU - hemodynamically stable.              Discharge Note    SUMMARY     Admit Date: 1/13/2020    Discharge Date and Time:  01/13/2020 11:45 AM    Hospital Course (synopsis of major diagnoses, care, treatment, and services provided during the course of the hospital stay): Uneventful      Final Diagnosis: Post-Op Diagnosis Codes:     * Other closed intra-articular fracture of distal end of right radius, initial encounter [S52.571A]     * Right wrist pain [M25.531]    Disposition: Home or Self Care    Follow  Up/Patient Instructions:     Medications:  Reconciled Home Medications:   Current Discharge Medication List      CONTINUE these medications which have NOT CHANGED    Details   ascorbic acid/multivit-min (EMERGEN-C ORAL) Take 1 packet by mouth every morning.      aspirin (ECOTRIN) 81 MG EC tablet Take 1 tablet (81 mg total) by mouth once daily.  Qty: 90 tablet, Refills: 3    Associated Diagnoses: Bilateral carotid artery stenosis      atorvastatin (LIPITOR) 80 MG tablet Take 1 tablet (80 mg total) by mouth once daily.  Qty: 90 tablet, Refills: 3    Associated Diagnoses: Hypercholesterolemia      citalopram (CELEXA) 10 MG tablet Take 10 mg by mouth every morning.     Associated Diagnoses: Hypertension, benign      clopidogrel (PLAVIX) 75 mg tablet TAKE 1 TABLET BY MOUTH EVERY DAY  Qty: 90 tablet, Refills: 0    Associated Diagnoses: Bilateral carotid artery stenosis      multivitamin capsule Take 1 capsule by mouth once daily.      nut.tx.impaired digestive fxn (VITAL HIGH PROTEIN ORAL) Take 1 tablet by mouth every morning.      omeprazole (PRILOSEC) 40 MG capsule Take 40 mg by mouth every morning.      oxyCODONE-acetaminophen (PERCOCET) 7.5-325 mg per tablet Take 1 tablet by mouth every 6 (six) hours as needed for Pain.  Qty: 28 tablet, Refills: 0    Associated Diagnoses: Other closed intra-articular fracture of distal end of right radius, initial encounter; Right wrist pain           No discharge procedures on file.

## 2020-01-13 NOTE — ANESTHESIA POSTPROCEDURE EVALUATION
Anesthesia Post Evaluation    Patient: Albina Armenta    Procedure(s) Performed: Procedure(s) (LRB):  ORIF, FRACTURE, RADIUS (Right)  RELEASE, CARPAL TUNNEL (Right)    Final Anesthesia Type: general    Patient location during evaluation: PACU  Patient participation: Yes- Able to Participate  Level of consciousness: awake and alert  Post-procedure vital signs: reviewed and stable  Pain management: adequate  Airway patency: patent    PONV status at discharge: No PONV  Anesthetic complications: no      Cardiovascular status: hemodynamically stable  Respiratory status: unassisted and room air  Hydration status: euvolemic  Follow-up not needed.          Vitals Value Taken Time   /60 1/13/2020  1:28 PM   Temp 36.6 °C (97.9 °F) 1/13/2020  1:05 PM   Pulse 83 1/13/2020  1:28 PM   Resp 18 1/13/2020  1:28 PM   SpO2 98 % 1/13/2020  1:28 PM         Event Time     Out of Recovery 13:08:00          Pain/Jerry Score: Jerry Score: 10 (1/13/2020  1:29 PM)

## 2020-01-13 NOTE — INTERVAL H&P NOTE
The patient has been examined and the H&P has been reviewed:    I concur with the findings and no changes have occurred since H&P was written.    Anesthesia/Surgery risks, benefits and alternative options discussed and understood by patient/family.          Active Hospital Problems    Diagnosis  POA    Closed die punch fracture distal radius, right, initial encounter [T05.883T]  Yes      Resolved Hospital Problems   No resolved problems to display.

## 2020-01-13 NOTE — ANESTHESIA PROCEDURE NOTES
Peripheral Block    Patient location during procedure: pre-op   Block not for primary anesthetic.  Reason for block: at surgeon's request and post-op pain management   Post-op Pain Location: right wrist   Start time: 1/13/2020 10:00 AM  Timeout: 1/13/2020 10:00 AM   End time: 1/13/2020 10:10 AM    Staffing  Authorizing Provider: Bobby Glass MD  Performing Provider: Bobby Glass MD    Preanesthetic Checklist  Completed: patient identified, site marked, surgical consent, pre-op evaluation, timeout performed, IV checked, risks and benefits discussed and monitors and equipment checked  Peripheral Block  Patient position: supine  Prep: ChloraPrep  Patient monitoring: heart rate, cardiac monitor, continuous pulse ox, continuous capnometry and frequent blood pressure checks  Block type: supraclavicular  Laterality: right  Injection technique: single shot  Needle  Needle type: Stimuplex   Needle gauge: 22 G  Needle length: 2 in  Needle localization: anatomical landmarks and ultrasound guidance   -ultrasound image captured on disc.  Assessment  Injection assessment: negative aspiration, negative parasthesia and local visualized surrounding nerve  Paresthesia pain: none  Heart rate change: no  Slow fractionated injection: yes  Additional Notes  VSS.  [facility] RN monitoring vitals throughout procedure.  Patient tolerated procedure well.

## 2020-01-13 NOTE — DISCHARGE INSTRUCTIONS
"Discharge Instructions: After Your Surgery/Procedure  Youve just had surgery. During surgery you were given medicine called anesthesia to keep you relaxed and free of pain. After surgery you may have some pain or nausea. This is common. Here are some tips for feeling better and getting well after surgery.     Stay on schedule with your medication.   Going home  Your doctor or nurse will show you how to take care of yourself when you go home. He or she will also answer your questions. Have an adult family member or friend drive you home.      For your safety we recommend these precaution for the first 24 hours after your procedure:  · Do not drive or use heavy equipment.  · Do not make important decisions or sign legal papers.  · Do not drink alcohol.  · Have someone stay with you, if needed. He or she can watch for problems and help keep you safe.  · Your concentration, balance, coordination, and judgement may be impaired for many hours after anesthesia.  Use caution when ambulating or standing up.     · You may feel weak and "washed out" after anesthesia and surgery.      Subtle residual effects of general anesthesia or sedation with regional / local anesthesia can last more than 24 hours.  Rest for the remainder of the day or longer if your Doctor/Surgeon has advised you to do so.  Although you may feel normal within the first 24 hours, your reflexes and mental ability may be impaired without you realizing it.  You may feel dizzy, lightheaded or sleepy for 24 hours or longer.      Be sure to go to all follow-up visits with your doctor. And rest after your surgery for as long as your doctor tells you to.  Coping with pain  If you have pain after surgery, pain medicine will help you feel better. Take it as told, before pain becomes severe. Also, ask your doctor or pharmacist about other ways to control pain. This might be with heat, ice, or relaxation. And follow any other instructions your surgeon or nurse gives " you.  Tips for taking pain medicine  To get the best relief possible, remember these points:  · Pain medicines can upset your stomach. Taking them with a little food may help.  · Most pain relievers taken by mouth need at least 20 to 30 minutes to start to work.  · Taking medicine on a schedule can help you remember to take it. Try to time your medicine so that you can take it before starting an activity. This might be before you get dressed, go for a walk, or sit down for dinner.  · Constipation is a common side effect of pain medicines. Call your doctor before taking any medicines such as laxatives or stool softeners to help ease constipation. Also ask if you should skip any foods. Drinking lots of fluids and eating foods such as fruits and vegetables that are high in fiber can also help. Remember, do not take laxatives unless your surgeon has prescribed them.  · Drinking alcohol and taking pain medicine can cause dizziness and slow your breathing. It can even be deadly. Do not drink alcohol while taking pain medicine.  · Pain medicine can make you react more slowly to things. Do not drive or run machinery while taking pain medicine.  Your health care provider may tell you to take acetaminophen to help ease your pain. Ask him or her how much you are supposed to take each day. Acetaminophen or other pain relievers may interact with your prescription medicines or other over-the-counter (OTC) drugs. Some prescription medicines have acetaminophen and other ingredients. Using both prescription and OTC acetaminophen for pain can cause you to overdose. Read the labels on your OTC medicines with care. This will help you to clearly know the list of ingredients, how much to take, and any warnings. It may also help you not take too much acetaminophen. If you have questions or do not understand the information, ask your pharmacist or health care provider to explain it to you before you take the OTC medicine.  Managing  nausea  Some people have an upset stomach after surgery. This is often because of anesthesia, pain, or pain medicine, or the stress of surgery. These tips will help you handle nausea and eat healthy foods as you get better. If you were on a special food plan before surgery, ask your doctor if you should follow it while you get better. These tips may help:  · Do not push yourself to eat. Your body will tell you when to eat and how much.  · Start off with clear liquids and soup. They are easier to digest.  · Next try semi-solid foods, such as mashed potatoes, applesauce, and gelatin, as you feel ready.  · Slowly move to solid foods. Dont eat fatty, rich, or spicy foods at first.  · Do not force yourself to have 3 large meals a day. Instead eat smaller amounts more often.  · Take pain medicines with a small amount of solid food, such as crackers or toast, to avoid nausea.     Call your surgeon if  · You still have pain an hour after taking medicine. The medicine may not be strong enough.  · You feel too sleepy, dizzy, or groggy. The medicine may be too strong.  · You have side effects like nausea, vomiting, or skin changes, such as rash, itching, or hives.       If you have obstructive sleep apnea  You were given anesthesia medicine during surgery to keep you comfortable and free of pain. After surgery, you may have more apnea spells because of this medicine and other medicines you were given. The spells may last longer than usual.   At home:  · Keep using the continuous positive airway pressure (CPAP) device when you sleep. Unless your health care provider tells you not to, use it when you sleep, day or night. CPAP is a common device used to treat obstructive sleep apnea.  · Talk with your provider before taking any pain medicine, muscle relaxants, or sedatives. Your provider will tell you about the possible dangers of taking these medicines.  © 8298-7083 [employer]. [address] [place] [address]  [place] [address]. All rights reserved. This information is not intended as a substitute for professional medical care. Always follow your healthcare professional's instructions.      General Information:    1.  Do not drink alcoholic beverages including beer for 24 hours or as long as you are on pain medication..  2.  Do not drive a motor vehicle, operate machinery or power tools, or signs legal papers for 24 hours or as long as you are on pain medication.   3.  You may experience light-headedness, dizziness, and sleepiness following surgery. Please do not stay alone. A responsible adult should be with you for this 24 hour period.  4.  Go home and rest.    5. Progress slowly to a normal diet unless instructed.  Otherwise, begin with liquids such as soft drinks, then soup and crackers working up to solid foods. Drink plenty of nonalcoholic fluids.  6.  Certain anesthetics and pain medications produce nausea and vomiting in certain       individuals. If nausea becomes a problem at home, call you doctor.    7. A nurse will be calling you sometime after surgery. Do not be alarmed. This is our way of finding out how you are doing.    8. Several times every hour while you are awake, take 2-3 deep breaths and cough. If you had stomach surgery hold a pillow or rolled towel firmly against your stomach before you cough. This will help with any pain the cough might cause.  9. Several times every hour while you are awake, pump and flex your feet 5-6 times and do foot circles. This will help prevent blood clots.    10.Call your doctor for severe pain, bleeding, fever, or signs or symptoms of infection (pain, swelling, redness, foul odor, drainage).    Post op instructions for prevention of DVT  What is deep vein thrombosis?  Deep vein thrombosis (DVT) is the medical term for blood clots in the deep veins of the leg.  These blood clots can be dangerous.  A DVT can block a blood vessel and keep blood from getting where it needs to go.   Another problem is that the clot can travel to other parts of the body such as the lungs.  A clot that travels to the lungs is called a pulmonary embolus (PE) and can cause serious problems with breathing which can lead to death.  Am I at risk for DVT/PE?  If you are not very active, you are at risk of DVT.  Anyone confined to bed, sitting for long periods of time, recovering from surgery, etc. increases the risk of DVT.  Other risk factors are cancer diagnosis, certain medications, estrogen replacement in any form,older age, obesity, pregnancy, smoking, history of clotting disorders, and dehydration.  How will I know if I have a DVT?   Swelling in the lower leg   Pain   Warmth, redness, hardness or bulging of the vein  If you have any of these symptoms, call your doctors office right away.  Some people will not have any symptoms until the clot moves to the lungs.  What are the symptoms of a PE?   Panting, shortness of breath, or trouble breathing   Sharp, knife-like chest pain when you breathe   Coughing or coughing up blood   Rapid heartbeat  If you have any of these symptoms or get worse quickly, call 911 for emergency treatment.  How can I prevent a DVT?   Avoid long periods of inactivity and dont cross your legs--get up and walk around every hour or so.   Stay active--walking after surgery is highly encouraged.  This means you should get out of the house and walk in the neighborhood.  Going up and down stairs will not impair healing (unless advised against such activity by your doctor).     Drink plenty of noncaffeinated, nonalcoholic fluids each day to prevent dehydration.   Wear special support stockings, if they have been advised by your doctor.   If you travel, stop at least once an hour and walk around.   Avoid smoking (assistance with stopping is available through your healthcare provider)  Always notify your doctor if you are not able to follow the post operative instructions that are  given to you at the time of discharge.  It may be necessary to prescribe one of the medications available to prevent DVT.    We hope your stay was comfortable as you heal now, mend and rest.    For we have enjoyed taking care of you by giving your our best.    And as you get better, by regaining your health and strength;   We count it as a privilege to have served you and hope your time at [facility] was well spent.      Thank  You!!!

## 2020-01-13 NOTE — PLAN OF CARE
Pt transferred to phase II. VSS, denies pain, R arm elevated, dressing to R arm cdi, tolerated clear liquids without N/V. Report given to GERMÁN Mota.

## 2020-01-13 NOTE — ANESTHESIA PREPROCEDURE EVALUATION
01/13/2020  Albina Armenta is a 74 y.o., female.    Anesthesia Evaluation    I have reviewed the Patient Summary Reports.    I have reviewed the Nursing Notes.   I have reviewed the Medications.     Review of Systems  Anesthesia Hx:  No problems with previous Anesthesia    Social:  Former Smoker    Hematology/Oncology:  Hematology Normal   Oncology Normal     EENT/Dental:EENT/Dental Normal   Cardiovascular:   Hypertension CAD   PVD    Pulmonary:  Pulmonary Normal    Renal/:  Renal/ Normal     Hepatic/GI:  Hepatic/GI Normal    Musculoskeletal:   Right distal radius fracture    Neurological:   CVA        Physical Exam  General:  Obesity    Airway/Jaw/Neck:  Airway Findings: Mouth Opening: Normal Tongue: Normal  General Airway Assessment: Adult  Mallampati: II        Eyes/Ears/Nose:  EYES/EARS/NOSE FINDINGS: Normal   Dental:  Dental Findings: In tact   Chest/Lungs:  Chest/Lungs Findings: Clear to auscultation, Normal Respiratory Rate     Heart/Vascular:  Heart Findings: Rate: Normal  Rhythm: Regular Rhythm        Mental Status:  Mental Status Findings:  Cooperative, Alert and Oriented         Anesthesia Plan  Type of Anesthesia, risks & benefits discussed:  Anesthesia Type:  general  Patient's Preference:   Intra-op Monitoring Plan: standard ASA monitors  Intra-op Monitoring Plan Comments:   Post Op Pain Control Plan: IV/PO Opioids PRN, multimodal analgesia and peripheral nerve block  Post Op Pain Control Plan Comments:   Induction:   IV  Beta Blocker:  Patient is not currently on a Beta-Blocker (No further documentation required).       Informed Consent: Patient understands risks and agrees with Anesthesia plan.  Questions answered. Anesthesia consent signed with patient.  ASA Score: 3     Day of Surgery Review of History & Physical: I have interviewed and examined the patient. I have reviewed the  patient's H&P dated:    H&P update referred to the surgeon.         Ready For Surgery From Anesthesia Perspective.

## 2020-01-13 NOTE — PLAN OF CARE
"MD notified of RX given to pt stating "it does not help with pain", MD instructed to take two at a time and come see him in the office Thursday. Discharge instructions given to pt and family member, verbalized understanding. IV removed. Sling on. Discharge via wheelchair.   "

## 2020-01-13 NOTE — ANESTHESIA PROCEDURE NOTES
Intubation  Performed by: Esteban Galeana CRNA  Authorized by: Bobby Glass MD     Intubation:     Intubated:  Postinduction    Attempted By:  CRNA    Difficult Airway Encountered?: No      Complications:  None    Airway Device:  Supraglottic airway/LMA    Airway Device Size:  3.0    Placement Verified By:  Capnometry    Findings Post-Intubation:  BS equal bilateral

## 2020-01-13 NOTE — TRANSFER OF CARE
"Anesthesia Transfer of Care Note    Patient: Albina Armenta    Procedure(s) Performed: Procedure(s) (LRB):  ORIF, FRACTURE, RADIUS (Right)  RELEASE, CARPAL TUNNEL (Right)    Patient location: PACU    Anesthesia Type: general    Transport from OR: Transported from OR on 2-3 L/min O2 by NC with adequate spontaneous ventilation    Post pain: adequate analgesia    Post assessment: no apparent anesthetic complications    Post vital signs: stable    Level of consciousness: awake    Nausea/Vomiting: no nausea/vomiting    Complications: none    Transfer of care protocol was followed      Last vitals:   Visit Vitals  BP (!) 148/66 (BP Location: Left leg, Patient Position: Lying)   Pulse 69   Temp 36.8 °C (98.2 °F) (Temporal)   Resp 16   Ht 5' 5" (1.651 m)   Wt 83.5 kg (184 lb)   LMP  (LMP Unknown)   SpO2 99%   Breastfeeding? No   BMI 30.62 kg/m²     "

## 2020-01-14 VITALS
WEIGHT: 184 LBS | SYSTOLIC BLOOD PRESSURE: 129 MMHG | TEMPERATURE: 98 F | OXYGEN SATURATION: 98 % | BODY MASS INDEX: 30.66 KG/M2 | DIASTOLIC BLOOD PRESSURE: 60 MMHG | RESPIRATION RATE: 18 BRPM | HEART RATE: 83 BPM | HEIGHT: 65 IN

## 2020-01-15 NOTE — PHYSICIAN QUERY
PT Name: Albina Armenta  MR #: [id number]     Physician Query Form - Documentation Clarification      CDS/: Mildred Fonseca               Contact information: [email or web address]    This form is a permanent document in the medical record.     Query Date: January 15, 2020    By submitting this query, we are merely seeking further clarification of documentation. Please utilize your independent clinical judgment when addressing the question(s) below.    The Medical record reflects the following:    Supporting Clinical Findings Location in Medical Record     Other closed intra-articular fracture of distal end of right radius,       Op note                                                                                      Doctor, Please specify type of fracture associated with above clinical findings.    Provider Use Only       ____ 2 part     ___x_ 3 or more part     ____ unspecified                                                                                                                           [  ] Clinically Undetermined

## 2020-01-16 ENCOUNTER — OFFICE VISIT (OUTPATIENT)
Dept: ORTHOPEDICS | Facility: CLINIC | Age: 75
End: 2020-01-16
Payer: MEDICARE

## 2020-01-16 VITALS
HEART RATE: 90 BPM | SYSTOLIC BLOOD PRESSURE: 126 MMHG | BODY MASS INDEX: 30.66 KG/M2 | HEIGHT: 65 IN | WEIGHT: 184 LBS | DIASTOLIC BLOOD PRESSURE: 74 MMHG

## 2020-01-16 DIAGNOSIS — Z87.81 S/P ORIF (OPEN REDUCTION INTERNAL FIXATION) FRACTURE: Primary | ICD-10-CM

## 2020-01-16 DIAGNOSIS — Z98.890 S/P ORIF (OPEN REDUCTION INTERNAL FIXATION) FRACTURE: Primary | ICD-10-CM

## 2020-01-16 DIAGNOSIS — S52.571S: ICD-10-CM

## 2020-01-16 PROCEDURE — 99024 PR POST-OP FOLLOW-UP VISIT: ICD-10-PCS | Mod: S$GLB,,, | Performed by: ORTHOPAEDIC SURGERY

## 2020-01-16 PROCEDURE — 99024 POSTOP FOLLOW-UP VISIT: CPT | Mod: S$GLB,,, | Performed by: ORTHOPAEDIC SURGERY

## 2020-01-16 NOTE — PROGRESS NOTES
[facility] ORTHOPEDICS POST-OP NOTE    Subjective:           Chief Complaint:   Chief Complaint   Patient presents with    Right Wrist - Post-op Evaluation     ORIF right wrist 1.13.2020. States that she is doing good no pain today.        Past Medical History:   Diagnosis Date    Atherosclerosis of renal artery 8/10/2015    7/27/2015: Renal Duplex: Right: severe - 3.2 m/s. Left: about 60% stenosis - 1.7 m/s.    Carotid bruit 7/16/2015 2005: Carotid bruit heard.    Coronary artery disease     CVA (cerebral vascular accident) 8/13/2019    Hypercholesterolemia 7/16/2015 2005: Diagnosed. Started statin.    Hypertension, benign 7/16/2015 1995: Diagnosed.    Mild obesity 6/9/2017 6/9/2017: 86 kg. BMI 31.    Subclavian artery stenosis, left 8/10/2015    7/2015: Diagnosed. 7/23/2015: Carotid Duplex: LIZETH: moderate plaquing. LICA: moderate plaquing -1.4 m/s - 50-60%. Left vertebral: retrograde flow.       Past Surgical History:   Procedure Laterality Date    APPENDECTOMY Right 1959    CAROTID STENT      8/2019    CARPAL TUNNEL RELEASE Right 1/13/2020    Procedure: RELEASE, CARPAL TUNNEL;  Surgeon: Mina Mahoney MD;  Location: [facility] OR;  Service: Orthopedics;  Laterality: Right;    HYSTERECTOMY      OOPHORECTOMY      OPEN REDUCTION AND INTERNAL FIXATION (ORIF) OF FRACTURE OF RADIUS Right 1/13/2020    Procedure: ORIF, FRACTURE, RADIUS;  Surgeon: Mina Mahoney MD;  Location: [facility] OR;  Service: Orthopedics;  Laterality: Right;  Accumed  pt on aspirin and plavix. Dr. Mahoney aware. Note in chart from cardiology on 1/7/2020    VASCULAR SURGERY         Current Outpatient Medications   Medication Sig    ascorbic acid/multivit-min (EMERGEN-C ORAL) Take 1 packet by mouth every morning.    aspirin (ECOTRIN) 81 MG EC tablet Take 1 tablet (81 mg total) by mouth once daily.    atorvastatin (LIPITOR) 80 MG tablet Take 1 tablet (80 mg total) by mouth once daily. (Patient taking differently: Take 80 mg by mouth  every morning. )    citalopram (CELEXA) 10 MG tablet Take 10 mg by mouth every morning.     clopidogrel (PLAVIX) 75 mg tablet TAKE 1 TABLET BY MOUTH EVERY DAY    multivitamin capsule Take 1 capsule by mouth once daily.    nut.tx.impaired digestive fxn (VITAL HIGH PROTEIN ORAL) Take 1 tablet by mouth every morning.    omeprazole (PRILOSEC) 40 MG capsule Take 40 mg by mouth every morning.    HYDROcodone-acetaminophen (NORCO) 5-325 mg per tablet Take 1 tablet by mouth every 6 (six) hours as needed for Pain. (Patient not taking: Reported on 2020)     No current facility-administered medications for this visit.      Facility-Administered Medications Ordered in Other Visits   Medication    electrolyte-S (ISOLYTE)    lactated ringers infusion    lidocaine (PF) 10 mg/ml (1%) injection 10 mg    sodium chloride 0.9% flush 3 mL       Review of patient's allergies indicates:  No Known Allergies    Family History   Problem Relation Age of Onset    Hypertension Mother     Hypertension Father        Social History     Socioeconomic History    Marital status:      Spouse name: Not on file    Number of children: Not on file    Years of education: Not on file    Highest education level: Not on file   Occupational History    Not on file   Social Needs    Financial resource strain: Not on file    Food insecurity:     Worry: Not on file     Inability: Not on file    Transportation needs:     Medical: Not on file     Non-medical: Not on file   Tobacco Use    Smoking status: Former Smoker     Packs/day: 1.00     Years: 47.00     Pack years: 47.00     Types: Cigarettes     Start date: 1961     Last attempt to quit: 2008     Years since quittin.0    Smokeless tobacco: Never Used   Substance and Sexual Activity    Alcohol use: Not Currently     Alcohol/week: 0.0 standard drinks     Comment: Socially.    Drug use: Not Currently    Sexual activity: Not Currently   Lifestyle    Physical  activity:     Days per week: Not on file     Minutes per session: Not on file    Stress: Not on file   Relationships    Social connections:     Talks on phone: Not on file     Gets together: Not on file     Attends Confucianist service: Not on file     Active member of club or organization: Not on file     Attends meetings of clubs or organizations: Not on file     Relationship status: Not on file   Other Topics Concern    Not on file   Social History Narrative    Not on file       History of present illness:  Patient returns status post right distal radius fracture.  She is doing very well.      Review of Systems:    Musculoskeletal:  See HPI      Objective:        Physical Examination:    Vital Signs:    Vitals:    01/16/20 1340   BP: 126/74   Pulse: 90       Body mass index is 30.62 kg/m².    This a well-developed, well nourished patient in no acute distress.  They are alert and oriented and cooperative to examination.        Wounds are clean dry and intact. No evidence of hematoma  Pertinent New Results:    XRAY Report / Interpretation:       Assessment/Plan:      Stable following open reduction internal fixation right distal radius with concurrent carpal tunnel release.  She is doing very well.  Follow up in 2 weeks for suture removal    This note was created using Dragon voice recognition software that occasionally misinterpreted phrases or words.

## 2020-01-22 ENCOUNTER — TELEPHONE (OUTPATIENT)
Dept: ORTHOPEDICS | Facility: CLINIC | Age: 75
End: 2020-01-22

## 2020-01-22 ENCOUNTER — HOSPITAL ENCOUNTER (OUTPATIENT)
Dept: RADIOLOGY | Facility: HOSPITAL | Age: 75
Discharge: HOME | End: 2020-01-22
Attending: SPECIALIST
Payer: MEDICARE

## 2020-01-22 DIAGNOSIS — J98.4 DISEASE OF LUNG: ICD-10-CM

## 2020-01-22 PROCEDURE — 71250 CT THORAX DX C-: CPT | Mod: 26,,, | Performed by: RADIOLOGY

## 2020-01-22 PROCEDURE — 71250 CT CHEST WITHOUT CONTRAST: ICD-10-PCS | Mod: 26,,, | Performed by: RADIOLOGY

## 2020-01-22 PROCEDURE — 71250 CT THORAX DX C-: CPT | Mod: TC

## 2020-01-22 NOTE — TELEPHONE ENCOUNTER
Spoke with pt, states wrist incision is bleeding and cast is rubbing. Not bleeding through bandage but had to change it as she got it wet. She is very concerned. Made appointment so we can check it

## 2020-01-22 NOTE — TELEPHONE ENCOUNTER
----- Message from Amy Schuster sent at 1/22/2020 12:16 PM CST -----  Contact: patient [phone]  Please call patient she is having bleeding on her right wrist. Dr. Mahoney. Appt 1/28/20

## 2020-01-28 ENCOUNTER — OFFICE VISIT (OUTPATIENT)
Dept: ORTHOPEDICS | Facility: CLINIC | Age: 75
End: 2020-01-28
Payer: MEDICARE

## 2020-01-28 VITALS — WEIGHT: 180 LBS | SYSTOLIC BLOOD PRESSURE: 126 MMHG | DIASTOLIC BLOOD PRESSURE: 74 MMHG | BODY MASS INDEX: 29.95 KG/M2

## 2020-01-28 DIAGNOSIS — Z98.890 S/P CARPAL TUNNEL RELEASE: ICD-10-CM

## 2020-01-28 DIAGNOSIS — Z87.81 S/P ORIF (OPEN REDUCTION INTERNAL FIXATION) FRACTURE: Primary | ICD-10-CM

## 2020-01-28 DIAGNOSIS — Z98.890 S/P ORIF (OPEN REDUCTION INTERNAL FIXATION) FRACTURE: Primary | ICD-10-CM

## 2020-01-28 DIAGNOSIS — S52.571S: ICD-10-CM

## 2020-01-28 PROCEDURE — 99024 PR POST-OP FOLLOW-UP VISIT: ICD-10-PCS | Mod: S$GLB,,, | Performed by: ORTHOPAEDIC SURGERY

## 2020-01-28 PROCEDURE — 99024 POSTOP FOLLOW-UP VISIT: CPT | Mod: S$GLB,,, | Performed by: ORTHOPAEDIC SURGERY

## 2020-01-28 NOTE — PROGRESS NOTES
[facility] ORTHOPEDICS POST-OP NOTE    Subjective:           Chief Complaint:   Chief Complaint   Patient presents with    Right Wrist - Injury     suture removal; CAST ROOM PO ORIF RT dist rad/CTR 1/13/2020. She is having very little pain       Past Medical History:   Diagnosis Date    Atherosclerosis of renal artery 8/10/2015    7/27/2015: Renal Duplex: Right: severe - 3.2 m/s. Left: about 60% stenosis - 1.7 m/s.    Carotid bruit 7/16/2015 2005: Carotid bruit heard.    Carotid stent occlusion     Coronary artery disease     CVA (cerebral vascular accident) 8/13/2019    History of stent insertion of renal artery     Hypercholesterolemia 7/16/2015 2005: Diagnosed. Started statin.    Hypertension, benign 7/16/2015 1995: Diagnosed.    Mild obesity 6/9/2017 6/9/2017: 86 kg. BMI 31.    Subclavian artery stenosis, left 8/10/2015    7/2015: Diagnosed. 7/23/2015: Carotid Duplex: LIZETH: moderate plaquing. LICA: moderate plaquing -1.4 m/s - 50-60%. Left vertebral: retrograde flow.       Past Surgical History:   Procedure Laterality Date    APPENDECTOMY Right 1959    CAROTID STENT      8/2019    CARPAL TUNNEL RELEASE Right 1/13/2020    Procedure: RELEASE, CARPAL TUNNEL;  Surgeon: Mina Mahoney MD;  Location: [facility] OR;  Service: Orthopedics;  Laterality: Right;    HYSTERECTOMY      OOPHORECTOMY      OPEN REDUCTION AND INTERNAL FIXATION (ORIF) OF FRACTURE OF RADIUS Right 1/13/2020    Procedure: ORIF, FRACTURE, RADIUS;  Surgeon: Mina Mahoney MD;  Location: [facility] OR;  Service: Orthopedics;  Laterality: Right;  Accumed  pt on aspirin and plavix. Dr. Mahoney aware. Note in chart from cardiology on 1/7/2020    VASCULAR SURGERY         Current Outpatient Medications   Medication Sig    ascorbic acid/multivit-min (EMERGEN-C ORAL) Take 1 packet by mouth every morning.    aspirin (ECOTRIN) 81 MG EC tablet Take 1 tablet (81 mg total) by mouth once daily.    atorvastatin (LIPITOR) 80 MG tablet Take 1 tablet  (80 mg total) by mouth once daily. (Patient taking differently: Take 80 mg by mouth every morning. )    citalopram (CELEXA) 10 MG tablet Take 10 mg by mouth every morning.     clopidogrel (PLAVIX) 75 mg tablet TAKE 1 TABLET BY MOUTH EVERY DAY    multivitamin capsule Take 1 capsule by mouth once daily.    nut.tx.impaired digestive fxn (VITAL HIGH PROTEIN ORAL) Take 1 tablet by mouth every morning.    omeprazole (PRILOSEC) 40 MG capsule Take 40 mg by mouth every morning.     No current facility-administered medications for this visit.      Facility-Administered Medications Ordered in Other Visits   Medication    electrolyte-S (ISOLYTE)    lactated ringers infusion    lidocaine (PF) 10 mg/ml (1%) injection 10 mg    sodium chloride 0.9% flush 3 mL       Review of patient's allergies indicates:  No Known Allergies    Family History   Problem Relation Age of Onset    Hypertension Mother     Hypertension Father        Social History     Socioeconomic History    Marital status:      Spouse name: Not on file    Number of children: Not on file    Years of education: Not on file    Highest education level: Not on file   Occupational History    Not on file   Social Needs    Financial resource strain: Not on file    Food insecurity:     Worry: Not on file     Inability: Not on file    Transportation needs:     Medical: Not on file     Non-medical: Not on file   Tobacco Use    Smoking status: Former Smoker     Packs/day: 1.00     Years: 47.00     Pack years: 47.00     Types: Cigarettes     Start date: 1961     Last attempt to quit: 2008     Years since quittin.0    Smokeless tobacco: Never Used   Substance and Sexual Activity    Alcohol use: Not Currently     Alcohol/week: 0.0 standard drinks     Comment: Socially.    Drug use: Not Currently    Sexual activity: Not Currently   Lifestyle    Physical activity:     Days per week: Not on file     Minutes per session: Not on file     Stress: Not on file   Relationships    Social connections:     Talks on phone: Not on file     Gets together: Not on file     Attends [employer] service: Not on file     Active member of club or organization: Not on file     Attends meetings of clubs or organizations: Not on file     Relationship status: Not on file   Other Topics Concern    Not on file   Social History Narrative    Not on file       History of present illness:  Patient comes in today for the right wrist and hand.  She is generally doing very well.  She has very little discomfort.  She does complain of some numbness and tingling in the median digits.      Review of Systems:    Musculoskeletal:  See HPI      Objective:        Physical Examination:    Vital Signs:    Vitals:    01/28/20 0838   BP: 126/74       Body mass index is 29.95 kg/m².    This a well-developed, well nourished patient in no acute distress.  They are alert and oriented and cooperative to examination.        Patient is good opposition of the right thumb.  She has full flexion of the fingers.  She can fully extend her fingers.  Sensation is subjectively decreased in the median digits but certainly intact. Her incisions are well healed over the carpal canal and over the distal radius.  Pertinent New Results:    XRAY Report / Interpretation:   AP and lateral the right wrist demonstrate her fractured be in anatomic position.  Hardware is in anatomic position.    Assessment/Plan:      Distal radius fracture.  Doing very well.  I placed her into a removable wrist splint.  She will keep it on at all times other than bathing.  She will follow up in 4 weeks for new films.    This note was created using Dragon voice recognition software that occasionally misinterpreted phrases or words.

## 2020-02-20 ENCOUNTER — OFFICE VISIT (OUTPATIENT)
Dept: ORTHOPEDICS | Facility: CLINIC | Age: 75
End: 2020-02-20
Payer: MEDICARE

## 2020-02-20 VITALS
SYSTOLIC BLOOD PRESSURE: 131 MMHG | BODY MASS INDEX: 29.95 KG/M2 | HEART RATE: 88 BPM | WEIGHT: 180 LBS | DIASTOLIC BLOOD PRESSURE: 70 MMHG

## 2020-02-20 DIAGNOSIS — Z98.890 S/P ORIF (OPEN REDUCTION INTERNAL FIXATION) FRACTURE: Primary | ICD-10-CM

## 2020-02-20 DIAGNOSIS — Z87.81 S/P ORIF (OPEN REDUCTION INTERNAL FIXATION) FRACTURE: Primary | ICD-10-CM

## 2020-02-20 DIAGNOSIS — Z98.890 S/P CARPAL TUNNEL RELEASE: ICD-10-CM

## 2020-02-20 PROCEDURE — 99024 POSTOP FOLLOW-UP VISIT: CPT | Mod: S$GLB,,, | Performed by: ORTHOPAEDIC SURGERY

## 2020-02-20 PROCEDURE — 99024 PR POST-OP FOLLOW-UP VISIT: ICD-10-PCS | Mod: S$GLB,,, | Performed by: ORTHOPAEDIC SURGERY

## 2020-02-20 NOTE — PROGRESS NOTES
[facility] ORTHOPEDICS POST-OP NOTE    Subjective:           Chief Complaint:   Chief Complaint   Patient presents with    Right Wrist - Post-op Evaluation      PO ORIF RT Dist Rad (1/13/2020) follow up from 1/28/20.   She is doing well and no pain today       Past Medical History:   Diagnosis Date    Atherosclerosis of renal artery 8/10/2015    7/27/2015: Renal Duplex: Right: severe - 3.2 m/s. Left: about 60% stenosis - 1.7 m/s.    Carotid bruit 7/16/2015 2005: Carotid bruit heard.    Carotid stent occlusion     Coronary artery disease     CVA (cerebral vascular accident) 8/13/2019    History of stent insertion of renal artery     Hypercholesterolemia 7/16/2015 2005: Diagnosed. Started statin.    Hypertension, benign 7/16/2015 1995: Diagnosed.    Mild obesity 6/9/2017 6/9/2017: 86 kg. BMI 31.    Subclavian artery stenosis, left 8/10/2015    7/2015: Diagnosed. 7/23/2015: Carotid Duplex: LIZETH: moderate plaquing. LICA: moderate plaquing -1.4 m/s - 50-60%. Left vertebral: retrograde flow.       Past Surgical History:   Procedure Laterality Date    APPENDECTOMY Right 1959    CAROTID STENT      8/2019    CARPAL TUNNEL RELEASE Right 1/13/2020    Procedure: RELEASE, CARPAL TUNNEL;  Surgeon: Mina Mahoney MD;  Location: [facility] OR;  Service: Orthopedics;  Laterality: Right;    HYSTERECTOMY      OOPHORECTOMY      OPEN REDUCTION AND INTERNAL FIXATION (ORIF) OF FRACTURE OF RADIUS Right 1/13/2020    Procedure: ORIF, FRACTURE, RADIUS;  Surgeon: Mina Mahoney MD;  Location: [facility] OR;  Service: Orthopedics;  Laterality: Right;  Accumed  pt on aspirin and plavix. Dr. Mahoney aware. Note in chart from cardiology on 1/7/2020    VASCULAR SURGERY         Current Outpatient Medications   Medication Sig    ascorbic acid/multivit-min (EMERGEN-C ORAL) Take 1 packet by mouth every morning.    aspirin (ECOTRIN) 81 MG EC tablet Take 1 tablet (81 mg total) by mouth once daily.    atorvastatin (LIPITOR) 80 MG tablet  Take 1 tablet (80 mg total) by mouth once daily. (Patient taking differently: Take 80 mg by mouth every morning. )    citalopram (CELEXA) 10 MG tablet Take 10 mg by mouth every morning.     clopidogrel (PLAVIX) 75 mg tablet TAKE 1 TABLET BY MOUTH EVERY DAY    multivitamin capsule Take 1 capsule by mouth once daily.    nut.tx.impaired digestive fxn (VITAL HIGH PROTEIN ORAL) Take 1 tablet by mouth every morning.    omeprazole (PRILOSEC) 40 MG capsule Take 40 mg by mouth every morning.     No current facility-administered medications for this visit.      Facility-Administered Medications Ordered in Other Visits   Medication    electrolyte-S (ISOLYTE)    lactated ringers infusion    lidocaine (PF) 10 mg/ml (1%) injection 10 mg    sodium chloride 0.9% flush 3 mL       Review of patient's allergies indicates:  No Known Allergies    Family History   Problem Relation Age of Onset    Hypertension Mother     Hypertension Father        Social History     Socioeconomic History    Marital status:      Spouse name: Not on file    Number of children: Not on file    Years of education: Not on file    Highest education level: Not on file   Occupational History    Not on file   Social Needs    Financial resource strain: Not on file    Food insecurity:     Worry: Not on file     Inability: Not on file    Transportation needs:     Medical: Not on file     Non-medical: Not on file   Tobacco Use    Smoking status: Former Smoker     Packs/day: 1.00     Years: 47.00     Pack years: 47.00     Types: Cigarettes     Start date: 1961     Last attempt to quit: 2008     Years since quittin.1    Smokeless tobacco: Never Used   Substance and Sexual Activity    Alcohol use: Not Currently     Alcohol/week: 0.0 standard drinks     Comment: Socially.    Drug use: Not Currently    Sexual activity: Not Currently   Lifestyle    Physical activity:     Days per week: Not on file     Minutes per session: Not on  file    Stress: Not on file   Relationships    Social connections:     Talks on phone: Not on file     Gets together: Not on file     Attends [employer] service: Not on file     Active member of club or organization: Not on file     Attends meetings of clubs or organizations: Not on file     Relationship status: Not on file   Other Topics Concern    Not on file   Social History Narrative    Not on file       History of present illness:  Patient comes in today for the right wrist.  She is doing very well.  Pain is well controlled.  She does complain of some numbness in the median digits.  She is 5 weeks out.      Review of Systems:    Musculoskeletal:  See HPI      Objective:        Physical Examination:    Vital Signs:    Vitals:    02/20/20 0845   BP: 131/70   Pulse: 88       Body mass index is 29.95 kg/m².    This a well-developed, well nourished patient in no acute distress.  They are alert and oriented and cooperative to examination.        Patient has full range of motion of the fingers and thumb.  She has 10° of dorsiflexion 20° of volar flexion sensation is grossly preserved .  She does have subjective decreased sensation in the median digits.  Pertinent New Results:    XRAY Report / Interpretation:   AP and lateral the right wrist demonstrates a hardware to be in ideal position.    Assessment/Plan:      Distal radius fracture.  Status post open reduction internal fixation.  She has already had a carpal tunnel release at the time of surgery and so is really nothing to do about the decreased sensation in the median digits.  She did have decreased sensation in the median digits prior to the operation.  She will follow up in 2 months.  Start occupational therapy for range of motion    This note was created using Dragon voice recognition software that occasionally misinterpreted phrases or words.

## 2020-02-21 ENCOUNTER — TELEPHONE (OUTPATIENT)
Dept: ORTHOPEDICS | Facility: CLINIC | Age: 75
End: 2020-02-21

## 2020-02-21 NOTE — TELEPHONE ENCOUNTER
----- Message from Lizy Mosley sent at 2/21/2020  9:00 AM CST -----  Contact: Donovan w/[facility]  Donovan stated that he needs the prescription for PT with [facility] for hand therapy. Call back # [phone] Fax [phone].

## 2020-03-16 ENCOUNTER — TELEPHONE (OUTPATIENT)
Dept: CARDIOLOGY | Facility: CLINIC | Age: 75
End: 2020-03-16

## 2020-03-16 NOTE — TELEPHONE ENCOUNTER
Patient called:  Consistently dizzy with standing, usually goes away.  She has an appointment on Friday that she doesn't want to keep.  If you think she needs an ultrasound, she would like to do it on the [facility].    Please advise.

## 2020-03-18 DIAGNOSIS — I15.0 RENOVASCULAR HYPERTENSION: Primary | ICD-10-CM

## 2020-03-18 RX ORDER — RAMIPRIL 5 MG/1
5 CAPSULE ORAL DAILY
Qty: 90 CAPSULE | Refills: 3 | Status: ON HOLD | OUTPATIENT
Start: 2020-03-18 | End: 2020-08-11

## 2020-04-13 DIAGNOSIS — I65.23 BILATERAL CAROTID ARTERY STENOSIS: ICD-10-CM

## 2020-04-13 RX ORDER — CLOPIDOGREL BISULFATE 75 MG/1
TABLET ORAL
Qty: 90 TABLET | Refills: 0 | Status: SHIPPED | OUTPATIENT
Start: 2020-04-13 | End: 2020-07-21 | Stop reason: SDUPTHER

## 2020-04-30 DIAGNOSIS — M79.602 LEFT ARM PAIN: ICD-10-CM

## 2020-04-30 DIAGNOSIS — R55 SYNCOPE AND COLLAPSE: ICD-10-CM

## 2020-04-30 DIAGNOSIS — R42 DIZZINESS AND GIDDINESS: Primary | ICD-10-CM

## 2020-04-30 DIAGNOSIS — M79.601 RIGHT UPPER LIMB PAIN: ICD-10-CM

## 2020-05-08 DIAGNOSIS — M79.602 LEFT ARM PAIN: ICD-10-CM

## 2020-05-08 DIAGNOSIS — M79.601 RIGHT UPPER LIMB PAIN: ICD-10-CM

## 2020-05-08 DIAGNOSIS — R55 SYNCOPE AND COLLAPSE: ICD-10-CM

## 2020-05-08 DIAGNOSIS — R42 DIZZINESS AND GIDDINESS: Primary | ICD-10-CM

## 2020-05-18 ENCOUNTER — HOSPITAL ENCOUNTER (OUTPATIENT)
Dept: RADIOLOGY | Facility: HOSPITAL | Age: 75
Discharge: HOME | End: 2020-05-18
Attending: INTERNAL MEDICINE
Payer: MEDICARE

## 2020-05-18 DIAGNOSIS — R42 DIZZINESS AND GIDDINESS: ICD-10-CM

## 2020-05-18 DIAGNOSIS — R55 SYNCOPE AND COLLAPSE: ICD-10-CM

## 2020-05-18 DIAGNOSIS — M79.602 LEFT ARM PAIN: ICD-10-CM

## 2020-05-18 DIAGNOSIS — M79.601 RIGHT UPPER LIMB PAIN: ICD-10-CM

## 2020-05-18 PROCEDURE — 25500020 PHARM REV CODE 255: Mod: PO | Performed by: INTERNAL MEDICINE

## 2020-05-18 PROCEDURE — 70544 MR ANGIOGRAPHY HEAD W/O DYE: CPT | Mod: TC,PO

## 2020-05-18 PROCEDURE — 93930 UPPER EXTREMITY STUDY: CPT | Mod: TC,PO

## 2020-05-18 PROCEDURE — 70549 MR ANGIOGRAPH NECK W/O&W/DYE: CPT | Mod: TC,PO

## 2020-05-18 PROCEDURE — A9585 GADOBUTROL INJECTION: HCPCS | Mod: PO | Performed by: INTERNAL MEDICINE

## 2020-05-18 RX ORDER — GADOBUTROL 604.72 MG/ML
8.5 INJECTION INTRAVENOUS
Status: COMPLETED | OUTPATIENT
Start: 2020-05-18 | End: 2020-05-18

## 2020-05-18 RX ADMIN — GADOBUTROL 8.5 ML: 604.72 INJECTION INTRAVENOUS at 04:05

## 2020-05-21 LAB
CREAT SERPL-MCNC: 0.9 MG/DL (ref 0.5–1.4)
SAMPLE: NORMAL

## 2020-05-22 DIAGNOSIS — R42 DIZZINESS AND GIDDINESS: ICD-10-CM

## 2020-05-22 DIAGNOSIS — I73.9 PERIPHERAL VASCULAR DISEASE, UNSPECIFIED: Primary | ICD-10-CM

## 2020-05-22 DIAGNOSIS — I63.9 IMPENDING CEREBROVASCULAR ACCIDENT: ICD-10-CM

## 2020-05-29 ENCOUNTER — HOSPITAL ENCOUNTER (OUTPATIENT)
Dept: RADIOLOGY | Facility: HOSPITAL | Age: 75
Discharge: HOME | End: 2020-05-29
Attending: INTERNAL MEDICINE
Payer: MEDICARE

## 2020-05-29 DIAGNOSIS — I63.9 IMPENDING CEREBROVASCULAR ACCIDENT: ICD-10-CM

## 2020-05-29 DIAGNOSIS — I73.9 PERIPHERAL VASCULAR DISEASE, UNSPECIFIED: ICD-10-CM

## 2020-05-29 DIAGNOSIS — R42 DIZZINESS AND GIDDINESS: ICD-10-CM

## 2020-05-29 PROCEDURE — 70496 CT ANGIOGRAPHY HEAD: CPT | Mod: TC,PO

## 2020-05-29 PROCEDURE — 25500020 PHARM REV CODE 255: Mod: PO | Performed by: INTERNAL MEDICINE

## 2020-05-29 PROCEDURE — 70498 CT ANGIOGRAPHY NECK: CPT | Mod: TC,PO

## 2020-05-29 RX ADMIN — IOHEXOL 100 ML: 350 INJECTION, SOLUTION INTRAVENOUS at 02:05

## 2020-06-11 ENCOUNTER — APPOINTMENT (OUTPATIENT)
Dept: LAB | Facility: HOSPITAL | Age: 75
End: 2020-06-11
Attending: NURSE PRACTITIONER
Payer: MEDICARE

## 2020-06-11 ENCOUNTER — OFFICE VISIT (OUTPATIENT)
Dept: PRIMARY CARE CLINIC | Facility: CLINIC | Age: 75
End: 2020-06-11
Payer: MEDICARE

## 2020-06-11 VITALS
TEMPERATURE: 98 F | RESPIRATION RATE: 19 BRPM | SYSTOLIC BLOOD PRESSURE: 129 MMHG | OXYGEN SATURATION: 98 % | DIASTOLIC BLOOD PRESSURE: 75 MMHG | HEART RATE: 87 BPM

## 2020-06-11 DIAGNOSIS — J02.9 SORE THROAT: ICD-10-CM

## 2020-06-11 DIAGNOSIS — Z20.822 SUSPECTED COVID-19 VIRUS INFECTION: Primary | ICD-10-CM

## 2020-06-11 LAB — GROUP A STREP, MOLECULAR: NEGATIVE

## 2020-06-11 PROCEDURE — 99203 OFFICE O/P NEW LOW 30 MIN: CPT | Mod: S$GLB,,, | Performed by: EMERGENCY MEDICINE

## 2020-06-11 PROCEDURE — 3074F SYST BP LT 130 MM HG: CPT | Mod: CPTII,S$GLB,, | Performed by: EMERGENCY MEDICINE

## 2020-06-11 PROCEDURE — 99203 PR OFFICE/OUTPT VISIT, NEW, LEVL III, 30-44 MIN: ICD-10-PCS | Mod: S$GLB,,, | Performed by: EMERGENCY MEDICINE

## 2020-06-11 PROCEDURE — 3078F DIAST BP <80 MM HG: CPT | Mod: CPTII,S$GLB,, | Performed by: EMERGENCY MEDICINE

## 2020-06-11 PROCEDURE — 1159F MED LIST DOCD IN RCRD: CPT | Mod: S$GLB,,, | Performed by: EMERGENCY MEDICINE

## 2020-06-11 PROCEDURE — 3074F PR MOST RECENT SYSTOLIC BLOOD PRESSURE < 130 MM HG: ICD-10-PCS | Mod: CPTII,S$GLB,, | Performed by: EMERGENCY MEDICINE

## 2020-06-11 PROCEDURE — 1101F PR PT FALLS ASSESS DOC 0-1 FALLS W/OUT INJ PAST YR: ICD-10-PCS | Mod: CPTII,S$GLB,, | Performed by: EMERGENCY MEDICINE

## 2020-06-11 PROCEDURE — U0003 INFECTIOUS AGENT DETECTION BY NUCLEIC ACID (DNA OR RNA); SEVERE ACUTE RESPIRATORY SYNDROME CORONAVIRUS 2 (SARS-COV-2) (CORONAVIRUS DISEASE [COVID-19]), AMPLIFIED PROBE TECHNIQUE, MAKING USE OF HIGH THROUGHPUT TECHNOLOGIES AS DESCRIBED BY CMS-2020-01-R: HCPCS

## 2020-06-11 PROCEDURE — 1159F PR MEDICATION LIST DOCUMENTED IN MEDICAL RECORD: ICD-10-PCS | Mod: S$GLB,,, | Performed by: EMERGENCY MEDICINE

## 2020-06-11 PROCEDURE — 3078F PR MOST RECENT DIASTOLIC BLOOD PRESSURE < 80 MM HG: ICD-10-PCS | Mod: CPTII,S$GLB,, | Performed by: EMERGENCY MEDICINE

## 2020-06-11 PROCEDURE — 87651 STREP A DNA AMP PROBE: CPT

## 2020-06-11 PROCEDURE — 1101F PT FALLS ASSESS-DOCD LE1/YR: CPT | Mod: CPTII,S$GLB,, | Performed by: EMERGENCY MEDICINE

## 2020-06-11 NOTE — PROGRESS NOTES
"Subjective:        Time seen by provider: 2:20 PM on 06/11/2020    Albina Armenta is a 75 y.o. female with PMHx of HTN who presents for an evaluation of possible COVID-19. The patient c/o intermittent sore throat x 10 days that was initially more prominent on the left side but is now affecting bilateral throat area. She describes sore throat as "mild soreness." She is tolerating food and fluids without difficulty. The patient denies any other symptoms at this time. She reports she is required to provide proof of negative COVID-19 screening in order to schedule appointment with ENT. No pertinent PMHx or PSHx. Patient is a former smoker.    Review of Systems   Constitutional: Negative for activity change, appetite change, fatigue and fever.   HENT: Positive for sore throat. Negative for congestion, rhinorrhea and trouble swallowing.    Respiratory: Negative for cough, chest tightness, shortness of breath and wheezing.    Cardiovascular: Negative for chest pain and palpitations.   Gastrointestinal: Negative for diarrhea, nausea and vomiting.   Musculoskeletal: Negative for arthralgias and myalgias.   Skin: Negative for rash.   Neurological: Negative for weakness, light-headedness, numbness and headaches.       Objective:      Physical Exam   Constitutional: She is oriented to person, place, and time. She appears well-developed and well-nourished. No distress.   HENT:   Head: Normocephalic and atraumatic.   Nose: Nose normal.   Mouth/Throat: Uvula is midline, oropharynx is clear and moist and mucous membranes are normal. No oropharyngeal exudate, posterior oropharyngeal edema or posterior oropharyngeal erythema. Tonsils are 1+ on the right. Tonsils are 1+ on the left.   No drooling.   Eyes: Conjunctivae are normal.   Neck: Normal range of motion and phonation normal.   Cardiovascular: Normal rate, regular rhythm, normal heart sounds and intact distal pulses.   No murmur heard.  Pulmonary/Chest: Breath sounds " normal. No respiratory distress. She has no wheezes.   Musculoskeletal: Normal range of motion.   Lymphadenopathy:     She has no cervical adenopathy.   Neurological: She is alert and oriented to person, place, and time.   Skin: Skin is warm and dry. She is not diaphoretic.   Nursing note and vitals reviewed.      Assessment:       1. Suspected COVID - 19 Virus infection  2. Viral pharyngitis   Plan:       1. Suspected Covid-19 Virus Infection      2. Discharge home and await results.   3. Return to clinic or ED for new or worsening symptoms.   4. Follow-up with PCP as needed.     2. Viral pharyngitis    The patient's symptoms are consistent with viral pharyngitis. Rapid strep test is negative; I do not think strep pharyngitis.  The patient doesn't appear to have any stridor, drooling, hoarseness, uvular deviation, facial swelling, meningismus to suggest peritonsillar abscess, retropharyngeal abscess, epiglotitis, meningitis, airway compromise.  Will treat with supportive care.    Scribe Attestation:   I, Modesta Rosenbaum, am scribing for, and in the presence of, LIZBETH Grant. I performed the above scribed service and the documentation accurately describes the services I performed. I attest to the accuracy of the note.       I, LIZBETH Grant, personally performed the services described in this documentation. All medical record entries made by the scribe were at my direction and in my presence.  I have reviewed the chart and agree that the record reflects my personal performance and is accurate and complete. LIZBETH Grant.  3:50 PM 06/11/2020

## 2020-06-11 NOTE — PATIENT INSTRUCTIONS
Instructions for Patients with Confirmed or Suspected COVID-19    If you are awaiting your test result, you will either be called or it will be released to the patient portal.  If you have any questions about your test, please visit [email or web address] or call our COVID-19 information line at [phone].      Preventing the Spread of Coronavirus Disease 2019 (COVID-19) in Homes and Residential Communities -- Patients     Prevention steps for people with confirmed or suspected COVID-19 (including persons under investigation) who do not need to be hospitalized and people with confirmed COVID-19 who were hospitalized and determined to be medically stable to go home.    Stay home except to get medical care.    Separate yourself from other people and animals in your home.    Call ahead before visiting your doctor.    Wear a face mask.    Cover your coughs and sneezes.    Clean your hands often.    Avoid sharing personal household items.    Clean all high-touch surfaces every day.    Monitor your symptoms. Seek prompt medical attention if your illness is worsening (e.g., difficulty breathing). Before seeking care, call your healthcare provider.    If you have a medical emergency and must call 911, notify the dispatcher that you have or are being evaluated for COVID-19. If possible, put on a face mask before emergency medical services arrive.    Use the following symptom-based strategy to return to normal activity following a suspected or confirmed case of COVID-19. Continue isolation until:   o At least 3 days (72 hours) have passed since recovery defined as resolution of fever without the use of fever-reducing medications and improvement in respiratory symptoms (e.g. cough, shortness of breath), and   o At least 10 days have passed since symptoms first appeared.     Precautions for household members, intimate partners and caregivers in a non-healthcare setting of a patient with symptomatic  laboratory-confirmed COVID-19 or a patient under investigation.     Household members, intimate partners and caregivers in a non-healthcare setting may have close contact with a person with symptomatic, laboratory-confirmed COVID-19 or a person under investigation. Close contacts should monitor their health; they should call their healthcare provider right away if they develop symptoms suggestive of COVID-19 (e.g., fever, cough, shortness of breath). Close contacts should also follow these recommendations:      Make sure that you understand and can help the patient follow their healthcare providers instructions for medication(s) and care. You should help the patient with basic needs in the home and provide support for getting groceries, prescriptions, and other personal needs.    Monitor the patients symptoms. If the patient is getting sicker, call his or her healthcare provider and tell them that the patient has laboratory-confirmed COVID-19. This will help the healthcare providers office take steps to keep people in the office or waiting room from getting infected. Ask the healthcare provider to call the local or [facility] health department for additional guidance. If the patient has a medical emergency and you need to call 911, notify the dispatch personnel that the patient has or is being evaluated for COVID-19.    Household members should stay in another room or be  from the patient as much as possible. Household members should use a separate bedroom and bathroom, if available.    Prohibit visitors who do not have an essential need to be in the home.    Household members should care for any pets. Do not handle pets or other animals while sick.    Make sure that shared spaces in the home have good air flow, such as by an air conditioner.    Perform hand hygiene frequently. Wash your hands often with soap and water for at least 20 seconds or use an alcohol-based hand  that contains 60 to  95% alcohol, covering all surfaces of your hands and rubbing them together until they feel dry. Soap and water are preferred if hands are visibly dirty.    Avoid touching your eyes, nose and mouth with unwashed hands.    The patient should wear a face mask when you are around other people. If the patient is not able to wear a face mask (for example, because it causes trouble breathing), you, as the caregiver, should wear a mask when you are in the same room as the patient.    Wear a disposable face mask and gloves when you touch or have contact with the patients blood, stool or body fluids, such as saliva, sputum, nasal mucus, vomit and urine.   o Throw out disposable face masks and gloves after using them. Do not reuse.   o When removing personal protective equipment, first remove and dispose of gloves. Then, immediately clean your hands with soap and water or alcohol-based hand . Next, remove and dispose of face mask, and immediately clean your hands again with soap and water or alcohol-based hand .    Avoid sharing household items with the patient. You should not share dishes, drinking glasses, cups, eating utensils, towels, bedding or other items. After the patient uses these items, you should wash them thoroughly (see below Wash laundry thoroughly).    Clean all high-touch surfaces, such as counters, tabletops, doorknobs, bathroom fixtures, toilets, phones, keyboards, tablets and bedside tables, every day. Also, clean any surfaces that may have blood, stool or body fluids on them.   o Use a household cleaning spray or wipe, according to the label instructions. Labels contain instructions for safe and effective use of the cleaning product including precautions you should take when applying the product, such as wearing gloves and making sure you have good ventilation during use of the product.    Wash laundry thoroughly.   o Immediately remove and wash clothes or bedding that have  blood, stool or body fluids on them.  o Wear disposable gloves while handling soiled items and keep soiled items away from your body. Clean your hands (with soap and water or an alcohol-based hand ) immediately after removing your gloves.   o Read and follow directions on labels of laundry or clothing items and detergent. In general, using a normal laundry detergent according to washing machine instructions and dry thoroughly using the warmest temperatures recommended on the clothing label.    Place all used disposable gloves, face masks and other contaminated items in a lined container before disposing of them with other household waste. Clean your hands (with soap and water or an alcohol-based hand ) immediately after handling these items. Soap and water should be used preferentially if hands are visibly dirty.   Discuss any additional questions with your state or local health department

## 2020-06-12 LAB — SARS-COV-2 RNA RESP QL NAA+PROBE: NOT DETECTED

## 2020-06-15 ENCOUNTER — TELEPHONE (OUTPATIENT)
Dept: NEUROSURGERY | Facility: CLINIC | Age: 75
End: 2020-06-15

## 2020-06-15 NOTE — TELEPHONE ENCOUNTER
I called pt to schedule appt and advise of imaging.  She stated she is going to [facility] to get imaging done today.

## 2020-06-16 DIAGNOSIS — I77.1 SUBCLAVIAN ARTERY STENOSIS, LEFT: Primary | ICD-10-CM

## 2020-06-17 ENCOUNTER — OFFICE VISIT (OUTPATIENT)
Dept: NEUROSURGERY | Facility: CLINIC | Age: 75
End: 2020-06-17
Payer: MEDICARE

## 2020-06-17 VITALS
SYSTOLIC BLOOD PRESSURE: 150 MMHG | BODY MASS INDEX: 31.2 KG/M2 | DIASTOLIC BLOOD PRESSURE: 80 MMHG | HEIGHT: 65 IN | WEIGHT: 187.25 LBS | HEART RATE: 93 BPM

## 2020-06-17 DIAGNOSIS — I65.29 ASYMPTOMATIC CAROTID ARTERY STENOSIS, UNSPECIFIED LATERALITY: Primary | ICD-10-CM

## 2020-06-17 DIAGNOSIS — I70.8 ATHEROSCLEROTIC STENOSIS OF BRACHIOCEPHALIC ARTERY: ICD-10-CM

## 2020-06-17 DIAGNOSIS — I77.1 SUBCLAVIAN ARTERIAL STENOSIS: ICD-10-CM

## 2020-06-17 DIAGNOSIS — I77.1 SUBCLAVIAN ARTERIAL STENOSIS: Primary | ICD-10-CM

## 2020-06-17 PROCEDURE — 3077F PR MOST RECENT SYSTOLIC BLOOD PRESSURE >= 140 MM HG: ICD-10-PCS | Mod: CPTII,S$GLB,, | Performed by: NEUROLOGICAL SURGERY

## 2020-06-17 PROCEDURE — 1159F MED LIST DOCD IN RCRD: CPT | Mod: S$GLB,,, | Performed by: NEUROLOGICAL SURGERY

## 2020-06-17 PROCEDURE — 99204 OFFICE O/P NEW MOD 45 MIN: CPT | Mod: S$GLB,,, | Performed by: NEUROLOGICAL SURGERY

## 2020-06-17 PROCEDURE — 99999 PR PBB SHADOW E&M-EST. PATIENT-LVL III: CPT | Mod: PBBFAC,,, | Performed by: NEUROLOGICAL SURGERY

## 2020-06-17 PROCEDURE — 1159F PR MEDICATION LIST DOCUMENTED IN MEDICAL RECORD: ICD-10-PCS | Mod: S$GLB,,, | Performed by: NEUROLOGICAL SURGERY

## 2020-06-17 PROCEDURE — 1101F PR PT FALLS ASSESS DOC 0-1 FALLS W/OUT INJ PAST YR: ICD-10-PCS | Mod: CPTII,S$GLB,, | Performed by: NEUROLOGICAL SURGERY

## 2020-06-17 PROCEDURE — 99999 PR PBB SHADOW E&M-EST. PATIENT-LVL III: ICD-10-PCS | Mod: PBBFAC,,, | Performed by: NEUROLOGICAL SURGERY

## 2020-06-17 PROCEDURE — 1126F AMNT PAIN NOTED NONE PRSNT: CPT | Mod: S$GLB,,, | Performed by: NEUROLOGICAL SURGERY

## 2020-06-17 PROCEDURE — 3079F DIAST BP 80-89 MM HG: CPT | Mod: CPTII,S$GLB,, | Performed by: NEUROLOGICAL SURGERY

## 2020-06-17 PROCEDURE — 1126F PR PAIN SEVERITY QUANTIFIED, NO PAIN PRESENT: ICD-10-PCS | Mod: S$GLB,,, | Performed by: NEUROLOGICAL SURGERY

## 2020-06-17 PROCEDURE — 3077F SYST BP >= 140 MM HG: CPT | Mod: CPTII,S$GLB,, | Performed by: NEUROLOGICAL SURGERY

## 2020-06-17 PROCEDURE — 1101F PT FALLS ASSESS-DOCD LE1/YR: CPT | Mod: CPTII,S$GLB,, | Performed by: NEUROLOGICAL SURGERY

## 2020-06-17 PROCEDURE — 99204 PR OFFICE/OUTPT VISIT, NEW, LEVL IV, 45-59 MIN: ICD-10-PCS | Mod: S$GLB,,, | Performed by: NEUROLOGICAL SURGERY

## 2020-06-17 PROCEDURE — 3079F PR MOST RECENT DIASTOLIC BLOOD PRESSURE 80-89 MM HG: ICD-10-PCS | Mod: CPTII,S$GLB,, | Performed by: NEUROLOGICAL SURGERY

## 2020-06-17 NOTE — PROGRESS NOTES
Neurosurgery History & Physical    Patient ID: Albina Armenta is a 75 y.o. female.    Chief Complaint   Patient presents with    Neck Pain     no pain in neck, however arms go dead easily like when washing and drying hair,vascular blockages, had carotid endardectomy in 2019, no pain today       HPI:  Ms. Armenta is a 75 year old female with history of HLD, HTN, prior smoker - quit in 2007, and renal artery stenosis who was treated for asymptomatic left carotid stenosis in July 2019 with a CEA by Dr. Soto.  This was apparently complicated by a high neck dissection.  The procedure went long and a partner was called in.  She had significant hypotension following the procedure as well as swallowing difficulty and some tongue deviation.  The swallowing and tongue deviation have improved.  She was admitted to [facility] in 8/2019 with right sided body symptoms and hypotension and MRI showed multiple strokes in the left hemisphere (also one or two hits on the right hemisphere - but she had had a couple of angiograms over the past month).  CTA showed irregular surgical site on left internal carotid.  This irregularity was treated with carotid stenting.  Now, on CTA, the carotid stent has some residual in-stent stenosis/kinking and is estimated to be > 70% stenotic. She has apparently not had a follow-up carotid ultrasound.  She is currently asymptomatic from the carotid and has not had any known additional strokes since the stent.  She is on ASA/Plavix.       She also has a history of left arm claudication due to subclavian stenosis.  This goes back to 2015 when she had systolic BP of 200 in right arm and 100 in left arm with weak radial pulse.  Since early 2020 the pain has seemed worse in the left arm and also started to hurt in the right arm.  She also reported a month or so period where she was getting dizzy but this dizziness was not associated with left arm movement and has since resolved.  She has a recent CTA  that shows severe calcification in the origin of the left subclavian artery.  She is currently seeing Dr. Bush who is planning to do an angiogram and possible subclavian stent.    Since early 2020 she began having pain in the right upper extremity as well especially with overhead movements.  She currently does not have a palpable radial pulse in the right arm.  The most recent CTA shows increased stenosis of the brachiocephalic trunk estimated > 70%. The right vertebral artery appears occluded at the origin.  The right carotid does not have significant stenosis.      With regard to her cerebral circulation, she has residual in-stent stenosis of the left internal carotid artery of approximately 70% according to the report.  Her right internal carotid artery has no hemodynamically significant stenosis.  Her right brachiocephalic trunk is stenotic to 70%.  Her right vertebral artery appears occluded at the origin.  Her left vertebral artery origin appears calcified and originates distal to the severe left subclavian stenosis.    Review of Systems    Past Medical History:   Diagnosis Date    Atherosclerosis of renal artery 8/10/2015    7/27/2015: Renal Duplex: Right: severe - 3.2 m/s. Left: about 60% stenosis - 1.7 m/s.    Carotid bruit 7/16/2015 2005: Carotid bruit heard.    Carotid stent occlusion     Coronary artery disease     CVA (cerebral vascular accident) 8/13/2019    History of stent insertion of renal artery     Hypercholesterolemia 7/16/2015 2005: Diagnosed. Started statin.    Hypertension, benign 7/16/2015 1995: Diagnosed.    Mild obesity 6/9/2017 6/9/2017: 86 kg. BMI 31.    Subclavian artery stenosis, left 8/10/2015    7/2015: Diagnosed. 7/23/2015: Carotid Duplex: LIZETH: moderate plaquing. LICA: moderate plaquing -1.4 m/s - 50-60%. Left vertebral: retrograde flow.     Social History     Socioeconomic History    Marital status:      Spouse name: Not on file    Number of  children: Not on file    Years of education: Not on file    Highest education level: Not on file   Occupational History    Not on file   Social Needs    Financial resource strain: Not on file    Food insecurity     Worry: Not on file     Inability: Not on file    Transportation needs     Medical: Not on file     Non-medical: Not on file   Tobacco Use    Smoking status: Former Smoker     Packs/day: 1.00     Years: 47.00     Pack years: 47.00     Types: Cigarettes     Start date: 1961     Quit date: 2008     Years since quittin.4    Smokeless tobacco: Never Used   Substance and Sexual Activity    Alcohol use: Not Currently     Alcohol/week: 0.0 standard drinks     Comment: Socially.    Drug use: Not Currently    Sexual activity: Not Currently   Lifestyle    Physical activity     Days per week: Not on file     Minutes per session: Not on file    Stress: Not on file   Relationships    Social connections     Talks on phone: Not on file     Gets together: Not on file     Attends Religion service: Not on file     Active member of club or organization: Not on file     Attends meetings of clubs or organizations: Not on file     Relationship status: Not on file   Other Topics Concern    Not on file   Social History Narrative    Not on file     Family History   Problem Relation Age of Onset    Hypertension Mother     Hypertension Father      Review of patient's allergies indicates:  No Known Allergies    Current Outpatient Medications:     ascorbic acid/multivit-min (EMERGEN-C ORAL), Take 1 packet by mouth every morning., Disp: , Rfl:     aspirin (ECOTRIN) 81 MG EC tablet, Take 1 tablet (81 mg total) by mouth once daily., Disp: 90 tablet, Rfl: 3    atorvastatin (LIPITOR) 80 MG tablet, TAKE 1 TABLET (80 MG TOTAL) BY MOUTH ONCE DAILY., Disp: 90 tablet, Rfl: 3    citalopram (CELEXA) 10 MG tablet, Take 10 mg by mouth every morning. , Disp: , Rfl:     clopidogreL (PLAVIX) 75 mg tablet, TAKE 1  "TABLET BY MOUTH EVERY DAY, Disp: 90 tablet, Rfl: 0    multivitamin capsule, Take 1 capsule by mouth once daily., Disp: , Rfl:     nut.tx.impaired digestive fxn (VITAL HIGH PROTEIN ORAL), Take 1 tablet by mouth every morning., Disp: , Rfl:     omeprazole (PRILOSEC) 40 MG capsule, Take 40 mg by mouth every morning., Disp: , Rfl:     ramipriL (ALTACE) 5 MG capsule, Take 1 capsule (5 mg total) by mouth once daily., Disp: 90 capsule, Rfl: 3  No current facility-administered medications for this visit.     Facility-Administered Medications Ordered in Other Visits:     electrolyte-S (ISOLYTE), , Intravenous, Continuous, Chaparro Madison MD, Stopped at 01/13/20 1417    lactated ringers infusion, 10 mL/hr, Intravenous, Continuous, Chaparro Madison MD    lidocaine (PF) 10 mg/ml (1%) injection 10 mg, 1 mL, Intradermal, Once, Chaparro Madison MD    sodium chloride 0.9% flush 3 mL, 3 mL, Intravenous, Q8H, Chaparro Madison MD  Blood pressure (!) 150/80, pulse 93, height 5' 5" (1.651 m), weight 84.9 kg (187 lb 4.5 oz).      Physical exam:  AAOx4, NAD  Strength 5/5 BUE  Pulses not palpable in bilateral radial arteries  Capillary refill present in < 2 seconds, bilateral fingers      Imaging:  CTA dated 5/29/2020, CTA dated 8/13/2019, and MRI brain dated 8/13/2019 are personally reviewed and discussed with the patient.  Please see HPI.    Assessment/Plan:   1.  Asymptomatic left carotid stenosis s/p CEA and stent.  There is in-stent stenosis.  The patient has not had any focal neurologic signs.  I would be in favor of following the stenosis with serial ultrasounds at this time. If there is progressive narrowing, in-stent angioplasty may be needed.   However - this was any asymptomatic lesion to begin with and her only problems with this carotid have been post-procedural so at this time I would be in favor of treating medically.    2.  Left arm claudication.  I think angiogram and possible subclavian stenting " "is a reasonable option.  She is currently considering undergoing this procedure with Dr. Bush.  She is not currently experiencing symptoms of subclavian steal but does have history of "retrograde flow" in the left vertebral on a prior ultrasound and also had dizziness earlier this year.    3.  Right arm claudication.  I think this can be addressed last, but stenting of the brachiocephalic trunk may be necessary.      4.  The patient and her daughter were very appreciative of me explaining their imaging and all of the above issues.  They expressed a desire to get a second opinion from a Vascular/cardiothoracic surgeon.  I will refer them to Dr. Celestin to get his thoughts on this very complex situation.    Total visit time 60 minutes with greater than 50% in face to face counseling.  "

## 2020-06-18 DIAGNOSIS — I77.1 SUBCLAVIAN ARTERIAL STENOSIS: Primary | ICD-10-CM

## 2020-06-23 ENCOUNTER — HOSPITAL ENCOUNTER (OUTPATIENT)
Dept: RADIOLOGY | Facility: HOSPITAL | Age: 75
Discharge: HOME | End: 2020-06-23
Attending: SURGERY
Payer: MEDICARE

## 2020-06-23 ENCOUNTER — TELEPHONE (OUTPATIENT)
Dept: VASCULAR SURGERY | Facility: CLINIC | Age: 75
End: 2020-06-23

## 2020-06-23 DIAGNOSIS — I77.1 SUBCLAVIAN ARTERY STENOSIS, LEFT: ICD-10-CM

## 2020-06-23 PROCEDURE — 71046 X-RAY EXAM CHEST 2 VIEWS: CPT | Mod: TC,PO

## 2020-06-23 NOTE — TELEPHONE ENCOUNTER
----- Message from Frederic Self sent at 6/23/2020  1:04 PM CDT -----  Contact: pt  Type: Needs Medical Advice    Who Called:  pt    Best Call Back Number: [phone]  Additional Information: pt has a referral and would like to make a new pt appointment for I77.1 (ICD-10-CM) - Subclavian arterial stenosis. Please call to advise.

## 2020-06-24 ENCOUNTER — HOSPITAL ENCOUNTER (OUTPATIENT)
Dept: RADIOLOGY | Facility: HOSPITAL | Age: 75
Discharge: HOME | End: 2020-06-24
Attending: NEUROLOGICAL SURGERY
Payer: MEDICARE

## 2020-06-24 DIAGNOSIS — I77.1 SUBCLAVIAN ARTERIAL STENOSIS: ICD-10-CM

## 2020-06-24 PROCEDURE — 93880 EXTRACRANIAL BILAT STUDY: CPT | Mod: TC,PO

## 2020-06-24 NOTE — TELEPHONE ENCOUNTER
Referred by Dr Milton Clarke for left subclavian arterial stenosis.  Next available appointment scheduled with Dr Celestin on Thursday, 7/23 @ 1330, voices understanding and is agreeable.

## 2020-07-21 DIAGNOSIS — I65.23 BILATERAL CAROTID ARTERY STENOSIS: ICD-10-CM

## 2020-07-22 RX ORDER — CLOPIDOGREL BISULFATE 75 MG/1
75 TABLET ORAL DAILY
Qty: 90 TABLET | Refills: 3 | Status: SHIPPED | OUTPATIENT
Start: 2020-07-22 | End: 2020-12-18 | Stop reason: SDUPTHER

## 2020-07-23 ENCOUNTER — OFFICE VISIT (OUTPATIENT)
Dept: VASCULAR SURGERY | Facility: CLINIC | Age: 75
End: 2020-07-23
Payer: MEDICARE

## 2020-07-23 VITALS — HEIGHT: 65 IN | WEIGHT: 186.31 LBS | BODY MASS INDEX: 31.04 KG/M2

## 2020-07-23 DIAGNOSIS — I73.9 PAD (PERIPHERAL ARTERY DISEASE): Primary | ICD-10-CM

## 2020-07-23 PROCEDURE — 1159F PR MEDICATION LIST DOCUMENTED IN MEDICAL RECORD: ICD-10-PCS | Mod: S$GLB,,, | Performed by: THORACIC SURGERY (CARDIOTHORACIC VASCULAR SURGERY)

## 2020-07-23 PROCEDURE — 1159F MED LIST DOCD IN RCRD: CPT | Mod: S$GLB,,, | Performed by: THORACIC SURGERY (CARDIOTHORACIC VASCULAR SURGERY)

## 2020-07-23 PROCEDURE — 1101F PR PT FALLS ASSESS DOC 0-1 FALLS W/OUT INJ PAST YR: ICD-10-PCS | Mod: CPTII,S$GLB,, | Performed by: THORACIC SURGERY (CARDIOTHORACIC VASCULAR SURGERY)

## 2020-07-23 PROCEDURE — 99999 PR PBB SHADOW E&M-EST. PATIENT-LVL III: CPT | Mod: PBBFAC,,, | Performed by: THORACIC SURGERY (CARDIOTHORACIC VASCULAR SURGERY)

## 2020-07-23 PROCEDURE — 1126F AMNT PAIN NOTED NONE PRSNT: CPT | Mod: S$GLB,,, | Performed by: THORACIC SURGERY (CARDIOTHORACIC VASCULAR SURGERY)

## 2020-07-23 PROCEDURE — 1126F PR PAIN SEVERITY QUANTIFIED, NO PAIN PRESENT: ICD-10-PCS | Mod: S$GLB,,, | Performed by: THORACIC SURGERY (CARDIOTHORACIC VASCULAR SURGERY)

## 2020-07-23 PROCEDURE — 99205 OFFICE O/P NEW HI 60 MIN: CPT | Mod: S$GLB,,, | Performed by: THORACIC SURGERY (CARDIOTHORACIC VASCULAR SURGERY)

## 2020-07-23 PROCEDURE — 99205 PR OFFICE/OUTPT VISIT, NEW, LEVL V, 60-74 MIN: ICD-10-PCS | Mod: S$GLB,,, | Performed by: THORACIC SURGERY (CARDIOTHORACIC VASCULAR SURGERY)

## 2020-07-23 PROCEDURE — 99999 PR PBB SHADOW E&M-EST. PATIENT-LVL III: ICD-10-PCS | Mod: PBBFAC,,, | Performed by: THORACIC SURGERY (CARDIOTHORACIC VASCULAR SURGERY)

## 2020-07-23 PROCEDURE — 1101F PT FALLS ASSESS-DOCD LE1/YR: CPT | Mod: CPTII,S$GLB,, | Performed by: THORACIC SURGERY (CARDIOTHORACIC VASCULAR SURGERY)

## 2020-07-23 RX ORDER — VIT A/VIT C/VIT E/ZINC/COPPER 4296-226
CAPSULE ORAL
Status: ON HOLD | COMMUNITY
End: 2020-10-04 | Stop reason: CLARIF

## 2020-07-23 NOTE — PROGRESS NOTES
OFFICE VISIT      This patient was referred to me by Dr. Milton Clarke    HISTORY OF PRESENT ILLNESS:  The patient is a 75 year old female with history of HLD, HTN, prior smoker - quit in 2007, and renal artery stenosis who underwent left carotid endarterectomy by Dr. Soto at [facility] in [place] for asymptomatic left carotid stenosis in July 2019.  This was apparently complicated by a high extension of the plaque that required extensive dissection.  She had significant hypotension following the procedure as well as swallowing difficulty and some tongue deviation.  The swallowing and tongue deviation have improved.  She was admitted to [facility] in 8/2019 with right sided body symptoms and hypotension and MRI showed multiple strokes in the bilateral cerebral hemispheres. CTA showed irregular surgical site on left internal carotid.  This irregularity was treated with carotid stenting. She is currently asymptomatic from the carotid and has not had any known additional strokes since the stent.       She also has known bilateral arterial insufficiency of the upper extremities.  She 1st developed stenosis of the left subclavian artery with significant pressure difference between the right and upper extremities.  She developed pain in the left upper extremity with use of that arm.  Earlier this year she was having some dizziness but that has resolved.  She is now also having pain and fatigue of the right upper extremity.  Pain and fatigue in bilateral upper extremities occur with use of the extremities especially when elevating the extremities above her head.    CT angiogram shows an InStent restenosis of approximately 70% in the left internal carotid artery, a 50% stenosis at the ostium of the left common carotid artery, a 70% stenosis of the innominate artery, an occluded right vertebral artery, and 90% or greater stenosis of the proximal left subclavian artery.  There is also reversal of flow in the left  vertebral artery.  She was seeing Dr. Bush who had planned on doing an angiogram and possibly open up the brachiocephalic in the left subclavian arteries.  She saw Dr. Clarke who recommended leaving the InStent restenosis of the left internal carotid artery alone and managed medically for now.  Patient wanted a 2nd opinion regarding the subclavian and innominate arteries.          Past Medical History:   Diagnosis Date    Atherosclerosis of renal artery 8/10/2015    7/27/2015: Renal Duplex: Right: severe - 3.2 m/s. Left: about 60% stenosis - 1.7 m/s.    Carotid bruit 7/16/2015 2005: Carotid bruit heard.    Carotid stent occlusion     Coronary artery disease     CVA (cerebral vascular accident) 8/13/2019    History of stent insertion of renal artery     Hypercholesterolemia 7/16/2015 2005: Diagnosed. Started statin.    Hypertension, benign 7/16/2015 1995: Diagnosed.    Mild obesity 6/9/2017 6/9/2017: 86 kg. BMI 31.    Subclavian artery stenosis, left 8/10/2015    7/2015: Diagnosed. 7/23/2015: Carotid Duplex: LIZETH: moderate plaquing. LICA: moderate plaquing -1.4 m/s - 50-60%. Left vertebral: retrograde flow.       Past Surgical History:   Procedure Laterality Date    APPENDECTOMY Right 1959    CAROTID STENT      8/2019    CARPAL TUNNEL RELEASE Right 1/13/2020    Procedure: RELEASE, CARPAL TUNNEL;  Surgeon: Mina Mahoney MD;  Location: [facility] OR;  Service: Orthopedics;  Laterality: Right;    HYSTERECTOMY      OOPHORECTOMY      OPEN REDUCTION AND INTERNAL FIXATION (ORIF) OF FRACTURE OF RADIUS Right 1/13/2020    Procedure: ORIF, FRACTURE, RADIUS;  Surgeon: Mina Mahoney MD;  Location: [facility] OR;  Service: Orthopedics;  Laterality: Right;  Accumed  pt on aspirin and plavix. Dr. Mahoney aware. Note in chart from cardiology on 1/7/2020    VASCULAR SURGERY         Review of patient's allergies indicates:  No Known Allergies    Current Outpatient Medications   Medication Sig Dispense Refill     ascorbic acid/multivit-min (EMERGEN-C ORAL) Take 1 packet by mouth every morning.      aspirin (ECOTRIN) 81 MG EC tablet Take 1 tablet (81 mg total) by mouth once daily. 90 tablet 3    atorvastatin (LIPITOR) 80 MG tablet TAKE 1 TABLET (80 MG TOTAL) BY MOUTH ONCE DAILY. 90 tablet 3    citalopram (CELEXA) 10 MG tablet Take 10 mg by mouth every morning.       clopidogreL (PLAVIX) 75 mg tablet Take 1 tablet (75 mg total) by mouth once daily. 90 tablet 3    multivitamin capsule Take 1 capsule by mouth once daily.      nut.tx.impaired digestive fxn (VITAL HIGH PROTEIN ORAL) Take 1 tablet by mouth every morning.      omeprazole (PRILOSEC) 40 MG capsule Take 40 mg by mouth every morning.      vitamins  A,C,E-zinc-copper (ICAPS AREDS) [phone] unit-mg-unit Cap       ramipriL (ALTACE) 5 MG capsule Take 1 capsule (5 mg total) by mouth once daily. 90 capsule 3     No current facility-administered medications for this visit.      Facility-Administered Medications Ordered in Other Visits   Medication Dose Route Frequency Provider Last Rate Last Dose    electrolyte-S (ISOLYTE)   Intravenous Continuous Chaparro Madison MD   Stopped at 01/13/20 1417    lactated ringers infusion  10 mL/hr Intravenous Continuous Chaparro Madison MD        lidocaine (PF) 10 mg/ml (1%) injection 10 mg  1 mL Intradermal Once Chaparro Madison MD        sodium chloride 0.9% flush 3 mL  3 mL Intravenous Q8H Chaparro Madison MD           Family History   Problem Relation Age of Onset    Hypertension Mother     Hypertension Father        Social History     Socioeconomic History    Marital status:      Spouse name: Not on file    Number of children: Not on file    Years of education: Not on file    Highest education level: Not on file   Occupational History    Not on file   Social Needs    Financial resource strain: Not on file    Food insecurity     Worry: Not on file     Inability: Not on file     Transportation needs     Medical: Not on file     Non-medical: Not on file   Tobacco Use    Smoking status: Former Smoker     Packs/day: 1.00     Years: 47.00     Pack years: 47.00     Types: Cigarettes     Start date: 1961     Quit date: 2008     Years since quittin.5    Smokeless tobacco: Never Used   Substance and Sexual Activity    Alcohol use: Not Currently     Alcohol/week: 0.0 standard drinks     Comment: Socially.    Drug use: Not Currently    Sexual activity: Not Currently   Lifestyle    Physical activity     Days per week: Not on file     Minutes per session: Not on file    Stress: Not on file   Relationships    Social connections     Talks on phone: Not on file     Gets together: Not on file     Attends [employer]ism service: Not on file     Active member of club or organization: Not on file     Attends meetings of clubs or organizations: Not on file     Relationship status: Not on file   Other Topics Concern    Not on file   Social History Narrative    Not on file       REVIEW OF SYSTEMS:  Pain and fatigue of bilateral upper extremities with use.  She was having dizziness earlier this year but that has resolved.  She does have generalized weakness.  Blood pressure cannot be measured in the upper extremities.  All other systems are reviewed and are negative.        PHYSICAL EXAM:    Blood pressure in the right leg is 167/84.  Blood pressure is could not be obtained in bilateral upper extremities.  Heart rate 54  Respiratory rate is 18  Weight is 84.5 kg    Physical Exam  Vitals signs and nursing note reviewed.   Constitutional:       Appearance: Normal appearance.   HENT:      Head: Normocephalic and atraumatic.   Eyes:      General: No scleral icterus.     Extraocular Movements: Extraocular movements intact.      Conjunctiva/sclera: Conjunctivae normal.      Pupils: Pupils are equal, round, and reactive to light.   Neck:      Musculoskeletal: Normal range of motion and neck supple.    Cardiovascular:      Rate and Rhythm: Normal rate.      Comments: I cannot palpate bilateral axillary, brachial, radial, dorsalis pedis, nor posterior tibial pulses.  She has palpable femoral pulses bilaterally  Pulmonary:      Effort: Pulmonary effort is normal. No respiratory distress.      Breath sounds: No stridor.   Abdominal:      General: There is no distension.      Palpations: Abdomen is soft.      Tenderness: There is no abdominal tenderness. There is no guarding or rebound.   Musculoskeletal: Normal range of motion.         General: No swelling.      Right lower leg: No edema.      Left lower leg: No edema.   Skin:     General: Skin is warm.   Neurological:      General: No focal deficit present.      Mental Status: She is alert and oriented to person, place, and time.      Motor: No weakness.      Gait: Gait normal.   Psychiatric:         Mood and Affect: Mood normal.         Behavior: Behavior normal.       CTA Head and Neck (xpd)  Order: [id number]  Status:  Final result   Visible to patient:  Yes (Patient Portal) Next appt:  None Dx:  Peripheral vascular disease, unspecif...  Details    Reading Physician Reading Date Result Priority   Dany Greer MD  [phone] 5/29/2020       Narrative & Impression     CTA HEAD AND NECK     CLINICAL DATA: I 73.9, R 42     CMS MANDATED QUALITY DATA - CT RADIATION  436     All CT scans at this facility utilize dose modulation, iterative  reconstruction, and/or weight based dosing when appropriate to reduce  radiation dose to as low as reasonably achievable.        CMS MANDATED QUALITY DATA - CAROTID 195     All measurements and percent stenosis described below were determined  using NASCET criteria or criteria similar to NASCET, as defined by the  Society of Radiologists in Ultrasound Consensus Conference, Radiology,  2003     Procedure: CT angiography of the head and neck was performed utilizing  100 cc nonionic contrast. Thin section axial images were  obtained,  with 3-D reformatted images obtained and stored as a part of the  patient's permanent medical record.     Comparison is made to a previous CTA neck from August 2019.     CTA neck findings:     Aortic arch is moderately calcified. There is dense calcification at  the left subclavian artery origin with evidence of severe stenosis,  likely 90% or greater. This is similar in appearance to the prior  study. There is moderate stenosis of up to 50% in diameter of the left  common carotid artery at its origin, similar to the prior exam. Best  appreciated on coronal reformatted images, there is severe stenosis of  the brachiocephalic artery at its origin, greater than 70%, and  slightly increased when compared to the prior examination from 2019.        Compared to the prior study, there is an endovascular stent in the  left internal carotid artery. There is stenosis within the stent at  its midportion. On coronal images, there appears to be moderate focal  angulation of the stent, where luminal diameter narrowing is probably  greater than 70%.     On the right, there is moderate atherosclerotic calcification at the  carotid bulb and ICA origin but only mild stenosis estimated at  20-30%.     The left vertebral artery is patent. There is plaque at its origin,  with probable high-grade stenosis. The right vertebral artery is  threadlike in appearance. This may reflect developmental variation or  acquired stenosis.     CTA brain findings:     Intracranial portions of the internal carotid arteries are patent.  There is moderate atherosclerotic calcification of the cavernous  segments bilaterally, with evidence of mild bilateral stenosis. The  basilar artery is patent but small. The bilateral anterior cerebral  arteries are patent, with no evidence of major branch occlusion,  high-grade stenosis, or aneurysm. The anterior tibia indicating artery  appears to be patent, with hypoplastic A1 segment on the right.  The  bilateral middle cerebral arteries are patent and within normal  limits. The posterior cerebral arteries are also patent without  significant stenosis or major branch occlusion. Bilateral posterior  communicating arteries are noted.     IMPRESSION:  1. Critical stenosis of the left subclavian artery at its origin,  similar in appearance to August 2019.  2. Critical stenosis of the brachiocephalic artery at its origin,  stable or slightly increased compared to the prior examination.  3. Stenosis of left common carotid artery at its origin, estimated at  50% in diameter.  4. Left internal carotid artery endovascular stent is new compared to  August 2019. Best appreciated on coronal reformatted images, there is  focal moderate angulation at the midportion of the stent, where stent  stenosis probably greater than 70% is noted.  5. No hemodynamically significant ICA stenosis on the right.  6. There is no evidence of intracranial major branch arterial  occlusion, high-grade stenosis, or aneurysm.     Electronically Signed by Brown Greer M.D. on 5/29/2020 3:37 PM            Specimen Collected: 05/29/20 14:47 Last Resulted: 05/29/20 15:22 Order Details View Encounter Lab and Collection Details Routing Result History         Scans on Order [id number]    Scan on 5/29/2020  2:53 PM by Tiff Massey, RT: CT CONTRAST INJECTION/HISTORY QUESTIONNAIRE             External Result Report    External Result Report   Narrative & Impression    CTA HEAD AND NECK     CLINICAL DATA: I 73.9, R 42     CMS MANDATED QUALITY DATA - CT RADIATION  436     All CT scans at this facility utilize dose modulation, iterative  reconstruction, and/or weight based dosing when appropriate to reduce  radiation dose to as low as reasonably achievable.        CMS MANDATED QUALITY DATA - CAROTID 195     All measurements and percent stenosis described below were determined  using NASCET criteria or criteria similar to NASCET, as defined by  the  Society of Radiologists in Ultrasound Consensus Conference, Radiology,  2003     Procedure: CT angiography of the head and neck was performed utilizing  100 cc nonionic contrast. Thin section axial images were obtained,  with 3-D reformatted images obtained and stored as a part of the  patient's permanent medical record.     Comparison is made to a previous CTA neck from August 2019.     CTA neck findings:     Aortic arch is moderately calcified. There is dense calcification at  the left subclavian artery origin with evidence of severe stenosis,  likely 90% or greater. This is similar in appearance to the prior  study. There is moderate stenosis of up to 50% in diameter of the left  common carotid artery at its origin, similar to the prior exam. Best  appreciated on coronal reformatted images, there is severe stenosis of  the brachiocephalic artery at its origin, greater than 70%, and  slightly increased when compared to the prior examination from 2019.        Compared to the prior study, there is an endovascular stent in the  left internal carotid artery. There is stenosis within the stent at  its midportion. On coronal images, there appears to be moderate focal  angulation of the stent, where luminal diameter narrowing is probably  greater than 70%.     On the right, there is moderate atherosclerotic calcification at the  carotid bulb and ICA origin but only mild stenosis estimated at  20-30%.     The left vertebral artery is patent. There is plaque at its origin,  with probable high-grade stenosis. The right vertebral artery is  threadlike in appearance. This may reflect developmental variation or  acquired stenosis.     CTA brain findings:     Intracranial portions of the internal carotid arteries are patent.  There is moderate atherosclerotic calcification of the cavernous  segments bilaterally, with evidence of mild bilateral stenosis. The  basilar artery is patent but small. The bilateral anterior  cerebral  arteries are patent, with no evidence of major branch occlusion,  high-grade stenosis, or aneurysm. The anterior tibia indicating artery  appears to be patent, with hypoplastic A1 segment on the right. The  bilateral middle cerebral arteries are patent and within normal  limits. The posterior cerebral arteries are also patent without  significant stenosis or major branch occlusion. Bilateral posterior  communicating arteries are noted.     IMPRESSION:  1. Critical stenosis of the left subclavian artery at its origin,  similar in appearance to August 2019.  2. Critical stenosis of the brachiocephalic artery at its origin,  stable or slightly increased compared to the prior examination.  3. Stenosis of left common carotid artery at its origin, estimated at  50% in diameter.  4. Left internal carotid artery endovascular stent is new compared to  August 2019. Best appreciated on coronal reformatted images, there is  focal moderate angulation at the midportion of the stent, where stent  stenosis probably greater than 70% is noted.  5. No hemodynamically significant ICA stenosis on the right.  6. There is no evidence of intracranial major branch arterial  occlusion, high-grade stenosis, or aneurysm.     Electronically Signed by Brown Greer M.D. on 5/29/2020 3:37 PM    Encounter    View Encounter                   IMPRESSION:  1.  Peripheral arterial occlusive disease  2.  Status post left carotid endarterectomy  3.  Status post stenting of the left carotid artery  4.  High-grade stenosis of innominate artery  5.  Moderate stenosis of the origin of the left common carotid artery  6.  Asymptomatic moderate to high-grade InStent restenoses of the left internal carotid artery  7.  High-grade stenosis of the left subclavian artery  8.  Left subclavian steal  9.  Occluded right vertebral artery  10.  Pain and fatigue of bilateral upper extremities  11.  Status post stenting of the renal artery  12.   Hypertension  13.  Hypercholesterolemia    RECOMMENDATIONS:  Recommendations I agree with Dr. bates that we should leave the left carotid artery alone and manage this non operatively for now.  The patient has high-grade stenosis of the left subclavian artery and the innominate artery.  She is experiencing pain and fatigue of her upper extremities when she uses these especially when she has to elevate and do activities with her arms above her head.  At 1 point she was having dizziness but that has resolved for the time being.  Dr. Emerson was planning on doing an angiogram and possibly the doing peripheral vascular intervention on the subclavian and innominate arteries.  I agree with this.  Patient wants me to proceed with these interventions.  We will set her up for angiography and possible angioplasty and stenting of the left subclavian and the innominate artery.  We will approach this from the groin.  If the left subclavian artery proves to be occluded the day and we will attempt a retrograde access from the left brachial artery.  Otherwise, we could consider left carotid subclavian bypass but the fact that she already has had a left carotid endarterectomy and now has a stent in the left carotid artery increases the risk.  She also has a moderate stenosis at the origin of the left common carotid artery which could also undergo stenting.  Another option would be of femoral to axillary bypass graft an revascularize the upper extremities from the femoral arteries.  We could also consider a sternotomy and revascularize the Supra aortic trunks with a graft coming off the ascending aorta.  Again will set her up for angiography as soon as possible.        Antony Celestin MD

## 2020-07-23 NOTE — LETTER
July 23, 2020      Milton Clarke MD  [address] [facility] [address]  [address]  [facility] [address]           [facility]  [address] [place] [address]  Phone: [phone]          Patient: Albina Armenta   MR Number: [id number]   YOB: 1945   Date of Visit: 7/23/2020       Dear Dr. Milton Clarke:    Thank you for referring Albina Armenta to me for evaluation. Attached you will find relevant portions of my assessment and plan of care.    If you have questions, please do not hesitate to call me. I look forward to following Albina Armenta along with you.    Sincerely,    Joana Celestin MD    Enclosure  CC:  No Recipients    If you would like to receive this communication electronically, please contact [email or web address] or [phone] to request more information on [facility] Link access.    For providers and/or their staff who would like to refer a patient to [facility], please contact us through our one-stop-shop provider referral line, Mark Hernandez, at [phone].    If you feel you have received this communication in error or would no longer like to receive these types of communications, please e-mail [email or web address]

## 2020-07-24 ENCOUNTER — TELEPHONE (OUTPATIENT)
Dept: VASCULAR SURGERY | Facility: CLINIC | Age: 75
End: 2020-07-24

## 2020-07-24 ENCOUNTER — PATIENT MESSAGE (OUTPATIENT)
Dept: VASCULAR SURGERY | Facility: CLINIC | Age: 75
End: 2020-07-24

## 2020-07-24 DIAGNOSIS — I73.9 PAD (PERIPHERAL ARTERY DISEASE): Primary | ICD-10-CM

## 2020-07-24 NOTE — TELEPHONE ENCOUNTER
----- Message from Bertha Shaw sent at 7/24/2020 10:26 AM CDT -----  Regarding: question for nurse  Contact: patient  Type: Needs Medical Advice  Who Called:  patient  Best Call Back Number: [phone]  Additional Information: Patient has questions for the nurse refused to give any other information.  Please call to advise.  Thanks!

## 2020-07-26 ENCOUNTER — PATIENT MESSAGE (OUTPATIENT)
Dept: VASCULAR SURGERY | Facility: CLINIC | Age: 75
End: 2020-07-26

## 2020-07-27 DIAGNOSIS — I73.9 PERIPHERAL VASCULAR DISEASE, UNSPECIFIED: ICD-10-CM

## 2020-07-27 DIAGNOSIS — I73.9 PAD (PERIPHERAL ARTERY DISEASE): Primary | ICD-10-CM

## 2020-07-27 DIAGNOSIS — Z79.01 LONG TERM (CURRENT) USE OF ANTICOAGULANTS: ICD-10-CM

## 2020-07-27 RX ORDER — LIDOCAINE HYDROCHLORIDE 10 MG/ML
1 INJECTION, SOLUTION EPIDURAL; INFILTRATION; INTRACAUDAL; PERINEURAL ONCE
Status: CANCELLED | OUTPATIENT
Start: 2020-07-27 | End: 2020-07-27

## 2020-07-27 NOTE — TELEPHONE ENCOUNTER
Spoke to patient, informed Dr Celestin's next available surgery date is Tuesday, 8/11/20, will confirm with [facility] Cath [facility], scheduled drive through [place] [address] screening appointment and preop appointment, then call her back with details.  Voices understanding and is agreeable.

## 2020-07-27 NOTE — TELEPHONE ENCOUNTER
Spoke to patient, angio confirmed on Tuesday, 8/11 @ [facility], [place] drive through screening appointment scheduled Saturday, 8/8/20, 10:30 am, entrance #3 at the [facility] and pre-op labs at the [facility] on Saturday, 8/8/20 anytime prior to 11 am.  [facility] pre-op nurses will call with pre-procedure instructions and arrival time for on 8/11/20.  Voices understanding and is agreeable.

## 2020-08-03 ENCOUNTER — TELEPHONE (OUTPATIENT)
Dept: NEUROSURGERY | Facility: CLINIC | Age: 75
End: 2020-08-03

## 2020-08-03 NOTE — TELEPHONE ENCOUNTER
Can you let us know when this patient is scheduled to have surgery because Dr. Clarke would like to observe?  Thank you kindly

## 2020-08-03 NOTE — TELEPHONE ENCOUNTER
Her procedure is scheduled next Tuesday, 8/11/20, in the cath lab at [facility], scheduled start time is 0800.

## 2020-08-03 NOTE — TELEPHONE ENCOUNTER
Dr. Clarke     Listed below is date time and place of surgery you wanted to observe with Dr. Antony ALCOCER

## 2020-08-08 ENCOUNTER — LAB VISIT (OUTPATIENT)
Dept: FAMILY MEDICINE | Facility: CLINIC | Age: 75
End: 2020-08-08
Payer: MEDICARE

## 2020-08-08 DIAGNOSIS — I73.9 PAD (PERIPHERAL ARTERY DISEASE): ICD-10-CM

## 2020-08-08 PROCEDURE — U0003 INFECTIOUS AGENT DETECTION BY NUCLEIC ACID (DNA OR RNA); SEVERE ACUTE RESPIRATORY SYNDROME CORONAVIRUS 2 (SARS-COV-2) (CORONAVIRUS DISEASE [COVID-19]), AMPLIFIED PROBE TECHNIQUE, MAKING USE OF HIGH THROUGHPUT TECHNOLOGIES AS DESCRIBED BY CMS-2020-01-R: HCPCS

## 2020-08-09 LAB — SARS-COV-2 RNA RESP QL NAA+PROBE: NOT DETECTED

## 2020-08-11 PROBLEM — I47.20 V-TACH: Status: ACTIVE | Noted: 2020-08-11

## 2020-08-11 PROBLEM — I73.9 PERIPHERAL VASCULAR DISEASE, UNSPECIFIED: Status: ACTIVE | Noted: 2020-08-11

## 2020-08-12 ENCOUNTER — TELEPHONE (OUTPATIENT)
Dept: CARDIOLOGY | Facility: CLINIC | Age: 75
End: 2020-08-12

## 2020-08-12 NOTE — TELEPHONE ENCOUNTER
Called pt to advise to follow up with cardiologist is vee. Also to see what pharm to send toprol to per Dr Lyman. Pt did not answer. LM to call back

## 2020-08-14 NOTE — TELEPHONE ENCOUNTER
----- Message from Adi Ruiz sent at 8/14/2020  1:18 PM CDT -----  Regarding: patient advice  Contact: patient  Pt called in regards to scheduling an post-op appointment     Pt can be reached at [phone]

## 2020-08-20 ENCOUNTER — NURSE TRIAGE (OUTPATIENT)
Dept: ADMINISTRATIVE | Facility: CLINIC | Age: 75
End: 2020-08-20

## 2020-08-20 NOTE — TELEPHONE ENCOUNTER
Daughter calling. Fluid in stomach from breasts to waist. 10 pounds over pre-op weight. Neighbor gave her 40 mg lasix. Recommended ED now for new onset of sob. Spoke to patient directly. She is audibly sob on the phone but states she is feeling better. Recommended she still come into ED for evaluation despite feeling better. She says she will think about it. She says neighbor or daughter can bring her if need be.    Reason for Disposition   Difficulty breathing, severe   [1] MODERATE difficulty breathing (e.g., speaks in phrases, SOB even at rest, pulse 100-120) AND [2] NEW-onset or WORSE than normal    Additional Information   Negative: [1] Breathing stopped AND [2] hasn't returned   Negative: Choking on something   Negative: Severe difficulty breathing (e.g., struggling for each breath, speaks in single words)   Negative: Bluish (or gray) lips or face now   Negative: Difficult to awaken or acting confused (e.g., disoriented, slurred speech)   Negative: Passed out (i.e., lost consciousness, collapsed and was not responding)   Negative: Wheezing started suddenly after medicine, an allergic food or bee sting   Negative: Stridor   Negative: Slow, shallow and weak breathing   Negative: Sounds like a life-threatening emergency to the triager   Negative: Chest pain   Negative: [1] Wheezing (high pitched whistling sound) AND [2] previous asthma attacks or use of asthma medicines   Negative: [1] Difficulty breathing AND [2] only present when coughing   Negative: [1] Difficulty breathing AND [2] only from stuffy or runny nose   Negative: [1] Difficulty breathing AND [2] within 14 days of COVID-19 Exposure    Protocols used: RESPIRATORY MULTIPLE SYMPTOMS - GUIDELINE STDXJGNAA-L-CE, BREATHING DIFFICULTY-A-AH

## 2020-08-20 NOTE — TELEPHONE ENCOUNTER
Spoke to patient, informed Dr Celestin in surgery, encourged to follow triage nurse recommendations and go to ED for evaluation.  States she is much improved since speaking to Triage Nurse,Leilani, breathing without difficulty, frequent urination, feels Lasix has resolved her problem, state she will call back as needed.    Instructed to call 911 if SOB recurs or if she feels in distress.  Voices understanding and is agreeable.

## 2020-08-24 ENCOUNTER — PATIENT MESSAGE (OUTPATIENT)
Dept: VASCULAR SURGERY | Facility: CLINIC | Age: 75
End: 2020-08-24

## 2020-08-25 ENCOUNTER — PATIENT MESSAGE (OUTPATIENT)
Dept: VASCULAR SURGERY | Facility: CLINIC | Age: 75
End: 2020-08-25

## 2020-08-27 ENCOUNTER — PATIENT MESSAGE (OUTPATIENT)
Dept: VASCULAR SURGERY | Facility: CLINIC | Age: 75
End: 2020-08-27

## 2020-09-03 ENCOUNTER — OFFICE VISIT (OUTPATIENT)
Dept: VASCULAR SURGERY | Facility: CLINIC | Age: 75
End: 2020-09-03
Payer: MEDICARE

## 2020-09-03 VITALS
HEIGHT: 65 IN | SYSTOLIC BLOOD PRESSURE: 115 MMHG | DIASTOLIC BLOOD PRESSURE: 71 MMHG | BODY MASS INDEX: 30.49 KG/M2 | HEART RATE: 96 BPM | WEIGHT: 183 LBS

## 2020-09-03 DIAGNOSIS — I73.9 PAD (PERIPHERAL ARTERY DISEASE): Primary | ICD-10-CM

## 2020-09-03 PROCEDURE — 99999 PR PBB SHADOW E&M-EST. PATIENT-LVL IV: CPT | Mod: PBBFAC,,, | Performed by: THORACIC SURGERY (CARDIOTHORACIC VASCULAR SURGERY)

## 2020-09-03 PROCEDURE — 3288F PR FALLS RISK ASSESSMENT DOCUMENTED: ICD-10-PCS | Mod: CPTII,S$GLB,, | Performed by: THORACIC SURGERY (CARDIOTHORACIC VASCULAR SURGERY)

## 2020-09-03 PROCEDURE — 99999 PR PBB SHADOW E&M-EST. PATIENT-LVL IV: ICD-10-PCS | Mod: PBBFAC,,, | Performed by: THORACIC SURGERY (CARDIOTHORACIC VASCULAR SURGERY)

## 2020-09-03 PROCEDURE — 1100F PTFALLS ASSESS-DOCD GE2>/YR: CPT | Mod: CPTII,S$GLB,, | Performed by: THORACIC SURGERY (CARDIOTHORACIC VASCULAR SURGERY)

## 2020-09-03 PROCEDURE — 3074F SYST BP LT 130 MM HG: CPT | Mod: CPTII,S$GLB,, | Performed by: THORACIC SURGERY (CARDIOTHORACIC VASCULAR SURGERY)

## 2020-09-03 PROCEDURE — 99214 OFFICE O/P EST MOD 30 MIN: CPT | Mod: S$GLB,,, | Performed by: THORACIC SURGERY (CARDIOTHORACIC VASCULAR SURGERY)

## 2020-09-03 PROCEDURE — 1126F PR PAIN SEVERITY QUANTIFIED, NO PAIN PRESENT: ICD-10-PCS | Mod: S$GLB,,, | Performed by: THORACIC SURGERY (CARDIOTHORACIC VASCULAR SURGERY)

## 2020-09-03 PROCEDURE — 1159F MED LIST DOCD IN RCRD: CPT | Mod: S$GLB,,, | Performed by: THORACIC SURGERY (CARDIOTHORACIC VASCULAR SURGERY)

## 2020-09-03 PROCEDURE — 3078F PR MOST RECENT DIASTOLIC BLOOD PRESSURE < 80 MM HG: ICD-10-PCS | Mod: CPTII,S$GLB,, | Performed by: THORACIC SURGERY (CARDIOTHORACIC VASCULAR SURGERY)

## 2020-09-03 PROCEDURE — 3078F DIAST BP <80 MM HG: CPT | Mod: CPTII,S$GLB,, | Performed by: THORACIC SURGERY (CARDIOTHORACIC VASCULAR SURGERY)

## 2020-09-03 PROCEDURE — 1159F PR MEDICATION LIST DOCUMENTED IN MEDICAL RECORD: ICD-10-PCS | Mod: S$GLB,,, | Performed by: THORACIC SURGERY (CARDIOTHORACIC VASCULAR SURGERY)

## 2020-09-03 PROCEDURE — 99214 PR OFFICE/OUTPT VISIT, EST, LEVL IV, 30-39 MIN: ICD-10-PCS | Mod: S$GLB,,, | Performed by: THORACIC SURGERY (CARDIOTHORACIC VASCULAR SURGERY)

## 2020-09-03 PROCEDURE — 3074F PR MOST RECENT SYSTOLIC BLOOD PRESSURE < 130 MM HG: ICD-10-PCS | Mod: CPTII,S$GLB,, | Performed by: THORACIC SURGERY (CARDIOTHORACIC VASCULAR SURGERY)

## 2020-09-03 PROCEDURE — 1100F PR PT FALLS ASSESS DOC 2+ FALLS/FALL W/INJURY/YR: ICD-10-PCS | Mod: CPTII,S$GLB,, | Performed by: THORACIC SURGERY (CARDIOTHORACIC VASCULAR SURGERY)

## 2020-09-03 PROCEDURE — 3288F FALL RISK ASSESSMENT DOCD: CPT | Mod: CPTII,S$GLB,, | Performed by: THORACIC SURGERY (CARDIOTHORACIC VASCULAR SURGERY)

## 2020-09-03 PROCEDURE — 1126F AMNT PAIN NOTED NONE PRSNT: CPT | Mod: S$GLB,,, | Performed by: THORACIC SURGERY (CARDIOTHORACIC VASCULAR SURGERY)

## 2020-09-03 NOTE — PROGRESS NOTES
OFFICE VISIT      HISTORY OF PRESENT ILLNESS:  The patient is a 75 year old female with history of HLD, HTN, prior smoker - quit in 2007, and renal artery stenosis who underwent left carotid endarterectomy by Dr. Soto at [facility] in [place] for asymptomatic left carotid stenosis in July 2019.  This was apparently complicated by a high extension of the plaque that required extensive dissection.  She had significant hypotension following the procedure as well as swallowing difficulty and some tongue deviation.  The swallowing and tongue deviation have improved.  She was admitted to [facility] in 8/2019 with right sided body symptoms and hypotension and MRI showed multiple strokes in the bilateral cerebral hemispheres. CTA showed irregular surgical site on left internal carotid.  This irregularity was treated with carotid stenting. She is currently asymptomatic from the carotid and has not had any known additional strokes since the stent.             She also has known arterial insufficiency of the upper extremities.  She 1st developed stenosis of the left subclavian artery with significant pressure difference between the right and upper extremities.  She developed pain in the left upper extremity with use of that arm.  Earlier this year she was having some dizziness but that has resolved.  Pain and fatigue in bilateral upper extremities occur with use of the extremities especially when elevating the extremities above her head.           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   She was taken to the cardiac cath lab and the innominate artery had a greater than 90-95% stenosis and this underwent angioplasty and stenting with excellent result.  Other  "findings were as described in the cardiac catheterization note described below.    The patient states that since angioplasty and stenting of the innominate artery she feels much better.  She can use bilateral upper extremities without developing fatigue.  She was having some dizziness but that has improved after she did some exercises that were shown to her by an audiologist and which is done for patients with benign positional vertigo.  Her dizziness is mostly when she leans forward or looks down.        Past Medical History:   Diagnosis Date    Anesthesia complication     "stays in my system for weeks, excesive weakness after, frequent falls"    Anxiety     Atherosclerosis of renal artery 8/10/2015    7/27/2015: Renal Duplex: Right: severe - 3.2 m/s. Left: about 60% stenosis - 1.7 m/s.    Carotid bruit 7/16/2015 2005: Carotid bruit heard.    Carotid stent occlusion     Coronary artery disease     CVA (cerebral vascular accident) 8/13/2019    GERD (gastroesophageal reflux disease)     History of stent insertion of renal artery     Hypercholesterolemia 7/16/2015 2005: Diagnosed. Started statin.    Hypertension, benign 7/16/2015 1995: Diagnosed.    Mild obesity 6/9/2017 6/9/2017: 86 kg. BMI 31.    Subclavian artery stenosis, left 8/10/2015    7/2015: Diagnosed. 7/23/2015: Carotid Duplex: LIZETH: moderate plaquing. LICA: moderate plaquing -1.4 m/s - 50-60%. Left vertebral: retrograde flow.       Past Surgical History:   Procedure Laterality Date    ABDOMINAL AORTOGRAPHY N/A 8/11/2020    Procedure: Aortogram, Abdominal;  Surgeon: Joana Celestin MD;  Location: [facility] CATH;  Service: Peripheral Vascular;  Laterality: N/A;    ANGIOGRAPHY OF UPPER EXTREMITY N/A 8/11/2020    Procedure: Angiogram Extremity Bilateral;  Surgeon: Joana Celestin MD;  Location: [facility] CATH;  Service: Peripheral Vascular;  Laterality: N/A;    ANGIOPLASTY      renal    APPENDECTOMY Right 1959    CAROTID ENDARTERECTOMY Left     " CAROTID STENT      8/2019    CARPAL TUNNEL RELEASE Right 1/13/2020    Procedure: RELEASE, CARPAL TUNNEL;  Surgeon: Mina Mahoney MD;  Location: [facility] OR;  Service: Orthopedics;  Laterality: Right;    HYSTERECTOMY      OOPHORECTOMY      OPEN REDUCTION AND INTERNAL FIXATION (ORIF) OF FRACTURE OF RADIUS Right 1/13/2020    Procedure: ORIF, FRACTURE, RADIUS;  Surgeon: Mina Mahoney MD;  Location: [facility] OR;  Service: Orthopedics;  Laterality: Right;  Accumed  pt on aspirin and plavix. Dr. Mahoney aware. Note in chart from cardiology on 1/7/2020    VASCULAR SURGERY         Review of patient's allergies indicates:  No Known Allergies    Current Outpatient Medications   Medication Sig Dispense Refill    ascorbic acid/multivit-min (EMERGEN-C ORAL) Take 1 packet by mouth every morning.      aspirin (ECOTRIN) 81 MG EC tablet Take 1 tablet (81 mg total) by mouth once daily. 90 tablet 3    atorvastatin (LIPITOR) 80 MG tablet TAKE 1 TABLET (80 MG TOTAL) BY MOUTH ONCE DAILY. 90 tablet 3    citalopram (CELEXA) 10 MG tablet Take 10 mg by mouth every morning.       clopidogreL (PLAVIX) 75 mg tablet Take 1 tablet (75 mg total) by mouth once daily. 90 tablet 3    multivitamin capsule Take 1 capsule by mouth once daily.      nut.tx.impaired digestive fxn (VITAL HIGH PROTEIN ORAL) Take 1 tablet by mouth every morning.      omeprazole (PRILOSEC) 40 MG capsule Take 40 mg by mouth every morning.      vitamins  A,C,E-zinc-copper (ICAPS AREDS) [phone] unit-mg-unit Cap        No current facility-administered medications for this visit.      Facility-Administered Medications Ordered in Other Visits   Medication Dose Route Frequency Provider Last Rate Last Dose    electrolyte-S (ISOLYTE)   Intravenous Continuous Chaparro Madison MD   Stopped at 01/13/20 1417    lactated ringers infusion  10 mL/hr Intravenous Continuous Chaparro Madison MD        lidocaine (PF) 10 mg/ml (1%) injection 10 mg  1 mL Intradermal Once  Chaparro Madison MD        sodium chloride 0.9% flush 3 mL  3 mL Intravenous Q8H Chaparro Madison MD           Family History   Problem Relation Age of Onset    Hypertension Mother     Hypertension Father        Social History     Socioeconomic History    Marital status:      Spouse name: Not on file    Number of children: Not on file    Years of education: Not on file    Highest education level: Not on file   Occupational History    Not on file   Social Needs    Financial resource strain: Not on file    Food insecurity     Worry: Not on file     Inability: Not on file    Transportation needs     Medical: Not on file     Non-medical: Not on file   Tobacco Use    Smoking status: Former Smoker     Packs/day: 1.00     Years: 47.00     Pack years: 47.00     Types: Cigarettes     Start date: 1961     Quit date: 2008     Years since quittin.6    Smokeless tobacco: Never Used   Substance and Sexual Activity    Alcohol use: Not Currently     Alcohol/week: 0.0 standard drinks     Comment: Socially.    Drug use: Not Currently    Sexual activity: Not Currently   Lifestyle    Physical activity     Days per week: Not on file     Minutes per session: Not on file    Stress: Not on file   Relationships    Social connections     Talks on phone: Not on file     Gets together: Not on file     Attends Shinto service: Not on file     Active member of club or organization: Not on file     Attends meetings of clubs or organizations: Not on file     Relationship status: Not on file   Other Topics Concern    Not on file   Social History Narrative    Not on file       REVIEW OF SYSTEMS:  Fatigue of the upper extremities and pain have resolved since angioplasty and stenting of the innominate artery.  She still has some dizziness.  Overall she is feeling much better.  She did have some shortness of breath.        PHYSICAL EXAM:  Physical Exam  Vitals signs and nursing note reviewed.    Constitutional:       Appearance: Normal appearance.   HENT:      Head: Normocephalic and atraumatic.   Eyes:      General: No scleral icterus.     Extraocular Movements: Extraocular movements intact.      Conjunctiva/sclera: Conjunctivae normal.      Pupils: Pupils are equal, round, and reactive to light.   Neck:      Musculoskeletal: Normal range of motion and neck supple. No neck rigidity or muscular tenderness.   Cardiovascular:      Rate and Rhythm: Normal rate.      Comments: She now has palpable right brachial and radial pulses which were not present prior to intervention on the innominate artery  Pulmonary:      Effort: Pulmonary effort is normal. No respiratory distress.      Breath sounds: No stridor.   Abdominal:      General: There is no distension.      Palpations: Abdomen is soft.      Tenderness: There is no abdominal tenderness. There is no rebound.   Musculoskeletal:      Right lower leg: No edema.      Left lower leg: No edema.   Skin:     General: Skin is warm.   Neurological:      General: No focal deficit present.      Mental Status: She is alert and oriented to person, place, and time.      Cranial Nerves: No cranial nerve deficit.      Sensory: No sensory deficit.      Motor: No weakness.      Coordination: Coordination normal.      Gait: Gait normal.         Vitals:    09/03/20 1420   BP: 115/71   Pulse: 96     Date of Service: 08/11/2020  DATE OF PROCEDURE:  08/11/2020     PREOPERATIVE DIAGNOSES:  1.  High-grade stenosis of the innominate artery.  2.  High-grade stenosis of the left internal carotid artery.  3.  Status post left carotid endarterectomy.  4.  Status post left carotid stent.  5.  High-grade stenosis of the left subclavian artery.  6.  Pain of the left upper extremity.  7.  Dizziness.     POSTOPERATIVE DIAGNOSES:  1.  High-grade stenosis of the innominate artery.  2.  High-grade stenosis of the left internal carotid artery.  3.  Status post left carotid endarterectomy.  4.   Status post left carotid stent.  5.  High-grade stenosis of the left subclavian artery.  6.  Pain of the left upper extremity.  7.  Dizziness.     OPERATIONS:  1.  Arch aortogram.  2.  Selective left common carotid arteriogram.  3.  Selective innominate arteriogram.  4.  Selective right subclavian arteriogram.  5.  Selective right common carotid arteriogram with arteriogram of the right   internal and external carotid arteries.  6.  Right intracerebral arteriogram.  7.  Left intracerebral arteriogram.  8.  Angioplasty of the right innominate artery.  9.  Placement of spider basket in the right common carotid artery.  10.  Placement of stent into the right innominate artery.     SURGEON:  Antony Celestin M.D., F.A.C.S.     ASSISTANT:  Milton Clarke M.D.     ANESTHESIA:  Xylocaine 1% for local and intravenous sedation using 8 mg of   Versed and 225 mcg of fentanyl IV.  The patient's level of consciousness was   monitored by the registered nurse from 9:08 to 11:54 a.m.  Other monitors   included blood pressure, pulse oximetry, heart rate and respiratory rate.     INDICATIONS:  The patient is a 75-year-old female who underwent left carotid   endarterectomy in [place] several years ago.  During the operation, it was   noted that the plaque had built up very high.  There was significant difficulty   during the operation.  Postoperatively, the patient had some neurologic   problems.  She subsequently was taken to the Cath Lab and had a left carotid   stent placed.  The patient now has a high-grade stenosis of the left internal   carotid artery with a kink in the left carotid artery and in the stent.  The   patient also has either a high-grade stenosis or occlusion of the left   subclavian artery.  Her right brachial blood pressure is significantly decreased   also and she has been noted to have high-grade stenosis of the innominate   artery on imaging studies.  The patient has pain of the left upper extremity and   also  has dizziness and other symptoms consistent with vertebrobasilar   insufficiency.     PROCEDURE IN DETAIL:  With the patient in the supine position on the cardiac   cath table, bilateral groins were prepped and draped in a sterile fashion.  The   right groin was infiltrated with 1% lidocaine and access to the right common   femoral artery was obtained using an 18-gauge access needle.  A guidewire was   passed through the needle and then a 6-[place] sheath was passed over the   guidewire and the guidewire and dilator were removed and sheath was aspirated   and flushed.  A J-tipped guidewire was passed through the sheath and under   fluoroscopy, advanced up into the ascending aorta.  The pigtail catheter was   passed over the guidewire and positioned into the distal ascending aorta.    Contrast was injected and cineangiography was performed.  An arch aortogram with   arteriogram of the supra-aortic trunks was performed.  The angiographic   catheter was removed and replaced with a multipurpose catheter.  I attempted to   cross the occlusion of the left subclavian artery without success.  I then   accessed the left common carotid artery and performed a selective left common   carotid arteriogram with arteriogram of the left internal and external carotid   arteries.  I then performed an intracranial arteriogram of the left side with   multiple projections.  The patient had a very high-grade stenosis of the   proximal innominate artery.  This was crossed with BAV selective catheter and a   selective innominate arteriogram was performed.  I then selected out the right   subclavian artery and a selective right subclavian arteriogram was performed.    The right common carotid artery was then selected out and a selective right   common carotid arteriogram with arteriogram of the right internal carotid and   external carotid arteries was performed.  I then performed a right intracerebral   arteriogram with multiple projections.   A spider basket was then placed in the   right common carotid artery.  Angioplasty of the innominate artery was performed   using a 5 mm x 40 mm angioplasty balloon.  I then stented the right innominate   artery with an 8 mm x 27 mm balloon expandable stent and this was postdilated   with 9 mm x 20 mm angioplasty balloon.  Completion angiogram showed excellent   result with no residual stenosis of the innominate artery.  Guidewires,   catheters and sheaths were removed.  Pressure was held over the right common   femoral artery.  Sterile dressing was placed.  The patient tolerated the   procedure and left the Cardiac Cath Lab in satisfactory and stable condition.     FINDINGS:  1.  The aortic arch had plaque, but no significant stenosis.  2.  The left subclavian artery was occluded at the ostium.  3.  The proximal left common carotid artery had an approximately 30% to 40%   stenosis.  Pressure gradients were measured across the stenosis and there was no   significant gradient.  4.  There was a stent in the left internal carotid artery, which was kinked and   there was greater than 95% in-stent restenosis.  5.  Left intracranial arteriogram showed no significant stenosis, aneurysm, or   tumor blush.  6.  The innominate artery had a greater than 95% stenosis proximally.  7.  The right subclavian artery was widely patent.  8.  The right common carotid artery was patent without any significant stenosis.  9.  The right external carotid artery was patent.  10.  The proximal right internal carotid artery had a calcified plaque resulting   in approximately 30% to 40% stenosis.  11.  Right intracranial arteriogram showed no intracranial stenosis, aneurysm,   or tumor blush.  12.  Bilateral vertebral arteries were not visualized on any injections.  13.  The basilar artery was visualized and filled from collaterals coming from   the thyrocervical trunks.  14.  The innominate artery was successfully stented as described above  with no   residual stenosis.     The patient tolerated the procedure and left the Cardiac Cath Lab in   satisfactory and stable condition.        CAPRICE/PK  dd: 09/02/2020 22:00:42 (CDT)  td: 09/02/2020 22:45:23 (CDT)  Doc ID       IMPRESSION:  IMPRESSION:  1.  Peripheral arterial occlusive disease  2.  Status post angioplasty and stenting of the innominate artery  3. Status post left carotid endarterectomy  4. Status post stenting of the left carotid artery  5.   Moderate stenosis of the origin of the left common carotid artery  6.   Asymptomatic moderate to high-grade InStent restenoses of the left internal carotid artery  7.   Occluded left subclavian artery  8.  Occluded bilateral vertebral arteries  9. Status post stenting of the renal artery  10.  Hypertension  11.  Hypercholesterolemia      RECOMMENDATIONS:  Patient is extremely happy with her results so far.  She states that she can use bilateral upper extremities without the pain and fatigue that she was experiencing earlier.  It is possible that by opening up the innominate artery there are collaterals filling above that little level that we are taking more blood over to the left side.  I will discuss the status of the left internal carotid artery stent with Dr. Clarke although he had previously recommended treating it medically.        Antony Celestin MD

## 2020-09-18 DIAGNOSIS — R10.9 STOMACH ACHE: Primary | ICD-10-CM

## 2020-09-24 ENCOUNTER — TELEPHONE (OUTPATIENT)
Dept: CARDIOLOGY | Facility: CLINIC | Age: 75
End: 2020-09-24

## 2020-09-24 NOTE — TELEPHONE ENCOUNTER
Left vm that appointment did not transfer to epic from [facility] and need to r/s to a different day if no sx.

## 2020-09-25 ENCOUNTER — TELEPHONE (OUTPATIENT)
Dept: CARDIOLOGY | Facility: CLINIC | Age: 75
End: 2020-09-25

## 2020-09-25 ENCOUNTER — HOSPITAL ENCOUNTER (OUTPATIENT)
Dept: RADIOLOGY | Facility: HOSPITAL | Age: 75
Discharge: HOME | End: 2020-09-25
Attending: SPECIALIST
Payer: MEDICARE

## 2020-09-25 DIAGNOSIS — R10.9 STOMACH ACHE: ICD-10-CM

## 2020-09-25 PROCEDURE — 76700 US EXAM ABDOM COMPLETE: CPT | Mod: TC,PO

## 2020-09-25 NOTE — TELEPHONE ENCOUNTER
Left vm of appointment that was made for  10/16. Informed pt if that doesn't work give us callback.

## 2020-09-25 NOTE — TELEPHONE ENCOUNTER
----- Message from Eliza Vega sent at 9/25/2020  9:36 AM CDT -----  Regarding: Appt  Pt called to schedule an appt. She stated she spoke with you regarding some appts being lost. She wants to see him before 10//21 due to her having surgery in August. She was suppose to follow up with him after her surgery but unable to schedule an appt. Please contact pt regarding this.  [phone]

## 2020-09-28 ENCOUNTER — OUTSIDE PLACE OF SERVICE (OUTPATIENT)
Dept: CARDIOLOGY | Facility: CLINIC | Age: 75
End: 2020-09-28
Payer: MEDICARE

## 2020-09-28 PROCEDURE — 99232 SBSQ HOSP IP/OBS MODERATE 35: CPT | Mod: ,,, | Performed by: INTERNAL MEDICINE

## 2020-09-28 PROCEDURE — 99232 PR SUBSEQUENT HOSPITAL CARE,LEVL II: ICD-10-PCS | Mod: ,,, | Performed by: INTERNAL MEDICINE

## 2020-09-29 ENCOUNTER — OUTSIDE PLACE OF SERVICE (OUTPATIENT)
Dept: CARDIOLOGY | Facility: CLINIC | Age: 75
End: 2020-09-29
Payer: MEDICARE

## 2020-09-29 PROCEDURE — 99232 PR SUBSEQUENT HOSPITAL CARE,LEVL II: ICD-10-PCS | Mod: ,,, | Performed by: INTERNAL MEDICINE

## 2020-09-29 PROCEDURE — 99232 SBSQ HOSP IP/OBS MODERATE 35: CPT | Mod: ,,, | Performed by: INTERNAL MEDICINE

## 2020-09-30 ENCOUNTER — OUTSIDE PLACE OF SERVICE (OUTPATIENT)
Dept: CARDIOLOGY | Facility: CLINIC | Age: 75
End: 2020-09-30

## 2020-09-30 ENCOUNTER — OUTSIDE PLACE OF SERVICE (OUTPATIENT)
Dept: CARDIOLOGY | Facility: CLINIC | Age: 75
End: 2020-09-30
Payer: MEDICARE

## 2020-09-30 PROCEDURE — 99232 SBSQ HOSP IP/OBS MODERATE 35: CPT | Mod: ,,, | Performed by: INTERNAL MEDICINE

## 2020-09-30 PROCEDURE — 99232 PR SUBSEQUENT HOSPITAL CARE,LEVL II: ICD-10-PCS | Mod: ,,, | Performed by: INTERNAL MEDICINE

## 2020-09-30 PROCEDURE — 93976 PR DUPLEX ABD/PEL VASC STUDY,LIMITD: ICD-10-PCS | Mod: 26,,, | Performed by: INTERNAL MEDICINE

## 2020-09-30 PROCEDURE — 93976 VASCULAR STUDY: CPT | Mod: 26,,, | Performed by: INTERNAL MEDICINE

## 2020-10-02 ENCOUNTER — TELEPHONE (OUTPATIENT)
Dept: CARDIOLOGY | Facility: CLINIC | Age: 75
End: 2020-10-02

## 2020-10-02 NOTE — TELEPHONE ENCOUNTER
PLEASE CALL PT DTR [phone]  - PT WAS IN THE HOSP AND NEEDS F/U APPT WITH DR. LANGLEY SOME TIME NEXT WEEK.    THE PHONE ROOM SENT MESSAGE TO CARDIOLOGY IN [place].      KIM

## 2020-10-02 NOTE — TELEPHONE ENCOUNTER
Left a message.    ----- Message from Antonia Oseguera LPN sent at 10/2/2020  2:22 PM CDT -----    ----- Message -----  From: Ha Nance  Sent: 10/2/2020  10:29 AM CDT  To: [facility] Staff    Name of Who is Calling: ELIA OTTO [[id number]]    What is the request in detail: Pt is requesting a call back regards to scheduling appt ...Please contact to further discuss and advise      Can the clinic reply by [facility]: No     [facility] Back [facility]  [phone] (home) [phone] (work)

## 2020-10-02 NOTE — TELEPHONE ENCOUNTER
----- Message from Ha Nance sent at 10/2/2020 10:29 AM CDT -----  Name of Who is Calling: ELIA OTTO [[id number]]    What is the request in detail: Pt is requesting a call back regards to scheduling appt ...Please contact to further discuss and advise      Can the clinic reply by [facility]: No     What Number to Call Back if not in [facility]:  [phone] (home) [phone] (work)

## 2020-10-03 PROBLEM — R10.9 FLANK PAIN: Status: ACTIVE | Noted: 2020-10-03

## 2020-10-03 PROBLEM — I50.42 CHRONIC COMBINED SYSTOLIC AND DIASTOLIC HEART FAILURE: Status: ACTIVE | Noted: 2020-10-03

## 2020-10-03 PROBLEM — N13.8 URINARY TRACT OBSTRUCTION DUE TO KIDNEY STONE: Status: ACTIVE | Noted: 2020-10-03

## 2020-10-03 PROBLEM — N20.0 URINARY TRACT OBSTRUCTION DUE TO KIDNEY STONE: Status: ACTIVE | Noted: 2020-10-03

## 2020-10-07 PROBLEM — I47.20 VENTRICULAR TACHYCARDIA: Status: RESOLVED | Noted: 2020-08-11 | Resolved: 2020-10-07

## 2020-10-07 PROBLEM — K92.2 ACUTE LOWER GI BLEEDING: Status: ACTIVE | Noted: 2020-10-07

## 2020-10-12 ENCOUNTER — TELEPHONE (OUTPATIENT)
Dept: UROLOGY | Facility: CLINIC | Age: 75
End: 2020-10-12

## 2020-10-12 ENCOUNTER — LAB VISIT (OUTPATIENT)
Dept: LAB | Facility: HOSPITAL | Age: 75
End: 2020-10-12
Attending: INTERNAL MEDICINE
Payer: MEDICARE

## 2020-10-12 ENCOUNTER — TELEPHONE (OUTPATIENT)
Dept: CARDIOLOGY | Facility: CLINIC | Age: 75
End: 2020-10-12

## 2020-10-12 DIAGNOSIS — D64.9 ANEMIA, UNSPECIFIED: Primary | ICD-10-CM

## 2020-10-12 DIAGNOSIS — K92.9 DISEASE OF DIGESTIVE SYSTEM: ICD-10-CM

## 2020-10-12 LAB
ANISOCYTOSIS BLD QL SMEAR: ABNORMAL
BASOPHILS # BLD AUTO: 0.1 K/UL (ref 0–0.2)
BASOPHILS NFR BLD: 0.8 % (ref 0–1.9)
DACRYOCYTES BLD QL SMEAR: ABNORMAL
DIFFERENTIAL METHOD: ABNORMAL
EOSINOPHIL # BLD AUTO: 0.2 K/UL (ref 0–0.5)
EOSINOPHIL NFR BLD: 1.2 % (ref 0–8)
ERYTHROCYTE [DISTWIDTH] IN BLOOD BY AUTOMATED COUNT: 28.5 % (ref 11.5–14.5)
HCT VFR BLD AUTO: 34.3 % (ref 37–48.5)
HGB BLD-MCNC: 9.5 G/DL (ref 12–16)
IMM GRANULOCYTES # BLD AUTO: 0.06 K/UL (ref 0–0.04)
IMM GRANULOCYTES NFR BLD AUTO: 0.5 % (ref 0–0.5)
LYMPHOCYTES # BLD AUTO: 1.5 K/UL (ref 1–4.8)
LYMPHOCYTES NFR BLD: 11.8 % (ref 18–48)
MCH RBC QN AUTO: 21.2 PG (ref 27–31)
MCHC RBC AUTO-ENTMCNC: 27.7 G/DL (ref 32–36)
MCV RBC AUTO: 77 FL (ref 82–98)
MONOCYTES # BLD AUTO: 1.2 K/UL (ref 0.3–1)
MONOCYTES NFR BLD: 9.2 % (ref 4–15)
NEUTROPHILS # BLD AUTO: 9.8 K/UL (ref 1.8–7.7)
NEUTROPHILS NFR BLD: 76.5 % (ref 38–73)
NRBC BLD-RTO: 0 /100 WBC
OVALOCYTES BLD QL SMEAR: ABNORMAL
PLATELET # BLD AUTO: 649 K/UL (ref 150–350)
PLATELET BLD QL SMEAR: ABNORMAL
PMV BLD AUTO: 10.8 FL (ref 9.2–12.9)
POIKILOCYTOSIS BLD QL SMEAR: SLIGHT
POLYCHROMASIA BLD QL SMEAR: ABNORMAL
RBC # BLD AUTO: 4.48 M/UL (ref 4–5.4)
WBC # BLD AUTO: 12.81 K/UL (ref 3.9–12.7)

## 2020-10-12 PROCEDURE — 85025 COMPLETE CBC W/AUTO DIFF WBC: CPT

## 2020-10-12 PROCEDURE — 36415 COLL VENOUS BLD VENIPUNCTURE: CPT

## 2020-10-12 NOTE — TELEPHONE ENCOUNTER
----- Message from Amanda Clark sent at 10/12/2020 11:46 AM CDT -----  Regarding: sooner appointment  Contact: Patient/[phone]  Type:  Sooner Apoointment Request    Caller is requesting a sooner appointment.  Caller declined first available appointment listed below.  Caller will not accept being placed on the waitlist and is requesting a message be sent to doctor.    Name of Caller:  Patient/[phone]  When is the first available appointment?  11/3/20  Symptoms:  Post op    Additional Information: The doctor wanted to see patient on 10/15/20 for a post op. Please call to advise.

## 2020-10-16 ENCOUNTER — OFFICE VISIT (OUTPATIENT)
Dept: UROLOGY | Facility: CLINIC | Age: 75
End: 2020-10-16
Payer: MEDICARE

## 2020-10-16 VITALS
SYSTOLIC BLOOD PRESSURE: 104 MMHG | HEIGHT: 65 IN | DIASTOLIC BLOOD PRESSURE: 72 MMHG | BODY MASS INDEX: 29.2 KG/M2 | WEIGHT: 175.25 LBS | HEART RATE: 98 BPM

## 2020-10-16 DIAGNOSIS — N13.5 OBSTRUCTION OF LEFT URETER: ICD-10-CM

## 2020-10-16 DIAGNOSIS — N20.1 URETERAL STONE: Primary | ICD-10-CM

## 2020-10-16 PROCEDURE — 1101F PT FALLS ASSESS-DOCD LE1/YR: CPT | Mod: CPTII,S$GLB,, | Performed by: UROLOGY

## 2020-10-16 PROCEDURE — 99214 OFFICE O/P EST MOD 30 MIN: CPT | Mod: S$GLB,,, | Performed by: UROLOGY

## 2020-10-16 PROCEDURE — 1126F PR PAIN SEVERITY QUANTIFIED, NO PAIN PRESENT: ICD-10-PCS | Mod: S$GLB,,, | Performed by: UROLOGY

## 2020-10-16 PROCEDURE — 3074F PR MOST RECENT SYSTOLIC BLOOD PRESSURE < 130 MM HG: ICD-10-PCS | Mod: CPTII,S$GLB,, | Performed by: UROLOGY

## 2020-10-16 PROCEDURE — 1159F MED LIST DOCD IN RCRD: CPT | Mod: S$GLB,,, | Performed by: UROLOGY

## 2020-10-16 PROCEDURE — 1101F PR PT FALLS ASSESS DOC 0-1 FALLS W/OUT INJ PAST YR: ICD-10-PCS | Mod: CPTII,S$GLB,, | Performed by: UROLOGY

## 2020-10-16 PROCEDURE — 1126F AMNT PAIN NOTED NONE PRSNT: CPT | Mod: S$GLB,,, | Performed by: UROLOGY

## 2020-10-16 PROCEDURE — 3078F PR MOST RECENT DIASTOLIC BLOOD PRESSURE < 80 MM HG: ICD-10-PCS | Mod: CPTII,S$GLB,, | Performed by: UROLOGY

## 2020-10-16 PROCEDURE — 99999 PR PBB SHADOW E&M-EST. PATIENT-LVL III: CPT | Mod: PBBFAC,,, | Performed by: UROLOGY

## 2020-10-16 PROCEDURE — 99214 PR OFFICE/OUTPT VISIT, EST, LEVL IV, 30-39 MIN: ICD-10-PCS | Mod: S$GLB,,, | Performed by: UROLOGY

## 2020-10-16 PROCEDURE — 99999 PR PBB SHADOW E&M-EST. PATIENT-LVL III: ICD-10-PCS | Mod: PBBFAC,,, | Performed by: UROLOGY

## 2020-10-16 PROCEDURE — 3078F DIAST BP <80 MM HG: CPT | Mod: CPTII,S$GLB,, | Performed by: UROLOGY

## 2020-10-16 PROCEDURE — 1159F PR MEDICATION LIST DOCUMENTED IN MEDICAL RECORD: ICD-10-PCS | Mod: S$GLB,,, | Performed by: UROLOGY

## 2020-10-16 PROCEDURE — 3074F SYST BP LT 130 MM HG: CPT | Mod: CPTII,S$GLB,, | Performed by: UROLOGY

## 2020-10-16 NOTE — PROGRESS NOTES
UROLOGY [place]  10 16 20    Cc visit after hospital treatment    Age 75, here with daughter Nica. Pt had a laser lithotripsy by her urologist at [facility] and a stent was placed. The stent came out 4 d later, and after that she started to have severe pain and signs of acute pyelonephritis. She came to [facility], as she was visiting her daughter, who lives in [place]. At the ER a CT scan showed some stone remnants in the lower pole of the L kidney, and a 5-cm steinstrasse formed in the distal L ureter. She developed acute pyelonephritis, with fever, leukocytosis and pyuria. A stent was placed as an emergency, and purulent material was immediately seen to exit the stent as soon as it was positioned. Pt was kept on iv antibiotics.     Pt has felt well since she left the hospital. She was on oral antibiotics but is now finished.     IMP  Severe nephrolithiasis  Ureteral obstruction, now with double J stent in place  Acute pyelonephritis, now resolved  Plan: cystoscopy, removal of stent, L ureteroscopy, holmium laser lithotripsy of distal L ureteral stones. Possible use of another stent. Do not plan to do ureterorenoscopy and extract stone material.        Pt and daughter counseled 35 min  Over 50% of time in counseling

## 2020-10-18 ENCOUNTER — PATIENT MESSAGE (OUTPATIENT)
Dept: CARDIOLOGY | Facility: CLINIC | Age: 75
End: 2020-10-18

## 2020-10-19 ENCOUNTER — TELEPHONE (OUTPATIENT)
Dept: UROLOGY | Facility: CLINIC | Age: 75
End: 2020-10-19

## 2020-10-19 NOTE — TELEPHONE ENCOUNTER
----- Message from Delmer Torres sent at 10/19/2020  9:16 AM CDT -----  Regarding: covid test at wrong location        The Pt states that she was scheduled for her Covid test on [facility] and [place]    Phone # [phone]

## 2020-10-19 NOTE — TELEPHONE ENCOUNTER
Changed covid appt for araceli. Patient going to see cardiologist on [facility] on 10/30. Gave fax number so that they can provide statement on cardiac clearance.

## 2020-10-20 ENCOUNTER — PATIENT MESSAGE (OUTPATIENT)
Dept: UROLOGY | Facility: CLINIC | Age: 75
End: 2020-10-20

## 2020-10-20 DIAGNOSIS — N30.01 ACUTE CYSTITIS WITH HEMATURIA: Primary | ICD-10-CM

## 2020-10-20 RX ORDER — NITROFURANTOIN (MACROCRYSTALS) 100 MG/1
100 CAPSULE ORAL EVERY 12 HOURS
Qty: 14 CAPSULE | Refills: 0 | Status: SHIPPED | OUTPATIENT
Start: 2020-10-20 | End: 2020-10-27

## 2020-10-20 NOTE — TELEPHONE ENCOUNTER
Patient states she has not urinated since this morning. Advised patient to go try to urinate. Patient was successful and stated it just burned. Advised antibiotics were called in, also advised to try AZO for symptoms. Pt expressed understanding

## 2020-10-21 ENCOUNTER — PATIENT MESSAGE (OUTPATIENT)
Dept: GASTROENTEROLOGY | Facility: CLINIC | Age: 75
End: 2020-10-21

## 2020-10-21 ENCOUNTER — OFFICE VISIT (OUTPATIENT)
Dept: GASTROENTEROLOGY | Facility: CLINIC | Age: 75
End: 2020-10-21
Payer: MEDICARE

## 2020-10-21 VITALS — RESPIRATION RATE: 19 BRPM | HEIGHT: 65 IN | WEIGHT: 174.38 LBS | BODY MASS INDEX: 29.05 KG/M2

## 2020-10-21 DIAGNOSIS — R19.5 OCCULT BLOOD POSITIVE STOOL: ICD-10-CM

## 2020-10-21 DIAGNOSIS — R74.01 ELEVATED AST (SGOT): ICD-10-CM

## 2020-10-21 DIAGNOSIS — Z79.01 ANTICOAGULANT LONG-TERM USE: ICD-10-CM

## 2020-10-21 DIAGNOSIS — D50.9 MICROCYTIC ANEMIA: ICD-10-CM

## 2020-10-21 DIAGNOSIS — Z87.19 HISTORY OF GASTROESOPHAGEAL REFLUX (GERD): ICD-10-CM

## 2020-10-21 DIAGNOSIS — Z01.818 PREOP TESTING: ICD-10-CM

## 2020-10-21 DIAGNOSIS — D64.9 ANEMIA, UNSPECIFIED TYPE: Primary | ICD-10-CM

## 2020-10-21 DIAGNOSIS — K59.00 CONSTIPATION, UNSPECIFIED CONSTIPATION TYPE: ICD-10-CM

## 2020-10-21 DIAGNOSIS — Z09 HOSPITAL DISCHARGE FOLLOW-UP: Primary | ICD-10-CM

## 2020-10-21 PROCEDURE — 1101F PR PT FALLS ASSESS DOC 0-1 FALLS W/OUT INJ PAST YR: ICD-10-PCS | Mod: CPTII,S$GLB,, | Performed by: NURSE PRACTITIONER

## 2020-10-21 PROCEDURE — 1126F PR PAIN SEVERITY QUANTIFIED, NO PAIN PRESENT: ICD-10-PCS | Mod: S$GLB,,, | Performed by: NURSE PRACTITIONER

## 2020-10-21 PROCEDURE — 99999 PR PBB SHADOW E&M-EST. PATIENT-LVL IV: CPT | Mod: PBBFAC,,, | Performed by: NURSE PRACTITIONER

## 2020-10-21 PROCEDURE — 1101F PT FALLS ASSESS-DOCD LE1/YR: CPT | Mod: CPTII,S$GLB,, | Performed by: NURSE PRACTITIONER

## 2020-10-21 PROCEDURE — 1126F AMNT PAIN NOTED NONE PRSNT: CPT | Mod: S$GLB,,, | Performed by: NURSE PRACTITIONER

## 2020-10-21 PROCEDURE — 1159F MED LIST DOCD IN RCRD: CPT | Mod: S$GLB,,, | Performed by: NURSE PRACTITIONER

## 2020-10-21 PROCEDURE — 99999 PR PBB SHADOW E&M-EST. PATIENT-LVL IV: ICD-10-PCS | Mod: PBBFAC,,, | Performed by: NURSE PRACTITIONER

## 2020-10-21 PROCEDURE — 99214 OFFICE O/P EST MOD 30 MIN: CPT | Mod: S$GLB,,, | Performed by: NURSE PRACTITIONER

## 2020-10-21 PROCEDURE — 1159F PR MEDICATION LIST DOCUMENTED IN MEDICAL RECORD: ICD-10-PCS | Mod: S$GLB,,, | Performed by: NURSE PRACTITIONER

## 2020-10-21 PROCEDURE — 99214 PR OFFICE/OUTPT VISIT, EST, LEVL IV, 30-39 MIN: ICD-10-PCS | Mod: S$GLB,,, | Performed by: NURSE PRACTITIONER

## 2020-10-21 RX ORDER — PANTOPRAZOLE SODIUM 40 MG/1
40 TABLET, DELAYED RELEASE ORAL
Qty: 90 TABLET | Refills: 3 | Status: ON HOLD | OUTPATIENT
Start: 2020-10-21 | End: 2021-07-06 | Stop reason: HOSPADM

## 2020-10-21 RX ORDER — DOCUSATE SODIUM 100 MG/1
200 CAPSULE, LIQUID FILLED ORAL DAILY PRN
COMMUNITY
End: 2021-06-08

## 2020-10-21 NOTE — PATIENT INSTRUCTIONS
Anemia  Anemia is a condition that occurs when your body does not have enough healthy red blood cells (RBCs). RBCs are the parts of your blood that carry oxygen throughout your body. A protein called hemoglobin allows your RBCs to absorb and release oxygen. Without enough RBCs or hemoglobin, your body doesn't get enough oxygen. Symptoms of anemia may then occur.    What are the symptoms of anemia?  Some people with anemia have no symptoms. But most people have symptoms that range from mild to severe. These can include:  · Tiredness (fatigue)  · Weakness  · Pale skin  · Shortness of breath  · Dizziness or fainting  · Rapid heartbeat  · Trouble doing normal amounts of activity  · Jaundice (yellowing of your eyes, skin, or mouth; dark urine)  What causes anemia?  Anemia can occur when your body:  · Loses too much blood  · Does not make enough RBCs  · Destroys your RBCs at a faster rate than it can replace them  · Does not make a normal amount of hemoglobin in your RBCs  These problems can occur for many reasons, including:  · A condition that you are born with (congenital or inherited), such as sickle cell disease or thalassemia  · Heavy bleeding for any reason, including injury, surgery, childbirth, or even heavy menstrual periods  · Being low in certain nutrients, such as iron, folate, or vitamin B12, possibly from a poor diet or a condition like celiac disease or Crohn's disease  · Certain chronic conditions like diabetes, arthritis, or kidney disease  · Certain chronic infections like tuberculosis or HIV  · Exposure to certain medicines, such as those used for chemotherapy  There are different types of anemia. Your healthcare provider can tell you more about the type of anemia you have and what may have caused it.  How is anemia diagnosed?  To diagnose anemia, your healthcare provider orders blood tests. These can include:  · Complete blood cell count (CBC). This test measures the amounts of the different types  of blood cells.  · Blood smear. This test checks the size and shape of your blood cells. To do the test, a drop of your blood is viewed under a microscope. A stain is used to make the blood cells easier to see.  · Iron studies. These tests measure the amount of iron in your blood. Your body needs iron to make hemoglobin in your RBCs.  · Vitamin B12 and folate studies. These tests check for some of the components that help give RBCs a normal size and shape.  · Reticulocyte count. This test measures the amount of new RBCs that your bone marrow makes.  · Hemoglobin electrophoresis. This test checks for problems with your hemoglobin in RBCs.  How is anemia treated?  Treatment for anemia is based on the type of anemia, its cause, and the severity of your symptoms. Treatments may include:  · Diet changes. This involves increasing the amount of certain nutrients in your diet, such as iron, vitamin B12, or folate. Your healthcare provider may also prescribe nutrient supplements.  · Medicines. Certain medicines treat the cause of your anemia. Others help build new RBCs or relieve symptoms. If a medicine is the cause of your anemia, you may need to stop or change it.  · Blood transfusions. Replacing some of your blood can increase the number of healthy RBCs in your body.  · Surgery. In some cases, your doctor may do surgery to treat the underlying cause of anemia. If you need surgery, your healthcare provider will explain the procedure and outline the risks and benefits for you.  What are the long-term concerns?  If you have a certain type of anemia, you can expect a full recovery after treatment. If you have other types of anemia (especially a type you're born with), you will need to manage it for life. Your doctor can tell you more.  Date Last Reviewed: 12/1/2016  © 6873-1960 [employer]. [address] [place] [address], [place] [address]. All rights reserved. This information is not intended as a substitute for  professional medical care. Always follow your healthcare professional's instructions.          GERD (Adult)    The esophagus is a tube that carries food from the mouth to the stomach. A valve at the lower end of the esophagus prevents stomach acid from flowing upward. When this valve doesn't work properly, stomach contents may repeatedly flow back up (reflux) into the esophagus. This is called gastroesophageal reflux disease (GERD). GERD can irritate the esophagus. It can cause problems with swallowing or breathing. In severe cases, GERD can cause recurrent pneumonia or other serious problems.  Symptoms of reflux include burning, pressure or sharp pain in the upper abdomen or mid to lower chest. The pain can spread to the neck, back, or shoulder. There may be belching, an acid taste in the back of the throat, chronic cough, or sore throat or hoarseness. GERD symptoms often occur during the day after a big meal. They can also occur at night when lying down.   Home care  Lifestyle changes can help reduce symptoms. If needed, medicines may be prescribed. Symptoms often improve with treatment, but if treatment is stopped, the symptoms often return after a few months. So most persons with GERD will need to continue treatment.  Lifestyle changes  · Limit or avoid fatty, fried, and spicy foods, as well as coffee, chocolate, mint, and foods with high acid content such as tomatoes and citrus fruit and juices (orange, grapefruit, lemon).  · Dont eat large meals, especially at night. Frequent, smaller meals are best. Do not lie down right after eating. And dont eat anything 3 hours before going to bed.  · Avoid drinking alcohol and smoking. As much as possible, stay away from second hand smoke.  · If you are overweight, losing weight will reduce symptoms.   · Avoid wearing tight clothing around your stomach area.  · If your symptoms occur during sleep, use a foam wedge to elevate your upper body (not just your head.) Or,  "place 4" blocks under the head of your bed.  Medicines  If needed, medicines can help relieve the symptoms of GERD and prevent damage to the esophagus. Discuss a medicine plan with your healthcare provider. This may include one or more of the following medicines:  · Antacids to help neutralize the normal acids in your stomach.  · Acid blockers (H2 blockers) to decrease acid production.  · Acid inhibitors (PPIs) to decrease acid production in a different way than the blockers. They may work better, but can take a little longer to take effect.  Take an antacid 30-60 minutes after eating and at bedtime, but not at the same time as an acid blocker.  Try not to take medicines such as ibuprofen and aspirin. If you are taking aspirin for your heart or other medical reasons, talk to your healthcare provider about stopping it.  Follow-up care  Follow up with your healthcare provider or as advised by our staff.  When to seek medical advice  Call your healthcare provider if any of the following occur:  · Stomach pain gets worse or moves to the lower right abdomen (appendix area)  · Chest pain appears or gets worse, or spreads to the back, neck, shoulder, or arm  · Frequent vomiting (cant keep down liquids)  · Blood in the stool or vomit (red or black in color)  · Feeling weak or dizzy  · Fever of 100.4ºF (38ºC) or higher, or as directed by your healthcare provider  Date Last Reviewed: 6/23/2015  © 5087-3391 [employer]. [address] [place] [id number]. All rights reserved. This information is not intended as a substitute for professional medical care. Always follow your healthcare professional's instructions.          Constipation (Adult)  Constipation means that you have bowel movements that are less frequent than usual. Stools often become very hard and difficult to pass.  Constipation is very common. At some point in life it affects almost everyone. Since everyone's bowel habits are different, what " is constipation to one person may not be to another. Your healthcare provider may do tests to diagnose constipation. It depends on what he or she finds when evaluating you.    Symptoms of constipation include:  · Abdominal pain  · Bloating  · Vomiting  · Painful bowel movements  · Itching, swelling, bleeding, or pain around the anus  Causes  Constipation can have many causes. These include:  · Diet low in fiber  · Too much dairy  · Not drinking enough liquids  · Lack of exercise or physical activity. This is especially true for older adults.  · Changes in lifestyle or daily routine, including pregnancy, aging, work, and travel  · Frequent use or misuse of laxatives  · Ignoring the urge to have a bowel movement or delaying it until later  · Medicines, such as certain prescription pain medicines, iron supplements, antacids, certain antidepressants, and calcium supplements  · Diseases like irritable bowel syndrome, bowel obstructions, stroke, diabetes, thyroid disease, Parkinson disease, hemorrhoids, and colon cancer  Complications  Potential complications of constipation can include:  · Hemorrhoids  · Rectal bleeding from hemorrhoids or anal fissures (skin tears)  · Hernias  · Dependency on laxatives  · Chronic constipation  · Fecal impaction  · Bowel obstruction or perforation  Home care  All treatment should be done after talking with your healthcare provider. This is especially true if you have another medical problems, are taking prescription medicines, or are an older adult. Treatment most often involves lifestyle changes. You may also need medicines. Your healthcare provider will tell you which will work best for you. Follow the advice below to help avoid this problem in the future.  Lifestyle changes  These lifestyle changes can help prevent constipation:  · Diet. Eat a high-fiber diet, with fresh fruit and vegetables, and reduce dairy intake, meats, and processed foods  · Fluids. It's important to get enough  fluids each day. Drink plenty of water when you eat more fiber. If you are on diet that limits the amount of fluid you can have, talk about this with your healthcare provider.  · Regular exercise. Check with your healthcare provider first.  Medications  Take any medicines as directed. Some laxatives are safe to use only every now and then. Others can be taken on a regular basis. Talk with your doctor or pharmacist if you have questions.  Prescription pain medicines can cause constipation. If you are taking this kind of medicine, ask your healthcare provider if you should also take a stool softener.  Medicines you may take to treat constipation include:  · Fiber supplements  · Stool softeners  · Laxatives  · Enemas  · Rectal suppositories  Follow-up care  Follow up with your healthcare provider if symptoms don't get better in the next few days. You may need to have more tests or see a specialist.  Call 911  Call 911 if any of these occur:  · Trouble breathing  · Stiff, rigid abdomen that is severely painful to touch  · Confusion  · Fainting or loss of consciousness  · Rapid heart rate  · Chest pain  When to seek medical advice  Call your healthcare provider right away if any of these occur:  · Fever over 100.4°F (38°C)  · Failure to resume normal bowel movements  · Pain in your abdomen or back gets worse  · Nausea or vomiting  · Swelling in your abdomen  · Blood in the stool  · Black, tarry stool  · Involuntary weight loss  · Weakness  Date Last Reviewed: 12/30/2015  © 3142-8844 [employer]. [address] [place] [address], [place] [address]. All rights reserved. This information is not intended as a substitute for professional medical care. Always follow your healthcare professional's instructions.

## 2020-10-21 NOTE — PROGRESS NOTES
"Subjective:       Patient ID: Albina Armenta is a 75 y.o. female Body mass index is 29.02 kg/m².    Chief Complaint: Other (Hospital follow up)    This patient is new to me.  Established patient of Dr. Choi.     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  Patient has a recently complicated past medical history.  She was admitted to [facility] in August and underwent an angiogram due to imaging findings of her bilateral arterial insufficiency in her upper extremities.  She had stenting of her right innominate artery.  Since that time she has been on aspirin and Plavix.  Last month she was hospitalized at [facility] for a GI bleed.  She underwent upper and lower endoscopies and was found to have bleeding AVMs.  Also during that admission she was started on torsemide as well as an antihypertensive.  She also recently had a laser lithotripsy with stent placement by a urologist at [facility].  She had a stent removed a few days ago and subsequently developed abdominal pain.  She denies fever or dysuria.  In the emergency room she was noted to have multiple kidney stones with left hydronephrosis.  Urology was contacted with plans for cystoscopy later today.  Hospital Medicine consulted for admission.  Procedure(s) (LRB):  CYSTOSCOPY, WITH RETROGRADE PYELOGRAM AND URETERAL STENT INSERTION (Left)    Hospital Course:   She was admitted for further eval and treatment of pyelonephritis 2/2 left obstructive nephropathy with hydronephrosis. Urology consulted and she is s/p " "cystoscopy with double-J ureteral stent placement (Dr. Stanton). Plan follow-up in one week with stone extraction 10-14 days.  She was placed on empiric antibiotics which were tailored based on urine culture growing pseudomonas and enterococcus. She clinically improved. She developed bright red blood per rectum the night of 10/6.  GI consulted. She has known colonic angioectasias, seen on recent c-scope at [facility]. Conservative mgmt recommended for now, with plan for VCE if bleeding persists. She had no further hematochezia/melena, and H/H remained stable. She was started on iron supplementation per GI recs. DAPT continued due to recent stents.  Physical therapy evaluated patient and HH recommended home health.  She was discharged home in stable condition with HH PT/OT. All questions answered at bedside and she was discharged in stable condition."    Anemia  Presents for initial (initial) visit. Symptoms include malaise/fatigue and weight loss (lost ~10 lbs since 8/2020 due to decreased portion sizes and stopped eating sweets). There has been no abdominal pain, fever or pica. Signs of blood loss that are not present include hematemesis, hematochezia, melena and vaginal bleeding. Past medical history includes heart failure, recent illness and recent surgery (had heart stent placed in 8/2020). There is no history of cancer, chronic liver disease, chronic renal disease, hypothyroidism, inflammatory bowel disease or recent trauma. (GERD controlled with omeprazole 40 mg once daily (placed on by ENT)) Procedure history includes colonoscopy, EGD and FOBT.     Review of Systems   Constitutional: Positive for fatigue, malaise/fatigue and weight loss (lost ~10 lbs since 8/2020 due to decreased portion sizes and stopped eating sweets). Negative for appetite change, chills and fever.   HENT: Negative for sore throat and trouble swallowing.    Respiratory: Negative for cough, choking and shortness of breath.    Cardiovascular: " "Negative for chest pain.   Gastrointestinal: Positive for blood in stool (no visible blood lately, but positive stool study noted with recent admission) and constipation (since on iron supplement; bowel movements are once daily of soft stool with taking colace OTC 2 tablets a day). Negative for abdominal pain, anal bleeding, diarrhea, hematemesis, hematochezia, melena, nausea, rectal pain and vomiting.   Genitourinary: Negative for difficulty urinating, dysuria (on antibiotic for UTI, seeing urology for this), flank pain, hematuria and vaginal bleeding.   Neurological: Negative for weakness.       No LMP recorded (lmp unknown). Patient has had a hysterectomy.  Past Medical History:   Diagnosis Date    Anesthesia complication     "stays in my system for weeks, excesive weakness after, frequent falls"    Anxiety     Atherosclerosis of renal artery 8/10/2015    7/27/2015: Renal Duplex: Right: severe - 3.2 m/s. Left: about 60% stenosis - 1.7 m/s.    Carotid bruit 7/16/2015 2005: Carotid bruit heard.    Carotid stent occlusion     Coronary artery disease     CVA (cerebral vascular accident) 8/13/2019    GERD (gastroesophageal reflux disease)     History of stent insertion of renal artery     Hypercholesterolemia 7/16/2015 2005: Diagnosed. Started statin.    Hypertension, benign 7/16/2015 1995: Diagnosed.    Mild obesity 6/9/2017 6/9/2017: 86 kg. BMI 31.    Subclavian artery stenosis, left 8/10/2015    7/2015: Diagnosed. 7/23/2015: Carotid Duplex: LIZETH: moderate plaquing. LICA: moderate plaquing -1.4 m/s - 50-60%. Left vertebral: retrograde flow.     Past Surgical History:   Procedure Laterality Date    ABDOMINAL AORTOGRAPHY N/A 8/11/2020    Procedure: Aortogram, Abdominal;  Surgeon: Joana Celestin MD;  Location: [facility] CATH;  Service: Peripheral Vascular;  Laterality: N/A;    ANGIOGRAPHY OF UPPER EXTREMITY N/A 8/11/2020    Procedure: Angiogram Extremity Bilateral;  Surgeon: Joana Celestin MD;  " Location: [facility] CATH;  Service: Peripheral Vascular;  Laterality: N/A;    ANGIOPLASTY      renal    APPENDECTOMY Right 1959    CAROTID ENDARTERECTOMY Left     CAROTID STENT      8/2019    CARPAL TUNNEL RELEASE Right 1/13/2020    Procedure: RELEASE, CARPAL TUNNEL;  Surgeon: Mina Mahoney MD;  Location: [facility] OR;  Service: Orthopedics;  Laterality: Right;    COLONOSCOPY  09/2020    done at [facility]; AVMs in the colon which was treated per patient report    CYSTOSCOPY WITH URETEROSCOPY, RETROGRADE PYELOGRAPHY, AND INSERTION OF STENT Left 10/3/2020    Procedure: CYSTOSCOPY, WITH RETROGRADE PYELOGRAM AND URETERAL STENT INSERTION;  Surgeon: Tony Stanton MD;  Location: [facility] OR;  Service: Urology;  Laterality: Left;    HYSTERECTOMY      OOPHORECTOMY      OPEN REDUCTION AND INTERNAL FIXATION (ORIF) OF FRACTURE OF RADIUS Right 1/13/2020    Procedure: ORIF, FRACTURE, RADIUS;  Surgeon: Mina Mahoney MD;  Location: [facility] OR;  Service: Orthopedics;  Laterality: Right;  Accumed  pt on aspirin and plavix. Dr. Mahoney aware. Note in chart from cardiology on 1/7/2020    UPPER GASTROINTESTINAL ENDOSCOPY  09/2020    done at [facility], normal findings per patient report    VASCULAR SURGERY       Family History   Problem Relation Age of Onset    Hypertension Mother     Hypertension Father     Colon cancer Neg Hx     Crohn's disease Neg Hx     Ulcerative colitis Neg Hx     Stomach cancer Neg Hx     Esophageal cancer Neg Hx      Wt Readings from Last 10 Encounters:   10/21/20 79.1 kg (174 lb 6.1 oz)   10/16/20 79.5 kg (175 lb 4.3 oz)   10/15/20 78.9 kg (174 lb)   10/03/20 85.2 kg (187 lb 13.3 oz)   09/03/20 83 kg (182 lb 15.7 oz)   08/12/20 81.6 kg (180 lb)   07/23/20 84.5 kg (186 lb 4.6 oz)   06/17/20 84.9 kg (187 lb 4.5 oz)   02/20/20 81.6 kg (180 lb)   01/28/20 81.6 kg (180 lb)     Lab Results   Component Value Date    WBC 12.81 (H) 10/12/2020    HGB 9.5 (L) 10/12/2020    HCT 34.3 (L) 10/12/2020    MCV 77 (L) 10/12/2020      (H) 10/12/2020     Lab Results   Component Value Date    OCCULTBLOOD Positive (A) 10/07/2020     CMP  Sodium   Date Value Ref Range Status   10/08/2020 132 (L) 136 - 145 mmol/L Final     Potassium   Date Value Ref Range Status   10/08/2020 3.6 3.5 - 5.1 mmol/L Final     Chloride   Date Value Ref Range Status   10/08/2020 96 95 - 110 mmol/L Final     CO2   Date Value Ref Range Status   10/08/2020 32 (H) 22 - 31 mmol/L Final     Glucose   Date Value Ref Range Status   10/08/2020 100 70 - 110 mg/dL Final     Comment:     The ADA recommends the following guidelines for fasting glucose:  Normal:       less than 100 mg/dL  Prediabetes:  100 mg/dL to 125 mg/dL  Diabetes:     126 mg/dL or higher       BUN, Bld   Date Value Ref Range Status   10/08/2020 6 (L) 7 - 18 mg/dL Final     Creatinine   Date Value Ref Range Status   10/08/2020 0.69 0.50 - 1.40 mg/dL Final     Calcium   Date Value Ref Range Status   10/08/2020 8.3 (L) 8.4 - 10.2 mg/dL Final     Total Protein   Date Value Ref Range Status   10/08/2020 5.8 (L) 6.0 - 8.4 g/dL Final     Albumin   Date Value Ref Range Status   10/08/2020 3.0 (L) 3.5 - 5.2 g/dL Final     Total Bilirubin   Date Value Ref Range Status   10/08/2020 0.7 0.2 - 1.3 mg/dL Final     Alkaline Phosphatase   Date Value Ref Range Status   10/08/2020 100 38 - 145 U/L Final     AST   Date Value Ref Range Status   10/08/2020 37 (H) 14 - 36 U/L Final     ALT   Date Value Ref Range Status   10/08/2020 26 0 - 35 U/L Final     Anion Gap   Date Value Ref Range Status   10/08/2020 4 (L) 8 - 16 mmol/L Final     eGFR if    Date Value Ref Range Status   10/08/2020 >60 >60 mL/min/1.73 m^2 Final     eGFR if non    Date Value Ref Range Status   10/08/2020 >60 >60 mL/min/1.73 m^2 Final     Comment:     Calculation used to obtain the estimated glomerular filtration  rate (eGFR) is the CKD-EPI equation.        Reviewed prior medical records including radiology report of  10/3/2020 CT renal stone abdomen pelvis; 9/25/2020 abdominal ultrasound; 10/15/2020 visit note with Dr. Morales; & endoscopy history (see surgical history).    Reviewed prior medical records in care everywhere including radiology report of 8/9/2019 swallow study with speech.    Objective:      Physical Exam  Vitals signs and nursing note reviewed.   Constitutional:       General: She is not in acute distress.     Appearance: Normal appearance. She is well-developed. She is not diaphoretic.   HENT:      Mouth/Throat:      Comments: Patient is wearing a face mask, which covers patient's mouth and nose, due to COVID 19 concerns.  Eyes:      General: No scleral icterus.     Conjunctiva/sclera: Conjunctivae normal.      Pupils: Pupils are equal, round, and reactive to light.   Pulmonary:      Effort: Pulmonary effort is normal. No respiratory distress.      Breath sounds: Normal breath sounds. No wheezing.   Abdominal:      General: Bowel sounds are normal. There is no distension or abdominal bruit.      Palpations: Abdomen is soft. Abdomen is not rigid. There is no mass.      Tenderness: There is no abdominal tenderness. There is no guarding or rebound. Negative signs include Maldonado's sign and McBurney's sign.   Skin:     General: Skin is warm and dry.      Coloration: Skin is not pale.      Findings: No erythema or rash.      Comments: Non-jaundiced   Neurological:      Mental Status: She is alert and oriented to person, place, and time.   Psychiatric:         Behavior: Behavior normal.         Thought Content: Thought content normal.         Judgment: Judgment normal.         Assessment:       1. Hospital discharge follow-up    2. Microcytic anemia    3. Occult blood positive stool    4. Elevated AST (SGOT)    5. Anticoagulant long-term use    6. Constipation, unspecified constipation type    7. History of gastroesophageal reflux (GERD)        Plan:     discussed case with Dr. Choi; he recommended scheduling patient  for video capsule endoscopy.  Patient agreed to sign medical records release consent for us to obtain records from [facility] (to include endoscopy reports)    Hospital discharge follow-up, Microcytic anemia, & Occult blood positive stool  - SCHEDULE VIDEO CAPSULE ENDOSCOPY  - discussed with patient the different ways that anemia occurs: blood loss (such as from the gi tract), the body is not making enough, or the body is breaking down the rbcs too quickly; recommend video capsule study  - anemia noted to be improving on recent blood work  - follow-up with PCP, Dr. Morales, for continued evaluation and management; patient reports she is scheduled for blood work in 2 weeks to recheck CBC and check iron levels by her PCP    Elevated AST (SGOT)  -     Hepatic Function Panel; Future; Expected date: 11/21/2020  - minimize/avoid alcohol and tylenol products, & follow-up with PCP for continued evaluation and management; if specialist is needed, recommend seeing hepatology.    Anticoagulant long-term use  - DISCONTINUE Prilosec due to possible drug-to-drug interaction with Plavix  - START    pantoprazole (PROTONIX) 40 MG tablet; Take 1 tablet (40 mg total) by mouth before breakfast.  Dispense: 90 tablet; Refill: 3    Constipation, unspecified constipation type  Recommend daily exercise as tolerated, adequate water intake (six 8-oz glasses of water daily), and high fiber diet. OTC fiber supplements are recommended if diet does not reach daily fiber goal (20-30 grams daily), such as Metamucil, Citrucel, or FiberCon (take as directed, separate from other oral medications by >2 hours).  - CONTINUE OTC stool softener such as Colace as directed to avoid hard stools and straining with bowel movements PRN  -If still no improvement with these measures, call/follow-up    History of gastroesophageal reflux (GERD)  - DISCONTINUE Prilosec due to possible drug-to-drug interaction with Plavix  - START    pantoprazole (PROTONIX) 40 MG tablet;  Take 1 tablet (40 mg total) by mouth before breakfast.  Dispense: 90 tablet; Refill: 3  - avoid/minimize use of NSAIDs- since they can cause GI upset, bleeding and/or ulcers. If NSAID must be taken, recommend take with food.    Follow up in about 1 month (around 11/21/2020), or if symptoms worsen or fail to improve.      If no improvement in symptoms or symptoms worsen, call/follow-up at clinic or go to ER.

## 2020-10-23 ENCOUNTER — PATIENT MESSAGE (OUTPATIENT)
Dept: UROLOGY | Facility: CLINIC | Age: 75
End: 2020-10-23

## 2020-10-23 ENCOUNTER — LAB VISIT (OUTPATIENT)
Dept: LAB | Facility: HOSPITAL | Age: 75
End: 2020-10-23
Attending: UROLOGY
Payer: MEDICARE

## 2020-10-23 DIAGNOSIS — N30.00 ACUTE CYSTITIS WITHOUT HEMATURIA: ICD-10-CM

## 2020-10-23 LAB
BACTERIA #/AREA URNS AUTO: ABNORMAL /HPF
BILIRUB UR QL STRIP: NEGATIVE
CLARITY UR REFRACT.AUTO: ABNORMAL
COLOR UR AUTO: YELLOW
GLUCOSE UR QL STRIP: NEGATIVE
HGB UR QL STRIP: ABNORMAL
HYALINE CASTS UR QL AUTO: 33 /LPF
KETONES UR QL STRIP: NEGATIVE
LEUKOCYTE ESTERASE UR QL STRIP: ABNORMAL
MICROSCOPIC COMMENT: ABNORMAL
NITRITE UR QL STRIP: NEGATIVE
PH UR STRIP: 6 [PH] (ref 5–8)
PROT UR QL STRIP: ABNORMAL
RBC #/AREA URNS AUTO: >100 /HPF (ref 0–4)
SP GR UR STRIP: 1.01 (ref 1–1.03)
SQUAMOUS #/AREA URNS AUTO: 2 /HPF
URN SPEC COLLECT METH UR: ABNORMAL
WBC #/AREA URNS AUTO: >100 /HPF (ref 0–5)
WBC CLUMPS UR QL AUTO: ABNORMAL

## 2020-10-23 PROCEDURE — 81001 URINALYSIS AUTO W/SCOPE: CPT

## 2020-10-23 PROCEDURE — 87077 CULTURE AEROBIC IDENTIFY: CPT

## 2020-10-23 PROCEDURE — 87086 URINE CULTURE/COLONY COUNT: CPT

## 2020-10-23 PROCEDURE — 87088 URINE BACTERIA CULTURE: CPT

## 2020-10-23 PROCEDURE — 87186 SC STD MICRODIL/AGAR DIL: CPT

## 2020-10-25 ENCOUNTER — PATIENT MESSAGE (OUTPATIENT)
Dept: UROLOGY | Facility: CLINIC | Age: 75
End: 2020-10-25

## 2020-10-25 DIAGNOSIS — N30.01 ACUTE CYSTITIS WITH HEMATURIA: Primary | ICD-10-CM

## 2020-10-26 ENCOUNTER — PATIENT MESSAGE (OUTPATIENT)
Dept: UROLOGY | Facility: CLINIC | Age: 75
End: 2020-10-26

## 2020-10-26 LAB — BACTERIA UR CULT: ABNORMAL

## 2020-10-26 RX ORDER — CIPROFLOXACIN 500 MG/1
500 TABLET ORAL EVERY 12 HOURS
Qty: 14 TABLET | Refills: 0 | Status: SHIPPED | OUTPATIENT
Start: 2020-10-26 | End: 2020-11-02

## 2020-10-28 ENCOUNTER — PATIENT MESSAGE (OUTPATIENT)
Dept: GASTROENTEROLOGY | Facility: CLINIC | Age: 75
End: 2020-10-28

## 2020-10-29 ENCOUNTER — PATIENT MESSAGE (OUTPATIENT)
Dept: UROLOGY | Facility: CLINIC | Age: 75
End: 2020-10-29

## 2020-10-29 NOTE — TELEPHONE ENCOUNTER
Discussed with Dr. Choi and he said if the anemia is resolving then if the patient wants to hold off on scheduling the video capsule study it is up to her.  KTP

## 2020-10-30 ENCOUNTER — PATIENT MESSAGE (OUTPATIENT)
Dept: GASTROENTEROLOGY | Facility: CLINIC | Age: 75
End: 2020-10-30

## 2020-10-30 NOTE — TELEPHONE ENCOUNTER
SPoke to pt and informed her covid test has been cancelled. Pt is not scheduled for any upcoming surgery with us. She is going to see dr randolph in December and get clearance to hold Plkavix. They she will contact us to schedule procedure she needs with us.

## 2020-11-01 ENCOUNTER — PATIENT MESSAGE (OUTPATIENT)
Dept: UROLOGY | Facility: CLINIC | Age: 75
End: 2020-11-01

## 2020-11-01 DIAGNOSIS — N30.01 ACUTE CYSTITIS WITH HEMATURIA: Primary | ICD-10-CM

## 2020-11-02 ENCOUNTER — PATIENT MESSAGE (OUTPATIENT)
Dept: UROLOGY | Facility: CLINIC | Age: 75
End: 2020-11-02

## 2020-11-02 RX ORDER — CIPROFLOXACIN 500 MG/1
500 TABLET ORAL EVERY 12 HOURS
Qty: 14 TABLET | Refills: 0 | Status: SHIPPED | OUTPATIENT
Start: 2020-11-02 | End: 2020-11-10 | Stop reason: CLARIF

## 2020-11-04 ENCOUNTER — PATIENT MESSAGE (OUTPATIENT)
Dept: UROLOGY | Facility: CLINIC | Age: 75
End: 2020-11-04

## 2020-11-05 ENCOUNTER — CLINICAL SUPPORT (OUTPATIENT)
Dept: UROLOGY | Facility: CLINIC | Age: 75
End: 2020-11-05
Payer: MEDICARE

## 2020-11-05 DIAGNOSIS — N30.01 ACUTE CYSTITIS WITH HEMATURIA: Primary | ICD-10-CM

## 2020-11-05 LAB
BILIRUB SERPL-MCNC: ABNORMAL MG/DL
BLOOD URINE, POC: ABNORMAL
CLARITY, POC UA: ABNORMAL
COLOR, POC UA: ABNORMAL
GLUCOSE UR QL STRIP: ABNORMAL
KETONES UR QL STRIP: ABNORMAL
LEUKOCYTE ESTERASE URINE, POC: ABNORMAL
NITRITE, POC UA: ABNORMAL
PH, POC UA: 6.5
PROTEIN, POC: 100
SPECIFIC GRAVITY, POC UA: 1.03
UROBILINOGEN, POC UA: 0.2

## 2020-11-05 PROCEDURE — 81002 POCT URINE DIPSTICK WITHOUT MICROSCOPE: ICD-10-PCS | Mod: S$GLB,,, | Performed by: UROLOGY

## 2020-11-05 PROCEDURE — 87086 URINE CULTURE/COLONY COUNT: CPT

## 2020-11-05 PROCEDURE — 51701 PR INSERTION OF NON-INDWELLING BLADDER CATHETERIZATION FOR RESIDUAL UR: ICD-10-PCS | Mod: S$GLB,,, | Performed by: UROLOGY

## 2020-11-05 PROCEDURE — 51701 INSERT BLADDER CATHETER: CPT | Mod: S$GLB,,, | Performed by: UROLOGY

## 2020-11-05 PROCEDURE — 81002 URINALYSIS NONAUTO W/O SCOPE: CPT | Mod: S$GLB,,, | Performed by: UROLOGY

## 2020-11-05 NOTE — PROGRESS NOTES
Pt was placed on table in supine position draped appropriately and catheterized using sterile technique. Bladder was drained of 70mL of cloudy jm urine. Catheter was then removed and pt was able to site up. Pt tolerated procedure well.

## 2020-11-07 LAB — BACTERIA UR CULT: NO GROWTH

## 2020-11-09 ENCOUNTER — PATIENT MESSAGE (OUTPATIENT)
Dept: UROLOGY | Facility: CLINIC | Age: 75
End: 2020-11-09

## 2020-11-09 NOTE — TELEPHONE ENCOUNTER
Spoke with patient and advised on urine culture results. Spoke to patient about estrace cream and advised will speak to Dr. Stanton about use for patient.

## 2020-11-10 ENCOUNTER — PATIENT MESSAGE (OUTPATIENT)
Dept: UROLOGY | Facility: CLINIC | Age: 75
End: 2020-11-10

## 2020-11-10 ENCOUNTER — TELEPHONE (OUTPATIENT)
Dept: UROLOGY | Facility: CLINIC | Age: 75
End: 2020-11-10

## 2020-11-10 PROBLEM — N39.0 UTI (URINARY TRACT INFECTION): Status: ACTIVE | Noted: 2020-11-10

## 2020-11-10 NOTE — TELEPHONE ENCOUNTER
Scheduled appt with Dr. Da Silva for 11/12. Advised patient if she starts having pain or fever becomes greater than 100.4, to contact us and go to ER. Patient states she is feeling fine, just mild fever.

## 2020-11-10 NOTE — TELEPHONE ENCOUNTER
Patient's daughter advising patient going to ER as she is not feeling well and seems to be having fever. Advised I would notify the doctor

## 2020-11-11 PROBLEM — R50.9 FEVER: Status: ACTIVE | Noted: 2020-11-11

## 2020-11-12 ENCOUNTER — PATIENT MESSAGE (OUTPATIENT)
Dept: UROLOGY | Facility: CLINIC | Age: 75
End: 2020-11-12

## 2020-11-12 ENCOUNTER — TELEPHONE (OUTPATIENT)
Dept: UROLOGY | Facility: CLINIC | Age: 75
End: 2020-11-12

## 2020-11-12 ENCOUNTER — PATIENT MESSAGE (OUTPATIENT)
Dept: ENDOSCOPY | Facility: HOSPITAL | Age: 75
End: 2020-11-12

## 2020-11-12 NOTE — TELEPHONE ENCOUNTER
----- Message from Zackery Glass sent at 11/12/2020  8:19 AM CST -----  Regarding: pt  Type: Needs Medical Advice    Who Called:  pt    Best Call Back Number: [phone]  Additional Information: pt is asking to speak with either of the nurses at dr castillo office. Pt is in hospital.

## 2020-11-14 PROBLEM — R31.9 HEMATURIA: Status: ACTIVE | Noted: 2020-11-14

## 2020-11-15 ENCOUNTER — PATIENT MESSAGE (OUTPATIENT)
Dept: ENDOSCOPY | Facility: HOSPITAL | Age: 75
End: 2020-11-15

## 2020-11-16 ENCOUNTER — TELEPHONE (OUTPATIENT)
Dept: GASTROENTEROLOGY | Facility: CLINIC | Age: 75
End: 2020-11-16

## 2020-11-16 ENCOUNTER — PATIENT MESSAGE (OUTPATIENT)
Dept: UROLOGY | Facility: CLINIC | Age: 75
End: 2020-11-16

## 2020-11-16 NOTE — TELEPHONE ENCOUNTER
----- Message from Bertha Shaw sent at 11/16/2020  9:07 AM CST -----  Contact: patient  Type: Needs Medical Advice  Who Called:  patient  Symptoms (please be specific): na  How long has patient had these symptoms:  andrea  Pharmacy name and phone #:  andrea  Best Call Back Number: [phone]  Additional Information: Patient would like to cancel procedure 11/24/20.  Please call to advise.  Thanks!

## 2020-11-18 ENCOUNTER — PATIENT MESSAGE (OUTPATIENT)
Dept: UROLOGY | Facility: CLINIC | Age: 75
End: 2020-11-18

## 2020-11-18 ENCOUNTER — LAB VISIT (OUTPATIENT)
Dept: LAB | Facility: HOSPITAL | Age: 75
End: 2020-11-18
Attending: UROLOGY
Payer: MEDICARE

## 2020-11-18 DIAGNOSIS — N30.01 ACUTE CYSTITIS WITH HEMATURIA: ICD-10-CM

## 2020-11-18 DIAGNOSIS — N30.01 ACUTE CYSTITIS WITH HEMATURIA: Primary | ICD-10-CM

## 2020-11-18 LAB
ERYTHROCYTE [DISTWIDTH] IN BLOOD BY AUTOMATED COUNT: 24.1 % (ref 11.5–14.5)
HCT VFR BLD AUTO: 35.6 % (ref 37–48.5)
HGB BLD-MCNC: 10 G/DL (ref 12–16)
MCH RBC QN AUTO: 24.2 PG (ref 27–31)
MCHC RBC AUTO-ENTMCNC: 28.1 G/DL (ref 32–36)
MCV RBC AUTO: 86 FL (ref 82–98)
PLATELET # BLD AUTO: 308 K/UL (ref 150–350)
PMV BLD AUTO: 11.3 FL (ref 9.2–12.9)
RBC # BLD AUTO: 4.14 M/UL (ref 4–5.4)
WBC # BLD AUTO: 16.31 K/UL (ref 3.9–12.7)

## 2020-11-18 PROCEDURE — 85027 COMPLETE CBC AUTOMATED: CPT

## 2020-11-18 PROCEDURE — 36415 COLL VENOUS BLD VENIPUNCTURE: CPT | Mod: PO

## 2020-11-19 ENCOUNTER — OFFICE VISIT (OUTPATIENT)
Dept: UROLOGY | Facility: CLINIC | Age: 75
End: 2020-11-19
Payer: MEDICARE

## 2020-11-19 VITALS
WEIGHT: 178.38 LBS | SYSTOLIC BLOOD PRESSURE: 96 MMHG | HEIGHT: 65 IN | OXYGEN SATURATION: 96 % | HEART RATE: 82 BPM | BODY MASS INDEX: 29.72 KG/M2 | DIASTOLIC BLOOD PRESSURE: 52 MMHG

## 2020-11-19 DIAGNOSIS — N30.01 ACUTE CYSTITIS WITH HEMATURIA: Primary | ICD-10-CM

## 2020-11-19 PROCEDURE — 99999 PR PBB SHADOW E&M-EST. PATIENT-LVL III: ICD-10-PCS | Mod: PBBFAC,,, | Performed by: UROLOGY

## 2020-11-19 PROCEDURE — 1101F PR PT FALLS ASSESS DOC 0-1 FALLS W/OUT INJ PAST YR: ICD-10-PCS | Mod: CPTII,S$GLB,, | Performed by: UROLOGY

## 2020-11-19 PROCEDURE — 3288F FALL RISK ASSESSMENT DOCD: CPT | Mod: CPTII,S$GLB,, | Performed by: UROLOGY

## 2020-11-19 PROCEDURE — 96372 PR INJECTION,THERAP/PROPH/DIAG2ST, IM OR SUBCUT: ICD-10-PCS | Mod: S$GLB,,, | Performed by: UROLOGY

## 2020-11-19 PROCEDURE — 96372 THER/PROPH/DIAG INJ SC/IM: CPT | Mod: S$GLB,,, | Performed by: UROLOGY

## 2020-11-19 PROCEDURE — 1101F PT FALLS ASSESS-DOCD LE1/YR: CPT | Mod: CPTII,S$GLB,, | Performed by: UROLOGY

## 2020-11-19 PROCEDURE — 99024 PR POST-OP FOLLOW-UP VISIT: ICD-10-PCS | Mod: S$GLB,,, | Performed by: UROLOGY

## 2020-11-19 PROCEDURE — 3288F PR FALLS RISK ASSESSMENT DOCUMENTED: ICD-10-PCS | Mod: CPTII,S$GLB,, | Performed by: UROLOGY

## 2020-11-19 PROCEDURE — 99024 POSTOP FOLLOW-UP VISIT: CPT | Mod: S$GLB,,, | Performed by: UROLOGY

## 2020-11-19 PROCEDURE — 99999 PR PBB SHADOW E&M-EST. PATIENT-LVL III: CPT | Mod: PBBFAC,,, | Performed by: UROLOGY

## 2020-11-19 RX ORDER — CEFTRIAXONE 1 G/1
1 INJECTION, POWDER, FOR SOLUTION INTRAMUSCULAR; INTRAVENOUS
Status: COMPLETED | OUTPATIENT
Start: 2020-11-19 | End: 2020-11-20

## 2020-11-19 RX ORDER — NITROFURANTOIN 25; 75 MG/1; MG/1
100 CAPSULE ORAL 2 TIMES DAILY
Qty: 20 CAPSULE | Refills: 1 | Status: SHIPPED | OUTPATIENT
Start: 2020-11-19 | End: 2020-11-29

## 2020-11-19 NOTE — PROGRESS NOTES
UROLOGY [place]  11 19 20    Cc gross hematuria, persistent fever    Age 75. Anxiety in pt and daughter . Pt had stones removed from ureter on 11 13 20 (six days ago). The procedure consisted of removal of a previous stent in L ureter and then easy removal of small stone particles that there present in distal L ureter. There was no trauma to the ureteral wall during extraction. Pt was on full anticoagulation when we did the procedure; normally we prefer to do the stone extractions after suspending anticoagulation for a few days, but in the cardiologists were worried about the risk of stopping her anticoagulation owing to recent coronary stent placement, and so I decided to do it without stopping the plavix or ecotrin. The stones were very small and came out without trauma to the wall. The procedure went very smoothly and did not bleed while we did it. I warned them that she might have a little bleeding afterwards.     The problem is that she has started to bleed and has continued with mild to moderate gross hematuria. The other problem is persistent fever. In the last 4 days, she had 99, 99, 101 and 101. Denies any dysuria or any problems voiding. Not toxic.    IMP  Persistent hematuria: This is not troublesome, as she still has some residual inflammatory changes from her stone. I am not worried about her hematuria, as it will heal.     Fever: she had urine culture no growth the last time checked. Another urine culture done yesterday is pending. Just finished cipro 10 days. Doesn't want augmentin (because it didn't work before) and doesn't want bactrim (because she heard can have bad side effects). I am putting her empirically on macrobid. Tomorrow we will have the results of the culture. May need to discuss with infectious diseases if the culture is negative and pt has persistent fever of unknown origin.

## 2020-11-20 ENCOUNTER — PATIENT MESSAGE (OUTPATIENT)
Dept: UROLOGY | Facility: CLINIC | Age: 75
End: 2020-11-20

## 2020-11-20 RX ADMIN — CEFTRIAXONE 1 G: 1 INJECTION, POWDER, FOR SOLUTION INTRAMUSCULAR; INTRAVENOUS at 12:11

## 2020-11-22 ENCOUNTER — PATIENT MESSAGE (OUTPATIENT)
Dept: UROLOGY | Facility: CLINIC | Age: 75
End: 2020-11-22

## 2020-11-23 ENCOUNTER — NURSE TRIAGE (OUTPATIENT)
Dept: ADMINISTRATIVE | Facility: CLINIC | Age: 75
End: 2020-11-23

## 2020-11-23 ENCOUNTER — PATIENT MESSAGE (OUTPATIENT)
Dept: UROLOGY | Facility: CLINIC | Age: 75
End: 2020-11-23

## 2020-11-23 NOTE — TELEPHONE ENCOUNTER
Patient was at infectious disease doctor that she will see in a few minutes.  Refused triage.    Reason for Disposition   Caller has already spoken with the PCP (or office), and has no further questions    Protocols used: NO CONTACT OR DUPLICATE CONTACT CALL-A-[place]

## 2020-11-24 ENCOUNTER — OFFICE VISIT (OUTPATIENT)
Dept: INFECTIOUS DISEASES | Facility: CLINIC | Age: 75
End: 2020-11-24
Payer: MEDICARE

## 2020-11-24 ENCOUNTER — LAB VISIT (OUTPATIENT)
Dept: LAB | Facility: HOSPITAL | Age: 75
End: 2020-11-24
Attending: INTERNAL MEDICINE
Payer: MEDICARE

## 2020-11-24 VITALS
HEART RATE: 72 BPM | SYSTOLIC BLOOD PRESSURE: 104 MMHG | WEIGHT: 175.06 LBS | BODY MASS INDEX: 29.17 KG/M2 | RESPIRATION RATE: 20 BRPM | DIASTOLIC BLOOD PRESSURE: 68 MMHG | HEIGHT: 65 IN

## 2020-11-24 DIAGNOSIS — N39.0 COMPLICATED UTI (URINARY TRACT INFECTION): ICD-10-CM

## 2020-11-24 DIAGNOSIS — R50.9 FUO (FEVER OF UNKNOWN ORIGIN): ICD-10-CM

## 2020-11-24 DIAGNOSIS — R50.9 FUO (FEVER OF UNKNOWN ORIGIN): Primary | ICD-10-CM

## 2020-11-24 LAB
BACTERIA #/AREA URNS HPF: ABNORMAL /HPF
BILIRUB UR QL STRIP: NEGATIVE
CLARITY UR: CLEAR
COLOR UR: YELLOW
ERYTHROCYTE [SEDIMENTATION RATE] IN BLOOD BY WESTERGREN METHOD: 51 MM/HR (ref 0–20)
GLUCOSE UR QL STRIP: NEGATIVE
HGB UR QL STRIP: ABNORMAL
HYALINE CASTS #/AREA URNS LPF: 0 /LPF
KETONES UR QL STRIP: NEGATIVE
LEUKOCYTE ESTERASE UR QL STRIP: NEGATIVE
MICROSCOPIC COMMENT: ABNORMAL
NITRITE UR QL STRIP: NEGATIVE
PH UR STRIP: 6 [PH] (ref 5–8)
PROT UR QL STRIP: ABNORMAL
RBC #/AREA URNS HPF: 5 /HPF (ref 0–4)
SP GR UR STRIP: 1.01 (ref 1–1.03)
URN SPEC COLLECT METH UR: ABNORMAL
WBC #/AREA URNS HPF: 15 /HPF (ref 0–5)
WBC CLUMPS URNS QL MICRO: ABNORMAL

## 2020-11-24 PROCEDURE — 87186 SC STD MICRODIL/AGAR DIL: CPT

## 2020-11-24 PROCEDURE — 81000 URINALYSIS NONAUTO W/SCOPE: CPT | Mod: PO

## 2020-11-24 PROCEDURE — 99204 PR OFFICE/OUTPT VISIT, NEW, LEVL IV, 45-59 MIN: ICD-10-PCS | Mod: S$GLB,,, | Performed by: INTERNAL MEDICINE

## 2020-11-24 PROCEDURE — 87088 URINE BACTERIA CULTURE: CPT

## 2020-11-24 PROCEDURE — 1101F PR PT FALLS ASSESS DOC 0-1 FALLS W/OUT INJ PAST YR: ICD-10-PCS | Mod: CPTII,S$GLB,, | Performed by: INTERNAL MEDICINE

## 2020-11-24 PROCEDURE — 99999 PR PBB SHADOW E&M-EST. PATIENT-LVL III: CPT | Mod: PBBFAC,,, | Performed by: INTERNAL MEDICINE

## 2020-11-24 PROCEDURE — 3078F PR MOST RECENT DIASTOLIC BLOOD PRESSURE < 80 MM HG: ICD-10-PCS | Mod: CPTII,S$GLB,, | Performed by: INTERNAL MEDICINE

## 2020-11-24 PROCEDURE — 1126F PR PAIN SEVERITY QUANTIFIED, NO PAIN PRESENT: ICD-10-PCS | Mod: S$GLB,,, | Performed by: INTERNAL MEDICINE

## 2020-11-24 PROCEDURE — 99999 PR PBB SHADOW E&M-EST. PATIENT-LVL III: ICD-10-PCS | Mod: PBBFAC,,, | Performed by: INTERNAL MEDICINE

## 2020-11-24 PROCEDURE — 85651 RBC SED RATE NONAUTOMATED: CPT | Mod: PO

## 2020-11-24 PROCEDURE — 3078F DIAST BP <80 MM HG: CPT | Mod: CPTII,S$GLB,, | Performed by: INTERNAL MEDICINE

## 2020-11-24 PROCEDURE — 3074F PR MOST RECENT SYSTOLIC BLOOD PRESSURE < 130 MM HG: ICD-10-PCS | Mod: CPTII,S$GLB,, | Performed by: INTERNAL MEDICINE

## 2020-11-24 PROCEDURE — 3074F SYST BP LT 130 MM HG: CPT | Mod: CPTII,S$GLB,, | Performed by: INTERNAL MEDICINE

## 2020-11-24 PROCEDURE — 86038 ANTINUCLEAR ANTIBODIES: CPT

## 2020-11-24 PROCEDURE — 87040 BLOOD CULTURE FOR BACTERIA: CPT | Mod: 59

## 2020-11-24 PROCEDURE — 3288F FALL RISK ASSESSMENT DOCD: CPT | Mod: CPTII,S$GLB,, | Performed by: INTERNAL MEDICINE

## 2020-11-24 PROCEDURE — 99204 OFFICE O/P NEW MOD 45 MIN: CPT | Mod: S$GLB,,, | Performed by: INTERNAL MEDICINE

## 2020-11-24 PROCEDURE — 1126F AMNT PAIN NOTED NONE PRSNT: CPT | Mod: S$GLB,,, | Performed by: INTERNAL MEDICINE

## 2020-11-24 PROCEDURE — 86140 C-REACTIVE PROTEIN: CPT

## 2020-11-24 PROCEDURE — 87086 URINE CULTURE/COLONY COUNT: CPT

## 2020-11-24 PROCEDURE — 1159F PR MEDICATION LIST DOCUMENTED IN MEDICAL RECORD: ICD-10-PCS | Mod: S$GLB,,, | Performed by: INTERNAL MEDICINE

## 2020-11-24 PROCEDURE — 1159F MED LIST DOCD IN RCRD: CPT | Mod: S$GLB,,, | Performed by: INTERNAL MEDICINE

## 2020-11-24 PROCEDURE — 36415 COLL VENOUS BLD VENIPUNCTURE: CPT | Mod: PO

## 2020-11-24 PROCEDURE — 3288F PR FALLS RISK ASSESSMENT DOCUMENTED: ICD-10-PCS | Mod: CPTII,S$GLB,, | Performed by: INTERNAL MEDICINE

## 2020-11-24 PROCEDURE — 84145 PROCALCITONIN (PCT): CPT

## 2020-11-24 PROCEDURE — 87077 CULTURE AEROBIC IDENTIFY: CPT

## 2020-11-24 PROCEDURE — 1101F PT FALLS ASSESS-DOCD LE1/YR: CPT | Mod: CPTII,S$GLB,, | Performed by: INTERNAL MEDICINE

## 2020-11-24 RX ORDER — AMOXICILLIN AND CLAVULANATE POTASSIUM 875; 125 MG/1; MG/1
1 TABLET, FILM COATED ORAL 2 TIMES DAILY
Qty: 28 TABLET | Refills: 0 | Status: SHIPPED | OUTPATIENT
Start: 2020-11-24 | End: 2020-12-08

## 2020-11-24 NOTE — LETTER
November 24, 2020      Norma Zayas, NP  [address] [facility] [address]           [place] - Infectious Disease  [address] [place] [address]  Phone: [phone]  Fax: [phone]          Patient: Albina Armenta   MR Number: [id number]   YOB: 1945   Date of Visit: 11/24/2020       Dear Norma Zayas:    Thank you for referring Albina Armenta to me for evaluation. Attached you will find relevant portions of my assessment and plan of care.    If you have questions, please do not hesitate to call me. I look forward to following Albina Armenta along with you.    Sincerely,    Kemar Caputo MD    Enclosure  CC:  No Recipients    If you would like to receive this communication electronically, please contact [email or web address] or [phone] to request more information on [facility] Link access.    For providers and/or their staff who would like to refer a patient to [facility], please contact us through our one-stop-shop provider referral line, [facility], at [phone].    If you feel you have received this communication in error or would no longer like to receive these types of communications, please e-mail [email or web address]

## 2020-11-24 NOTE — PROGRESS NOTES
"  Patient ID: Albina Armenta is a 75 y.o. female.    Subjective:         Chief Complaint: Fever    HPI  - This is a 76 y/o female with PMH of PAD, nephrolithiasis complicated with obstructive uropathy.   - Patient was seen at [facility] placed a removable stent which was removed but patient felt poorly, and admitted at [facility] where a new stent was placed. Patient received Zosyn and was discharged on Cipro  - Patient was readmitted due to fevers again. While in the hospital patient received IV Zosyn for 5 days followed by ciprofloxacin for another 5 days  - Patient tells me that she developed fevers again which started the same day of discharge on 11/15/20.   - 11/19/20 Seen by Dr. Stanton and was given Ceftriaxone IM once   - Repeat Urine culture: No growth  - Saw  on 11/20/20 blood cultures obtained which were negative and patient was prescribed nitrofurantoin  - Patient states that she has been running fevers everyday in the afternoon for which she takes tylenol     Interval HPI  - During my evaluation today, patient tells me that she feels "well". He only complaint is fevers.   - No other symptom  - Denies diarrhea, dysuria,   - Very light cough, unproductive   - Endorses still taking Nitrofurantoin       Past Medical History:   Diagnosis Date    Anesthesia complication     "stays in my system for weeks, excesive weakness after, frequent falls"    Anxiety     Atherosclerosis of renal artery 8/10/2015    7/27/2015: Renal Duplex: Right: severe - 3.2 m/s. Left: about 60% stenosis - 1.7 m/s.    Carotid bruit 7/16/2015 2005: Carotid bruit heard.    Carotid stent occlusion     Coronary artery disease     CVA (cerebral vascular accident) 8/13/2019    GERD (gastroesophageal reflux disease)     History of stent insertion of renal artery     Hypercholesterolemia 7/16/2015    2005: Diagnosed. Started statin.    Hypertension, benign 7/16/2015 1995: Diagnosed.    Mild obesity 6/9/2017 6/9/2017: " 86 kg. BMI 31.    Subclavian artery stenosis, left 8/10/2015    7/2015: Diagnosed. 7/23/2015: Carotid Duplex: LIZETH: moderate plaquing. LICA: moderate plaquing -1.4 m/s - 50-60%. Left vertebral: retrograde flow.       Past Surgical History:   Procedure Laterality Date    ABDOMINAL AORTOGRAPHY N/A 8/11/2020    Procedure: Aortogram, Abdominal;  Surgeon: Joana Celestin MD;  Location: [facility] CATH;  Service: Peripheral Vascular;  Laterality: N/A;    ANGIOGRAPHY OF UPPER EXTREMITY N/A 8/11/2020    Procedure: Angiogram Extremity Bilateral;  Surgeon: Joana Celestin MD;  [facility];  Service: Peripheral Vascular;  Laterality: N/A;    ANGIOPLASTY      renal    APPENDECTOMY Right 1959    CAROTID ENDARTERECTOMY Left     CAROTID STENT      8/2019    CARPAL TUNNEL RELEASE Right 1/13/2020    Procedure: RELEASE, CARPAL TUNNEL;  Surgeon: Mina Mahoney MD;  Location: [facility] OR;  Service: Orthopedics;  Laterality: Right;    COLONOSCOPY  09/2020    done at [facility]; AVMs in the colon which was treated per patient report    CYSTOSCOPY WITH URETEROSCOPY, RETROGRADE PYELOGRAPHY, AND INSERTION OF STENT Left 10/3/2020    Procedure: CYSTOSCOPY, WITH RETROGRADE PYELOGRAM AND URETERAL STENT INSERTION;  Surgeon: Tony Stanton MD;  Location: [facility] OR;  Service: Urology;  Laterality: Left;    CYSTOURETEROSCOPY WITH RETROGRADE PYELOGRAPHY AND INSERTION OF STENT INTO URETER Left 11/13/2020    Procedure: LEFT CYSTOURETEROSCOPY WITH FLUOROSCOPY, URETERAL STENT REMOVAL, & STONE REMOVAL;  Surgeon: Tony Stanton MD;  Location: [facility] OR;  Service: Urology;  Laterality: Left;    HYSTERECTOMY      OOPHORECTOMY      OPEN REDUCTION AND INTERNAL FIXATION (ORIF) OF FRACTURE OF RADIUS Right 1/13/2020    Procedure: ORIF, FRACTURE, RADIUS;  Surgeon: Mina Mahoney MD;  Location: [facility] OR;  Service: Orthopedics;  Laterality: Right;  Accumed  pt on aspirin and plavix. Dr. Mahoney aware. Note in chart from cardiology on 1/7/2020    UPPER  GASTROINTESTINAL ENDOSCOPY  09/2020    done at [facility], normal findings per patient report    VASCULAR SURGERY         Review of patient's allergies indicates:  No Known Allergies    Current Outpatient Medications   Medication Instructions    albuterol-ipratropium (DUO-NEB) 2.5 mg-0.5 mg/3 mL nebulizer solution USE 1 VIAL PER NEBULIZER BID    amoxicillin-clavulanate 875-125mg (AUGMENTIN) 875-125 mg per tablet 1 tablet, Oral, 2 times daily    ascorbic acid/multivit-min (EMERGEN-C ORAL) 1 packet, Oral, Every morning    aspirin (ECOTRIN) 81 mg, Oral, Daily    atorvastatin (LIPITOR) 80 mg, Oral, Daily    citalopram (CELEXA) 10 mg, Oral, Every morning    clopidogreL (PLAVIX) 75 mg, Oral, Daily    docusate sodium (COLACE) 200 mg, Oral, Daily    ferrous sulfate (FEOSOL) 325 mg, Oral, With breakfast    nitrofurantoin, macrocrystal-monohydrate, (MACROBID) 100 MG capsule 100 mg, Oral, 2 times daily    nut.tx.impaired digestive fxn (VITAL HIGH PROTEIN ORAL) 1 tablet, Oral, Every morning    pantoprazole (PROTONIX) 40 mg, Oral, Before breakfast    torsemide (DEMADEX) 10 mg, Oral, Daily    VIOS AEROSOL DELIVERY SYSTEM Radha No dose, route, or frequency recorded.         Review of Systems   Constitutional: Positive for fever. Negative for activity change, appetite change, chills, diaphoresis, fatigue and unexpected weight change.   HENT: Negative for sore throat.    Eyes: Negative for photophobia and visual disturbance.   Respiratory: Negative for cough and shortness of breath.    Cardiovascular: Negative for chest pain and leg swelling.   Gastrointestinal: Negative for abdominal distention and abdominal pain.   Genitourinary: Negative for difficulty urinating.   Musculoskeletal: Negative for arthralgias.   Skin: Negative for color change and rash.   Allergic/Immunologic: Negative for immunocompromised state.   Neurological: Negative for dizziness and headaches.   Psychiatric/Behavioral: The patient is not  nervous/anxious.            Objective:     Vitals:    11/24/20 1401   BP: 104/68   Pulse: 72   Resp: 20            Physical Exam  Vitals signs reviewed.   Constitutional:       Appearance: She is well-developed.   HENT:      Head: Normocephalic and atraumatic.   Eyes:      Pupils: Pupils are equal, round, and reactive to light.   Neck:      Musculoskeletal: Normal range of motion and neck supple.   Cardiovascular:      Rate and Rhythm: Normal rate and regular rhythm.   Pulmonary:      Effort: Pulmonary effort is normal. No respiratory distress.      Breath sounds: Normal breath sounds.   Abdominal:      General: There is no distension.      Palpations: Abdomen is soft.      Tenderness: There is no abdominal tenderness.   Musculoskeletal: Normal range of motion.         General: No deformity.   Skin:     General: Skin is warm and dry.   Neurological:      Mental Status: She is alert and oriented to person, place, and time.           Assessment:           Albina was seen today for fever.    Diagnoses and all orders for this visit:    FUO (fever of unknown origin)  -     Sedimentation rate; Future  -     C-reactive protein; Future  -     Blood culture; Future  -     Procalcitonin; Future  -     US Retroperitoneal Complete (Kidney and; Future  -     SALVADOR Screen w/Reflex; Future  -     Blood culture; Future  -     Urinalysis, Reflex to Urine Culture Urine, Clean Catch  -     Echo Color Flow Doppler? Yes; Future  -     Urinalysis, Reflex to Urine Culture Urine, Clean Catch; Future    Complicated UTI (urinary tract infection)  -     amoxicillin-clavulanate 875-125mg (AUGMENTIN) 875-125 mg per tablet; Take 1 tablet by mouth 2 (two) times daily. for 14 days         Plan:     - During my evaluation today, patient looks very good. She had no complains despite extensive questioning.   - Does not have any epidemiological risk factor either.   - Unclear if fevers are secondary to UTI vs other  - Will start a work up for FUO with  above  - I did notice her urine was very abnormal, but surprisingly this was sterile. I will request another urine sample  - I am prescribing Augmentin which she is going to take if fevers persists despite Nitrofurantoin considering recent Enterococcus spp.   - Close follow up. RTC next Monday    Kemar Caputo MD  Infectious Diseases

## 2020-11-25 ENCOUNTER — PATIENT MESSAGE (OUTPATIENT)
Dept: INFECTIOUS DISEASES | Facility: CLINIC | Age: 75
End: 2020-11-25

## 2020-11-25 LAB
ANA SER QL IF: NORMAL
CRP SERPL-MCNC: 101.5 MG/L (ref 0–8.2)
PROCALCITONIN SERPL IA-MCNC: 0.04 NG/ML

## 2020-11-25 NOTE — TELEPHONE ENCOUNTER
Spoke with Albina and advised that Dr Caputo had already escribed the rx to her pharmacy. She stated she had called them, as they usually send her an email when she has a rx ready, but they told her that they forgot to call.

## 2020-11-27 ENCOUNTER — PATIENT MESSAGE (OUTPATIENT)
Dept: INFECTIOUS DISEASES | Facility: CLINIC | Age: 75
End: 2020-11-27

## 2020-11-27 DIAGNOSIS — R50.9 FUO (FEVER OF UNKNOWN ORIGIN): Primary | ICD-10-CM

## 2020-11-27 DIAGNOSIS — A49.1 VRE (VANCOMYCIN-RESISTANT ENTEROCOCCI) INFECTION: ICD-10-CM

## 2020-11-27 DIAGNOSIS — Z16.21 VRE (VANCOMYCIN-RESISTANT ENTEROCOCCI) INFECTION: ICD-10-CM

## 2020-11-27 LAB — BACTERIA UR CULT: ABNORMAL

## 2020-11-27 RX ORDER — LINEZOLID 600 MG/1
600 TABLET, FILM COATED ORAL 2 TIMES DAILY
Qty: 14 TABLET | Refills: 0 | Status: SHIPPED | OUTPATIENT
Start: 2020-11-27 | End: 2020-12-04

## 2020-11-27 NOTE — PROGRESS NOTES
Care Update:    Urine positive for VRE. Susceptibilities pending.  Patient is taking Augmentin with no improvement. Likely VRE is not Amp S  Will switch to Linezolid but patient is taking Celexa 10mg.   - Patient is to call her PCP is she can stop low dose Celexa abruptly and start Linezolid.   - Will Rx Linezolid to pharmacy.

## 2020-11-29 LAB
BACTERIA BLD CULT: NORMAL
BACTERIA BLD CULT: NORMAL

## 2020-12-01 ENCOUNTER — OFFICE VISIT (OUTPATIENT)
Dept: INFECTIOUS DISEASES | Facility: CLINIC | Age: 75
End: 2020-12-01
Payer: MEDICARE

## 2020-12-01 VITALS — HEIGHT: 65 IN | WEIGHT: 175.06 LBS | BODY MASS INDEX: 29.17 KG/M2

## 2020-12-01 DIAGNOSIS — Z16.21 VRE (VANCOMYCIN-RESISTANT ENTEROCOCCI) INFECTION: Primary | ICD-10-CM

## 2020-12-01 DIAGNOSIS — A49.1 VRE (VANCOMYCIN-RESISTANT ENTEROCOCCI) INFECTION: Primary | ICD-10-CM

## 2020-12-01 DIAGNOSIS — N39.0 COMPLICATED UTI (URINARY TRACT INFECTION): ICD-10-CM

## 2020-12-01 PROCEDURE — 99213 OFFICE O/P EST LOW 20 MIN: CPT | Mod: S$GLB,,, | Performed by: INTERNAL MEDICINE

## 2020-12-01 PROCEDURE — 99999 PR PBB SHADOW E&M-EST. PATIENT-LVL II: CPT | Mod: PBBFAC,,, | Performed by: INTERNAL MEDICINE

## 2020-12-01 PROCEDURE — 1159F MED LIST DOCD IN RCRD: CPT | Mod: S$GLB,,, | Performed by: INTERNAL MEDICINE

## 2020-12-01 PROCEDURE — 1159F PR MEDICATION LIST DOCUMENTED IN MEDICAL RECORD: ICD-10-PCS | Mod: S$GLB,,, | Performed by: INTERNAL MEDICINE

## 2020-12-01 PROCEDURE — 1101F PR PT FALLS ASSESS DOC 0-1 FALLS W/OUT INJ PAST YR: ICD-10-PCS | Mod: CPTII,S$GLB,, | Performed by: INTERNAL MEDICINE

## 2020-12-01 PROCEDURE — 99213 PR OFFICE/OUTPT VISIT, EST, LEVL III, 20-29 MIN: ICD-10-PCS | Mod: S$GLB,,, | Performed by: INTERNAL MEDICINE

## 2020-12-01 PROCEDURE — 1101F PT FALLS ASSESS-DOCD LE1/YR: CPT | Mod: CPTII,S$GLB,, | Performed by: INTERNAL MEDICINE

## 2020-12-01 PROCEDURE — 3288F PR FALLS RISK ASSESSMENT DOCUMENTED: ICD-10-PCS | Mod: CPTII,S$GLB,, | Performed by: INTERNAL MEDICINE

## 2020-12-01 PROCEDURE — 99999 PR PBB SHADOW E&M-EST. PATIENT-LVL II: ICD-10-PCS | Mod: PBBFAC,,, | Performed by: INTERNAL MEDICINE

## 2020-12-01 PROCEDURE — 3288F FALL RISK ASSESSMENT DOCD: CPT | Mod: CPTII,S$GLB,, | Performed by: INTERNAL MEDICINE

## 2020-12-01 RX ORDER — LINEZOLID 600 MG/1
600 TABLET, FILM COATED ORAL 2 TIMES DAILY
Qty: 8 TABLET | Refills: 0 | Status: SHIPPED | OUTPATIENT
Start: 2020-12-01 | End: 2020-12-05

## 2020-12-02 ENCOUNTER — HOSPITAL ENCOUNTER (OUTPATIENT)
Dept: RADIOLOGY | Facility: HOSPITAL | Age: 75
Discharge: HOME | End: 2020-12-02
Attending: INTERNAL MEDICINE
Payer: MEDICARE

## 2020-12-02 DIAGNOSIS — R50.9 FUO (FEVER OF UNKNOWN ORIGIN): ICD-10-CM

## 2020-12-02 PROCEDURE — 76770 US EXAM ABDO BACK WALL COMP: CPT | Mod: TC,PO

## 2020-12-02 NOTE — PROGRESS NOTES
"  Patient ID: Albina Armenta is a 75 y.o. female.    Subjective:         Chief Complaint: Follow-up    Follow-up  Pertinent negatives include no abdominal pain, arthralgias, chest pain, chills, coughing, diaphoresis, fatigue, fever, headaches, rash or sore throat.     - This is a 74 y/o female with PMH of PAD, nephrolithiasis complicated with obstructive uropathy.   - Patient was seen at [facility] placed a removable stent which was removed but patient felt poorly, and admitted at [facility] where a new stent was placed. Patient received Zosyn and was discharged on Cipro  - Patient was readmitted due to fevers again. While in the hospital patient received IV Zosyn for 5 days followed by ciprofloxacin for another 5 days  - Patient tells me that she developed fevers again which started the same day of discharge on 11/15/20.   - 11/19/20 Seen by Dr. Stanton and was given Ceftriaxone IM once   - Repeat Urine culture: No growth  - Saw  on 11/20/20 blood cultures obtained which were negative and patient was prescribed nitrofurantoin  - Patient states that she has been running fevers everyday in the afternoon for which she takes tylenol   - 11/24/20: Seen by ID. Prescribed Augmentin but repeat UA showed VRE R:Amp so she was switched to Linezolid     Interval HPI  - Patient tells me that she is feeling better although not back to normal.   - Today is Day #4 of Linezolid.   - Denies fevers, chills or night sweats. Endorses Tmax 99      Past Medical History:   Diagnosis Date    Anesthesia complication     "stays in my system for weeks, excesive weakness after, frequent falls"    Anxiety     Atherosclerosis of renal artery 8/10/2015    7/27/2015: Renal Duplex: Right: severe - 3.2 m/s. Left: about 60% stenosis - 1.7 m/s.    Carotid bruit 7/16/2015    2005: Carotid bruit heard.    Carotid stent occlusion     Coronary artery disease     CVA (cerebral vascular accident) 8/13/2019    GERD (gastroesophageal reflux " disease)     History of stent insertion of renal artery     Hypercholesterolemia 7/16/2015    2005: Diagnosed. Started statin.    Hypertension, benign 7/16/2015 1995: Diagnosed.    Mild obesity 6/9/2017 6/9/2017: 86 kg. BMI 31.    Subclavian artery stenosis, left 8/10/2015    7/2015: Diagnosed. 7/23/2015: Carotid Duplex: LIZETH: moderate plaquing. LICA: moderate plaquing -1.4 m/s - 50-60%. Left vertebral: retrograde flow.       Past Surgical History:   Procedure Laterality Date    ABDOMINAL AORTOGRAPHY N/A 8/11/2020    Procedure: Aortogram, Abdominal;  Surgeon: Joana Celestin MD;  Location: [facility] CATH;  Service: Peripheral Vascular;  Laterality: N/A;    ANGIOGRAPHY OF UPPER EXTREMITY N/A 8/11/2020    Procedure: Angiogram Extremity Bilateral;  Surgeon: Joana Celestin MD;  Location: [facility] CATH;  Service: Peripheral Vascular;  Laterality: N/A;    ANGIOPLASTY      renal    APPENDECTOMY Right 1959    CAROTID ENDARTERECTOMY Left     CAROTID STENT      8/2019    CARPAL TUNNEL RELEASE Right 1/13/2020    Procedure: RELEASE, CARPAL TUNNEL;  Surgeon: Mina Mahoney MD;  Location: [facility] OR;  Service: Orthopedics;  Laterality: Right;    COLONOSCOPY  09/2020    done at [facility]; AVMs in the colon which was treated per patient report    CYSTOSCOPY WITH URETEROSCOPY, RETROGRADE PYELOGRAPHY, AND INSERTION OF STENT Left 10/3/2020    Procedure: CYSTOSCOPY, WITH RETROGRADE PYELOGRAM AND URETERAL STENT INSERTION;  Surgeon: Tony Stanton MD;  Location: [facility] OR;  Service: Urology;  Laterality: Left;    CYSTOURETEROSCOPY WITH RETROGRADE PYELOGRAPHY AND INSERTION OF STENT INTO URETER Left 11/13/2020    Procedure: LEFT CYSTOURETEROSCOPY WITH FLUOROSCOPY, URETERAL STENT REMOVAL, & STONE REMOVAL;  Surgeon: Tony Stanton MD;  Location: [facility] OR;  Service: Urology;  Laterality: Left;    HYSTERECTOMY      OOPHORECTOMY      OPEN REDUCTION AND INTERNAL FIXATION (ORIF) OF FRACTURE OF RADIUS Right 1/13/2020    Procedure: ORIF,  FRACTURE, RADIUS;  Surgeon: Mina Mahoney MD;  Location: [facility];  Service: Orthopedics;  Laterality: Right;  Accumed  pt on aspirin and plavix. Dr. Mahoney aware. Note in chart from cardiology on 1/7/2020    UPPER GASTROINTESTINAL ENDOSCOPY  09/2020    done at [facility], normal findings per patient report    VASCULAR SURGERY         Review of patient's allergies indicates:  No Known Allergies    Current Outpatient Medications   Medication Instructions    albuterol-ipratropium (DUO-NEB) 2.5 mg-0.5 mg/3 mL nebulizer solution USE 1 VIAL PER NEBULIZER BID    amoxicillin-clavulanate 875-125mg (AUGMENTIN) 875-125 mg per tablet 1 tablet, Oral, 2 times daily    ascorbic acid/multivit-min (EMERGEN-C ORAL) 1 packet, Oral, Every morning    aspirin (ECOTRIN) 81 mg, Oral, Daily    atorvastatin (LIPITOR) 80 mg, Oral, Daily    citalopram (CELEXA) 10 mg, Oral, Every morning    clopidogreL (PLAVIX) 75 mg, Oral, Daily    docusate sodium (COLACE) 200 mg, Oral, Daily    ferrous sulfate (FEOSOL) 325 mg, Oral, With breakfast    linezolid (ZYVOX) 600 mg, Oral, 2 times daily    linezolid (ZYVOX) 600 mg, Oral, 2 times daily    nut.tx.impaired digestive fxn (VITAL HIGH PROTEIN ORAL) 1 tablet, Oral, Every morning    pantoprazole (PROTONIX) 40 mg, Oral, Before breakfast    torsemide (DEMADEX) 10 mg, Oral, Daily    VIOS AEROSOL DELIVERY SYSTEM Radha No dose, route, or frequency recorded.         Review of Systems   Constitutional: Negative for activity change, appetite change, chills, diaphoresis, fatigue, fever and unexpected weight change.   HENT: Negative for sore throat.    Eyes: Negative for photophobia and visual disturbance.   Respiratory: Negative for cough and shortness of breath.    Cardiovascular: Negative for chest pain and leg swelling.   Gastrointestinal: Negative for abdominal distention and abdominal pain.   Genitourinary: Positive for dysuria. Negative for difficulty urinating.   Musculoskeletal: Negative for  arthralgias.   Skin: Negative for color change and rash.   Allergic/Immunologic: Negative for immunocompromised state.   Neurological: Negative for dizziness and headaches.   Psychiatric/Behavioral: The patient is not nervous/anxious.            Objective:     There were no vitals filed for this visit.         Physical Exam  Vitals signs reviewed.   Constitutional:       Appearance: She is well-developed.   HENT:      Head: Normocephalic and atraumatic.   Eyes:      Pupils: Pupils are equal, round, and reactive to light.   Neck:      Musculoskeletal: Normal range of motion and neck supple.   Cardiovascular:      Rate and Rhythm: Normal rate and regular rhythm.   Pulmonary:      Effort: Pulmonary effort is normal. No respiratory distress.      Breath sounds: Normal breath sounds.   Abdominal:      General: There is no distension.      Palpations: Abdomen is soft.      Tenderness: There is no abdominal tenderness.   Musculoskeletal: Normal range of motion.         General: No deformity.   Skin:     General: Skin is warm and dry.   Neurological:      Mental Status: She is alert and oriented to person, place, and time.           Assessment:           Albina was seen today for follow-up.    Diagnoses and all orders for this visit:    VRE (vancomycin-resistant Enterococci) infection  -     linezolid (ZYVOX) 600 mg Tab; Take 1 tablet (600 mg total) by mouth 2 (two) times daily. for 4 days    Complicated UTI (urinary tract infection)  -     linezolid (ZYVOX) 600 mg Tab; Take 1 tablet (600 mg total) by mouth 2 (two) times daily. for 4 days         Plan:     - During my evaluation today, patient feels better.   - Still symptomatic, so I will prolong Linezolid for a total of 10 days  - Patient is very concerned that this might return. I think it is possible and if that is the case, she needs to see urology.   - RTC next week.    Kemar Caputo MD  Infectious Diseases

## 2020-12-04 ENCOUNTER — PATIENT MESSAGE (OUTPATIENT)
Dept: UROLOGY | Facility: CLINIC | Age: 75
End: 2020-12-04

## 2020-12-04 NOTE — TELEPHONE ENCOUNTER
Spoke to patient and advised I would speak to Dr. Stanton to determine the best course of action.

## 2020-12-07 ENCOUNTER — PATIENT MESSAGE (OUTPATIENT)
Dept: UROLOGY | Facility: CLINIC | Age: 75
End: 2020-12-07

## 2020-12-07 ENCOUNTER — TELEPHONE (OUTPATIENT)
Dept: CARDIOLOGY | Facility: HOSPITAL | Age: 75
End: 2020-12-07

## 2020-12-08 ENCOUNTER — CLINICAL SUPPORT (OUTPATIENT)
Dept: CARDIOLOGY | Facility: HOSPITAL | Age: 75
End: 2020-12-08
Attending: INTERNAL MEDICINE
Payer: MEDICARE

## 2020-12-08 ENCOUNTER — TELEPHONE (OUTPATIENT)
Dept: INFECTIOUS DISEASES | Facility: CLINIC | Age: 75
End: 2020-12-08

## 2020-12-08 VITALS — BODY MASS INDEX: 29.16 KG/M2 | WEIGHT: 175 LBS | HEIGHT: 65 IN

## 2020-12-08 DIAGNOSIS — R50.9 FUO (FEVER OF UNKNOWN ORIGIN): ICD-10-CM

## 2020-12-08 PROBLEM — I50.9 ACUTE CONGESTIVE HEART FAILURE: Status: ACTIVE | Noted: 2020-12-08

## 2020-12-08 PROBLEM — I27.20 PULMONARY HYPERTENSION: Status: ACTIVE | Noted: 2020-12-08

## 2020-12-08 PROBLEM — Z71.89 ACP (ADVANCE CARE PLANNING): Status: ACTIVE | Noted: 2020-12-08

## 2020-12-08 PROBLEM — E87.20 ACIDOSIS: Status: ACTIVE | Noted: 2020-12-08

## 2020-12-08 LAB
AORTIC ROOT ANNULUS: 3.1 CM
AORTIC VALVE CUSP SEPERATION: 1.78 CM
AV INDEX (PROSTH): 0.48
AV MEAN GRADIENT: 7 MMHG
AV PEAK GRADIENT: 11 MMHG
AV VALVE AREA: 1.52 CM2
AV VELOCITY RATIO: 43.87
BSA FOR ECHO PROCEDURE: 1.91 M2
CV ECHO LV RWT: 0.41 CM
DOP CALC AO PEAK VEL: 1.68 M/S
DOP CALC AO VTI: 30 CM
DOP CALC LVOT AREA: 3.2 CM2
DOP CALC LVOT DIAMETER: 2.02 CM
DOP CALC LVOT PEAK VEL: 73.7 M/S
DOP CALC LVOT STROKE VOLUME: 45.68 CM3
DOP CALCLVOT PEAK VEL VTI: 14.26 CM
E WAVE DECELERATION TIME: 247.61 MSEC
E/A RATIO: 0.94
E/E' RATIO: 29.09 M/S
ECHO LV POSTERIOR WALL: 1.28 CM (ref 0.6–1.1)
FRACTIONAL SHORTENING: 14 % (ref 28–44)
INTERVENTRICULAR SEPTUM: 1.04 CM (ref 0.6–1.1)
IVRT: 80.12 MSEC
LEFT ATRIUM SIZE: 5.02 CM
LEFT INTERNAL DIMENSION IN SYSTOLE: 5.41 CM (ref 2.1–4)
LEFT VENTRICLE DIASTOLIC VOLUME INDEX: 63.46 ML/M2
LEFT VENTRICLE DIASTOLIC VOLUME: 118.6 ML
LEFT VENTRICLE MASS INDEX: 174 G/M2
LEFT VENTRICLE SYSTOLIC VOLUME INDEX: 53.4 ML/M2
LEFT VENTRICLE SYSTOLIC VOLUME: 99.8 ML
LEFT VENTRICULAR INTERNAL DIMENSION IN DIASTOLE: 6.3 CM (ref 3.5–6)
LEFT VENTRICULAR MASS: 325.46 G
LV LATERAL E/E' RATIO: 26.67 M/S
LV SEPTAL E/E' RATIO: 32 M/S
MV PEAK A VEL: 1.71 M/S
MV PEAK E VEL: 1.6 M/S
PISA TR MAX VEL: 3.93 M/S
PULM VEIN S/D RATIO: 0.8
PV PEAK D VEL: 40.09 M/S
PV PEAK S VEL: 31.94 M/S
PV PEAK VELOCITY: 67.63 CM/S
RA PRESSURE: 3 MMHG
RIGHT VENTRICULAR END-DIASTOLIC DIMENSION: 254 CM
TDI LATERAL: 0.06 M/S
TDI SEPTAL: 0.05 M/S
TDI: 0.06 M/S
TR MAX PG: 62 MMHG
TV REST PULMONARY ARTERY PRESSURE: 65 MMHG

## 2020-12-08 PROCEDURE — 93306 ECHO (CUPID ONLY): ICD-10-PCS | Mod: 26,,, | Performed by: INTERNAL MEDICINE

## 2020-12-08 PROCEDURE — 93306 TTE W/DOPPLER COMPLETE: CPT | Mod: 26,,, | Performed by: INTERNAL MEDICINE

## 2020-12-08 PROCEDURE — 93306 TTE W/DOPPLER COMPLETE: CPT

## 2020-12-08 NOTE — TELEPHONE ENCOUNTER
----- Message from Ladan Borrero MA sent at 12/8/2020  2:06 PM CST -----  Regarding: CAROL SHEARER is requesting call back in reference to Echo orders, Pt  echo was abnormal , Please contact Pt

## 2020-12-09 ENCOUNTER — PATIENT MESSAGE (OUTPATIENT)
Dept: UROLOGY | Facility: CLINIC | Age: 75
End: 2020-12-09

## 2020-12-09 PROBLEM — R42 DIZZINESS: Status: ACTIVE | Noted: 2020-12-09

## 2020-12-10 ENCOUNTER — PATIENT MESSAGE (OUTPATIENT)
Dept: UROLOGY | Facility: CLINIC | Age: 75
End: 2020-12-10

## 2020-12-10 PROBLEM — I25.810 CORONARY ARTERY DISEASE INVOLVING CORONARY BYPASS GRAFT OF NATIVE HEART WITHOUT ANGINA PECTORIS: Status: ACTIVE | Noted: 2020-12-10

## 2020-12-10 PROBLEM — I50.23 ACUTE ON CHRONIC SYSTOLIC CONGESTIVE HEART FAILURE: Status: ACTIVE | Noted: 2020-12-08

## 2020-12-11 PROBLEM — N39.0 COMPLICATED UTI (URINARY TRACT INFECTION): Status: RESOLVED | Noted: 2020-11-10 | Resolved: 2020-12-11

## 2020-12-11 PROBLEM — I50.21 ACUTE SYSTOLIC CONGESTIVE HEART FAILURE: Status: ACTIVE | Noted: 2020-12-08

## 2020-12-17 ENCOUNTER — HOSPITAL ENCOUNTER (OUTPATIENT)
Dept: RADIOLOGY | Facility: CLINIC | Age: 75
Discharge: HOME | End: 2020-12-17
Attending: INTERNAL MEDICINE
Payer: MEDICARE

## 2020-12-17 ENCOUNTER — TELEPHONE (OUTPATIENT)
Dept: HEMATOLOGY/ONCOLOGY | Facility: CLINIC | Age: 75
End: 2020-12-17

## 2020-12-17 ENCOUNTER — OFFICE VISIT (OUTPATIENT)
Dept: CARDIOLOGY | Facility: CLINIC | Age: 75
End: 2020-12-17
Payer: MEDICARE

## 2020-12-17 ENCOUNTER — TELEPHONE (OUTPATIENT)
Dept: GASTROENTEROLOGY | Facility: CLINIC | Age: 75
End: 2020-12-17

## 2020-12-17 ENCOUNTER — TREATMENT PLANNING (OUTPATIENT)
Dept: GASTROENTEROLOGY | Facility: CLINIC | Age: 75
End: 2020-12-17

## 2020-12-17 VITALS
HEIGHT: 65 IN | HEART RATE: 88 BPM | WEIGHT: 163.38 LBS | BODY MASS INDEX: 27.22 KG/M2 | SYSTOLIC BLOOD PRESSURE: 100 MMHG | DIASTOLIC BLOOD PRESSURE: 60 MMHG

## 2020-12-17 DIAGNOSIS — D64.9 SYMPTOMATIC ANEMIA: ICD-10-CM

## 2020-12-17 DIAGNOSIS — I25.810 CORONARY ARTERY DISEASE INVOLVING CORONARY BYPASS GRAFT OF NATIVE HEART WITHOUT ANGINA PECTORIS: ICD-10-CM

## 2020-12-17 DIAGNOSIS — I50.42 CHRONIC COMBINED SYSTOLIC AND DIASTOLIC HEART FAILURE: ICD-10-CM

## 2020-12-17 DIAGNOSIS — N13.8 URINARY TRACT OBSTRUCTION DUE TO KIDNEY STONE: ICD-10-CM

## 2020-12-17 DIAGNOSIS — I10 ESSENTIAL HYPERTENSION: ICD-10-CM

## 2020-12-17 DIAGNOSIS — I77.1 SUBCLAVIAN ARTERY STENOSIS, LEFT: ICD-10-CM

## 2020-12-17 DIAGNOSIS — I42.9 CARDIOMYOPATHY, UNSPECIFIED TYPE: Primary | ICD-10-CM

## 2020-12-17 DIAGNOSIS — Z98.890 HISTORY OF CAROTID ENDARTERECTOMY: ICD-10-CM

## 2020-12-17 DIAGNOSIS — I35.9 AORTIC VALVE DISEASE: ICD-10-CM

## 2020-12-17 DIAGNOSIS — Z86.73 HISTORY OF CEREBROVASCULAR ACCIDENT: ICD-10-CM

## 2020-12-17 DIAGNOSIS — I50.21 ACUTE SYSTOLIC CONGESTIVE HEART FAILURE: ICD-10-CM

## 2020-12-17 DIAGNOSIS — N20.0 URINARY TRACT OBSTRUCTION DUE TO KIDNEY STONE: ICD-10-CM

## 2020-12-17 DIAGNOSIS — D64.9 ANEMIA, UNSPECIFIED TYPE: Primary | ICD-10-CM

## 2020-12-17 PROCEDURE — 99213 PR OFFICE/OUTPT VISIT, EST, LEVL III, 20-29 MIN: ICD-10-PCS | Mod: S$GLB,,, | Performed by: PHYSICIAN ASSISTANT

## 2020-12-17 PROCEDURE — 77067 SCR MAMMO BI INCL CAD: CPT | Mod: TC,PO

## 2020-12-17 PROCEDURE — 1126F AMNT PAIN NOTED NONE PRSNT: CPT | Mod: S$GLB,,, | Performed by: PHYSICIAN ASSISTANT

## 2020-12-17 PROCEDURE — 1101F PT FALLS ASSESS-DOCD LE1/YR: CPT | Mod: CPTII,S$GLB,, | Performed by: PHYSICIAN ASSISTANT

## 2020-12-17 PROCEDURE — 1101F PR PT FALLS ASSESS DOC 0-1 FALLS W/OUT INJ PAST YR: ICD-10-PCS | Mod: CPTII,S$GLB,, | Performed by: PHYSICIAN ASSISTANT

## 2020-12-17 PROCEDURE — 99999 PR PBB SHADOW E&M-EST. PATIENT-LVL IV: ICD-10-PCS | Mod: PBBFAC,,, | Performed by: PHYSICIAN ASSISTANT

## 2020-12-17 PROCEDURE — 77063 MAMMO DIGITAL SCREENING BILAT WITH TOMO: ICD-10-PCS | Mod: 26,,, | Performed by: RADIOLOGY

## 2020-12-17 PROCEDURE — 77080 DXA BONE DENSITY AXIAL: CPT | Mod: 26,,, | Performed by: RADIOLOGY

## 2020-12-17 PROCEDURE — 77080 DXA BONE DENSITY AXIAL: CPT | Mod: TC,PO

## 2020-12-17 PROCEDURE — 3288F FALL RISK ASSESSMENT DOCD: CPT | Mod: CPTII,S$GLB,, | Performed by: PHYSICIAN ASSISTANT

## 2020-12-17 PROCEDURE — 77063 BREAST TOMOSYNTHESIS BI: CPT | Mod: 26,,, | Performed by: RADIOLOGY

## 2020-12-17 PROCEDURE — 3288F PR FALLS RISK ASSESSMENT DOCUMENTED: ICD-10-PCS | Mod: CPTII,S$GLB,, | Performed by: PHYSICIAN ASSISTANT

## 2020-12-17 PROCEDURE — 3074F SYST BP LT 130 MM HG: CPT | Mod: CPTII,S$GLB,, | Performed by: PHYSICIAN ASSISTANT

## 2020-12-17 PROCEDURE — 3078F DIAST BP <80 MM HG: CPT | Mod: CPTII,S$GLB,, | Performed by: PHYSICIAN ASSISTANT

## 2020-12-17 PROCEDURE — 3078F PR MOST RECENT DIASTOLIC BLOOD PRESSURE < 80 MM HG: ICD-10-PCS | Mod: CPTII,S$GLB,, | Performed by: PHYSICIAN ASSISTANT

## 2020-12-17 PROCEDURE — 77067 SCR MAMMO BI INCL CAD: CPT | Mod: 26,,, | Performed by: RADIOLOGY

## 2020-12-17 PROCEDURE — 1126F PR PAIN SEVERITY QUANTIFIED, NO PAIN PRESENT: ICD-10-PCS | Mod: S$GLB,,, | Performed by: PHYSICIAN ASSISTANT

## 2020-12-17 PROCEDURE — 99999 PR PBB SHADOW E&M-EST. PATIENT-LVL IV: CPT | Mod: PBBFAC,,, | Performed by: PHYSICIAN ASSISTANT

## 2020-12-17 PROCEDURE — 1159F PR MEDICATION LIST DOCUMENTED IN MEDICAL RECORD: ICD-10-PCS | Mod: S$GLB,,, | Performed by: PHYSICIAN ASSISTANT

## 2020-12-17 PROCEDURE — 99213 OFFICE O/P EST LOW 20 MIN: CPT | Mod: S$GLB,,, | Performed by: PHYSICIAN ASSISTANT

## 2020-12-17 PROCEDURE — 77080 DEXA BONE DENSITY SPINE HIP: ICD-10-PCS | Mod: 26,,, | Performed by: RADIOLOGY

## 2020-12-17 PROCEDURE — 3074F PR MOST RECENT SYSTOLIC BLOOD PRESSURE < 130 MM HG: ICD-10-PCS | Mod: CPTII,S$GLB,, | Performed by: PHYSICIAN ASSISTANT

## 2020-12-17 PROCEDURE — 1159F MED LIST DOCD IN RCRD: CPT | Mod: S$GLB,,, | Performed by: PHYSICIAN ASSISTANT

## 2020-12-17 PROCEDURE — 77067 MAMMO DIGITAL SCREENING BILAT WITH TOMO: ICD-10-PCS | Mod: 26,,, | Performed by: RADIOLOGY

## 2020-12-17 NOTE — TELEPHONE ENCOUNTER
Called patient in reference to referral received from AMY Ferguson for hematology evaluation of anemia.  Patient states she was unaware that this referral was being made and will contact referring provider's office to inquire why referral was placed.  Pt has number to call center to call back and r/s once she speaks to referring provider's office.    Tele encounter routed to referring provider's staff.

## 2020-12-17 NOTE — TELEPHONE ENCOUNTER
----- Message from AMY Fulton sent at 12/17/2020 10:41 AM CST -----  Please inform patient that we received and reviewed her prior medical records from [facility]. Also reviewed recent lab results in our system and noted anemia has persisted. Recommend completing video capsule endoscopy since anemia has persisted since our last visit. Recommend seeing hematology as well for continue evaluation and management of anemia (referral placed).ThanksKTP

## 2020-12-17 NOTE — NURSING
Initial contact to schedule NP appt. Pt unaware of referral and will contact referring provider's office for more info.  Pt to call back to schedule, if desired.

## 2020-12-17 NOTE — PROGRESS NOTES
Subjective:    Patient ID:  Albina Armenta is a 75 y.o. female who presents for follow-up of CHF.       HPI  Ms. Albina Armenta is a pleasant lady who previously followed with Dr. Dang, but was planning to establish care with Dr. Belcher. She was hospitalized at [facility] the week prior to her appointment. She has a history of nephrolithiasis complicated with obstructive uropathy s/p stent, HLD, HTN, prior smoker - quit in 2007, and PAD including renal artery stenosis and carotid stenosis s/p left carotid endarterectomy in July 2019 complicated by CVA after her surgery with subsequent carotid stent. She had innominate artery stenting in August of this year. She was having fever and was being worked up by Dr. Caputo. She had an outpatient echo that showed a new drop in her EF (45% --> 25%) as well as worsening of her MR (mild-mod to mod-severe) and TR (mild to severe) and new AI. PASP increased from 37 mmHg to 65 mmHg with mild RV enlargement. She was sent to the ED for further evaluation.     During her hospitalization, she had a coronary angiogram that revealed a significant lesion in the OM1 that was treated with a ROBERTO.  A repeat echo showed an EF of 25-30%, grade II diastolic dysfunction, mild-moderate AS (Aortic valve area is 1.40 cm2; peak velocity is 2.06 m/s; mean gradient is 8 mmHg), mild-moderate MR, and mild-moderate TR. PASP was 44mmHg.     She was having SOB that improved with diuresis. She was started on GDMT with lisinopril 5mg daily, metoprolol 25mg daily, and torsemide 10mg daily. She has done very well since her discharge. Her weights are stable. She feels she has more tru.gy over the last few days. She has not had further fevers.      Review of Systems   Constitution: Negative for chills, diaphoresis, fever, weight gain and weight loss.   HENT: Negative for sore throat.    Eyes: Negative for blurred vision, vision loss in left eye, vision loss in right eye and visual disturbance.  "  Cardiovascular: Negative for chest pain, claudication, dyspnea on exertion, leg swelling, near-syncope, orthopnea, palpitations, paroxysmal nocturnal dyspnea and syncope.   Respiratory: Negative for cough, hemoptysis, shortness of breath, sputum production and wheezing.    Endocrine: Negative for cold intolerance and heat intolerance.   Hematologic/Lymphatic: Negative for adenopathy. Does not bruise/bleed easily.   Skin: Negative for rash.   Musculoskeletal: Negative for falls, muscle weakness and myalgias.   Gastrointestinal: Negative for abdominal pain, change in bowel habit, constipation, diarrhea, melena and nausea.   Genitourinary: Negative for bladder incontinence.   Neurological: Negative for dizziness, focal weakness, headaches, light-headedness, numbness and weakness.   Psychiatric/Behavioral: Negative for altered mental status.         Vitals:    12/17/20 1011   BP: 100/60   Pulse: 88   Weight: 74.1 kg (163 lb 5.8 oz)   Height: 5' 5" (1.651 m)     Body mass index is 27.18 kg/m².    Objective:    Physical Exam   Constitutional: She is oriented to person, place, and time. She appears well-developed and well-nourished. No distress.   HENT:   Head: Normocephalic and atraumatic.   Mouth/Throat: Oropharynx is clear and moist.   Eyes: Pupils are equal, round, and reactive to light. Conjunctivae and EOM are normal. No scleral icterus.   Neck: Neck supple. No JVD present. No tracheal deviation present.   Cardiovascular: Normal rate and regular rhythm. Exam reveals no gallop and no friction rub.   Murmur heard.  Pulmonary/Chest: Effort normal and breath sounds normal. No respiratory distress. She has no wheezes. She has no rales. She exhibits no tenderness.   Abdominal: Soft. Bowel sounds are normal. She exhibits no distension. There is no hepatosplenomegaly. There is no abdominal tenderness.   Musculoskeletal:         General: No tenderness or edema.   Neurological: She is alert and oriented to person, place, and " time.   Skin: Skin is warm and dry. No rash noted. No erythema.   Psychiatric: She has a normal mood and affect. Her behavior is normal.         Assessment:       Problem List Items Addressed This Visit        Cardiology Problems    Acute systolic congestive heart failure    Aortic valve disease    Chronic combined systolic and diastolic heart failure    Coronary artery disease involving coronary bypass graft of native heart without angina pectoris    Hypertension    Subclavian artery stenosis, left       Other    History of carotid endarterectomy    History of cerebrovascular accident    Symptomatic anemia    Urinary tract obstruction due to kidney stone      Other Visit Diagnoses     Cardiomyopathy, unspecified type    -  Primary    Relevant Orders    Echo Color Flow Doppler? Yes    Basic metabolic panel    CBC Auto Differential    NT-Pro Natriuretic Peptide    Cardiac Rehab Phase II             Plan:       Continue current cardiac medications.   Reinforced salt/fluid restrictions and daily weights.   She knows she can take an extra torsemide + KCl as needed for weight gain > 3 lbs overnight or > 5 lbs in 1 week.   F/U with Dr. Belcher in 3 months with repeat echo to reassess EF.

## 2020-12-17 NOTE — PROGRESS NOTES
Reviewed medical records received from [facility], summarized below and in medical & surgical history (endoscopies, etc), copies made & given to nurse to be scanned into system:   Multiple lab results reviewed (see records for results).  Path report for ureteroscopy stone extraction from 9/28/2020 9/25/2020 abdominal x-ray with chest & CT abdomen and pelvis (in care everywhere as well).  9/28/2020 EGD & 9/29/2020 colonoscopy (no biopsy reports received)    Lab Results   Component Value Date    WBC 10.63 12/11/2020    HGB 10.9 (L) 12/11/2020    HCT 36.2 (L) 12/11/2020    MCV 82 12/11/2020     12/11/2020     Lab Results   Component Value Date    IRON 40 10/27/2020    TIBC 326 10/27/2020    FERRITIN 51 10/27/2020     Recommendations:  Continue with previous recommendations. Recommend completing video capsule endoscopy since anemia has persisted since our last visit. Recommend seeing hematology as well for continue evaluation and management of anemia (referral placed). Message routed to staff.

## 2020-12-17 NOTE — TELEPHONE ENCOUNTER
Informed pt of results and recommendations per Judy Fernandez NP. Pt stated that she did not think her H&H was low and want to wait until to scheduled after she gets blood work again in two week. Inform pt that I would let the provider know. Pt verbalized understanding.

## 2020-12-17 NOTE — Clinical Note
Please inform patient that we received and reviewed her prior medical records from [facility]. Also reviewed recent lab results in our system and noted anemia has persisted. Recommend completing video capsule endoscopy since anemia has persisted since our last visit. Recommend seeing hematology as well for continue evaluation and management of anemia (referral placed).  Thanks  JODY

## 2020-12-18 ENCOUNTER — TELEPHONE (OUTPATIENT)
Dept: GASTROENTEROLOGY | Facility: CLINIC | Age: 75
End: 2020-12-18

## 2020-12-18 ENCOUNTER — PATIENT MESSAGE (OUTPATIENT)
Dept: CARDIOLOGY | Facility: CLINIC | Age: 75
End: 2020-12-18

## 2020-12-18 DIAGNOSIS — I65.23 BILATERAL CAROTID ARTERY STENOSIS: ICD-10-CM

## 2020-12-18 DIAGNOSIS — E78.00 HYPERCHOLESTEROLEMIA: ICD-10-CM

## 2020-12-18 RX ORDER — TORSEMIDE 10 MG/1
10 TABLET ORAL DAILY
Qty: 90 TABLET | Refills: 1 | Status: SHIPPED | OUTPATIENT
Start: 2020-12-18 | End: 2021-11-01

## 2020-12-18 RX ORDER — CLOPIDOGREL BISULFATE 75 MG/1
75 TABLET ORAL DAILY
Qty: 90 TABLET | Refills: 1 | Status: ON HOLD | OUTPATIENT
Start: 2020-12-18 | End: 2021-07-06 | Stop reason: HOSPADM

## 2020-12-18 RX ORDER — ATORVASTATIN CALCIUM 80 MG/1
80 TABLET, FILM COATED ORAL DAILY
Qty: 90 TABLET | Refills: 1 | Status: SHIPPED | OUTPATIENT
Start: 2020-12-18 | End: 2021-03-22

## 2020-12-18 RX ORDER — LISINOPRIL 5 MG/1
5 TABLET ORAL DAILY
Qty: 90 TABLET | Refills: 1 | Status: SHIPPED | OUTPATIENT
Start: 2020-12-19 | End: 2021-01-25

## 2020-12-18 RX ORDER — POTASSIUM CHLORIDE 750 MG/1
20 CAPSULE, EXTENDED RELEASE ORAL DAILY
Qty: 90 CAPSULE | Refills: 1 | Status: SHIPPED | OUTPATIENT
Start: 2020-12-18 | End: 2021-04-01

## 2020-12-18 RX ORDER — METOPROLOL SUCCINATE 25 MG/1
25 TABLET, EXTENDED RELEASE ORAL DAILY
Qty: 90 TABLET | Refills: 1 | Status: SHIPPED | OUTPATIENT
Start: 2020-12-19 | End: 2021-03-29 | Stop reason: SDUPTHER

## 2020-12-21 ENCOUNTER — PATIENT MESSAGE (OUTPATIENT)
Dept: HEMATOLOGY/ONCOLOGY | Facility: CLINIC | Age: 75
End: 2020-12-21

## 2020-12-30 ENCOUNTER — LAB VISIT (OUTPATIENT)
Dept: LAB | Facility: HOSPITAL | Age: 75
End: 2020-12-30
Attending: PHYSICIAN ASSISTANT
Payer: MEDICARE

## 2020-12-30 DIAGNOSIS — I42.9 CARDIOMYOPATHY, UNSPECIFIED TYPE: ICD-10-CM

## 2020-12-30 LAB
ANION GAP SERPL CALC-SCNC: 12 MMOL/L (ref 8–16)
BASOPHILS # BLD AUTO: 0.06 K/UL (ref 0–0.2)
BASOPHILS NFR BLD: 0.8 % (ref 0–1.9)
BUN SERPL-MCNC: 20 MG/DL (ref 8–23)
CALCIUM SERPL-MCNC: 9.3 MG/DL (ref 8.7–10.5)
CHLORIDE SERPL-SCNC: 105 MMOL/L (ref 95–110)
CO2 SERPL-SCNC: 24 MMOL/L (ref 23–29)
CREAT SERPL-MCNC: 0.9 MG/DL (ref 0.5–1.4)
DIFFERENTIAL METHOD: ABNORMAL
EOSINOPHIL # BLD AUTO: 0.2 K/UL (ref 0–0.5)
EOSINOPHIL NFR BLD: 2.8 % (ref 0–8)
ERYTHROCYTE [DISTWIDTH] IN BLOOD BY AUTOMATED COUNT: 19.8 % (ref 11.5–14.5)
EST. GFR  (AFRICAN AMERICAN): >60 ML/MIN/1.73 M^2
EST. GFR  (NON AFRICAN AMERICAN): >60 ML/MIN/1.73 M^2
GLUCOSE SERPL-MCNC: 103 MG/DL (ref 70–110)
HCT VFR BLD AUTO: 40 % (ref 37–48.5)
HGB BLD-MCNC: 11.5 G/DL (ref 12–16)
IMM GRANULOCYTES # BLD AUTO: 0.01 K/UL (ref 0–0.04)
IMM GRANULOCYTES NFR BLD AUTO: 0.1 % (ref 0–0.5)
LYMPHOCYTES # BLD AUTO: 2 K/UL (ref 1–4.8)
LYMPHOCYTES NFR BLD: 26.6 % (ref 18–48)
MCH RBC QN AUTO: 25.1 PG (ref 27–31)
MCHC RBC AUTO-ENTMCNC: 28.8 G/DL (ref 32–36)
MCV RBC AUTO: 87 FL (ref 82–98)
MONOCYTES # BLD AUTO: 0.6 K/UL (ref 0.3–1)
MONOCYTES NFR BLD: 8.6 % (ref 4–15)
NEUTROPHILS # BLD AUTO: 4.6 K/UL (ref 1.8–7.7)
NEUTROPHILS NFR BLD: 61.1 % (ref 38–73)
NRBC BLD-RTO: 0 /100 WBC
PLATELET # BLD AUTO: 257 K/UL (ref 150–350)
PMV BLD AUTO: 12 FL (ref 9.2–12.9)
POTASSIUM SERPL-SCNC: 4 MMOL/L (ref 3.5–5.1)
RBC # BLD AUTO: 4.58 M/UL (ref 4–5.4)
SODIUM SERPL-SCNC: 141 MMOL/L (ref 136–145)
WBC # BLD AUTO: 7.45 K/UL (ref 3.9–12.7)

## 2020-12-30 PROCEDURE — 85025 COMPLETE CBC W/AUTO DIFF WBC: CPT

## 2020-12-30 PROCEDURE — 80048 BASIC METABOLIC PNL TOTAL CA: CPT

## 2020-12-30 PROCEDURE — 36415 COLL VENOUS BLD VENIPUNCTURE: CPT | Mod: PO

## 2021-01-04 ENCOUNTER — PATIENT MESSAGE (OUTPATIENT)
Dept: ADMINISTRATIVE | Facility: OTHER | Age: 76
End: 2021-01-04

## 2021-01-23 ENCOUNTER — PATIENT MESSAGE (OUTPATIENT)
Dept: CARDIOLOGY | Facility: CLINIC | Age: 76
End: 2021-01-23

## 2021-01-25 RX ORDER — VALSARTAN 40 MG/1
20 TABLET ORAL DAILY
Qty: 45 TABLET | Refills: 3 | Status: SHIPPED | OUTPATIENT
Start: 2021-01-25 | End: 2021-03-22

## 2021-01-27 ENCOUNTER — PATIENT MESSAGE (OUTPATIENT)
Dept: CARDIOLOGY | Facility: CLINIC | Age: 76
End: 2021-01-27

## 2021-01-28 ENCOUNTER — PATIENT MESSAGE (OUTPATIENT)
Dept: CARDIOLOGY | Facility: CLINIC | Age: 76
End: 2021-01-28

## 2021-01-28 ENCOUNTER — HOSPITAL ENCOUNTER (OUTPATIENT)
Dept: RADIOLOGY | Facility: HOSPITAL | Age: 76
Discharge: HOME | End: 2021-01-28
Attending: PHYSICIAN ASSISTANT
Payer: MEDICARE

## 2021-01-28 DIAGNOSIS — I73.9 CLAUDICATION OF UPPER EXTREMITY: ICD-10-CM

## 2021-01-28 DIAGNOSIS — I73.9 CLAUDICATION OF UPPER EXTREMITY: Primary | ICD-10-CM

## 2021-01-28 PROCEDURE — 70496 CT ANGIOGRAPHY HEAD: CPT | Mod: TC,PO

## 2021-01-28 PROCEDURE — 70496 CTA HEAD AND NECK (XPD): ICD-10-PCS | Mod: 26,,, | Performed by: RADIOLOGY

## 2021-01-28 PROCEDURE — 70498 CT ANGIOGRAPHY NECK: CPT | Mod: 26,,, | Performed by: RADIOLOGY

## 2021-01-28 PROCEDURE — 70496 CT ANGIOGRAPHY HEAD: CPT | Mod: 26,,, | Performed by: RADIOLOGY

## 2021-01-28 PROCEDURE — 70498 CTA HEAD AND NECK (XPD): ICD-10-PCS | Mod: 26,,, | Performed by: RADIOLOGY

## 2021-01-28 PROCEDURE — 25500020 PHARM REV CODE 255: Mod: PO | Performed by: PHYSICIAN ASSISTANT

## 2021-01-28 RX ADMIN — IOHEXOL 100 ML: 350 INJECTION, SOLUTION INTRAVENOUS at 05:01

## 2021-02-03 ENCOUNTER — PATIENT MESSAGE (OUTPATIENT)
Dept: CARDIOLOGY | Facility: CLINIC | Age: 76
End: 2021-02-03

## 2021-02-05 ENCOUNTER — PATIENT MESSAGE (OUTPATIENT)
Dept: CARDIOLOGY | Facility: CLINIC | Age: 76
End: 2021-02-05

## 2021-02-07 ENCOUNTER — PATIENT MESSAGE (OUTPATIENT)
Dept: VASCULAR SURGERY | Facility: CLINIC | Age: 76
End: 2021-02-07

## 2021-02-08 ENCOUNTER — PATIENT MESSAGE (OUTPATIENT)
Dept: NEUROSURGERY | Facility: CLINIC | Age: 76
End: 2021-02-08

## 2021-02-08 ENCOUNTER — PATIENT MESSAGE (OUTPATIENT)
Dept: CARDIOLOGY | Facility: CLINIC | Age: 76
End: 2021-02-08

## 2021-02-10 ENCOUNTER — TELEPHONE (OUTPATIENT)
Dept: NEUROSURGERY | Facility: CLINIC | Age: 76
End: 2021-02-10

## 2021-02-12 ENCOUNTER — PATIENT MESSAGE (OUTPATIENT)
Dept: CARDIOLOGY | Facility: CLINIC | Age: 76
End: 2021-02-12

## 2021-02-12 ENCOUNTER — PATIENT MESSAGE (OUTPATIENT)
Dept: VASCULAR SURGERY | Facility: CLINIC | Age: 76
End: 2021-02-12

## 2021-02-25 ENCOUNTER — OFFICE VISIT (OUTPATIENT)
Dept: VASCULAR SURGERY | Facility: CLINIC | Age: 76
End: 2021-02-25
Payer: MEDICARE

## 2021-02-25 VITALS — HEIGHT: 65 IN | WEIGHT: 160.94 LBS | BODY MASS INDEX: 26.81 KG/M2

## 2021-02-25 DIAGNOSIS — I73.9 PAD (PERIPHERAL ARTERY DISEASE): Primary | ICD-10-CM

## 2021-02-25 PROCEDURE — 1159F PR MEDICATION LIST DOCUMENTED IN MEDICAL RECORD: ICD-10-PCS | Mod: S$GLB,,, | Performed by: THORACIC SURGERY (CARDIOTHORACIC VASCULAR SURGERY)

## 2021-02-25 PROCEDURE — 99215 PR OFFICE/OUTPT VISIT, EST, LEVL V, 40-54 MIN: ICD-10-PCS | Mod: S$GLB,,, | Performed by: THORACIC SURGERY (CARDIOTHORACIC VASCULAR SURGERY)

## 2021-02-25 PROCEDURE — 1100F PTFALLS ASSESS-DOCD GE2>/YR: CPT | Mod: CPTII,S$GLB,, | Performed by: THORACIC SURGERY (CARDIOTHORACIC VASCULAR SURGERY)

## 2021-02-25 PROCEDURE — 1100F PR PT FALLS ASSESS DOC 2+ FALLS/FALL W/INJURY/YR: ICD-10-PCS | Mod: CPTII,S$GLB,, | Performed by: THORACIC SURGERY (CARDIOTHORACIC VASCULAR SURGERY)

## 2021-02-25 PROCEDURE — 1126F AMNT PAIN NOTED NONE PRSNT: CPT | Mod: S$GLB,,, | Performed by: THORACIC SURGERY (CARDIOTHORACIC VASCULAR SURGERY)

## 2021-02-25 PROCEDURE — 1157F PR ADVANCE CARE PLAN OR EQUIV PRESENT IN MEDICAL RECORD: ICD-10-PCS | Mod: S$GLB,,, | Performed by: THORACIC SURGERY (CARDIOTHORACIC VASCULAR SURGERY)

## 2021-02-25 PROCEDURE — 3288F FALL RISK ASSESSMENT DOCD: CPT | Mod: CPTII,S$GLB,, | Performed by: THORACIC SURGERY (CARDIOTHORACIC VASCULAR SURGERY)

## 2021-02-25 PROCEDURE — 99999 PR PBB SHADOW E&M-EST. PATIENT-LVL IV: CPT | Mod: PBBFAC,,, | Performed by: THORACIC SURGERY (CARDIOTHORACIC VASCULAR SURGERY)

## 2021-02-25 PROCEDURE — 1159F MED LIST DOCD IN RCRD: CPT | Mod: S$GLB,,, | Performed by: THORACIC SURGERY (CARDIOTHORACIC VASCULAR SURGERY)

## 2021-02-25 PROCEDURE — 99999 PR PBB SHADOW E&M-EST. PATIENT-LVL IV: ICD-10-PCS | Mod: PBBFAC,,, | Performed by: THORACIC SURGERY (CARDIOTHORACIC VASCULAR SURGERY)

## 2021-02-25 PROCEDURE — 1157F ADVNC CARE PLAN IN RCRD: CPT | Mod: S$GLB,,, | Performed by: THORACIC SURGERY (CARDIOTHORACIC VASCULAR SURGERY)

## 2021-02-25 PROCEDURE — 99215 OFFICE O/P EST HI 40 MIN: CPT | Mod: S$GLB,,, | Performed by: THORACIC SURGERY (CARDIOTHORACIC VASCULAR SURGERY)

## 2021-02-25 PROCEDURE — 3288F PR FALLS RISK ASSESSMENT DOCUMENTED: ICD-10-PCS | Mod: CPTII,S$GLB,, | Performed by: THORACIC SURGERY (CARDIOTHORACIC VASCULAR SURGERY)

## 2021-02-25 PROCEDURE — 1126F PR PAIN SEVERITY QUANTIFIED, NO PAIN PRESENT: ICD-10-PCS | Mod: S$GLB,,, | Performed by: THORACIC SURGERY (CARDIOTHORACIC VASCULAR SURGERY)

## 2021-02-26 ENCOUNTER — HOSPITAL ENCOUNTER (OUTPATIENT)
Dept: RADIOLOGY | Facility: HOSPITAL | Age: 76
Discharge: HOME | End: 2021-02-26
Attending: THORACIC SURGERY (CARDIOTHORACIC VASCULAR SURGERY)
Payer: MEDICARE

## 2021-02-26 DIAGNOSIS — I73.9 PAD (PERIPHERAL ARTERY DISEASE): ICD-10-CM

## 2021-02-26 PROCEDURE — 93922 UPR/L XTREMITY ART 2 LEVELS: CPT | Mod: TC,PO

## 2021-02-26 PROCEDURE — 93925 US ARTERIAL LOWER EXTREMITY BILAT WITH ABI (XPD): ICD-10-PCS | Mod: 26,,, | Performed by: RADIOLOGY

## 2021-02-26 PROCEDURE — 93922 UPR/L XTREMITY ART 2 LEVELS: CPT | Mod: 26,,, | Performed by: RADIOLOGY

## 2021-02-26 PROCEDURE — 93922 US ARTERIAL LOWER EXTREMITY BILAT WITH ABI (XPD): ICD-10-PCS | Mod: 26,,, | Performed by: RADIOLOGY

## 2021-02-26 PROCEDURE — 93925 LOWER EXTREMITY STUDY: CPT | Mod: 26,,, | Performed by: RADIOLOGY

## 2021-03-03 ENCOUNTER — IMMUNIZATION (OUTPATIENT)
Dept: FAMILY MEDICINE | Facility: CLINIC | Age: 76
End: 2021-03-03
Payer: MEDICARE

## 2021-03-03 DIAGNOSIS — Z23 NEED FOR VACCINATION: Primary | ICD-10-CM

## 2021-03-03 PROCEDURE — 0031A COVID-19,VECTOR-NR,RS-AD26,PF,0.5 ML DOSE VACCINE (J&J): CPT | Mod: PBBFAC | Performed by: FAMILY MEDICINE

## 2021-03-10 ENCOUNTER — CLINICAL SUPPORT (OUTPATIENT)
Dept: CARDIOLOGY | Facility: CLINIC | Age: 76
End: 2021-03-10
Attending: PHYSICIAN ASSISTANT
Payer: MEDICARE

## 2021-03-10 VITALS — BODY MASS INDEX: 26.66 KG/M2 | HEIGHT: 65 IN | WEIGHT: 160 LBS

## 2021-03-10 DIAGNOSIS — I42.9 CARDIOMYOPATHY, UNSPECIFIED TYPE: ICD-10-CM

## 2021-03-10 LAB
ASCENDING AORTA: 2.69 CM
AV INDEX (PROSTH): 0.31
AV MEAN GRADIENT: 13 MMHG
AV PEAK GRADIENT: 26 MMHG
AV VALVE AREA: 0.87 CM2
AV VELOCITY RATIO: 0.33
BSA FOR ECHO PROCEDURE: 1.82 M2
CV ECHO LV RWT: 0.27 CM
DOP CALC AO PEAK VEL: 2.56 M/S
DOP CALC AO VTI: 54.85 CM
DOP CALC LVOT AREA: 2.8 CM2
DOP CALC LVOT DIAMETER: 1.9 CM
DOP CALC LVOT PEAK VEL: 0.85 M/S
DOP CALC LVOT STROKE VOLUME: 47.89 CM3
DOP CALCLVOT PEAK VEL VTI: 16.9 CM
E WAVE DECELERATION TIME: 159.86 MSEC
E/A RATIO: 0.65
E/E' RATIO: 11.88 M/S
ECHO LV POSTERIOR WALL: 0.87 CM (ref 0.6–1.1)
FRACTIONAL SHORTENING: 5 % (ref 28–44)
INTERVENTRICULAR SEPTUM: 1.03 CM (ref 0.6–1.1)
IVRT: 119.89 MSEC
LA MAJOR: 5.38 CM
LA MINOR: 5.91 CM
LA WIDTH: 4.3 CM
LEFT ATRIUM SIZE: 4.03 CM
LEFT ATRIUM VOLUME INDEX: 46.1 ML/M2
LEFT ATRIUM VOLUME: 82.97 CM3
LEFT INTERNAL DIMENSION IN SYSTOLE: 6.2 CM (ref 2.1–4)
LEFT VENTRICLE DIASTOLIC VOLUME INDEX: 122.02 ML/M2
LEFT VENTRICLE DIASTOLIC VOLUME: 219.63 ML
LEFT VENTRICLE MASS INDEX: 149 G/M2
LEFT VENTRICLE SYSTOLIC VOLUME INDEX: 107.7 ML/M2
LEFT VENTRICLE SYSTOLIC VOLUME: 193.89 ML
LEFT VENTRICULAR INTERNAL DIMENSION IN DIASTOLE: 6.55 CM (ref 3.5–6)
LEFT VENTRICULAR MASS: 268.79 G
LV LATERAL E/E' RATIO: 12.63 M/S
LV SEPTAL E/E' RATIO: 11.22 M/S
MV A" WAVE DURATION": 16.37 MSEC
MV PEAK A VEL: 1.56 M/S
MV PEAK E VEL: 1.01 M/S
MV STENOSIS PRESSURE HALF TIME: 46.36 MS
MV VALVE AREA P 1/2 METHOD: 4.75 CM2
PISA MRMAX VEL: 0.06 M/S
PISA TR MAX VEL: 2.72 M/S
PULM VEIN S/D RATIO: 1.38
PV PEAK D VEL: 0.45 M/S
PV PEAK S VEL: 0.62 M/S
RA MAJOR: 4.13 CM
RA PRESSURE: 3 MMHG
RA WIDTH: 3.65 CM
RIGHT VENTRICULAR END-DIASTOLIC DIMENSION: 3.96 CM
SINUS: 2.97 CM
STJ: 2.61 CM
TDI LATERAL: 0.08 M/S
TDI SEPTAL: 0.09 M/S
TDI: 0.09 M/S
TR MAX PG: 30 MMHG
TRICUSPID ANNULAR PLANE SYSTOLIC EXCURSION: 2.48 CM
TV REST PULMONARY ARTERY PRESSURE: 33 MMHG

## 2021-03-10 PROCEDURE — 93306 TTE W/DOPPLER COMPLETE: CPT | Mod: S$GLB,,, | Performed by: INTERNAL MEDICINE

## 2021-03-10 PROCEDURE — 93306 ECHO (CUPID ONLY): ICD-10-PCS | Mod: S$GLB,,, | Performed by: INTERNAL MEDICINE

## 2021-03-10 PROCEDURE — 99999 PR PBB SHADOW E&M-EST. PATIENT-LVL I: ICD-10-PCS | Mod: PBBFAC,,,

## 2021-03-10 PROCEDURE — 99999 PR PBB SHADOW E&M-EST. PATIENT-LVL I: CPT | Mod: PBBFAC,,,

## 2021-03-15 ENCOUNTER — TELEPHONE (OUTPATIENT)
Dept: VASCULAR SURGERY | Facility: CLINIC | Age: 76
End: 2021-03-15

## 2021-03-22 ENCOUNTER — TELEPHONE (OUTPATIENT)
Dept: CARDIOLOGY | Facility: CLINIC | Age: 76
End: 2021-03-22

## 2021-03-22 ENCOUNTER — OFFICE VISIT (OUTPATIENT)
Dept: CARDIOLOGY | Facility: CLINIC | Age: 76
End: 2021-03-22
Payer: MEDICARE

## 2021-03-22 VITALS
HEIGHT: 65 IN | HEART RATE: 83 BPM | SYSTOLIC BLOOD PRESSURE: 97 MMHG | WEIGHT: 166 LBS | DIASTOLIC BLOOD PRESSURE: 64 MMHG | BODY MASS INDEX: 27.66 KG/M2

## 2021-03-22 DIAGNOSIS — Z95.5 STENTED CORONARY ARTERY: ICD-10-CM

## 2021-03-22 DIAGNOSIS — I70.1 ATHEROSCLEROSIS OF RENAL ARTERY: Primary | ICD-10-CM

## 2021-03-22 DIAGNOSIS — I65.22 STENOSIS OF LEFT CAROTID ARTERY: ICD-10-CM

## 2021-03-22 DIAGNOSIS — I35.9 AORTIC VALVE DISEASE: ICD-10-CM

## 2021-03-22 DIAGNOSIS — I25.10 CORONARY ARTERY DISEASE, ANGINA PRESENCE UNSPECIFIED, UNSPECIFIED VESSEL OR LESION TYPE, UNSPECIFIED WHETHER NATIVE OR TRANSPLANTED HEART: ICD-10-CM

## 2021-03-22 DIAGNOSIS — I77.1 SUBCLAVIAN ARTERY STENOSIS, LEFT: ICD-10-CM

## 2021-03-22 DIAGNOSIS — I35.0 NONRHEUMATIC AORTIC VALVE STENOSIS: ICD-10-CM

## 2021-03-22 DIAGNOSIS — E78.00 HYPERCHOLESTEROLEMIA: ICD-10-CM

## 2021-03-22 DIAGNOSIS — I10 ESSENTIAL HYPERTENSION: ICD-10-CM

## 2021-03-22 DIAGNOSIS — I25.5 CARDIOMYOPATHY, ISCHEMIC: ICD-10-CM

## 2021-03-22 PROCEDURE — 1101F PT FALLS ASSESS-DOCD LE1/YR: CPT | Mod: CPTII,S$GLB,, | Performed by: INTERNAL MEDICINE

## 2021-03-22 PROCEDURE — 3078F DIAST BP <80 MM HG: CPT | Mod: CPTII,S$GLB,, | Performed by: INTERNAL MEDICINE

## 2021-03-22 PROCEDURE — 1126F PR PAIN SEVERITY QUANTIFIED, NO PAIN PRESENT: ICD-10-PCS | Mod: S$GLB,,, | Performed by: INTERNAL MEDICINE

## 2021-03-22 PROCEDURE — 3288F PR FALLS RISK ASSESSMENT DOCUMENTED: ICD-10-PCS | Mod: CPTII,S$GLB,, | Performed by: INTERNAL MEDICINE

## 2021-03-22 PROCEDURE — 1157F PR ADVANCE CARE PLAN OR EQUIV PRESENT IN MEDICAL RECORD: ICD-10-PCS | Mod: S$GLB,,, | Performed by: INTERNAL MEDICINE

## 2021-03-22 PROCEDURE — 3074F PR MOST RECENT SYSTOLIC BLOOD PRESSURE < 130 MM HG: ICD-10-PCS | Mod: CPTII,S$GLB,, | Performed by: INTERNAL MEDICINE

## 2021-03-22 PROCEDURE — 3074F SYST BP LT 130 MM HG: CPT | Mod: CPTII,S$GLB,, | Performed by: INTERNAL MEDICINE

## 2021-03-22 PROCEDURE — 99999 PR PBB SHADOW E&M-EST. PATIENT-LVL III: ICD-10-PCS | Mod: PBBFAC,,, | Performed by: INTERNAL MEDICINE

## 2021-03-22 PROCEDURE — 1159F MED LIST DOCD IN RCRD: CPT | Mod: S$GLB,,, | Performed by: INTERNAL MEDICINE

## 2021-03-22 PROCEDURE — 1101F PR PT FALLS ASSESS DOC 0-1 FALLS W/OUT INJ PAST YR: ICD-10-PCS | Mod: CPTII,S$GLB,, | Performed by: INTERNAL MEDICINE

## 2021-03-22 PROCEDURE — 3288F FALL RISK ASSESSMENT DOCD: CPT | Mod: CPTII,S$GLB,, | Performed by: INTERNAL MEDICINE

## 2021-03-22 PROCEDURE — 99214 OFFICE O/P EST MOD 30 MIN: CPT | Mod: S$GLB,,, | Performed by: INTERNAL MEDICINE

## 2021-03-22 PROCEDURE — 1159F PR MEDICATION LIST DOCUMENTED IN MEDICAL RECORD: ICD-10-PCS | Mod: S$GLB,,, | Performed by: INTERNAL MEDICINE

## 2021-03-22 PROCEDURE — 1157F ADVNC CARE PLAN IN RCRD: CPT | Mod: S$GLB,,, | Performed by: INTERNAL MEDICINE

## 2021-03-22 PROCEDURE — 99999 PR PBB SHADOW E&M-EST. PATIENT-LVL III: CPT | Mod: PBBFAC,,, | Performed by: INTERNAL MEDICINE

## 2021-03-22 PROCEDURE — 3078F PR MOST RECENT DIASTOLIC BLOOD PRESSURE < 80 MM HG: ICD-10-PCS | Mod: CPTII,S$GLB,, | Performed by: INTERNAL MEDICINE

## 2021-03-22 PROCEDURE — 99214 PR OFFICE/OUTPT VISIT, EST, LEVL IV, 30-39 MIN: ICD-10-PCS | Mod: S$GLB,,, | Performed by: INTERNAL MEDICINE

## 2021-03-22 PROCEDURE — 1126F AMNT PAIN NOTED NONE PRSNT: CPT | Mod: S$GLB,,, | Performed by: INTERNAL MEDICINE

## 2021-03-22 RX ORDER — LISINOPRIL 5 MG/1
5 TABLET ORAL DAILY
COMMUNITY
End: 2021-03-29 | Stop reason: SDUPTHER

## 2021-03-22 RX ORDER — ATORVASTATIN CALCIUM 40 MG/1
40 TABLET, FILM COATED ORAL DAILY
Qty: 90 TABLET | Refills: 4 | Status: SHIPPED | OUTPATIENT
Start: 2021-03-22 | End: 2022-01-20

## 2021-03-28 ENCOUNTER — PATIENT MESSAGE (OUTPATIENT)
Dept: CARDIOLOGY | Facility: CLINIC | Age: 76
End: 2021-03-28

## 2021-03-29 RX ORDER — METOPROLOL SUCCINATE 25 MG/1
25 TABLET, EXTENDED RELEASE ORAL DAILY
Qty: 90 TABLET | Refills: 4 | Status: SHIPPED | OUTPATIENT
Start: 2021-03-29 | End: 2022-05-02

## 2021-03-29 RX ORDER — LISINOPRIL 5 MG/1
5 TABLET ORAL DAILY
Qty: 90 TABLET | Refills: 4 | Status: SHIPPED | OUTPATIENT
Start: 2021-03-29 | End: 2021-04-01 | Stop reason: SDUPTHER

## 2021-03-31 ENCOUNTER — PATIENT MESSAGE (OUTPATIENT)
Dept: CARDIOLOGY | Facility: CLINIC | Age: 76
End: 2021-03-31

## 2021-04-01 ENCOUNTER — PATIENT MESSAGE (OUTPATIENT)
Dept: CARDIOLOGY | Facility: CLINIC | Age: 76
End: 2021-04-01

## 2021-04-01 DIAGNOSIS — I10 ESSENTIAL HYPERTENSION: Primary | ICD-10-CM

## 2021-04-01 RX ORDER — LISINOPRIL 5 MG/1
5 TABLET ORAL DAILY
Qty: 90 TABLET | Refills: 4 | Status: SHIPPED | OUTPATIENT
Start: 2021-04-01 | End: 2021-04-12 | Stop reason: SDUPTHER

## 2021-04-11 ENCOUNTER — PATIENT MESSAGE (OUTPATIENT)
Dept: CARDIOLOGY | Facility: CLINIC | Age: 76
End: 2021-04-11

## 2021-04-12 ENCOUNTER — PATIENT MESSAGE (OUTPATIENT)
Dept: CARDIOLOGY | Facility: CLINIC | Age: 76
End: 2021-04-12

## 2021-04-12 DIAGNOSIS — I10 ESSENTIAL HYPERTENSION: ICD-10-CM

## 2021-04-12 RX ORDER — LISINOPRIL 5 MG/1
5 TABLET ORAL DAILY
Qty: 90 TABLET | Refills: 4 | Status: ON HOLD | OUTPATIENT
Start: 2021-04-12 | End: 2021-07-06 | Stop reason: SDUPTHER

## 2021-05-26 ENCOUNTER — HOSPITAL ENCOUNTER (OUTPATIENT)
Dept: CARDIOLOGY | Facility: HOSPITAL | Age: 76
Discharge: HOME | End: 2021-05-26
Attending: INTERNAL MEDICINE
Payer: MEDICARE

## 2021-05-26 VITALS — HEIGHT: 65 IN | WEIGHT: 166 LBS | BODY MASS INDEX: 27.66 KG/M2

## 2021-05-26 LAB
AV INDEX (PROSTH): 0.43
AV MEAN GRADIENT: 10 MMHG
AV PEAK GRADIENT: 18 MMHG
AV VALVE AREA: 1.36 CM2
AV VELOCITY RATIO: 0.48
BSA FOR ECHO PROCEDURE: 1.86 M2
CV ECHO LV RWT: 0.31 CM
DOP CALC AO PEAK VEL: 2.15 M/S
DOP CALC AO VTI: 49.84 CM
DOP CALC LVOT AREA: 3.1 CM2
DOP CALC LVOT DIAMETER: 2 CM
DOP CALC LVOT PEAK VEL: 1.04 M/S
DOP CALC LVOT STROKE VOLUME: 67.76 CM3
DOP CALCLVOT PEAK VEL VTI: 21.58 CM
E WAVE DECELERATION TIME: 127.26 MSEC
E/A RATIO: 0.61
E/E' RATIO: 10.44 M/S
ECHO LV POSTERIOR WALL: 0.95 CM (ref 0.6–1.1)
EJECTION FRACTION: 30 %
FRACTIONAL SHORTENING: 14 % (ref 28–44)
INTERVENTRICULAR SEPTUM: 1.36 CM (ref 0.6–1.1)
LA MAJOR: 5.13 CM
LA MINOR: 4.8 CM
LA WIDTH: 4.68 CM
LEFT ATRIUM SIZE: 4.16 CM
LEFT ATRIUM VOLUME INDEX: 44.8 ML/M2
LEFT ATRIUM VOLUME: 82.07 CM3
LEFT INTERNAL DIMENSION IN SYSTOLE: 5.32 CM (ref 2.1–4)
LEFT VENTRICLE DIASTOLIC VOLUME INDEX: 104.49 ML/M2
LEFT VENTRICLE DIASTOLIC VOLUME: 191.22 ML
LEFT VENTRICLE MASS INDEX: 170 G/M2
LEFT VENTRICLE SYSTOLIC VOLUME INDEX: 74.7 ML/M2
LEFT VENTRICLE SYSTOLIC VOLUME: 136.66 ML
LEFT VENTRICULAR INTERNAL DIMENSION IN DIASTOLE: 6.16 CM (ref 3.5–6)
LEFT VENTRICULAR MASS: 311.69 G
LV LATERAL E/E' RATIO: 11.75 M/S
LV SEPTAL E/E' RATIO: 9.4 M/S
MV MEAN GRADIENT: 1 MMHG
MV PEAK A VEL: 1.55 M/S
MV PEAK E VEL: 0.94 M/S
MV PEAK GRADIENT: 11 MMHG
MV STENOSIS PRESSURE HALF TIME: 36.91 MS
MV VALVE AREA P 1/2 METHOD: 5.96 CM2
PISA MRMAX VEL: 0.06 M/S
PISA TR MAX VEL: 2.57 M/S
RA MAJOR: 3.57 CM
RA PRESSURE: 3 MMHG
RA WIDTH: 3.54 CM
RIGHT VENTRICULAR END-DIASTOLIC DIMENSION: 2.85 CM
RV TISSUE DOPPLER FREE WALL SYSTOLIC VELOCITY 1 (APICAL 4 CHAMBER VIEW): 9.94 CM/S
TDI LATERAL: 0.08 M/S
TDI SEPTAL: 0.1 M/S
TDI: 0.09 M/S
TR MAX PG: 26 MMHG
TRICUSPID ANNULAR PLANE SYSTOLIC EXCURSION: 1.72 CM
TV REST PULMONARY ARTERY PRESSURE: 29 MMHG

## 2021-05-26 PROCEDURE — 93306 TTE W/DOPPLER COMPLETE: CPT | Mod: 26,,, | Performed by: INTERNAL MEDICINE

## 2021-05-26 PROCEDURE — 93306 ECHO (CUPID ONLY): ICD-10-PCS | Mod: 26,,, | Performed by: INTERNAL MEDICINE

## 2021-05-26 PROCEDURE — 93306 TTE W/DOPPLER COMPLETE: CPT | Mod: PO

## 2021-05-27 ENCOUNTER — OFFICE VISIT (OUTPATIENT)
Dept: VASCULAR SURGERY | Facility: CLINIC | Age: 76
End: 2021-05-27
Payer: MEDICARE

## 2021-05-27 VITALS
SYSTOLIC BLOOD PRESSURE: 109 MMHG | HEART RATE: 89 BPM | WEIGHT: 163 LBS | DIASTOLIC BLOOD PRESSURE: 69 MMHG | HEIGHT: 65 IN | BODY MASS INDEX: 27.16 KG/M2

## 2021-05-27 DIAGNOSIS — I73.9 PAD (PERIPHERAL ARTERY DISEASE): Primary | ICD-10-CM

## 2021-05-27 PROCEDURE — 1159F MED LIST DOCD IN RCRD: CPT | Mod: S$GLB,,, | Performed by: THORACIC SURGERY (CARDIOTHORACIC VASCULAR SURGERY)

## 2021-05-27 PROCEDURE — 99999 PR PBB SHADOW E&M-EST. PATIENT-LVL IV: ICD-10-PCS | Mod: PBBFAC,,, | Performed by: THORACIC SURGERY (CARDIOTHORACIC VASCULAR SURGERY)

## 2021-05-27 PROCEDURE — 3288F PR FALLS RISK ASSESSMENT DOCUMENTED: ICD-10-PCS | Mod: CPTII,S$GLB,, | Performed by: THORACIC SURGERY (CARDIOTHORACIC VASCULAR SURGERY)

## 2021-05-27 PROCEDURE — 1159F PR MEDICATION LIST DOCUMENTED IN MEDICAL RECORD: ICD-10-PCS | Mod: S$GLB,,, | Performed by: THORACIC SURGERY (CARDIOTHORACIC VASCULAR SURGERY)

## 2021-05-27 PROCEDURE — 1157F PR ADVANCE CARE PLAN OR EQUIV PRESENT IN MEDICAL RECORD: ICD-10-PCS | Mod: S$GLB,,, | Performed by: THORACIC SURGERY (CARDIOTHORACIC VASCULAR SURGERY)

## 2021-05-27 PROCEDURE — 1126F AMNT PAIN NOTED NONE PRSNT: CPT | Mod: S$GLB,,, | Performed by: THORACIC SURGERY (CARDIOTHORACIC VASCULAR SURGERY)

## 2021-05-27 PROCEDURE — 1126F PR PAIN SEVERITY QUANTIFIED, NO PAIN PRESENT: ICD-10-PCS | Mod: S$GLB,,, | Performed by: THORACIC SURGERY (CARDIOTHORACIC VASCULAR SURGERY)

## 2021-05-27 PROCEDURE — 1101F PT FALLS ASSESS-DOCD LE1/YR: CPT | Mod: CPTII,S$GLB,, | Performed by: THORACIC SURGERY (CARDIOTHORACIC VASCULAR SURGERY)

## 2021-05-27 PROCEDURE — 99999 PR PBB SHADOW E&M-EST. PATIENT-LVL IV: CPT | Mod: PBBFAC,,, | Performed by: THORACIC SURGERY (CARDIOTHORACIC VASCULAR SURGERY)

## 2021-05-27 PROCEDURE — 1157F ADVNC CARE PLAN IN RCRD: CPT | Mod: S$GLB,,, | Performed by: THORACIC SURGERY (CARDIOTHORACIC VASCULAR SURGERY)

## 2021-05-27 PROCEDURE — 3288F FALL RISK ASSESSMENT DOCD: CPT | Mod: CPTII,S$GLB,, | Performed by: THORACIC SURGERY (CARDIOTHORACIC VASCULAR SURGERY)

## 2021-05-27 PROCEDURE — 1101F PR PT FALLS ASSESS DOC 0-1 FALLS W/OUT INJ PAST YR: ICD-10-PCS | Mod: CPTII,S$GLB,, | Performed by: THORACIC SURGERY (CARDIOTHORACIC VASCULAR SURGERY)

## 2021-05-27 PROCEDURE — 99215 OFFICE O/P EST HI 40 MIN: CPT | Mod: S$GLB,,, | Performed by: THORACIC SURGERY (CARDIOTHORACIC VASCULAR SURGERY)

## 2021-05-27 PROCEDURE — 99215 PR OFFICE/OUTPT VISIT, EST, LEVL V, 40-54 MIN: ICD-10-PCS | Mod: S$GLB,,, | Performed by: THORACIC SURGERY (CARDIOTHORACIC VASCULAR SURGERY)

## 2021-06-04 ENCOUNTER — PATIENT MESSAGE (OUTPATIENT)
Dept: GASTROENTEROLOGY | Facility: CLINIC | Age: 76
End: 2021-06-04

## 2021-06-08 ENCOUNTER — OFFICE VISIT (OUTPATIENT)
Dept: GASTROENTEROLOGY | Facility: CLINIC | Age: 76
End: 2021-06-08
Payer: MEDICARE

## 2021-06-08 VITALS — WEIGHT: 162.69 LBS | BODY MASS INDEX: 27.1 KG/M2 | HEIGHT: 65 IN

## 2021-06-08 DIAGNOSIS — Z87.19 HISTORY OF DIVERTICULOSIS: ICD-10-CM

## 2021-06-08 DIAGNOSIS — R63.4 WEIGHT LOSS: ICD-10-CM

## 2021-06-08 DIAGNOSIS — D64.9 NORMOCYTIC ANEMIA: ICD-10-CM

## 2021-06-08 DIAGNOSIS — K63.9 COLON WALL THICKENING: ICD-10-CM

## 2021-06-08 DIAGNOSIS — R14.0 BLOATING SYMPTOM: ICD-10-CM

## 2021-06-08 DIAGNOSIS — R93.3 ABNORMAL CT SCAN, GASTROINTESTINAL TRACT: ICD-10-CM

## 2021-06-08 DIAGNOSIS — K92.1 BLACK STOOLS: ICD-10-CM

## 2021-06-08 DIAGNOSIS — Z87.19 HISTORY OF GASTROESOPHAGEAL REFLUX (GERD): ICD-10-CM

## 2021-06-08 DIAGNOSIS — R19.7 DIARRHEA, UNSPECIFIED TYPE: ICD-10-CM

## 2021-06-08 DIAGNOSIS — R19.5 OCCULT BLOOD POSITIVE STOOL: ICD-10-CM

## 2021-06-08 DIAGNOSIS — R10.30 LOWER ABDOMINAL PAIN: Primary | ICD-10-CM

## 2021-06-08 DIAGNOSIS — Z79.01 ANTICOAGULANT LONG-TERM USE: ICD-10-CM

## 2021-06-08 PROCEDURE — 99999 PR PBB SHADOW E&M-EST. PATIENT-LVL IV: ICD-10-PCS | Mod: PBBFAC,,, | Performed by: NURSE PRACTITIONER

## 2021-06-08 PROCEDURE — 1125F PR PAIN SEVERITY QUANTIFIED, PAIN PRESENT: ICD-10-PCS | Mod: S$GLB,,, | Performed by: NURSE PRACTITIONER

## 2021-06-08 PROCEDURE — 1159F MED LIST DOCD IN RCRD: CPT | Mod: S$GLB,,, | Performed by: NURSE PRACTITIONER

## 2021-06-08 PROCEDURE — 99214 OFFICE O/P EST MOD 30 MIN: CPT | Mod: S$GLB,,, | Performed by: NURSE PRACTITIONER

## 2021-06-08 PROCEDURE — 1157F ADVNC CARE PLAN IN RCRD: CPT | Mod: S$GLB,,, | Performed by: NURSE PRACTITIONER

## 2021-06-08 PROCEDURE — 1159F PR MEDICATION LIST DOCUMENTED IN MEDICAL RECORD: ICD-10-PCS | Mod: S$GLB,,, | Performed by: NURSE PRACTITIONER

## 2021-06-08 PROCEDURE — 1157F PR ADVANCE CARE PLAN OR EQUIV PRESENT IN MEDICAL RECORD: ICD-10-PCS | Mod: S$GLB,,, | Performed by: NURSE PRACTITIONER

## 2021-06-08 PROCEDURE — 99214 PR OFFICE/OUTPT VISIT, EST, LEVL IV, 30-39 MIN: ICD-10-PCS | Mod: S$GLB,,, | Performed by: NURSE PRACTITIONER

## 2021-06-08 PROCEDURE — 1125F AMNT PAIN NOTED PAIN PRSNT: CPT | Mod: S$GLB,,, | Performed by: NURSE PRACTITIONER

## 2021-06-08 PROCEDURE — 99999 PR PBB SHADOW E&M-EST. PATIENT-LVL IV: CPT | Mod: PBBFAC,,, | Performed by: NURSE PRACTITIONER

## 2021-06-08 RX ORDER — SUCRALFATE 1 G/1
1 TABLET ORAL
Qty: 120 TABLET | Refills: 0 | Status: ON HOLD | OUTPATIENT
Start: 2021-06-08 | End: 2021-11-05 | Stop reason: HOSPADM

## 2021-06-09 ENCOUNTER — LAB VISIT (OUTPATIENT)
Dept: LAB | Facility: HOSPITAL | Age: 76
End: 2021-06-09
Attending: INTERNAL MEDICINE
Payer: MEDICARE

## 2021-06-09 DIAGNOSIS — R19.7 DIARRHEA, UNSPECIFIED TYPE: ICD-10-CM

## 2021-06-09 PROCEDURE — 82272 OCCULT BLD FECES 1-3 TESTS: CPT | Performed by: NURSE PRACTITIONER

## 2021-06-09 PROCEDURE — 83986 ASSAY PH BODY FLUID NOS: CPT | Performed by: NURSE PRACTITIONER

## 2021-06-09 PROCEDURE — 87425 ROTAVIRUS AG IA: CPT | Performed by: NURSE PRACTITIONER

## 2021-06-09 PROCEDURE — 87427 SHIGA-LIKE TOXIN AG IA: CPT | Performed by: NURSE PRACTITIONER

## 2021-06-09 PROCEDURE — 87324 CLOSTRIDIUM AG IA: CPT | Performed by: NURSE PRACTITIONER

## 2021-06-09 PROCEDURE — 87046 STOOL CULTR AEROBIC BACT EA: CPT | Performed by: NURSE PRACTITIONER

## 2021-06-09 PROCEDURE — 87798 DETECT AGENT NOS DNA AMP: CPT | Performed by: NURSE PRACTITIONER

## 2021-06-09 PROCEDURE — 87209 SMEAR COMPLEX STAIN: CPT | Performed by: NURSE PRACTITIONER

## 2021-06-09 PROCEDURE — 89125 SPECIMEN FAT STAIN: CPT | Performed by: NURSE PRACTITIONER

## 2021-06-09 PROCEDURE — 87045 FECES CULTURE AEROBIC BACT: CPT | Performed by: NURSE PRACTITIONER

## 2021-06-09 PROCEDURE — 87449 NOS EACH ORGANISM AG IA: CPT | Mod: 59 | Performed by: NURSE PRACTITIONER

## 2021-06-09 PROCEDURE — 87329 GIARDIA AG IA: CPT | Performed by: NURSE PRACTITIONER

## 2021-06-09 PROCEDURE — 89055 LEUKOCYTE ASSESSMENT FECAL: CPT | Performed by: NURSE PRACTITIONER

## 2021-06-10 LAB
C DIFF GDH STL QL: NEGATIVE
C DIFF TOX A+B STL QL IA: NEGATIVE
CRYPTOSP AG STL QL IA: NEGATIVE
G LAMBLIA AG STL QL IA: NEGATIVE
OB PNL STL: NEGATIVE
RV AG STL QL IA.RAPID: NEGATIVE
WBC #/AREA STL HPF: NORMAL /[HPF]

## 2021-06-11 ENCOUNTER — TELEPHONE (OUTPATIENT)
Dept: GASTROENTEROLOGY | Facility: CLINIC | Age: 76
End: 2021-06-11

## 2021-06-11 LAB
E COLI SXT1 STL QL IA: NEGATIVE
E COLI SXT2 STL QL IA: NEGATIVE
FAT STL SUDAN IV STN: NORMAL
O+P STL MICRO: NORMAL
PH STL: NORMAL [PH]

## 2021-06-13 LAB
HADV DNA SERPL QL NAA+PROBE: NEGATIVE
SPECIMEN SOURCE: NORMAL

## 2021-06-14 LAB — BACTERIA STL CULT: NORMAL

## 2021-06-15 ENCOUNTER — TELEPHONE (OUTPATIENT)
Dept: GASTROENTEROLOGY | Facility: CLINIC | Age: 76
End: 2021-06-15

## 2021-06-15 ENCOUNTER — OFFICE VISIT (OUTPATIENT)
Dept: CARDIOLOGY | Facility: CLINIC | Age: 76
End: 2021-06-15
Payer: MEDICARE

## 2021-06-15 VITALS
HEIGHT: 65 IN | BODY MASS INDEX: 27.51 KG/M2 | DIASTOLIC BLOOD PRESSURE: 70 MMHG | HEART RATE: 89 BPM | SYSTOLIC BLOOD PRESSURE: 115 MMHG | WEIGHT: 165.13 LBS

## 2021-06-15 DIAGNOSIS — Z86.73 HISTORY OF CEREBROVASCULAR ACCIDENT: ICD-10-CM

## 2021-06-15 DIAGNOSIS — I25.5 CARDIOMYOPATHY, ISCHEMIC: ICD-10-CM

## 2021-06-15 DIAGNOSIS — I35.0 NONRHEUMATIC AORTIC VALVE STENOSIS: Primary | ICD-10-CM

## 2021-06-15 DIAGNOSIS — Z95.5 STENTED CORONARY ARTERY: ICD-10-CM

## 2021-06-15 DIAGNOSIS — E78.00 HYPERCHOLESTEROLEMIA: ICD-10-CM

## 2021-06-15 DIAGNOSIS — I73.9 PAD (PERIPHERAL ARTERY DISEASE): ICD-10-CM

## 2021-06-15 DIAGNOSIS — I77.1 SUBCLAVIAN ARTERY STENOSIS, LEFT: ICD-10-CM

## 2021-06-15 DIAGNOSIS — I70.1 ATHEROSCLEROSIS OF RENAL ARTERY: ICD-10-CM

## 2021-06-15 DIAGNOSIS — I25.10 CORONARY ARTERY DISEASE, ANGINA PRESENCE UNSPECIFIED, UNSPECIFIED VESSEL OR LESION TYPE, UNSPECIFIED WHETHER NATIVE OR TRANSPLANTED HEART: ICD-10-CM

## 2021-06-15 DIAGNOSIS — I10 ESSENTIAL HYPERTENSION: ICD-10-CM

## 2021-06-15 DIAGNOSIS — I65.22 STENOSIS OF LEFT CAROTID ARTERY: ICD-10-CM

## 2021-06-15 PROCEDURE — 3288F FALL RISK ASSESSMENT DOCD: CPT | Mod: CPTII,S$GLB,, | Performed by: INTERNAL MEDICINE

## 2021-06-15 PROCEDURE — 99214 OFFICE O/P EST MOD 30 MIN: CPT | Mod: S$GLB,,, | Performed by: INTERNAL MEDICINE

## 2021-06-15 PROCEDURE — 1126F AMNT PAIN NOTED NONE PRSNT: CPT | Mod: S$GLB,,, | Performed by: INTERNAL MEDICINE

## 2021-06-15 PROCEDURE — 99999 PR PBB SHADOW E&M-EST. PATIENT-LVL IV: CPT | Mod: PBBFAC,,, | Performed by: INTERNAL MEDICINE

## 2021-06-15 PROCEDURE — 99214 PR OFFICE/OUTPT VISIT, EST, LEVL IV, 30-39 MIN: ICD-10-PCS | Mod: S$GLB,,, | Performed by: INTERNAL MEDICINE

## 2021-06-15 PROCEDURE — 1157F ADVNC CARE PLAN IN RCRD: CPT | Mod: S$GLB,,, | Performed by: INTERNAL MEDICINE

## 2021-06-15 PROCEDURE — 3288F PR FALLS RISK ASSESSMENT DOCUMENTED: ICD-10-PCS | Mod: CPTII,S$GLB,, | Performed by: INTERNAL MEDICINE

## 2021-06-15 PROCEDURE — 1101F PT FALLS ASSESS-DOCD LE1/YR: CPT | Mod: CPTII,S$GLB,, | Performed by: INTERNAL MEDICINE

## 2021-06-15 PROCEDURE — 99999 PR PBB SHADOW E&M-EST. PATIENT-LVL IV: ICD-10-PCS | Mod: PBBFAC,,, | Performed by: INTERNAL MEDICINE

## 2021-06-15 PROCEDURE — 1159F MED LIST DOCD IN RCRD: CPT | Mod: S$GLB,,, | Performed by: INTERNAL MEDICINE

## 2021-06-15 PROCEDURE — 1157F PR ADVANCE CARE PLAN OR EQUIV PRESENT IN MEDICAL RECORD: ICD-10-PCS | Mod: S$GLB,,, | Performed by: INTERNAL MEDICINE

## 2021-06-15 PROCEDURE — 1159F PR MEDICATION LIST DOCUMENTED IN MEDICAL RECORD: ICD-10-PCS | Mod: S$GLB,,, | Performed by: INTERNAL MEDICINE

## 2021-06-15 PROCEDURE — 1101F PR PT FALLS ASSESS DOC 0-1 FALLS W/OUT INJ PAST YR: ICD-10-PCS | Mod: CPTII,S$GLB,, | Performed by: INTERNAL MEDICINE

## 2021-06-15 PROCEDURE — 1126F PR PAIN SEVERITY QUANTIFIED, NO PAIN PRESENT: ICD-10-PCS | Mod: S$GLB,,, | Performed by: INTERNAL MEDICINE

## 2021-06-18 ENCOUNTER — PATIENT MESSAGE (OUTPATIENT)
Dept: GASTROENTEROLOGY | Facility: CLINIC | Age: 76
End: 2021-06-18

## 2021-07-04 PROBLEM — K92.2 GASTROINTESTINAL HEMORRHAGE: Status: ACTIVE | Noted: 2021-07-04

## 2021-07-06 ENCOUNTER — TELEPHONE (OUTPATIENT)
Dept: GASTROENTEROLOGY | Facility: CLINIC | Age: 76
End: 2021-07-06

## 2021-07-07 ENCOUNTER — TELEPHONE (OUTPATIENT)
Dept: GASTROENTEROLOGY | Facility: CLINIC | Age: 76
End: 2021-07-07

## 2021-07-11 ENCOUNTER — PATIENT MESSAGE (OUTPATIENT)
Dept: CARDIOLOGY | Facility: CLINIC | Age: 76
End: 2021-07-11

## 2021-07-12 RX ORDER — CLOPIDOGREL BISULFATE 75 MG/1
75 TABLET ORAL DAILY
Qty: 90 TABLET | Refills: 3 | Status: ON HOLD | OUTPATIENT
Start: 2021-07-12 | End: 2021-07-19 | Stop reason: HOSPADM

## 2021-07-13 ENCOUNTER — TELEPHONE (OUTPATIENT)
Dept: GASTROENTEROLOGY | Facility: CLINIC | Age: 76
End: 2021-07-13

## 2021-07-15 ENCOUNTER — PATIENT MESSAGE (OUTPATIENT)
Dept: GASTROENTEROLOGY | Facility: CLINIC | Age: 76
End: 2021-07-15

## 2021-07-15 ENCOUNTER — TELEPHONE (OUTPATIENT)
Dept: GASTROENTEROLOGY | Facility: CLINIC | Age: 76
End: 2021-07-15

## 2021-07-17 ENCOUNTER — PATIENT MESSAGE (OUTPATIENT)
Dept: GASTROENTEROLOGY | Facility: CLINIC | Age: 76
End: 2021-07-17

## 2021-07-17 PROBLEM — K92.2 LOWER GI BLEED: Status: ACTIVE | Noted: 2021-07-17

## 2021-07-20 ENCOUNTER — PATIENT MESSAGE (OUTPATIENT)
Dept: CARDIOLOGY | Facility: CLINIC | Age: 76
End: 2021-07-20

## 2021-08-15 ENCOUNTER — PATIENT MESSAGE (OUTPATIENT)
Dept: GASTROENTEROLOGY | Facility: CLINIC | Age: 76
End: 2021-08-15

## 2021-09-24 ENCOUNTER — PATIENT MESSAGE (OUTPATIENT)
Dept: CARDIOLOGY | Facility: CLINIC | Age: 76
End: 2021-09-24

## 2021-09-24 DIAGNOSIS — I10 ESSENTIAL HYPERTENSION: ICD-10-CM

## 2021-09-24 RX ORDER — LISINOPRIL 5 MG/1
5 TABLET ORAL EVERY MORNING
Qty: 90 TABLET | Refills: 3 | Status: SHIPPED | OUTPATIENT
Start: 2021-09-24 | End: 2022-05-16

## 2021-10-28 ENCOUNTER — PATIENT MESSAGE (OUTPATIENT)
Dept: GASTROENTEROLOGY | Facility: CLINIC | Age: 76
End: 2021-10-28
Payer: MEDICARE

## 2021-10-29 ENCOUNTER — PATIENT MESSAGE (OUTPATIENT)
Dept: CARDIOLOGY | Facility: CLINIC | Age: 76
End: 2021-10-29
Payer: MEDICARE

## 2021-10-31 ENCOUNTER — PATIENT MESSAGE (OUTPATIENT)
Dept: GASTROENTEROLOGY | Facility: CLINIC | Age: 76
End: 2021-10-31
Payer: MEDICARE

## 2021-10-31 DIAGNOSIS — Z87.19 HISTORY OF GASTROESOPHAGEAL REFLUX (GERD): Primary | ICD-10-CM

## 2021-11-01 ENCOUNTER — PATIENT MESSAGE (OUTPATIENT)
Dept: GASTROENTEROLOGY | Facility: CLINIC | Age: 76
End: 2021-11-01
Payer: MEDICARE

## 2021-11-01 RX ORDER — PANTOPRAZOLE SODIUM 40 MG/1
40 TABLET, DELAYED RELEASE ORAL 2 TIMES DAILY
Qty: 60 TABLET | Refills: 0 | Status: SHIPPED | OUTPATIENT
Start: 2021-11-01 | End: 2021-11-12 | Stop reason: SDUPTHER

## 2021-11-03 ENCOUNTER — PATIENT MESSAGE (OUTPATIENT)
Dept: GASTROENTEROLOGY | Facility: CLINIC | Age: 76
End: 2021-11-03
Payer: MEDICARE

## 2021-11-03 ENCOUNTER — TELEPHONE (OUTPATIENT)
Dept: ENDOSCOPY | Facility: HOSPITAL | Age: 76
End: 2021-11-03
Payer: MEDICARE

## 2021-11-03 DIAGNOSIS — Z87.19 HISTORY OF GASTROESOPHAGEAL REFLUX (GERD): ICD-10-CM

## 2021-11-03 RX ORDER — PANTOPRAZOLE SODIUM 40 MG/1
40 TABLET, DELAYED RELEASE ORAL 2 TIMES DAILY
Qty: 180 TABLET | Refills: 0 | OUTPATIENT
Start: 2021-11-03 | End: 2022-11-03

## 2021-11-04 ENCOUNTER — ANESTHESIA EVENT (OUTPATIENT)
Dept: ENDOSCOPY | Facility: HOSPITAL | Age: 76
End: 2021-11-04
Payer: MEDICARE

## 2021-11-04 ENCOUNTER — PATIENT MESSAGE (OUTPATIENT)
Dept: GASTROENTEROLOGY | Facility: CLINIC | Age: 76
End: 2021-11-04
Payer: MEDICARE

## 2021-11-05 ENCOUNTER — PATIENT MESSAGE (OUTPATIENT)
Dept: GASTROENTEROLOGY | Facility: CLINIC | Age: 76
End: 2021-11-05
Payer: MEDICARE

## 2021-11-05 ENCOUNTER — ANESTHESIA (OUTPATIENT)
Dept: ENDOSCOPY | Facility: HOSPITAL | Age: 76
End: 2021-11-05
Payer: MEDICARE

## 2021-11-05 ENCOUNTER — HOSPITAL ENCOUNTER (OUTPATIENT)
Facility: HOSPITAL | Age: 76
Discharge: HOME | End: 2021-11-05
Attending: INTERNAL MEDICINE | Admitting: INTERNAL MEDICINE
Payer: MEDICARE

## 2021-11-05 DIAGNOSIS — D64.9 ANEMIA: ICD-10-CM

## 2021-11-05 PROCEDURE — 88305 TISSUE EXAM BY PATHOLOGIST: CPT | Mod: 26,,, | Performed by: PATHOLOGY

## 2021-11-05 PROCEDURE — D9220A PRA ANESTHESIA: Mod: CRNA,,, | Performed by: NURSE ANESTHETIST, CERTIFIED REGISTERED

## 2021-11-05 PROCEDURE — 88305 TISSUE EXAM BY PATHOLOGIST: ICD-10-PCS | Mod: 26,,, | Performed by: PATHOLOGY

## 2021-11-05 PROCEDURE — 25000003 PHARM REV CODE 250: Mod: PO | Performed by: NURSE ANESTHETIST, CERTIFIED REGISTERED

## 2021-11-05 PROCEDURE — 43239 EGD BIOPSY SINGLE/MULTIPLE: CPT | Mod: PO | Performed by: INTERNAL MEDICINE

## 2021-11-05 PROCEDURE — 63600175 PHARM REV CODE 636 W HCPCS: Mod: PO | Performed by: INTERNAL MEDICINE

## 2021-11-05 PROCEDURE — 43239 PR EGD, FLEX, W/BIOPSY, SGL/MULTI: ICD-10-PCS | Mod: ,,, | Performed by: INTERNAL MEDICINE

## 2021-11-05 PROCEDURE — D9220A PRA ANESTHESIA: ICD-10-PCS | Mod: ANES,,, | Performed by: ANESTHESIOLOGY

## 2021-11-05 PROCEDURE — 37000009 HC ANESTHESIA EA ADD 15 MINS: Mod: PO | Performed by: INTERNAL MEDICINE

## 2021-11-05 PROCEDURE — 63600175 PHARM REV CODE 636 W HCPCS: Mod: PO | Performed by: NURSE ANESTHETIST, CERTIFIED REGISTERED

## 2021-11-05 PROCEDURE — 27201012 HC FORCEPS, HOT/COLD, DISP: Mod: PO | Performed by: INTERNAL MEDICINE

## 2021-11-05 PROCEDURE — 25000003 PHARM REV CODE 250: Mod: PO | Performed by: INTERNAL MEDICINE

## 2021-11-05 PROCEDURE — 43239 EGD BIOPSY SINGLE/MULTIPLE: CPT | Mod: ,,, | Performed by: INTERNAL MEDICINE

## 2021-11-05 PROCEDURE — D9220A PRA ANESTHESIA: ICD-10-PCS | Mod: CRNA,,, | Performed by: NURSE ANESTHETIST, CERTIFIED REGISTERED

## 2021-11-05 PROCEDURE — 37000008 HC ANESTHESIA 1ST 15 MINUTES: Mod: PO | Performed by: INTERNAL MEDICINE

## 2021-11-05 PROCEDURE — D9220A PRA ANESTHESIA: Mod: ANES,,, | Performed by: ANESTHESIOLOGY

## 2021-11-05 PROCEDURE — 88305 TISSUE EXAM BY PATHOLOGIST: CPT | Mod: 59 | Performed by: PATHOLOGY

## 2021-11-05 RX ORDER — SODIUM CHLORIDE, SODIUM LACTATE, POTASSIUM CHLORIDE, CALCIUM CHLORIDE 600; 310; 30; 20 MG/100ML; MG/100ML; MG/100ML; MG/100ML
INJECTION, SOLUTION INTRAVENOUS CONTINUOUS
Status: DISCONTINUED | OUTPATIENT
Start: 2021-11-05 | End: 2021-11-05 | Stop reason: HOSPADM

## 2021-11-05 RX ORDER — SUCRALFATE 1 G/1
1 TABLET ORAL
Qty: 120 TABLET | Refills: 11 | Status: SHIPPED | OUTPATIENT
Start: 2021-11-05 | End: 2021-11-12 | Stop reason: SDUPTHER

## 2021-11-05 RX ORDER — LIDOCAINE HYDROCHLORIDE 10 MG/ML
1 INJECTION INFILTRATION; PERINEURAL ONCE
Status: COMPLETED | OUTPATIENT
Start: 2021-11-05 | End: 2021-11-05

## 2021-11-05 RX ORDER — SODIUM CHLORIDE 0.9 % (FLUSH) 0.9 %
10 SYRINGE (ML) INJECTION
Status: DISCONTINUED | OUTPATIENT
Start: 2021-11-05 | End: 2021-11-05 | Stop reason: HOSPADM

## 2021-11-05 RX ORDER — LIDOCAINE HCL/PF 100 MG/5ML
SYRINGE (ML) INTRAVENOUS
Status: DISCONTINUED | OUTPATIENT
Start: 2021-11-05 | End: 2021-11-05

## 2021-11-05 RX ORDER — PROPOFOL 10 MG/ML
VIAL (ML) INTRAVENOUS
Status: DISCONTINUED | OUTPATIENT
Start: 2021-11-05 | End: 2021-11-05

## 2021-11-05 RX ADMIN — PROPOFOL 25 MG: 10 INJECTION, EMULSION INTRAVENOUS at 01:11

## 2021-11-05 RX ADMIN — LIDOCAINE HYDROCHLORIDE 100 MG: 20 INJECTION, SOLUTION INTRAVENOUS at 12:11

## 2021-11-05 RX ADMIN — PROPOFOL 50 MG: 10 INJECTION, EMULSION INTRAVENOUS at 01:11

## 2021-11-05 RX ADMIN — SODIUM CHLORIDE, SODIUM LACTATE, POTASSIUM CHLORIDE, AND CALCIUM CHLORIDE: .6; .31; .03; .02 INJECTION, SOLUTION INTRAVENOUS at 12:11

## 2021-11-05 RX ADMIN — PROPOFOL 25 MG: 10 INJECTION, EMULSION INTRAVENOUS at 12:11

## 2021-11-05 RX ADMIN — LIDOCAINE HYDROCHLORIDE 1 ML: 10 INJECTION, SOLUTION INFILTRATION; PERINEURAL at 01:11

## 2021-11-05 RX ADMIN — PROPOFOL 100 MG: 10 INJECTION, EMULSION INTRAVENOUS at 12:11

## 2021-11-08 VITALS
SYSTOLIC BLOOD PRESSURE: 129 MMHG | HEART RATE: 66 BPM | OXYGEN SATURATION: 99 % | HEIGHT: 65 IN | RESPIRATION RATE: 13 BRPM | DIASTOLIC BLOOD PRESSURE: 62 MMHG | TEMPERATURE: 97 F | BODY MASS INDEX: 27.49 KG/M2 | WEIGHT: 165 LBS

## 2021-11-12 ENCOUNTER — PATIENT MESSAGE (OUTPATIENT)
Dept: VASCULAR SURGERY | Facility: CLINIC | Age: 76
End: 2021-11-12
Payer: MEDICARE

## 2021-11-12 ENCOUNTER — OFFICE VISIT (OUTPATIENT)
Dept: GASTROENTEROLOGY | Facility: CLINIC | Age: 76
End: 2021-11-12
Payer: MEDICARE

## 2021-11-12 ENCOUNTER — TELEPHONE (OUTPATIENT)
Dept: VASCULAR SURGERY | Facility: CLINIC | Age: 76
End: 2021-11-12
Payer: MEDICARE

## 2021-11-12 VITALS — HEIGHT: 65 IN | BODY MASS INDEX: 28.36 KG/M2 | WEIGHT: 170.19 LBS

## 2021-11-12 DIAGNOSIS — R10.9 ABDOMINAL PAIN, UNSPECIFIED ABDOMINAL LOCATION: Primary | ICD-10-CM

## 2021-11-12 DIAGNOSIS — R10.13 EPIGASTRIC PAIN: ICD-10-CM

## 2021-11-12 DIAGNOSIS — K59.00 CONSTIPATION, UNSPECIFIED CONSTIPATION TYPE: ICD-10-CM

## 2021-11-12 DIAGNOSIS — K25.9 GASTRIC ULCER WITHOUT HEMORRHAGE OR PERFORATION, UNSPECIFIED CHRONICITY: Primary | ICD-10-CM

## 2021-11-12 DIAGNOSIS — I25.10 CORONARY ARTERY DISEASE, UNSPECIFIED VESSEL OR LESION TYPE, UNSPECIFIED WHETHER ANGINA PRESENT, UNSPECIFIED WHETHER NATIVE OR TRANSPLANTED HEART: ICD-10-CM

## 2021-11-12 DIAGNOSIS — I73.9 PAD (PERIPHERAL ARTERY DISEASE): ICD-10-CM

## 2021-11-12 DIAGNOSIS — Z87.19 HISTORY OF GASTROESOPHAGEAL REFLUX (GERD): ICD-10-CM

## 2021-11-12 DIAGNOSIS — R10.84 GENERALIZED ABDOMINAL PAIN: ICD-10-CM

## 2021-11-12 DIAGNOSIS — R10.12 LEFT UPPER QUADRANT PAIN: ICD-10-CM

## 2021-11-12 PROCEDURE — 1157F ADVNC CARE PLAN IN RCRD: CPT | Mod: CPTII,S$GLB,, | Performed by: NURSE PRACTITIONER

## 2021-11-12 PROCEDURE — 99999 PR PBB SHADOW E&M-EST. PATIENT-LVL IV: CPT | Mod: PBBFAC,,, | Performed by: NURSE PRACTITIONER

## 2021-11-12 PROCEDURE — 3288F PR FALLS RISK ASSESSMENT DOCUMENTED: ICD-10-PCS | Mod: CPTII,S$GLB,, | Performed by: NURSE PRACTITIONER

## 2021-11-12 PROCEDURE — 1160F PR REVIEW ALL MEDS BY PRESCRIBER/CLIN PHARMACIST DOCUMENTED: ICD-10-PCS | Mod: CPTII,S$GLB,, | Performed by: NURSE PRACTITIONER

## 2021-11-12 PROCEDURE — 99214 PR OFFICE/OUTPT VISIT, EST, LEVL IV, 30-39 MIN: ICD-10-PCS | Mod: S$GLB,,, | Performed by: NURSE PRACTITIONER

## 2021-11-12 PROCEDURE — 99214 OFFICE O/P EST MOD 30 MIN: CPT | Mod: S$GLB,,, | Performed by: NURSE PRACTITIONER

## 2021-11-12 PROCEDURE — 1157F PR ADVANCE CARE PLAN OR EQUIV PRESENT IN MEDICAL RECORD: ICD-10-PCS | Mod: CPTII,S$GLB,, | Performed by: NURSE PRACTITIONER

## 2021-11-12 PROCEDURE — 1160F RVW MEDS BY RX/DR IN RCRD: CPT | Mod: CPTII,S$GLB,, | Performed by: NURSE PRACTITIONER

## 2021-11-12 PROCEDURE — 1125F PR PAIN SEVERITY QUANTIFIED, PAIN PRESENT: ICD-10-PCS | Mod: CPTII,S$GLB,, | Performed by: NURSE PRACTITIONER

## 2021-11-12 PROCEDURE — 3288F FALL RISK ASSESSMENT DOCD: CPT | Mod: CPTII,S$GLB,, | Performed by: NURSE PRACTITIONER

## 2021-11-12 PROCEDURE — 1101F PR PT FALLS ASSESS DOC 0-1 FALLS W/OUT INJ PAST YR: ICD-10-PCS | Mod: CPTII,S$GLB,, | Performed by: NURSE PRACTITIONER

## 2021-11-12 PROCEDURE — 1159F PR MEDICATION LIST DOCUMENTED IN MEDICAL RECORD: ICD-10-PCS | Mod: CPTII,S$GLB,, | Performed by: NURSE PRACTITIONER

## 2021-11-12 PROCEDURE — 99999 PR PBB SHADOW E&M-EST. PATIENT-LVL IV: ICD-10-PCS | Mod: PBBFAC,,, | Performed by: NURSE PRACTITIONER

## 2021-11-12 PROCEDURE — 1101F PT FALLS ASSESS-DOCD LE1/YR: CPT | Mod: CPTII,S$GLB,, | Performed by: NURSE PRACTITIONER

## 2021-11-12 PROCEDURE — 1125F AMNT PAIN NOTED PAIN PRSNT: CPT | Mod: CPTII,S$GLB,, | Performed by: NURSE PRACTITIONER

## 2021-11-12 PROCEDURE — 1159F MED LIST DOCD IN RCRD: CPT | Mod: CPTII,S$GLB,, | Performed by: NURSE PRACTITIONER

## 2021-11-12 RX ORDER — PANTOPRAZOLE SODIUM 40 MG/1
40 TABLET, DELAYED RELEASE ORAL 2 TIMES DAILY
Qty: 180 TABLET | Refills: 1 | Status: SHIPPED | OUTPATIENT
Start: 2021-11-12 | End: 2022-04-25

## 2021-11-12 RX ORDER — SUCRALFATE 1 G/1
1 TABLET ORAL
Qty: 360 TABLET | Refills: 0 | Status: SHIPPED | OUTPATIENT
Start: 2021-11-12 | End: 2022-03-28

## 2021-11-16 ENCOUNTER — HOSPITAL ENCOUNTER (OUTPATIENT)
Dept: RADIOLOGY | Facility: HOSPITAL | Age: 76
Discharge: HOME | End: 2021-11-16
Attending: THORACIC SURGERY (CARDIOTHORACIC VASCULAR SURGERY)
Payer: MEDICARE

## 2021-11-16 DIAGNOSIS — I25.10 CORONARY ARTERY DISEASE, UNSPECIFIED VESSEL OR LESION TYPE, UNSPECIFIED WHETHER ANGINA PRESENT, UNSPECIFIED WHETHER NATIVE OR TRANSPLANTED HEART: ICD-10-CM

## 2021-11-16 DIAGNOSIS — R10.13 EPIGASTRIC PAIN: ICD-10-CM

## 2021-11-16 DIAGNOSIS — R10.9 ABDOMINAL PAIN, UNSPECIFIED ABDOMINAL LOCATION: ICD-10-CM

## 2021-11-16 DIAGNOSIS — R10.12 LEFT UPPER QUADRANT PAIN: ICD-10-CM

## 2021-11-16 DIAGNOSIS — I73.9 PAD (PERIPHERAL ARTERY DISEASE): ICD-10-CM

## 2021-11-16 LAB
FINAL PATHOLOGIC DIAGNOSIS: NORMAL
Lab: NORMAL

## 2021-11-16 PROCEDURE — 25500020 PHARM REV CODE 255: Mod: PO | Performed by: THORACIC SURGERY (CARDIOTHORACIC VASCULAR SURGERY)

## 2021-11-16 PROCEDURE — 74174 CTA ABD&PLVS W/CONTRAST: CPT | Mod: TC,PO

## 2021-11-16 PROCEDURE — 74174 CTA ABDOMEN AND PELVIS: ICD-10-PCS | Mod: 26,,, | Performed by: RADIOLOGY

## 2021-11-16 PROCEDURE — 74174 CTA ABD&PLVS W/CONTRAST: CPT | Mod: 26,,, | Performed by: RADIOLOGY

## 2021-11-16 RX ADMIN — IOHEXOL 100 ML: 350 INJECTION, SOLUTION INTRAVENOUS at 04:11

## 2021-11-30 ENCOUNTER — PATIENT MESSAGE (OUTPATIENT)
Dept: VASCULAR SURGERY | Facility: CLINIC | Age: 76
End: 2021-11-30
Payer: MEDICARE

## 2021-12-03 ENCOUNTER — TELEPHONE (OUTPATIENT)
Dept: GASTROENTEROLOGY | Facility: CLINIC | Age: 76
End: 2021-12-03
Payer: MEDICARE

## 2021-12-21 ENCOUNTER — CLINICAL SUPPORT (OUTPATIENT)
Dept: CARDIOLOGY | Facility: HOSPITAL | Age: 76
End: 2021-12-21
Attending: INTERNAL MEDICINE
Payer: MEDICARE

## 2021-12-21 VITALS — WEIGHT: 170 LBS | BODY MASS INDEX: 28.32 KG/M2 | HEIGHT: 65 IN | HEART RATE: 72 BPM

## 2021-12-21 LAB
ASCENDING AORTA: 2.51 CM
AV INDEX (PROSTH): 0.57
AV MEAN GRADIENT: 8 MMHG
AV PEAK GRADIENT: 16 MMHG
AV VALVE AREA: 1.74 CM2
AV VELOCITY RATIO: 0.55
BSA FOR ECHO PROCEDURE: 1.88 M2
CV ECHO LV RWT: 0.34 CM
DOP CALC AO PEAK VEL: 1.97 M/S
DOP CALC AO VTI: 47.5 CM
DOP CALC LVOT AREA: 3 CM2
DOP CALC LVOT DIAMETER: 1.97 CM
DOP CALC LVOT PEAK VEL: 1.09 M/S
DOP CALC LVOT STROKE VOLUME: 82.53 CM3
DOP CALCLVOT PEAK VEL VTI: 27.09 CM
E WAVE DECELERATION TIME: 284.59 MSEC
E/A RATIO: 0.52
E/E' RATIO: 16.89 M/S
ECHO LV POSTERIOR WALL: 0.91 CM (ref 0.6–1.1)
EJECTION FRACTION: 30 %
FRACTIONAL SHORTENING: 23 % (ref 28–44)
INTERVENTRICULAR SEPTUM: 0.95 CM (ref 0.6–1.1)
IVRT: 142.72 MSEC
LA MAJOR: 4.51 CM
LA MINOR: 5.74 CM
LA WIDTH: 3.79 CM
LEFT ATRIUM SIZE: 3.82 CM
LEFT ATRIUM VOLUME INDEX: 33.6 ML/M2
LEFT ATRIUM VOLUME: 62.16 CM3
LEFT INTERNAL DIMENSION IN SYSTOLE: 4.19 CM (ref 2.1–4)
LEFT VENTRICLE DIASTOLIC VOLUME INDEX: 77.23 ML/M2
LEFT VENTRICLE DIASTOLIC VOLUME: 142.87 ML
LEFT VENTRICLE MASS INDEX: 103 G/M2
LEFT VENTRICLE SYSTOLIC VOLUME INDEX: 42.1 ML/M2
LEFT VENTRICLE SYSTOLIC VOLUME: 77.96 ML
LEFT VENTRICULAR INTERNAL DIMENSION IN DIASTOLE: 5.43 CM (ref 3.5–6)
LEFT VENTRICULAR MASS: 189.73 G
LV LATERAL E/E' RATIO: 15.2 M/S
LV SEPTAL E/E' RATIO: 19 M/S
MV A" WAVE DURATION": 15.41 MSEC
MV PEAK A VEL: 1.46 M/S
MV PEAK E VEL: 0.76 M/S
MV STENOSIS PRESSURE HALF TIME: 82.53 MS
MV VALVE AREA P 1/2 METHOD: 2.67 CM2
PISA MRMAX VEL: 0.06 M/S
PISA TR MAX VEL: 2.62 M/S
PULM VEIN S/D RATIO: 1.56
PV PEAK D VEL: 0.41 M/S
PV PEAK S VEL: 0.64 M/S
RA MAJOR: 3.62 CM
RA PRESSURE: 3 MMHG
RA WIDTH: 3.17 CM
RIGHT VENTRICULAR END-DIASTOLIC DIMENSION: 2.83 CM
RV TISSUE DOPPLER FREE WALL SYSTOLIC VELOCITY 1 (APICAL 4 CHAMBER VIEW): 11.45 CM/S
SINUS: 3.08 CM
STJ: 1.98 CM
TDI LATERAL: 0.05 M/S
TDI SEPTAL: 0.04 M/S
TDI: 0.05 M/S
TR MAX PG: 27 MMHG
TRICUSPID ANNULAR PLANE SYSTOLIC EXCURSION: 2.31 CM
TV REST PULMONARY ARTERY PRESSURE: 30 MMHG

## 2021-12-21 PROCEDURE — 93306 TTE W/DOPPLER COMPLETE: CPT | Mod: PO

## 2021-12-27 ENCOUNTER — PATIENT MESSAGE (OUTPATIENT)
Dept: CARDIOLOGY | Facility: CLINIC | Age: 76
End: 2021-12-27
Payer: MEDICARE

## 2021-12-29 ENCOUNTER — PATIENT MESSAGE (OUTPATIENT)
Dept: CARDIOLOGY | Facility: CLINIC | Age: 76
End: 2021-12-29
Payer: MEDICARE

## 2022-01-13 ENCOUNTER — TELEPHONE (OUTPATIENT)
Dept: CARDIOLOGY | Facility: CLINIC | Age: 77
End: 2022-01-13
Payer: MEDICARE

## 2022-01-13 NOTE — TELEPHONE ENCOUNTER
----- Message from Antonia Oseguera LPN sent at 1/12/2022  4:48 PM CST -----  Regarding: FW: DENTAL CLEARANCE FORM  Contact: DR. CYN WERNER OFFICE    ----- Message -----  From: Joselito Ellis  Sent: 1/12/2022   4:43 PM CST  To: Carmen Dowell Staff, Simi DINERO Staff  Subject: DENTAL CLEARANCE FORM                            DR. CYN WERNER OFFICE  faxed over a dental clearance form to be filled out for Mrs. Montemayor (patient) and faxed back to [phone]      FORM HAS BEEN SCANNED INTO MEDIA

## 2022-01-20 ENCOUNTER — OFFICE VISIT (OUTPATIENT)
Dept: VASCULAR SURGERY | Facility: CLINIC | Age: 77
End: 2022-01-20
Payer: MEDICARE

## 2022-01-20 VITALS
HEART RATE: 85 BPM | DIASTOLIC BLOOD PRESSURE: 79 MMHG | WEIGHT: 169.75 LBS | SYSTOLIC BLOOD PRESSURE: 116 MMHG | HEIGHT: 60 IN | BODY MASS INDEX: 33.33 KG/M2

## 2022-01-20 DIAGNOSIS — I73.9 PAD (PERIPHERAL ARTERY DISEASE): Primary | ICD-10-CM

## 2022-01-20 PROCEDURE — 3074F SYST BP LT 130 MM HG: CPT | Mod: CPTII,S$GLB,, | Performed by: THORACIC SURGERY (CARDIOTHORACIC VASCULAR SURGERY)

## 2022-01-20 PROCEDURE — 1157F PR ADVANCE CARE PLAN OR EQUIV PRESENT IN MEDICAL RECORD: ICD-10-PCS | Mod: CPTII,S$GLB,, | Performed by: THORACIC SURGERY (CARDIOTHORACIC VASCULAR SURGERY)

## 2022-01-20 PROCEDURE — 3288F PR FALLS RISK ASSESSMENT DOCUMENTED: ICD-10-PCS | Mod: CPTII,S$GLB,, | Performed by: THORACIC SURGERY (CARDIOTHORACIC VASCULAR SURGERY)

## 2022-01-20 PROCEDURE — 99215 PR OFFICE/OUTPT VISIT, EST, LEVL V, 40-54 MIN: ICD-10-PCS | Mod: S$GLB,,, | Performed by: THORACIC SURGERY (CARDIOTHORACIC VASCULAR SURGERY)

## 2022-01-20 PROCEDURE — 99999 PR PBB SHADOW E&M-EST. PATIENT-LVL IV: CPT | Mod: PBBFAC,,, | Performed by: THORACIC SURGERY (CARDIOTHORACIC VASCULAR SURGERY)

## 2022-01-20 PROCEDURE — 3074F PR MOST RECENT SYSTOLIC BLOOD PRESSURE < 130 MM HG: ICD-10-PCS | Mod: CPTII,S$GLB,, | Performed by: THORACIC SURGERY (CARDIOTHORACIC VASCULAR SURGERY)

## 2022-01-20 PROCEDURE — 99999 PR PBB SHADOW E&M-EST. PATIENT-LVL IV: ICD-10-PCS | Mod: PBBFAC,,, | Performed by: THORACIC SURGERY (CARDIOTHORACIC VASCULAR SURGERY)

## 2022-01-20 PROCEDURE — 1159F MED LIST DOCD IN RCRD: CPT | Mod: CPTII,S$GLB,, | Performed by: THORACIC SURGERY (CARDIOTHORACIC VASCULAR SURGERY)

## 2022-01-20 PROCEDURE — 1159F PR MEDICATION LIST DOCUMENTED IN MEDICAL RECORD: ICD-10-PCS | Mod: CPTII,S$GLB,, | Performed by: THORACIC SURGERY (CARDIOTHORACIC VASCULAR SURGERY)

## 2022-01-20 PROCEDURE — 1160F RVW MEDS BY RX/DR IN RCRD: CPT | Mod: CPTII,S$GLB,, | Performed by: THORACIC SURGERY (CARDIOTHORACIC VASCULAR SURGERY)

## 2022-01-20 PROCEDURE — 1101F PR PT FALLS ASSESS DOC 0-1 FALLS W/OUT INJ PAST YR: ICD-10-PCS | Mod: CPTII,S$GLB,, | Performed by: THORACIC SURGERY (CARDIOTHORACIC VASCULAR SURGERY)

## 2022-01-20 PROCEDURE — 99215 OFFICE O/P EST HI 40 MIN: CPT | Mod: S$GLB,,, | Performed by: THORACIC SURGERY (CARDIOTHORACIC VASCULAR SURGERY)

## 2022-01-20 PROCEDURE — 3078F PR MOST RECENT DIASTOLIC BLOOD PRESSURE < 80 MM HG: ICD-10-PCS | Mod: CPTII,S$GLB,, | Performed by: THORACIC SURGERY (CARDIOTHORACIC VASCULAR SURGERY)

## 2022-01-20 PROCEDURE — 1101F PT FALLS ASSESS-DOCD LE1/YR: CPT | Mod: CPTII,S$GLB,, | Performed by: THORACIC SURGERY (CARDIOTHORACIC VASCULAR SURGERY)

## 2022-01-20 PROCEDURE — 1157F ADVNC CARE PLAN IN RCRD: CPT | Mod: CPTII,S$GLB,, | Performed by: THORACIC SURGERY (CARDIOTHORACIC VASCULAR SURGERY)

## 2022-01-20 PROCEDURE — 3288F FALL RISK ASSESSMENT DOCD: CPT | Mod: CPTII,S$GLB,, | Performed by: THORACIC SURGERY (CARDIOTHORACIC VASCULAR SURGERY)

## 2022-01-20 PROCEDURE — 1160F PR REVIEW ALL MEDS BY PRESCRIBER/CLIN PHARMACIST DOCUMENTED: ICD-10-PCS | Mod: CPTII,S$GLB,, | Performed by: THORACIC SURGERY (CARDIOTHORACIC VASCULAR SURGERY)

## 2022-01-20 PROCEDURE — 3078F DIAST BP <80 MM HG: CPT | Mod: CPTII,S$GLB,, | Performed by: THORACIC SURGERY (CARDIOTHORACIC VASCULAR SURGERY)

## 2022-02-08 NOTE — PROGRESS NOTES
HISTORY OF PRESENT ILLNESS:  A 76-year-old female referred to me by Dr. Eddy to rule out mesenteric ischemia.  She had been complaining of abdominal pain and endoscopy revealed a gastric ulcer which underwent biopsy.  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  The patient states that her abdominal pain completely resolved after she was started on sucralfate.  Presently she denies any abdominal pain.  She has a complicated vascular history as described below.    The patient is a 75 year old female with history of HLD, HTN, prior smoker - quit in 2007, and renal artery stenosis who underwent left carotid endarterectomy by Dr. Soto at [facility] in [place] for asymptomatic left carotid stenosis in July 2019.  This was apparently complicated by a high extension of the plaque that required extensive dissection.  She had significant hypotension following the procedure as well as swallowing difficulty and some tongue deviation.  The swallowing and tongue deviation have improved.  She was admitted to [facility] in 8/2019 with right sided body symptoms and hypotension and MRI showed multiple strokes in the bilateral cerebral hemispheres. CTA showed irregular surgical site on left internal carotid.  This irregularity was treated with carotid stenting. She is currently asymptomatic from the carotid and has not had any known additional strokes since the stent.          She also has known arterial insufficiency of the upper extremities.  She 1st developed stenosis of the left subclavian artery with significant pressure difference between the right and upper extremities.  She developed pain in the left upper extremity with use of that arm.  Earlier this year she was having some dizziness but that has resolved.  Pain and fatigue in bilateral upper extremities occur with use of the extremities especially when elevating the extremities above her head.        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   She was taken to the cardiac cath lab and the innominate artery had a greater than 90-95% stenosis and this underwent angioplasty and stenting with excellent result.  Other findings were as described in the cardiac catheterization note described below.     The patient came to see me after her angioplasty and stenting and stated that she felt much better.  She could use bilateral upper extremities without developing fatigue.  She was having some dizziness but that had improved after she did some exercises that were shown to her by an audiologist and which is done for patients with benign positional vertigo.       However, in January of 2021, she came back to see me.  She stated that her pain in the upper extremities with fatigue had returned.  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  At that time we discussed repeat intervention versus physical therapy.  We decided to proceed with conservative management.  Patient comes back now stating that she is feeling very well.  She has only mild pain in the upper extremities but it does not stop her from doing her activities of daily living.  She has gone back to doing water aerobics.  Overall she is very happy with how she has progressed.                   Past Medical History:   Diagnosis Date    Anesthesia complication       "stays in my system for weeks, excesive weakness after, frequent falls"    Anxiety      Atherosclerosis of renal artery 8/10/2015     7/27/2015: Renal Duplex: Right: severe - 3.2 m/s. Left: about 60% stenosis - 1.7 m/s.    Carotid bruit 7/16/2015     2005: Carotid bruit heard.    Carotid stent occlusion      " Coronary artery disease      CVA (cerebral vascular accident) 8/13/2019    GERD (gastroesophageal reflux disease)      History of stent insertion of renal artery      Hypercholesterolemia 7/16/2015 2005: Diagnosed. Started statin.    Hypertension, benign 7/16/2015 1995: Diagnosed.    Mild obesity 6/9/2017 6/9/2017: 86 kg. BMI 31.    Subclavian artery stenosis, left 8/10/2015     7/2015: Diagnosed. 7/23/2015: Carotid Duplex: LIZETH: moderate plaquing. LICA: moderate plaquing -1.4 m/s - 50-60%. Left vertebral: retrograde flow.               Past Surgical History:   Procedure Laterality Date    ABDOMINAL AORTOGRAPHY N/A 8/11/2020     Procedure: Aortogram, Abdominal;  Surgeon: Joana Celestin MD;  Location: [facility] CATH;  Service: Peripheral Vascular;  Laterality: N/A;    ANGIOGRAPHY OF UPPER EXTREMITY N/A 8/11/2020     Procedure: Angiogram Extremity Bilateral;  Surgeon: Joana Celestin MD;  Location: [facility] CATH;  Service: Peripheral Vascular;  Laterality: N/A;    ANGIOPLASTY         renal    APPENDECTOMY Right 1959    CAROTID ENDARTERECTOMY Left      CAROTID STENT         8/2019    CARPAL TUNNEL RELEASE Right 1/13/2020     Procedure: RELEASE, CARPAL TUNNEL;  Surgeon: Mina Mahoney MD;  Location: [facility] OR;  Service: Orthopedics;  Laterality: Right;    HYSTERECTOMY        OOPHORECTOMY        OPEN REDUCTION AND INTERNAL FIXATION (ORIF) OF FRACTURE OF RADIUS Right 1/13/2020     Procedure: ORIF, FRACTURE, RADIUS;  Surgeon: Mina Mahoney MD;  Location: [facility] OR;  Service: Orthopedics;  Laterality: Right;  Accumed  pt on aspirin and plavix. Dr. Mahoney aware. Note in chart from cardiology on 1/7/2020    VASCULAR SURGERY             Review of patient's allergies indicates:  No Known Allergies     Current Medications               Current Outpatient Medications   Medication Sig Dispense Refill    ascorbic acid/multivit-min (EMERGEN-C ORAL) Take 1 packet by mouth every morning.        aspirin (ECOTRIN) 81 MG EC  tablet Take 1 tablet (81 mg total) by mouth once daily. 90 tablet 3    atorvastatin (LIPITOR) 80 MG tablet TAKE 1 TABLET (80 MG TOTAL) BY MOUTH ONCE DAILY. 90 tablet 3    citalopram (CELEXA) 10 MG tablet Take 10 mg by mouth every morning.         clopidogreL (PLAVIX) 75 mg tablet Take 1 tablet (75 mg total) by mouth once daily. 90 tablet 3    multivitamin capsule Take 1 capsule by mouth once daily.        nut.tx.impaired digestive fxn (VITAL HIGH PROTEIN ORAL) Take 1 tablet by mouth every morning.        omeprazole (PRILOSEC) 40 MG capsule Take 40 mg by mouth every morning.        vitamins  A,C,E-zinc-copper (ICAPS AREDS) 14320-816-200 unit-mg-unit Cap            No current facility-administered medications for this visit.                         Facility-Administered Medications Ordered in Other Visits   Medication Dose Route Frequency Provider Last Rate Last Dose    electrolyte-S (ISOLYTE)   Intravenous Continuous Chaparro Madison MD   Stopped at 01/13/20 1417    lactated ringers infusion  10 mL/hr Intravenous Continuous Chaparro Madison MD        lidocaine (PF) 10 mg/ml (1%) injection 10 mg  1 mL Intradermal Once Chaparro Madison MD        sodium chloride 0.9% flush 3 mL  3 mL Intravenous Q8H Chaparro Madison MD                          Family History   Problem Relation Age of Onset    Hypertension Mother      Hypertension Father           Social History                Socioeconomic History    Marital status:        Spouse name: Not on file    Number of children: Not on file    Years of education: Not on file    Highest education level: Not on file   Occupational History    Not on file   Social Needs    Financial resource strain: Not on file    Food insecurity       Worry: Not on file       Inability: Not on file    Transportation needs       Medical: Not on file       Non-medical: Not on file   Tobacco Use    Smoking status: Former Smoker       Packs/day: 1.00        Years: 47.00       Pack years: 47.00       Types: Cigarettes       Start date: 1961       Quit date: 2008       Years since quittin.6    Smokeless tobacco: Never Used   Substance and Sexual Activity    Alcohol use: Not Currently       Alcohol/week: 0.0 standard drinks       Comment: Socially.    Drug use: Not Currently    Sexual activity: Not Currently   Lifestyle    Physical activity       Days per week: Not on file       Minutes per session: Not on file    Stress: Not on file   Relationships    Social connections       Talks on phone: Not on file       Gets together: Not on file       Attends Buddhist service: Not on file       Active member of club or organization: Not on file       Attends meetings of clubs or organizations: Not on file       Relationship status: Not on file   Other Topics Concern    Not on file   Social History Narrative    Not on file         REVIEW OF SYSTEMS:  Fatigue of the upper extremities and pain have resolved since angioplasty and stenting of the innominate artery.  She still has some dizziness.  Overall she is feeling much better.  She did have some shortness of breath.           PHYSICAL EXAM:  Physical Exam  Vitals signs and nursing note reviewed.   Constitutional:       Appearance: Normal appearance.   HENT:      Head: Normocephalic and atraumatic.   Eyes:      General: No scleral icterus.     Extraocular Movements: Extraocular movements intact.      Conjunctiva/sclera: Conjunctivae normal.      Pupils: Pupils are equal, round, and reactive to light.   Neck:      Musculoskeletal: Normal range of motion and neck supple. No neck rigidity or muscular tenderness.   Cardiovascular:      Rate and Rhythm: Normal rate.      Comments: She now has palpable right brachial and radial pulses which were not present prior to intervention on the innominate artery  Pulmonary:      Effort: Pulmonary effort is normal. No respiratory distress.      Breath sounds: No stridor.    Abdominal:      General: There is no distension.      Palpations: Abdomen is soft.      Tenderness: There is no abdominal tenderness. There is no rebound.   Musculoskeletal:      Right lower leg: No edema.      Left lower leg: No edema.   Skin:     General: Skin is warm.   Neurological:      General: No focal deficit present.      Mental Status: She is alert and oriented to person, place, and time.      Cranial Nerves: No cranial nerve deficit.      Sensory: No sensory deficit.      Motor: No weakness.      Coordination: Coordination normal.      Gait: Gait normal.                Vitals:     09/03/20 1420   BP: 115/71   Pulse: 96         CTA Abdomen and Pelvis  Order: [id number]   Status: Final result     Visible to patient: Yes (seen)     Next appt: 02/14/2022 at 10:00 AM in Cardiology (Kb Belcher MD)     Dx: Epigastric pain; Left upper quadrant ...     0 Result Notes    Details    Reading Physician Reading Date Result Priority   Cha Pfeiffer MD  [phone] 11/16/2021 Routine     Narrative & Impression  EXAMINATION:  CTA ABDOMEN AND PELVIS     CLINICAL HISTORY:  Epigastric pain;LUQ abdominal pain;Abdominal pain;  Unspecified abdominal pain     TECHNIQUE:  Following the intravenous administration of 100 cc of Omnipaque 350 contrast material, 1.25 mm contiguous axial images were acquired through the abdomen and pelvis for evaluation of the abdominal aorta, iliac, and mesenteric vasculature utilizing the CT angiography protocol.  Curved reformatted images of the mesenteric and renal vasculature as well as 3-D volume rendered images of the abdominal vasculature were generated from the source data.     COMPARISON:  CT of the abdomen and pelvis with contrast-07/17/2021.     FINDINGS:  CTA:     There is scattered calcified atherosclerotic plaque deposition noted throughout the visualized descending thoracic aorta and abdominal aorta.  There is calcified plaque deposition noted at the origin of the celiac  artery (possibly within endoluminal stent in the celiac artery on series 3, image 167).  The celiac artery is diminutive in size beginning at its origin and and there appears to be at least 50-75% luminal stenosis at the origin of the celiac artery.  There is dense calcification of the entire splenic artery throughout its length..  The SMA is widely patent.  There is calcified plaque deposition at the origin of the left renal artery resulting in up to 50% luminal stenosis (series 3, image 213)..  There is calcified plaque deposition observed at the origin of the right renal artery, and there is an endovascular stent present within the proximal right renal artery which appears widely patent (series 3, image 220).  There is a short segment infrarenal abdominal aortic dissection with a patent lumen versus ulcerated plaque visible in the mid infrarenal abdominal aorta on series 3, image 305.  No infrarenal abdominal aortic aneurysm.  There is calcified atherosclerotic plaque deposition at the origin of both common iliac arteries resulting in 50-75% luminal stenosis at the origin of the right common iliac artery, best seen on series 601, image 59, series 3, image 377, and series 602 image 104.  There is calcified atherosclerotic plaque deposition within the external iliac arteries.  No hemodynamically significant stenosis within the external iliac arteries.     Soft tissues of the abdomen and pelvis:     The visualized lower mediastinum is unremarkable.  The lung bases are clear.  The liver, gallbladder, spleen, stomach, pancreas, and adrenal glands are unremarkable.  There is in asymmetric cortical parenchymal thinning noted within the left kidney and the left kidney is asymmetrically small in size.  There is non-obstructing left nephrolithiasis with 3 stones observed in the left kidney, the largest of which measures 4 mm in the interpolar region of the left kidney on series 3, image 230.  No solid renal mass or  hydronephrosis.  The ureters are normal in caliber and course without stones, and the urinary bladder is unremarkable.  No bulky periaortic or retroperitoneal lymphadenopathy.  The visualized loops of bowel show no evidence of inflammation or obstruction.  There is a small fat containing umbilical hernia present on series 3, image 374.  No inguinal lymphadenopathy or inguinal hernia.  No mesenteric lymphadenopathy, ascites, or peritoneal soft tissue mass.  The uterus is surgically absent.  No solid or cystic adnexal mass.     Bones:     There is degenerative change versus CPPD of the symphysis pubis.  The bones are osteopenic.  There is prominent multilevel degenerative change of the thoracolumbar spine in the form of marginal osteophyte formation and disc space narrowing.  There is an S-type scoliotic curvature of the thoracolumbar spine.     Impression:     1. Calcified plaque deposition within the origin of the celiac artery resulting in at least 50-75% luminal stenosis.  The SMA is widely patent.  2. 50% luminal stenosis at the origin of the left renal artery due to calcified atherosclerotic plaque deposition.  The left kidney is asymmetrically small in size and shows diffuse cortical parenchymal thinning.  3. 50-75% luminal stenosis at the origin of the right common iliac artery due to calcified atherosclerotic plaque deposition.  4. Short segment mid infrarenal abdominal aortic dissection with a patent false lumen, unchanged.  5. Non-obstructing left nephrolithiasis.  6. Stent within the proximal right renal artery which appears widely patent.  No > 50% luminal stenosis observed within the imaged right renal artery.  7. Extensive splenic artery calcifications.  8. Other incidental findings as detailed above.        Electronically signed by: Lazaro Pfeiffer MD  Date:                                            11/16/2021  Time:                                           17:32                 IMPRESSION:  1.   Moderate stenosis of the celiac artery.  2. Widely patent superior mesenteric artery   3. Gastric ulcer  4. Abdominal pain resolved with use of sucralfate.  5.  Peripheral arterial occlusive disease  6. Status post angioplasty and stenting of the innominate artery  7.  In stent restenosis of the innominate artery  8. Status post left carotid endarterectomy  9.  Status post stenting of the left carotid artery  10.  Moderate stenosis of the origin of the left common carotid artery  11.  Asymptomatic moderate to high-grade InStent restenoses of the left internal carotid artery  12. Occluded left subclavian artery  13. Occluded bilateral vertebral arteries  14. Status post stenting of the renal artery  15. Hypertension  16. Hypercholesterolemia        RECOMMENDATIONS:   the patient was experiencing abdominal pain and was found to have a gastric ulcer on endoscopy.  CT angiogram showed a 50-75% stenosis of the celiac artery but a widely patent superior mesenteric artery.  The patient states that her pain completely resolved after initiation of sucralfate.  At this point there is no indication for intervention and celiac artery.  It has only a moderate stenosis and the superior mesenteric artery is widely patent and there are collaterals connecting both of these arteries.

## 2022-04-05 ENCOUNTER — OFFICE VISIT (OUTPATIENT)
Dept: CARDIOLOGY | Facility: CLINIC | Age: 77
End: 2022-04-05
Payer: MEDICARE

## 2022-04-05 VITALS
WEIGHT: 170.19 LBS | HEIGHT: 65 IN | BODY MASS INDEX: 28.36 KG/M2 | SYSTOLIC BLOOD PRESSURE: 131 MMHG | HEART RATE: 51 BPM | DIASTOLIC BLOOD PRESSURE: 57 MMHG

## 2022-04-05 DIAGNOSIS — Z98.890 HISTORY OF CAROTID ENDARTERECTOMY: ICD-10-CM

## 2022-04-05 DIAGNOSIS — I25.5 CARDIOMYOPATHY, ISCHEMIC: ICD-10-CM

## 2022-04-05 DIAGNOSIS — I77.1 CELIAC ARTERY STENOSIS: ICD-10-CM

## 2022-04-05 DIAGNOSIS — Z86.73 HISTORY OF CEREBROVASCULAR ACCIDENT: ICD-10-CM

## 2022-04-05 DIAGNOSIS — I27.20 PULMONARY HYPERTENSION: ICD-10-CM

## 2022-04-05 DIAGNOSIS — I10 PRIMARY HYPERTENSION: ICD-10-CM

## 2022-04-05 DIAGNOSIS — I35.0 NONRHEUMATIC AORTIC VALVE STENOSIS: ICD-10-CM

## 2022-04-05 DIAGNOSIS — I70.1 ATHEROSCLEROSIS OF RENAL ARTERY: ICD-10-CM

## 2022-04-05 DIAGNOSIS — I25.10 CORONARY ARTERY DISEASE, UNSPECIFIED VESSEL OR LESION TYPE, UNSPECIFIED WHETHER ANGINA PRESENT, UNSPECIFIED WHETHER NATIVE OR TRANSPLANTED HEART: ICD-10-CM

## 2022-04-05 DIAGNOSIS — Z95.5 STENTED CORONARY ARTERY: ICD-10-CM

## 2022-04-05 DIAGNOSIS — I77.1 SUBCLAVIAN ARTERY STENOSIS, LEFT: Primary | ICD-10-CM

## 2022-04-05 DIAGNOSIS — I73.9 PAD (PERIPHERAL ARTERY DISEASE): ICD-10-CM

## 2022-04-05 DIAGNOSIS — I65.29 STENOSIS OF CAROTID ARTERY, UNSPECIFIED LATERALITY: ICD-10-CM

## 2022-04-05 PROBLEM — I77.4 CELIAC ARTERY STENOSIS: Status: ACTIVE | Noted: 2022-04-05

## 2022-04-05 PROCEDURE — 3075F SYST BP GE 130 - 139MM HG: CPT | Mod: CPTII,S$GLB,, | Performed by: INTERNAL MEDICINE

## 2022-04-05 PROCEDURE — 99214 OFFICE O/P EST MOD 30 MIN: CPT | Mod: S$GLB,,, | Performed by: INTERNAL MEDICINE

## 2022-04-05 PROCEDURE — 99999 PR PBB SHADOW E&M-EST. PATIENT-LVL III: CPT | Mod: PBBFAC,,, | Performed by: INTERNAL MEDICINE

## 2022-04-05 PROCEDURE — 3075F PR MOST RECENT SYSTOLIC BLOOD PRESS GE 130-139MM HG: ICD-10-PCS | Mod: CPTII,S$GLB,, | Performed by: INTERNAL MEDICINE

## 2022-04-05 PROCEDURE — 3078F PR MOST RECENT DIASTOLIC BLOOD PRESSURE < 80 MM HG: ICD-10-PCS | Mod: CPTII,S$GLB,, | Performed by: INTERNAL MEDICINE

## 2022-04-05 PROCEDURE — 1159F PR MEDICATION LIST DOCUMENTED IN MEDICAL RECORD: ICD-10-PCS | Mod: CPTII,S$GLB,, | Performed by: INTERNAL MEDICINE

## 2022-04-05 PROCEDURE — 99999 PR PBB SHADOW E&M-EST. PATIENT-LVL III: ICD-10-PCS | Mod: PBBFAC,,, | Performed by: INTERNAL MEDICINE

## 2022-04-05 PROCEDURE — 3078F DIAST BP <80 MM HG: CPT | Mod: CPTII,S$GLB,, | Performed by: INTERNAL MEDICINE

## 2022-04-05 PROCEDURE — 1157F ADVNC CARE PLAN IN RCRD: CPT | Mod: CPTII,S$GLB,, | Performed by: INTERNAL MEDICINE

## 2022-04-05 PROCEDURE — 1159F MED LIST DOCD IN RCRD: CPT | Mod: CPTII,S$GLB,, | Performed by: INTERNAL MEDICINE

## 2022-04-05 PROCEDURE — 1126F AMNT PAIN NOTED NONE PRSNT: CPT | Mod: CPTII,S$GLB,, | Performed by: INTERNAL MEDICINE

## 2022-04-05 PROCEDURE — 99214 PR OFFICE/OUTPT VISIT, EST, LEVL IV, 30-39 MIN: ICD-10-PCS | Mod: S$GLB,,, | Performed by: INTERNAL MEDICINE

## 2022-04-05 PROCEDURE — 1126F PR PAIN SEVERITY QUANTIFIED, NO PAIN PRESENT: ICD-10-PCS | Mod: CPTII,S$GLB,, | Performed by: INTERNAL MEDICINE

## 2022-04-05 PROCEDURE — 1157F PR ADVANCE CARE PLAN OR EQUIV PRESENT IN MEDICAL RECORD: ICD-10-PCS | Mod: CPTII,S$GLB,, | Performed by: INTERNAL MEDICINE

## 2022-04-05 NOTE — PROGRESS NOTES
"Subjective:    Patient ID:  Albina Armenta is a 76 y.o. female who presents for follow-up of No chief complaint on file.      HPI  Here for follow up of CAD-PCI (12/20)/AS/ICM/CVA (19)/DEBORAH/occluded left subclavian artery and moderately stenotic celiac artery being followed by Dr. Celestin. Remains very active no angina.     Review of Systems   Constitutional: Negative for malaise/fatigue.   Eyes: Negative for blurred vision.   Cardiovascular: Negative for chest pain, claudication, cyanosis, dyspnea on exertion, irregular heartbeat, leg swelling, near-syncope, orthopnea, palpitations, paroxysmal nocturnal dyspnea and syncope.   Respiratory: Negative for cough and shortness of breath.    Hematologic/Lymphatic: Does not bruise/bleed easily.   Musculoskeletal: Negative for back pain, falls, joint pain, muscle cramps, muscle weakness and myalgias.   Gastrointestinal: Negative for abdominal pain, change in bowel habit, nausea and vomiting.   Genitourinary: Negative for urgency.   Neurological: Negative for dizziness, focal weakness and light-headedness.       Past Medical History:   Diagnosis Date    Abdominal pain     Anesthesia complication     "stays in my system for weeks, excesive weakness after, frequent falls"    Anxiety     Atherosclerosis of renal artery 8/10/2015    7/27/2015: Renal Duplex: Right: severe - 3.2 m/s. Left: about 60% stenosis - 1.7 m/s.    Carotid bruit 7/16/2015 2005: Carotid bruit heard.    Carotid stent occlusion     Coronary artery disease     CVA (cerebral vascular accident) 8/13/2019    GERD (gastroesophageal reflux disease)     History of stent insertion of renal artery     Hypercholesterolemia 7/16/2015 2005: Diagnosed. Started statin.    Hypertension, benign 7/16/2015 1995: Diagnosed.    Mild obesity 6/9/2017 6/9/2017: 86 kg. BMI 31.    Subclavian artery stenosis, left 8/10/2015    7/2015: Diagnosed. 7/23/2015: Carotid Duplex: LIZETH: moderate plaquing. LICA: " moderate plaquing -1.4 m/s - 50-60%. Left vertebral: retrograde flow.     There are no hospital problems to display for this patient.       Objective:     Vitals:    04/05/22 1310   BP: (!) 131/57   Pulse: (!) 51        Physical Exam  Vitals and nursing note reviewed.   Constitutional:       Appearance: She is well-developed.   HENT:      Head: Normocephalic.   Eyes:      Conjunctiva/sclera: Conjunctivae normal.   Neck:      Vascular: No JVD.   Cardiovascular:      Rate and Rhythm: Normal rate and regular rhythm.      Pulses: Intact distal pulses.           Carotid pulses are 2+ on the right side and 2+ on the left side.       Radial pulses are 2+ on the right side and 2+ on the left side.        Dorsalis pedis pulses are 2+ on the right side and 2+ on the left side.        Posterior tibial pulses are 2+ on the right side and 2+ on the left side.      Heart sounds: Normal heart sounds.   Pulmonary:      Effort: Pulmonary effort is normal.      Breath sounds: Normal breath sounds.   Abdominal:      General: Bowel sounds are normal.      Palpations: Abdomen is soft.   Musculoskeletal:         General: No tenderness.      Cervical back: Normal range of motion and neck supple.   Skin:     General: Skin is warm and dry.      Nails: There is no clubbing.   Neurological:      Mental Status: She is alert and oriented to person, place, and time.      Gait: Gait normal.   Psychiatric:         Speech: Speech normal.         Behavior: Behavior normal.         Thought Content: Thought content normal.               ..    Chemistry        Component Value Date/Time     12/21/2021 0910    K 4.3 12/21/2021 0910     12/21/2021 0910    CO2 26 12/21/2021 0910    BUN 14 12/21/2021 0910    CREATININE 0.9 12/21/2021 0910    GLU 93 12/21/2021 0910        Component Value Date/Time    CALCIUM 9.6 12/21/2021 0910    ALKPHOS 94 12/21/2021 0910    AST 28 12/21/2021 0910    ALT 30 12/21/2021 0910    BILITOT 0.9 12/21/2021 0910     ESTGFRAFRICA >60.0 12/21/2021 0910    EGFRNONAA >60.0 12/21/2021 0910            ..  Lab Results   Component Value Date    CHOL 172 12/21/2021    CHOL 153 05/26/2021    CHOL 102 (L) 08/14/2019     Lab Results   Component Value Date    HDL 51 12/21/2021    HDL 43 05/26/2021    HDL 34 (L) 08/14/2019     Lab Results   Component Value Date    LDLCALC 89.4 12/21/2021    LDLCALC 80.0 05/26/2021    LDLCALC 52.6 (L) 08/14/2019     Lab Results   Component Value Date    TRIG 158 (H) 12/21/2021    TRIG 150 05/26/2021    TRIG 77 08/14/2019     Lab Results   Component Value Date    CHOLHDL 29.7 12/21/2021    CHOLHDL 28.1 05/26/2021    CHOLHDL 33.3 08/14/2019     ..  Lab Results   Component Value Date    WBC 8.57 12/21/2021    HGB 13.4 12/21/2021    HCT 41.8 12/21/2021    MCV 96 12/21/2021     12/21/2021       Test(s) Reviewed  I have reviewed the following in detail:  [] Stress test   [] Angiography   [x] Echocardiogram   [x] Labs   [x] Other:       Assessment:         ICD-10-CM ICD-9-CM   1. Subclavian artery stenosis, left  I77.1 447.1   2. Nonrheumatic aortic valve stenosis  I35.0 424.1   3. Pulmonary hypertension  I27.20 416.8   4. Primary hypertension  I10 401.9   5. PAD (peripheral artery disease)  I73.9 443.9   6. Stenosis of carotid artery, unspecified laterality  I65.29 433.10   7. Cardiomyopathy, ischemic  I25.5 414.8   8. Coronary artery disease, unspecified vessel or lesion type, unspecified whether angina present, unspecified whether native or transplanted heart  I25.10 414.00   9. Atherosclerosis of renal artery  I70.1 440.1   10. Stented coronary artery  Z95.5 V45.82   11. History of carotid endarterectomy  Z98.890 V45.89   12. History of cerebrovascular accident  Z86.73 V12.54   13. Celiac artery stenosis  I77.4 447.4     Active Problem List with Overview Notes    Diagnosis Date Noted    Celiac artery stenosis 04/05/2022    Lower GI bleed 07/17/2021    Iron deficiency anemia     Erosive gastritis      Melena     Numbness and tingling in left arm     Weakness     Gastrointestinal hemorrhage 07/04/2021    Stented coronary artery 03/22/2021 12/20   1st obtuse marginal- 2.25 x 16 synergy stent       Cardiomyopathy, ischemic 03/22/2021 1/21 25%      Nonrheumatic aortic valve stenosis 03/22/2021 1/21  · Aortic valve area is 0.87 cm2; peak velocity is 2.56 m/s; mean gradient is 13 mmHg      CAD (coronary artery disease) 12/10/2020     12/20  Left main coronary-medium size vessel vessel moderately calcified less than 30% stenosis.  Left anterior descending-medium size vessel moderate calcification seen throughout entire course all less than 35%.  First diagonal is a long branching vessel mild disease less than 30%.  Circumflex-medium size long dominant vessel diffuse calcific disease seen throughout entire course all less than 35%.  First obtuse marginal long branching vessel with a 95% discrete lesions in its midportion.  Second obtuse marginal trivial vessel 3rd obtuse marginal and PDA are normal size vessel mild disease less than 30%.  Right coronary artery-trivial size nondominant vessel.      Dizziness 12/09/2020    SOB (shortness of breath)     Acute systolic congestive heart failure 12/08/2020    ACP (advance care planning) 12/08/2020    Pulmonary hypertension 12/08/2020    Hematuria 11/14/2020    History of kidney stones     Left ureteral stone     Fever 11/11/2020    Anemia     Acute lower GI bleeding 10/07/2020    Pyelonephritis of left kidney     Urinary tract obstruction due to kidney stone 10/03/2020    Chronic combined systolic and diastolic heart failure 10/03/2020    Flank pain 10/03/2020    PAD (peripheral artery disease) 08/11/2020    Closed die punch fracture distal radius, right, initial encounter 01/07/2020    History of carotid endarterectomy 08/14/2019    History of cerebrovascular accident 08/13/2019 8/2019: CVA.  8/2019: [facility]: MRA: Subacute CVAs at least a  few days old.      Mild obesity 06/09/2017 6/9/2017: 86 kg. BMI 31.      Subclavian artery stenosis, left 08/10/2015     7/2015: Diagnosed. Right arm: 200/90 mmHg. Left Arm: 110/60 mmHg.   7/23/2015: Carotid Duplex: LIZETH: Moderate plaquing. LICA: Moderate plaquing -1.4 m/s - 50-60%. Left vertebral: Retrograde flow.      Atherosclerosis of renal artery 08/10/2015     7/27/2015: Renal Duplex: Right: Severe - 3.2 m/s. Left: About 60% stenosis - 1.7 m/s.  8/20/2015: [facility]: Renal Angio: Right: 80%. Stented. Left: 30%.      Hypertension 07/16/2015 1995: Diagnosed with hypertension.      Hypercholesterolemia 07/16/2015 2005: Diagnosed. Began statin.      Carotid artery stenosis 07/16/2015 2005: Carotid bruit heard.  7/23/2015: Carotid Duplex: LIZETH: Moderate plaquing. LICA: Moderate plaquing -1.4 m/s - 50-60%. Left vertebral: Retrograde flow.  7/6/2016: Carotid Duplex: LIZETH: Moderate plaquing. LICA: Severe plaquing - 1.3 m/s - 50-60%. Left Vertebral: Retrograde flow.  6/30/2017: Carotid Duplex: LIZETH: Moderate plaquing - 1.1 m/s - <50%. LICA: Severe plaquing - 1.7 m/s - 60-70%.  7/11/2018: Carotid Duplex: LIZETH: Severe plaquing - 1.1 m/s - 50-60%. LICA: Severe plaquing - 2.1 m/s - 60-70%. Left vertebral retrograde.  7/3/2019: Carotid Duplex: LIZETH: Moderate plaquing - 1.0 m/s - <50%. LICA: Severe plaquing - 3.6 m/s - 80-90%. Left vertebral retrograde.  7/16/2019: [facility]: AA, 4V: Left subclavian: Occluded. LICA: 90%. LIZETH: 30%. Retrograde left vertebral flow.  8/7/2019: [facility]: Left CEA.   8/12/2019: [facility]: Carotid Duplex: LICA: 3.9 m/s.   8/13/2019: [facility]: CTA: LICA: 70-80%.   8/13/2019: [facility]: MRI: Several multifocal CVAs.  The images suggest a flap at the end of the endartherectomy site causing at least a moderate stenosis and a severe stenosis higher up possibly at the site of where the vessel was clamped with sarkis flow irregularity more distal. The MRA also reveals subacute CVAs at least a few days old.    9/25/2019: Carotid Duplex: LIZETH: Moderate plaquing - 1.0 m/s - <50%. LICA: CEA. Above CEA site - 3.7 m/s - 80-90%. Left vertebral retrograde.  10/15/2019: [facility], 4V: Right BCT: Osteal 90%. LIZETH: 30%. LCCA: Osteal 50%. LICA: 90%.  11/5/2019: [facility]: LICA Stent.              Plan:           Return to clinic 9 months   Low level/low impact aerobic exercise 5x's/wk. Heart healthy diet and risk factor modification.    See labs and med orders.  continue current meds    Portions of this note may have been created with voice recognition software.  Grammatical, syntax and spelling errors may be inevitable.

## 2022-04-06 ENCOUNTER — PATIENT MESSAGE (OUTPATIENT)
Dept: CARDIOLOGY | Facility: CLINIC | Age: 77
End: 2022-04-06
Payer: MEDICARE

## 2022-05-01 ENCOUNTER — PATIENT MESSAGE (OUTPATIENT)
Dept: CARDIOLOGY | Facility: CLINIC | Age: 77
End: 2022-05-01
Payer: MEDICARE

## 2022-05-01 DIAGNOSIS — I50.42 CHRONIC COMBINED SYSTOLIC AND DIASTOLIC HEART FAILURE: ICD-10-CM

## 2022-05-01 DIAGNOSIS — I10 ESSENTIAL HYPERTENSION: ICD-10-CM

## 2022-05-01 DIAGNOSIS — I50.42 CHRONIC COMBINED SYSTOLIC AND DIASTOLIC HEART FAILURE: Primary | ICD-10-CM

## 2022-05-01 DIAGNOSIS — E78.00 HYPERCHOLESTEROLEMIA: Primary | ICD-10-CM

## 2022-05-02 DIAGNOSIS — I10 ESSENTIAL HYPERTENSION: ICD-10-CM

## 2022-05-02 RX ORDER — TORSEMIDE 10 MG/1
10 TABLET ORAL DAILY
Qty: 90 TABLET | Refills: 3 | Status: CANCELLED | OUTPATIENT
Start: 2022-05-02

## 2022-05-02 RX ORDER — METOPROLOL SUCCINATE 25 MG/1
TABLET, EXTENDED RELEASE ORAL
Qty: 90 TABLET | Refills: 4 | Status: SHIPPED | OUTPATIENT
Start: 2022-05-02 | End: 2022-05-02 | Stop reason: SDUPTHER

## 2022-05-04 RX ORDER — METOPROLOL SUCCINATE 25 MG/1
25 TABLET, EXTENDED RELEASE ORAL DAILY
Qty: 90 TABLET | Refills: 4 | Status: SHIPPED | OUTPATIENT
Start: 2022-05-04 | End: 2023-04-04 | Stop reason: SDUPTHER

## 2022-05-04 RX ORDER — ATORVASTATIN CALCIUM 80 MG/1
80 TABLET, FILM COATED ORAL DAILY
Qty: 90 TABLET | Refills: 4 | Status: SHIPPED | OUTPATIENT
Start: 2022-05-04 | End: 2023-04-04

## 2022-05-04 RX ORDER — TORSEMIDE 10 MG/1
10 TABLET ORAL DAILY
Qty: 90 TABLET | Refills: 4 | Status: SHIPPED | OUTPATIENT
Start: 2022-05-04 | End: 2023-04-04

## 2022-05-05 ENCOUNTER — PATIENT MESSAGE (OUTPATIENT)
Dept: CARDIOLOGY | Facility: CLINIC | Age: 77
End: 2022-05-05
Payer: MEDICARE

## 2022-05-10 ENCOUNTER — PATIENT MESSAGE (OUTPATIENT)
Dept: GASTROENTEROLOGY | Facility: CLINIC | Age: 77
End: 2022-05-10
Payer: MEDICARE

## 2022-05-11 NOTE — TELEPHONE ENCOUNTER
My chart message sent to patient with uJdy Fernandez Np response, message was also sent to Dr. Choi for review.    Per Judy Fernandez NP-Thank you for the update,  Exercise can trigger/worsen reflux. You are currently on the max dose of protonix since you are already taking 40 mg twice a day. Are your still taking the carafate? Recommend trying to avoid/minimize bending forward and avoid eating right before exercising. Recommend to eat at least 3 hours before exercising and avoid lying flat for at least 3 hours after eating. If you do eat before exercising, try eating a small meal/snack. I have also forwarded this message to Dr. Choi to see if he has any other recommendations.  Thank you for choosing us to participate in your healthcare,  Judy MTZ

## 2022-05-19 NOTE — TELEPHONE ENCOUNTER
Don't increase the Protonix.    I would recommend that she take the Carafate / sucralfate just before starting the water aerobics.   Take 2 of the sucralfate.

## 2022-05-20 ENCOUNTER — PATIENT MESSAGE (OUTPATIENT)
Dept: GASTROENTEROLOGY | Facility: CLINIC | Age: 77
End: 2022-05-20
Payer: MEDICARE

## 2022-05-31 ENCOUNTER — PATIENT MESSAGE (OUTPATIENT)
Dept: CARDIOLOGY | Facility: CLINIC | Age: 77
End: 2022-05-31
Payer: MEDICARE

## 2022-06-05 ENCOUNTER — PATIENT MESSAGE (OUTPATIENT)
Dept: GASTROENTEROLOGY | Facility: CLINIC | Age: 77
End: 2022-06-05
Payer: MEDICARE

## 2022-06-06 NOTE — TELEPHONE ENCOUNTER
Patient sent a different message stating the she found her prescription as her daughter had placed in a different location than the patient was looking for it.

## 2022-06-21 ENCOUNTER — PATIENT MESSAGE (OUTPATIENT)
Dept: VASCULAR SURGERY | Facility: CLINIC | Age: 77
End: 2022-06-21
Payer: MEDICARE

## 2022-06-28 ENCOUNTER — TELEPHONE (OUTPATIENT)
Dept: VASCULAR SURGERY | Facility: CLINIC | Age: 77
End: 2022-06-28
Payer: MEDICARE

## 2022-06-28 ENCOUNTER — PATIENT MESSAGE (OUTPATIENT)
Dept: VASCULAR SURGERY | Facility: CLINIC | Age: 77
End: 2022-06-28
Payer: MEDICARE

## 2022-06-28 DIAGNOSIS — I65.29 OCCLUSION AND STENOSIS OF UNSPECIFIED CAROTID ARTERY: ICD-10-CM

## 2022-06-28 DIAGNOSIS — I73.9 PAD (PERIPHERAL ARTERY DISEASE): Primary | ICD-10-CM

## 2022-07-01 ENCOUNTER — PATIENT MESSAGE (OUTPATIENT)
Dept: VASCULAR SURGERY | Facility: CLINIC | Age: 77
End: 2022-07-01
Payer: MEDICARE

## 2022-07-01 DIAGNOSIS — I73.9 PAD (PERIPHERAL ARTERY DISEASE): Primary | ICD-10-CM

## 2022-07-05 ENCOUNTER — HOSPITAL ENCOUNTER (OUTPATIENT)
Dept: RADIOLOGY | Facility: HOSPITAL | Age: 77
Discharge: HOME | End: 2022-07-05
Attending: THORACIC SURGERY (CARDIOTHORACIC VASCULAR SURGERY)
Payer: MEDICARE

## 2022-07-05 DIAGNOSIS — I65.29 OCCLUSION AND STENOSIS OF UNSPECIFIED CAROTID ARTERY: ICD-10-CM

## 2022-07-05 PROCEDURE — 70498 CTA NECK: ICD-10-PCS | Mod: 26,,, | Performed by: RADIOLOGY

## 2022-07-05 PROCEDURE — 25500020 PHARM REV CODE 255: Mod: PO | Performed by: THORACIC SURGERY (CARDIOTHORACIC VASCULAR SURGERY)

## 2022-07-05 PROCEDURE — 70498 CT ANGIOGRAPHY NECK: CPT | Mod: 26,,, | Performed by: RADIOLOGY

## 2022-07-05 PROCEDURE — 70498 CT ANGIOGRAPHY NECK: CPT | Mod: TC,PO

## 2022-07-05 RX ADMIN — IOHEXOL 100 ML: 350 INJECTION, SOLUTION INTRAVENOUS at 03:07

## 2022-07-08 ENCOUNTER — PATIENT MESSAGE (OUTPATIENT)
Dept: VASCULAR SURGERY | Facility: CLINIC | Age: 77
End: 2022-07-08
Payer: MEDICARE

## 2022-07-08 ENCOUNTER — HOSPITAL ENCOUNTER (OUTPATIENT)
Dept: RADIOLOGY | Facility: HOSPITAL | Age: 77
Discharge: HOME | End: 2022-07-08
Attending: THORACIC SURGERY (CARDIOTHORACIC VASCULAR SURGERY)
Payer: MEDICARE

## 2022-07-08 DIAGNOSIS — I73.9 PAD (PERIPHERAL ARTERY DISEASE): ICD-10-CM

## 2022-07-08 PROCEDURE — 25500020 PHARM REV CODE 255: Mod: PO | Performed by: THORACIC SURGERY (CARDIOTHORACIC VASCULAR SURGERY)

## 2022-07-08 PROCEDURE — 73206 CTA UPPER EXTREMITY BILATERAL: ICD-10-PCS | Mod: 26,,, | Performed by: RADIOLOGY

## 2022-07-08 PROCEDURE — 73206 CT ANGIO UPR EXTRM W/O&W/DYE: CPT | Mod: TC,50,PO

## 2022-07-08 PROCEDURE — 73206 CT ANGIO UPR EXTRM W/O&W/DYE: CPT | Mod: 26,,, | Performed by: RADIOLOGY

## 2022-07-08 RX ADMIN — IOHEXOL 100 ML: 350 INJECTION, SOLUTION INTRAVENOUS at 12:07

## 2022-08-18 ENCOUNTER — OFFICE VISIT (OUTPATIENT)
Dept: VASCULAR SURGERY | Facility: CLINIC | Age: 77
End: 2022-08-18
Payer: MEDICARE

## 2022-08-18 VITALS
BODY MASS INDEX: 26.49 KG/M2 | DIASTOLIC BLOOD PRESSURE: 85 MMHG | WEIGHT: 159 LBS | HEART RATE: 88 BPM | SYSTOLIC BLOOD PRESSURE: 140 MMHG | HEIGHT: 65 IN

## 2022-08-18 DIAGNOSIS — I73.9 PAD (PERIPHERAL ARTERY DISEASE): Primary | ICD-10-CM

## 2022-08-18 PROCEDURE — 1157F ADVNC CARE PLAN IN RCRD: CPT | Mod: CPTII,S$GLB,, | Performed by: THORACIC SURGERY (CARDIOTHORACIC VASCULAR SURGERY)

## 2022-08-18 PROCEDURE — 99215 OFFICE O/P EST HI 40 MIN: CPT | Mod: S$GLB,,, | Performed by: THORACIC SURGERY (CARDIOTHORACIC VASCULAR SURGERY)

## 2022-08-18 PROCEDURE — 99999 PR PBB SHADOW E&M-EST. PATIENT-LVL IV: CPT | Mod: PBBFAC,,, | Performed by: THORACIC SURGERY (CARDIOTHORACIC VASCULAR SURGERY)

## 2022-08-18 PROCEDURE — 3079F DIAST BP 80-89 MM HG: CPT | Mod: CPTII,S$GLB,, | Performed by: THORACIC SURGERY (CARDIOTHORACIC VASCULAR SURGERY)

## 2022-08-18 PROCEDURE — 1159F PR MEDICATION LIST DOCUMENTED IN MEDICAL RECORD: ICD-10-PCS | Mod: CPTII,S$GLB,, | Performed by: THORACIC SURGERY (CARDIOTHORACIC VASCULAR SURGERY)

## 2022-08-18 PROCEDURE — 1159F MED LIST DOCD IN RCRD: CPT | Mod: CPTII,S$GLB,, | Performed by: THORACIC SURGERY (CARDIOTHORACIC VASCULAR SURGERY)

## 2022-08-18 PROCEDURE — 1160F PR REVIEW ALL MEDS BY PRESCRIBER/CLIN PHARMACIST DOCUMENTED: ICD-10-PCS | Mod: CPTII,S$GLB,, | Performed by: THORACIC SURGERY (CARDIOTHORACIC VASCULAR SURGERY)

## 2022-08-18 PROCEDURE — 3288F FALL RISK ASSESSMENT DOCD: CPT | Mod: CPTII,S$GLB,, | Performed by: THORACIC SURGERY (CARDIOTHORACIC VASCULAR SURGERY)

## 2022-08-18 PROCEDURE — 1126F AMNT PAIN NOTED NONE PRSNT: CPT | Mod: CPTII,S$GLB,, | Performed by: THORACIC SURGERY (CARDIOTHORACIC VASCULAR SURGERY)

## 2022-08-18 PROCEDURE — 3077F PR MOST RECENT SYSTOLIC BLOOD PRESSURE >= 140 MM HG: ICD-10-PCS | Mod: CPTII,S$GLB,, | Performed by: THORACIC SURGERY (CARDIOTHORACIC VASCULAR SURGERY)

## 2022-08-18 PROCEDURE — 3079F PR MOST RECENT DIASTOLIC BLOOD PRESSURE 80-89 MM HG: ICD-10-PCS | Mod: CPTII,S$GLB,, | Performed by: THORACIC SURGERY (CARDIOTHORACIC VASCULAR SURGERY)

## 2022-08-18 PROCEDURE — 99215 PR OFFICE/OUTPT VISIT, EST, LEVL V, 40-54 MIN: ICD-10-PCS | Mod: S$GLB,,, | Performed by: THORACIC SURGERY (CARDIOTHORACIC VASCULAR SURGERY)

## 2022-08-18 PROCEDURE — 1157F PR ADVANCE CARE PLAN OR EQUIV PRESENT IN MEDICAL RECORD: ICD-10-PCS | Mod: CPTII,S$GLB,, | Performed by: THORACIC SURGERY (CARDIOTHORACIC VASCULAR SURGERY)

## 2022-08-18 PROCEDURE — 99999 PR PBB SHADOW E&M-EST. PATIENT-LVL IV: ICD-10-PCS | Mod: PBBFAC,,, | Performed by: THORACIC SURGERY (CARDIOTHORACIC VASCULAR SURGERY)

## 2022-08-18 PROCEDURE — 1101F PR PT FALLS ASSESS DOC 0-1 FALLS W/OUT INJ PAST YR: ICD-10-PCS | Mod: CPTII,S$GLB,, | Performed by: THORACIC SURGERY (CARDIOTHORACIC VASCULAR SURGERY)

## 2022-08-18 PROCEDURE — 1101F PT FALLS ASSESS-DOCD LE1/YR: CPT | Mod: CPTII,S$GLB,, | Performed by: THORACIC SURGERY (CARDIOTHORACIC VASCULAR SURGERY)

## 2022-08-18 PROCEDURE — 1160F RVW MEDS BY RX/DR IN RCRD: CPT | Mod: CPTII,S$GLB,, | Performed by: THORACIC SURGERY (CARDIOTHORACIC VASCULAR SURGERY)

## 2022-08-18 PROCEDURE — 3288F PR FALLS RISK ASSESSMENT DOCUMENTED: ICD-10-PCS | Mod: CPTII,S$GLB,, | Performed by: THORACIC SURGERY (CARDIOTHORACIC VASCULAR SURGERY)

## 2022-08-18 PROCEDURE — 3077F SYST BP >= 140 MM HG: CPT | Mod: CPTII,S$GLB,, | Performed by: THORACIC SURGERY (CARDIOTHORACIC VASCULAR SURGERY)

## 2022-08-18 PROCEDURE — 1126F PR PAIN SEVERITY QUANTIFIED, NO PAIN PRESENT: ICD-10-PCS | Mod: CPTII,S$GLB,, | Performed by: THORACIC SURGERY (CARDIOTHORACIC VASCULAR SURGERY)

## 2022-08-18 NOTE — PROGRESS NOTES
OFFICE VISIT        HISTORY OF PRESENT ILLNESS:  A 76-year-old female referred to me by Dr. Eddy to rule out mesenteric ischemia.  She had been complaining of abdominal pain and endoscopy revealed a gastric ulcer which underwent biopsy.  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  The patient states that her abdominal pain completely resolved after she was started on sucralfate.  Presently she denies any abdominal pain.  She has a complicated vascular history as described below.     The patient is a 75 year old female with history of HLD, HTN, prior smoker - quit in 2007, and renal artery stenosis who underwent left carotid endarterectomy by Dr. Soto at [facility] in [place] for asymptomatic left carotid stenosis in July 2019.  This was apparently complicated by a high extension of the plaque that required extensive dissection.  She had significant hypotension following the procedure as well as swallowing difficulty and some tongue deviation.  The swallowing and tongue deviation have improved.  She was admitted to [facility] in 8/2019 with right sided body symptoms and hypotension and MRI showed multiple strokes in the bilateral cerebral hemispheres. CTA showed irregular surgical site on left internal carotid.  This irregularity was treated with carotid stenting. She is currently asymptomatic from the carotid and has not had any known additional strokes since the stent.          She also has known arterial insufficiency of the upper extremities.  She 1st developed stenosis of the left subclavian artery with significant pressure difference between the right and upper extremities.  She developed pain in the left upper extremity with use of that arm.  Earlier this year she was having some dizziness but that has resolved.  Pain and fatigue in bilateral upper extremities occur with use of the extremities especially when elevating the extremities above  her head.        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   She was taken to the cardiac cath lab and the innominate artery had a greater than 90-95% stenosis and this underwent angioplasty and stenting with excellent result.  Other findings were as described in the cardiac catheterization note described below.     The patient came to see me after her angioplasty and stenting and stated that she felt much better.  She could use bilateral upper extremities without developing fatigue.  She was having some dizziness but that had improved after she did some exercises that were shown to her by an audiologist and which is done for patients with benign positional vertigo.       However, in January of 2021, she came back to see me.  She stated that her pain in the upper extremities with fatigue had returned.  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  At that time we discussed repeat intervention versus physical therapy.  We decided to proceed with conservative management.      I was subsequently asked to see her for mesenteric stenosis but the CT angiogram did not show any high-grade stenosis of the superior mesenteric or the celiac artery.  The patient also stated that her abdominal pain went away when she is she was placed on sucralfate and no intervention was done on the mesenteric arteries.      Patient comes back now stating that she is feeling very well.  She has only mild pain in the upper extremities most pronounced when she lifts her arms above her head. She does fine when she is doing water aerobics.  She does not think that her pain is severe enough that she would want some sort of intervention.             Past  "Medical History:   Diagnosis Date    Abdominal pain     Anesthesia complication     "stays in my system for weeks, excesive weakness after, frequent falls"    Anxiety     Atherosclerosis of renal artery 8/10/2015    7/27/2015: Renal Duplex: Right: severe - 3.2 m/s. Left: about 60% stenosis - 1.7 m/s.    Carotid bruit 7/16/2015    2005: Carotid bruit heard.    Carotid stent occlusion     Coronary artery disease     CVA (cerebral vascular accident) 8/13/2019    GERD (gastroesophageal reflux disease)     History of stent insertion of renal artery     Hypercholesterolemia 7/16/2015 2005: Diagnosed. Started statin.    Hypertension, benign 7/16/2015 1995: Diagnosed.    Mild obesity 6/9/2017 6/9/2017: 86 kg. BMI 31.    Subclavian artery stenosis, left 8/10/2015    7/2015: Diagnosed. 7/23/2015: Carotid Duplex: LIZETH: moderate plaquing. LICA: moderate plaquing -1.4 m/s - 50-60%. Left vertebral: retrograde flow.       Past Surgical History:   Procedure Laterality Date    ABDOMINAL AORTOGRAPHY N/A 8/11/2020    Procedure: Aortogram, Abdominal;  Surgeon: Joana Celestin MD;  Location: [facility] CATH;  Service: Peripheral Vascular;  Laterality: N/A;    ANGIOGRAPHY OF UPPER EXTREMITY N/A 8/11/2020    Procedure: Angiogram Extremity Bilateral;  Surgeon: Joana Celestin MD;  Location: [facility] CATH;  Service: Peripheral Vascular;  Laterality: N/A;    ANGIOPLASTY      renal    APPENDECTOMY Right 1959    CAROTID ENDARTERECTOMY Left     CAROTID STENT      8/2019    CARPAL TUNNEL RELEASE Right 1/13/2020    Procedure: RELEASE, CARPAL TUNNEL;  Surgeon: Mina aMhoney MD;  Location: [facility] OR;  Service: Orthopedics;  Laterality: Right;    COLONOSCOPY  09/29/2020    done at [facility]; sent for scanning: diverticulosis, 2 colonic angiodysplastic lesions, treated with APC    COLONOSCOPY N/A 7/9/2021    Procedure: COLONOSCOPY;  Surgeon: Wu Choi Jr., MD;  Location: [facility];  Service: Endoscopy;  Laterality: N/A;    COLONOSCOPY " N/A 7/19/2021    Procedure: COLONOSCOPY;  Surgeon: Olvin Nelson MD;  Location: [facility] ENDO;  Service: Endoscopy;  Laterality: N/A;    CYSTOSCOPY WITH URETEROSCOPY, RETROGRADE PYELOGRAPHY, AND INSERTION OF STENT Left 10/3/2020    Procedure: CYSTOSCOPY, WITH RETROGRADE PYELOGRAM AND URETERAL STENT INSERTION;  Surgeon: Tony Stanton MD;  Location: [facility] OR;  Service: Urology;  Laterality: Left;    CYSTOURETEROSCOPY WITH RETROGRADE PYELOGRAPHY AND INSERTION OF STENT INTO URETER Left 11/13/2020    Procedure: LEFT CYSTOURETEROSCOPY WITH FLUOROSCOPY, URETERAL STENT REMOVAL, & STONE REMOVAL;  Surgeon: Tony Stanton MD;  Location: [facility] OR;  Service: Urology;  Laterality: Left;    ESOPHAGOGASTRODUODENOSCOPY N/A 7/5/2021    Procedure: EGD (ESOPHAGOGASTRODUODENOSCOPY);  Surgeon: Brandon Hdz MD;  Location: [facility] ENDO;  Service: Endoscopy;  Laterality: N/A;    ESOPHAGOGASTRODUODENOSCOPY N/A 11/5/2021    Procedure: EGD (ESOPHAGOGASTRODUODENOSCOPY);  Surgeon: Wu Choi Jr., MD;  Location: [facility] ENDO;  Service: Endoscopy;  Laterality: N/A;    HYSTERECTOMY      OOPHORECTOMY      OPEN REDUCTION AND INTERNAL FIXATION (ORIF) OF FRACTURE OF RADIUS Right 1/13/2020    Procedure: ORIF, FRACTURE, RADIUS;  Surgeon: Mina Mahoney MD;  Location: [facility] OR;  Service: Orthopedics;  Laterality: Right;  Accumed  pt on aspirin and plavix. Dr. Mahoney aware. Note in chart from cardiology on 1/7/2020    PERCUTANEOUS TRANSLUMINAL BALLOON ANGIOPLASTY OF CORONARY ARTERY  12/10/2020    Procedure: Angioplasty-coronary;  Surgeon: Kb Belcher MD;  Location: [facility] CATH;  Service: Cardiology;;    UPPER GASTROINTESTINAL ENDOSCOPY  09/28/2020    done at [facility], sent for scanning: widely patent schatzki's ring found in distal esophagus. small hiatal hernia, otherwise unremarkable    VASCULAR SURGERY         Review of patient's allergies indicates:  No Known Allergies    Current Outpatient Medications   Medication Sig Dispense  Refill    acetaminophen (TYLENOL) 500 MG tablet Take 1,000 mg by mouth nightly as needed for Temperature greater than.       ascorbic acid/multivit-min (EMERGEN-C ORAL) Take 1 packet by mouth every morning.      aspirin (ECOTRIN) 81 MG EC tablet Take 1 tablet (81 mg total) by mouth once daily. 90 tablet 3    atorvastatin (LIPITOR) 80 MG tablet Take 1 tablet (80 mg total) by mouth once daily. 90 tablet 4    atorvastatin (LIPITOR) 80 MG tablet Take 1 tablet (80 mg total) by mouth once daily. 90 tablet 3    CALCIUM ORAL Take 1 tablet by mouth every evening.      cholecalciferol, vitamin D3, (VITAMIN D3 ORAL) Take 1 capsule by mouth every evening.       citalopram (CELEXA) 10 MG tablet Take 1 tablet (10 mg total) by mouth every morning. 90 tablet 3    ferrous sulfate (FEOSOL) 325 mg (65 mg iron) Tab tablet Take 2 tablets (650 mg total) by mouth every other day. At night (Patient taking differently: Take 650 mg by mouth once daily.)  0    lisinopriL (PRINIVIL,ZESTRIL) 5 MG tablet TAKE 1 TABLET EVERY MORNING 90 tablet 3    metoprolol succinate (TOPROL-XL) 25 MG 24 hr tablet Take 1 tablet (25 mg total) by mouth once daily. 90 tablet 4    nut.tx.impaired digestive fxn (VITAL HIGH PROTEIN ORAL) Take 1 Dose by mouth every morning. 1 dose = 1 scoop      pantoprazole (PROTONIX) 40 MG tablet TAKE 1 TABLET TWICE DAILY 180 tablet 1    potassium chloride (MICRO-K) 10 MEQ CpSR Take 1 capsule (10 mEq total) by mouth once daily. 90 capsule 3    sucralfate (CARAFATE) 1 gram tablet TAKE 1 TABLET FOUR TIMES DAILY BEFORE MEALS AND NIGHTLY 360 tablet 3    torsemide (DEMADEX) 10 MG Tab Take 1 tablet (10 mg total) by mouth once daily. 90 tablet 4    torsemide (DEMADEX) 10 MG Tab Take 1 tablet (10 mg total) by mouth once daily. (Patient not taking: Reported on 8/18/2022) 90 tablet 4    torsemide (DEMADEX) 10 MG Tab Take 1 tablet (10 mg total) by mouth once daily. (Patient not taking: Reported on 8/18/2022) 90 tablet 3     No  current facility-administered medications for this visit.       Family History   Problem Relation Age of Onset    Hypertension Mother     Hypertension Father     Colon cancer Neg Hx     Crohn's disease Neg Hx     Ulcerative colitis Neg Hx     Stomach cancer Neg Hx     Esophageal cancer Neg Hx        Social History     Socioeconomic History    Marital status:    Tobacco Use    Smoking status: Former Smoker     Packs/day: 1.00     Years: 47.00     Pack years: 47.00     Types: Cigarettes     Start date: 1961     Quit date: 2008     Years since quittin.6    Smokeless tobacco: Never Used   Substance and Sexual Activity    Alcohol use: Not Currently     Alcohol/week: 0.0 standard drinks     Comment: rarely    Drug use: Not Currently    Sexual activity: Not Currently       REVIEW OF SYSTEMS:        PHYSICAL EXAM:  Physical Exam    Vitals:    22 1326   BP: (!) 140/85   Pulse: 88     Physical Exam  Vitals signs and nursing note reviewed.   Constitutional:       Appearance: Normal appearance.   HENT:      Head: Normocephalic and atraumatic.   Eyes:      General: No scleral icterus.     Extraocular Movements: Extraocular movements intact.      Conjunctiva/sclera: Conjunctivae normal.      Pupils: Pupils are equal, round, and reactive to light.   Neck:      Musculoskeletal: Normal range of motion and neck supple. No neck rigidity or muscular tenderness.   Cardiovascular:      Rate and Rhythm: Normal rate.      I cannot palpate brachial nor radial pulses bilaterally.  Pulmonary:      Effort: Pulmonary effort is normal. No respiratory distress.      Breath sounds: No stridor.   Abdominal:      General: There is no distension.      Palpations: Abdomen is soft.      Tenderness: There is no abdominal tenderness. There is no rebound.   Musculoskeletal:      Right lower leg: No edema.      Left lower leg: No edema.   Skin:     General: Skin is warm.   Neurological:      General: No focal  deficit present.      Mental Status: She is alert and oriented to person, place, and time.      Cranial Nerves: No cranial nerve deficit.      Sensory: No sensory deficit.      Motor: No weakness.      Coordination: Coordination normal.      Gait: Gait normal.                   CTA Upper Extremity Bilateral  Order: [id number]   Status: Final result     Visible to patient: Yes (seen)     Next appt: None     Dx: PAD (peripheral artery disease)     0 Result Notes    Details    Reading Physician Reading Date Result Priority   Divina Fischer MD  [phone] 7/8/2022 Routine     Narrative & Impression  EXAMINATION:  CTA UPPER EXTREMITY BILATERAL     CLINICAL HISTORY:  Upper arm trauma, neurovasc/lig/tendon injury suspected;Peripheral arterial disease;  Peripheral vascular disease, unspecified     TECHNIQUE:  CTA of the upper extremities with axial scans obtained with injection of 100 cc Omnipaque 350, sagittal coronal MIP reformatted images obtained.     COMPARISON:  None     FINDINGS:  Please refer to the CTA of the neck of 07/05/2022 which evaluated the proximal great vessels.     A few small pulmonary nodules largest measuring 4 mm axial series 3, image 1095.  These are not significantly changed compared to 07/05/2022 or an earlier CTA of the neck of 01/28/2021.  For less than 6 mm nodules no follow-up is suggested the patient is low risk in the patient is high risk follow-up suggested in 12 months.  These are now in change for more than 12 months.     As described on the CT of the neck note is made of the endovascular stent in the brachiocephalic artery with the proximal end of the stent in the brachiocephalic artery appearing collapse with severe high-grade stenosis.  Heavy calcification with occlusion of the proximal left subclavian artery with reconstitution distally.  Heavy calcification the origin the left common carotid artery with diameter stenosis appearing severe greater than 50%.  Calcification proximal  left vertebral artery appearing severely stenotic.  Right vertebral artery proximal occlusion described on the CT of the neck better demonstrated on that exam.  Left ICA with vascular stent with severe stenosis midportion of the stent.  Stenosis of proximal right internal carotid artery appears greater than 50%.  Left-sided IVC noted.     As mentioned above severe stenosis with collapse of the proximal portion of the right brachiocephalic endovascular stent.  That segment within the aorta appears a patent but severely stenotic proximal brachiocephalic artery.  Right subclavian artery appears narrowed by approximately 50% at its origin..  Distal to its origin the right subclavian and right axillary artery patent without significant stenosis the brachial artery is patent with significant stenosis not identified but with limited evaluation with large amount of venous opacification.  The radial and ulnar arteries not reliably assessed with the artifact both metal artifact at the wrist and large amount of venous opacification.     Left upper extremity; mentioned above occlusion of the left subclavian artery at its origin with reconstitution just beyond its origin.  Reconstituted subclavian and axillary arteries patent without significant stenosis.  Brachial artery visualized and patent down to the elbow without significant stenosis.  Radial and ulnar arteries patent seen down to the wrist without significant stenosis.  Is not the large degree of venous opacification in the left upper extremity as is seen right upper extremity.     Impression:     Please refer to the report of the CT of the neck of 07/05/2022 findings refer to the proximal great vessels which have also been reviewed today.  Note is made that stenosis of the proximal right internal carotid artery in today's study does appear greater than 50%.     Left subclavian artery is noted on the CT of the neck occluded at its origin reconstituted distally with no  significant stenosis seen more distally in the left subclavian, axillary, brachial, radial or ulnar arteries     High-grade stenosis of the stent in the right brachiocephalic artery.  Origin the right subclavian artery appears narrowed by approximately 50% with distal subclavian, axillary, brachial artery as visualized appearing patent without significant stenosis.  Brachial artery not densely opacified and appears narrow and there is large amount of venous opacification limiting evaluation particularly for the radial and ulnar arteries which are not reliably evaluated.  Suspected severe narrowing of the distal right brachial artery.        Electronically signed by: Divina Fischer MD  Date:                                            07/08/2022  Time:                                           15:43         CTA Neck  Order: [id number]   Status: Final result     Visible to patient: Yes (seen)     Next appt: None     Dx: Occlusion and stenosis of unspecified...     0 Result Notes    Details    Reading Physician Reading Date Result Priority   Kalen Ray MD  [phone] 7/5/2022 Routine     Narrative & Impression  EXAMINATION:  CTA NECK     CLINICAL HISTORY:  Carotid artery stenosis; Occlusion and stenosis of unspecified carotid artery     TECHNIQUE:  Axial CT images obtained throughout the region of the neck during intravenous bolus injection of 40 intravenous contrast via CT angiogram technique.  Multiplanar MPR and MIP reconstructions were performed. Arterial stenosis percentages are based on NASCET measurement criteria.  Technologist reports collapse of the vein prior to full dose of contrast administered.     COMPARISON:  CTA head neck 01/28/2021     FINDINGS:  Aortic arch and great vessels: Left-sided aortic arch with prominent calcific atherosclerotic plaque.  The origins of the great vessels are suboptimally evaluated due to motion.  Stable marked prominent calcification at the origin of the left subclavian  artery with associated occlusion and distal reconstitution, similar to prior exam.  Suspected at least 50% stenosis at the origin of the left common carotid artery.  Endovascular stent is present within the brachiocephalic artery.  Proximal end of the stent again appears collapsed with associated high-grade luminal stenosis.  Moderate calcific plaque at the origin of the right subclavian artery without hemodynamically significant stenosis.     Cervical vessels:     Right carotid artery:   The right common carotid is without significant stenosis.  Moderate calcific atherosclerotic plaque at the right carotid bulb and proximal internal carotid artery.  There is approximately 50% stenosis of the proximal right internal carotid artery.     Left carotid artery:  The left common carotid is without significant stenosis.  Endovascular stent within the proximal left internal carotid artery.  Persistent high-grade intraluminal stent stenosis within the mid portion of the stent, which is suboptimally evaluated due to metallic artifact but does not appear significantly changed since the prior study.     Limited intracranial evaluation demonstrates moderate bilateral cavernous internal carotid artery calcification.     Vertebral arteries: Left vertebral artery is dominant.  Prominent calcific atherosclerotic plaque at the origin left vertebral artery with moderate to high-grade stenosis.  Chronic occlusion at the origin of the right vertebral artery with distal reconstitution the level of C4-5, unchanged.     Other:     The visualized portions of the lung apices again demonstrate several scattered subcentimeter solid pulmonary nodules, measuring up to 0.4 cm in the right upper lobe.     Age-appropriate degenerative osseous changes.  No acute osseous abnormality or concerning osseous lesions.     Impression:     1. Marked calcific atherosclerosis at the aortic arch with chronic occlusion of the left subclavian artery origin.  2.  Chronic occlusion of the right vertebral artery at its origin.  Suspected high-grade stenosis at the origin of the left vertebral artery.  3. Endovascular stent within the brachiocephalic artery with continued collapse of its proximal portion and associated high-grade luminal stenosis.  4. Left proximal internal carotid artery stent with persistent high-grade intraluminal stent stenosis at its midportion, suboptimally evaluated due to metallic artifact but not significantly changed since the prior study.  5. Moderate calcific atherosclerotic plaque at the right carotid bifurcation and proximal ICA with 50% stenosis of the proximal right internal carotid artery.        Electronically signed by: Kalen Ray  Date:                                            07/05/2022  Time:                                           15:55                          IMPRESSION:  1.  Moderate stenosis of the celiac artery.  2. Widely patent superior mesenteric artery   3. Gastric ulcer  4. Abdominal pain resolved with use of sucralfate.  5.  Peripheral arterial occlusive disease  6. Status post angioplasty and stenting of the innominate artery  7.  In stent restenosis of the innominate artery  8. Status post left carotid endarterectomy  9.  Status post stenting of the left carotid artery  10.  Moderate stenosis of the origin of the left common carotid artery  11.  Asymptomatic moderate to high-grade InStent restenoses of the left internal carotid artery  12. Occluded left subclavian artery  13. Occluded bilateral vertebral arteries  14. Status post stenting of the renal artery  15. Hypertension  16. Hypercholesterolemia      RECOMMENDATIONS:  OVERALL HER IMAGING STUDIES SHOW NO PROGRESSION OF DISEASE AND ARE ABOUT THE SAME AS THEY WERE AT HER LAST VISIT.  AT THIS POINT IN TIME WE WILL CONTINUE TO OBSERVE HER AND WE WILL NOT PROCEED WITH ANY FURTHER INTERVENTION AT THIS TIME.  WE WILL GET A CT ANGIOGRAM IN 1 YEAR.        Antony Celestin  MD

## 2022-08-22 ENCOUNTER — TELEPHONE (OUTPATIENT)
Dept: VASCULAR SURGERY | Facility: CLINIC | Age: 77
End: 2022-08-22
Payer: MEDICARE

## 2022-08-22 DIAGNOSIS — I73.9 PAD (PERIPHERAL ARTERY DISEASE): Primary | ICD-10-CM

## 2022-08-31 ENCOUNTER — PATIENT MESSAGE (OUTPATIENT)
Dept: GASTROENTEROLOGY | Facility: CLINIC | Age: 77
End: 2022-08-31
Payer: MEDICARE

## 2022-08-31 DIAGNOSIS — K25.9 GASTRIC ULCER WITHOUT HEMORRHAGE OR PERFORATION, UNSPECIFIED CHRONICITY: ICD-10-CM

## 2022-08-31 RX ORDER — SUCRALFATE 1 G/1
1 TABLET ORAL
Qty: 180 TABLET | Refills: 0 | Status: SHIPPED | OUTPATIENT
Start: 2022-08-31 | End: 2023-01-18

## 2022-10-24 ENCOUNTER — PATIENT MESSAGE (OUTPATIENT)
Dept: CARDIOLOGY | Facility: CLINIC | Age: 77
End: 2022-10-24
Payer: MEDICARE

## 2023-01-13 ENCOUNTER — PATIENT MESSAGE (OUTPATIENT)
Dept: GASTROENTEROLOGY | Facility: CLINIC | Age: 78
End: 2023-01-13
Payer: MEDICARE

## 2023-01-17 NOTE — TELEPHONE ENCOUNTER
Ferrous sulfate can be purchased over the counter. I have not seen this patient in over a year and typically insurance does not cover it when I have tried to prescribe it previously since it is available over the counter. Recommend follow-up with Primary Care Provider or Dr. Choi for continued evaluation and management of this medication/supplement. Patient will likely need labs to see how her iron levels are currently.  Thanks  JODY   General

## 2023-07-09 DIAGNOSIS — Z87.19 HISTORY OF GASTROESOPHAGEAL REFLUX (GERD): ICD-10-CM

## 2023-07-09 RX ORDER — PANTOPRAZOLE SODIUM 40 MG/1
TABLET, DELAYED RELEASE ORAL
Qty: 180 TABLET | Refills: 1 | Status: SHIPPED | OUTPATIENT
Start: 2023-07-09 | End: 2024-01-25 | Stop reason: ALTCHOICE

## 2023-11-03 RX ORDER — POTASSIUM CHLORIDE 750 MG/1
CAPSULE, EXTENDED RELEASE ORAL
Qty: 90 CAPSULE | Refills: 4 | Status: SHIPPED | OUTPATIENT
Start: 2023-11-03

## 2024-01-25 ENCOUNTER — OFFICE VISIT (OUTPATIENT)
Dept: GASTROENTEROLOGY | Facility: CLINIC | Age: 79
End: 2024-01-25
Payer: MEDICARE

## 2024-01-25 ENCOUNTER — TELEPHONE (OUTPATIENT)
Dept: GASTROENTEROLOGY | Facility: CLINIC | Age: 79
End: 2024-01-25

## 2024-01-25 ENCOUNTER — HOSPITAL ENCOUNTER (OUTPATIENT)
Dept: RADIOLOGY | Facility: HOSPITAL | Age: 79
Discharge: HOME | End: 2024-01-25
Attending: NURSE PRACTITIONER
Payer: MEDICARE

## 2024-01-25 VITALS — BODY MASS INDEX: 25.78 KG/M2 | HEIGHT: 65 IN | WEIGHT: 154.75 LBS

## 2024-01-25 DIAGNOSIS — R12 HEARTBURN: ICD-10-CM

## 2024-01-25 DIAGNOSIS — R10.9 ABDOMINAL PAIN, UNSPECIFIED ABDOMINAL LOCATION: ICD-10-CM

## 2024-01-25 DIAGNOSIS — R74.01 ELEVATED ALT MEASUREMENT: ICD-10-CM

## 2024-01-25 DIAGNOSIS — K59.00 CONSTIPATION, UNSPECIFIED CONSTIPATION TYPE: Primary | ICD-10-CM

## 2024-01-25 DIAGNOSIS — R10.30 LOWER ABDOMINAL PAIN: ICD-10-CM

## 2024-01-25 DIAGNOSIS — K59.00 CONSTIPATION, UNSPECIFIED CONSTIPATION TYPE: ICD-10-CM

## 2024-01-25 DIAGNOSIS — R19.4 CHANGE IN BOWEL HABITS: ICD-10-CM

## 2024-01-25 PROCEDURE — 3288F FALL RISK ASSESSMENT DOCD: CPT | Mod: CPTII,S$GLB,, | Performed by: NURSE PRACTITIONER

## 2024-01-25 PROCEDURE — 74019 RADEX ABDOMEN 2 VIEWS: CPT | Mod: TC,FY,PO

## 2024-01-25 PROCEDURE — 1126F AMNT PAIN NOTED NONE PRSNT: CPT | Mod: CPTII,S$GLB,, | Performed by: NURSE PRACTITIONER

## 2024-01-25 PROCEDURE — 1159F MED LIST DOCD IN RCRD: CPT | Mod: CPTII,S$GLB,, | Performed by: NURSE PRACTITIONER

## 2024-01-25 PROCEDURE — 1101F PT FALLS ASSESS-DOCD LE1/YR: CPT | Mod: CPTII,S$GLB,, | Performed by: NURSE PRACTITIONER

## 2024-01-25 PROCEDURE — 1157F ADVNC CARE PLAN IN RCRD: CPT | Mod: CPTII,S$GLB,, | Performed by: NURSE PRACTITIONER

## 2024-01-25 PROCEDURE — 99214 OFFICE O/P EST MOD 30 MIN: CPT | Mod: S$GLB,,, | Performed by: NURSE PRACTITIONER

## 2024-01-25 PROCEDURE — 1160F RVW MEDS BY RX/DR IN RCRD: CPT | Mod: CPTII,S$GLB,, | Performed by: NURSE PRACTITIONER

## 2024-01-25 PROCEDURE — 74019 RADEX ABDOMEN 2 VIEWS: CPT | Mod: 26,,, | Performed by: RADIOLOGY

## 2024-01-25 PROCEDURE — 99999 PR PBB SHADOW E&M-EST. PATIENT-LVL IV: CPT | Mod: PBBFAC,,, | Performed by: NURSE PRACTITIONER

## 2024-01-25 RX ORDER — BISACODYL 5 MG
5 TABLET, DELAYED RELEASE (ENTERIC COATED) ORAL DAILY PRN
COMMUNITY

## 2024-01-25 RX ORDER — LACTULOSE 10 G/15ML
10 SOLUTION ORAL 2 TIMES DAILY
Qty: 900 ML | Refills: 0 | Status: SHIPPED | OUTPATIENT
Start: 2024-01-25 | End: 2024-02-28

## 2024-01-25 RX ORDER — OMEPRAZOLE 40 MG/1
40 CAPSULE, DELAYED RELEASE ORAL
Qty: 60 CAPSULE | Refills: 5 | Status: SHIPPED | OUTPATIENT
Start: 2024-01-25 | End: 2024-02-12 | Stop reason: SDUPTHER

## 2024-01-25 NOTE — PROGRESS NOTES
Subjective:       Patient ID: Albina Armenta is a 78 y.o. female Body mass index is 25.75 kg/m².    Chief Complaint: Other (Constipation)    Established patient of Dr. Choi & myself.     GI Problem  The primary symptoms include weight loss (trying to lose weight with exercise, stable lately), abdominal pain (occasional when she has a bowel movement; described as lower abdominal ache; relieved after bowel movement is completed) and nausea (occasional, mild). Primary symptoms do not include fever, fatigue, vomiting, diarrhea, melena, hematemesis, jaundice, hematochezia or dysuria.   The illness is also significant for constipation (CHIEF COMPLAINT: started ~12/2023; bowel movements are once a week, taking dulcolax PRN- taking 1-2 times a week; last bowel movement was yesterday; PAST TREATMENT: miralax x3 days without relief, metamucil- helped). The illness does not include chills, dysphagia or odynophagia. Significant associated medical issues include GERD (breakthrough reflux noted lately in the morning when she goes exercise (swims), taking protonix 40 mg BID). Associated medical issues do not include inflammatory bowel disease.     Review of Systems   Constitutional:  Positive for weight loss (trying to lose weight with exercise, stable lately). Negative for activity change, appetite change, chills, fatigue and fever.   HENT:  Negative for sore throat and trouble swallowing.    Respiratory:  Negative for cough, choking and shortness of breath.    Cardiovascular:  Negative for chest pain.   Gastrointestinal:  Positive for abdominal pain (occasional when she has a bowel movement; described as lower abdominal ache; relieved after bowel movement is completed), constipation (CHIEF COMPLAINT: started ~12/2023; bowel movements are once a week, taking dulcolax PRN- taking 1-2 times a week; last bowel movement was yesterday; PAST TREATMENT: miralax x3 days without relief, metamucil- helped) and nausea  "(occasional, mild). Negative for anal bleeding, blood in stool, diarrhea, dysphagia, hematemesis, hematochezia, jaundice, melena, rectal pain and vomiting.   Genitourinary:  Negative for difficulty urinating, dysuria and flank pain.   Neurological:  Negative for weakness.       No LMP recorded (lmp unknown). Patient has had a hysterectomy.  Past Medical History:   Diagnosis Date    Abdominal pain     Anesthesia complication     "stays in my system for weeks, excesive weakness after, frequent falls"    Anxiety     Atherosclerosis of renal artery 8/10/2015    7/27/2015: Renal Duplex: Right: severe - 3.2 m/s. Left: about 60% stenosis - 1.7 m/s.    Carotid bruit 7/16/2015 2005: Carotid bruit heard.    Carotid stent occlusion     Coronary artery disease     CVA (cerebral vascular accident) 8/13/2019    GERD (gastroesophageal reflux disease)     History of stent insertion of renal artery     Hypercholesterolemia 7/16/2015 2005: Diagnosed. Started statin.    Hypertension, benign 7/16/2015 1995: Diagnosed.    Mild obesity 6/9/2017 6/9/2017: 86 kg. BMI 31.    Subclavian artery stenosis, left 8/10/2015    7/2015: Diagnosed. 7/23/2015: Carotid Duplex: LIZETH: moderate plaquing. LICA: moderate plaquing -1.4 m/s - 50-60%. Left vertebral: retrograde flow.     Past Surgical History:   Procedure Laterality Date    ABDOMINAL AORTOGRAPHY N/A 8/11/2020    Procedure: Aortogram, Abdominal;  Surgeon: Joana Celestin MD;  Location: [facility] CATH;  Service: Peripheral Vascular;  Laterality: N/A;    ANGIOGRAPHY OF UPPER EXTREMITY N/A 8/11/2020    Procedure: Angiogram Extremity Bilateral;  Surgeon: Joana Celestin MD;  Location: [facility] CATH;  Service: Peripheral Vascular;  Laterality: N/A;    ANGIOPLASTY      renal    APPENDECTOMY Right 1959    CAROTID ENDARTERECTOMY Left     CAROTID STENT      8/2019    CARPAL TUNNEL RELEASE Right 1/13/2020    Procedure: RELEASE, CARPAL TUNNEL;  Surgeon: Mina Mahoney MD;  Location: [facility];  Service: " Orthopedics;  Laterality: Right;    COLONOSCOPY  09/29/2020    done at [facility]; sent for scanning: diverticulosis, 2 colonic angiodysplastic lesions, treated with APC    COLONOSCOPY N/A 7/9/2021    Procedure: COLONOSCOPY;  Surgeon: Wu Choi Jr., MD;  Location: [facility];  Service: Endoscopy;  Laterality: N/A;    COLONOSCOPY N/A 7/19/2021    Procedure: COLONOSCOPY;  Surgeon: Olvin Nelson MD;  Location: [facility];  Service: Endoscopy;  Laterality: N/A;    CYSTOSCOPY WITH URETEROSCOPY, RETROGRADE PYELOGRAPHY, AND INSERTION OF STENT Left 10/3/2020    Procedure: CYSTOSCOPY, WITH RETROGRADE PYELOGRAM AND URETERAL STENT INSERTION;  Surgeon: Tony Stanton MD;  Location: [facility];  Service: Urology;  Laterality: Left;    CYSTOURETEROSCOPY WITH RETROGRADE PYELOGRAPHY AND INSERTION OF STENT INTO URETER Left 11/13/2020    Procedure: LEFT CYSTOURETEROSCOPY WITH FLUOROSCOPY, URETERAL STENT REMOVAL, & STONE REMOVAL;  Surgeon: Tony Stanton MD;  Location: [facility];  Service: Urology;  Laterality: Left;    ESOPHAGOGASTRODUODENOSCOPY N/A 7/5/2021    Procedure: EGD (ESOPHAGOGASTRODUODENOSCOPY);  Surgeon: Brandon Hdz MD;  Location: [facility];  Service: Endoscopy;  Laterality: N/A;    ESOPHAGOGASTRODUODENOSCOPY N/A 11/5/2021    Procedure: EGD (ESOPHAGOGASTRODUODENOSCOPY);  Surgeon: Wu Choi Jr., MD;  Location: [facility];  Service: Endoscopy;  Laterality: N/A;    HYSTERECTOMY      OOPHORECTOMY      OPEN REDUCTION AND INTERNAL FIXATION (ORIF) OF FRACTURE OF RADIUS Right 1/13/2020    Procedure: ORIF, FRACTURE, RADIUS;  Surgeon: Mina Mahoney MD;  Location: [facility] OR;  Service: Orthopedics;  Laterality: Right;  Accumed  pt on aspirin and plavix. Dr. Mahoney aware. Note in chart from cardiology on 1/7/2020    PERCUTANEOUS TRANSLUMINAL BALLOON ANGIOPLASTY OF CORONARY ARTERY  12/10/2020    Procedure: Angioplasty-coronary;  Surgeon: Kb Belcher MD;  Location: [facility];  Service: Cardiology;;    UPPER  GASTROINTESTINAL ENDOSCOPY  2020    done at [facility], sent for scanning: widely patent schatzki's ring found in distal esophagus. small hiatal hernia, otherwise unremarkable    VASCULAR SURGERY       Family History   Problem Relation Age of Onset    Hypertension Mother     Hypertension Father     Colon cancer Neg Hx     Crohn's disease Neg Hx     Ulcerative colitis Neg Hx     Stomach cancer Neg Hx     Esophageal cancer Neg Hx      Social History     Tobacco Use    Smoking status: Former     Current packs/day: 0.00     Average packs/day: 1 pack/day for 47.0 years (47.0 ttl pk-yrs)     Types: Cigarettes     Start date: 1961     Quit date: 2008     Years since quittin.0    Smokeless tobacco: Never   Substance Use Topics    Alcohol use: Not Currently     Alcohol/week: 0.0 standard drinks of alcohol     Comment: rarely    Drug use: Not Currently     Wt Readings from Last 20 Encounters:   24 70.2 kg (154 lb 12.2 oz)   23 69.9 kg (154 lb)   23 70.8 kg (156 lb)   23 70.1 kg (154 lb 8 oz)   23 72.1 kg (159 lb)   23 72.1 kg (159 lb)   22 72.1 kg (159 lb)   22 77.2 kg (170 lb 3.1 oz)   22 77 kg (169 lb 12.1 oz)   22 76.7 kg (169 lb)   21 77.1 kg (170 lb)   21 77.1 kg (170 lb)   21 77.2 kg (170 lb 3.1 oz)   21 74.8 kg (165 lb)   21 74.4 kg (164 lb)   21 78 kg (171 lb 15.3 oz)   21 78.8 kg (173 lb 11.6 oz)   06/15/21 74.9 kg (165 lb 2 oz)   21 73.8 kg (162 lb 11.2 oz)   21 74.6 kg (164 lb 7.4 oz)     Lab Results   Component Value Date    WBC 10.88 2022    HGB 13.0 2022    HCT 41.6 2022    MCV 95 2022     2022     Lab Results   Component Value Date    IRON 13 (L) 2021    TIBC 321 2021    FERRITIN 8 (L) 2021     CMP  Sodium   Date Value Ref Range Status   2023 138 136 - 145 mmol/L Final     Potassium   Date Value Ref Range Status   2023  3.9 3.5 - 5.1 mmol/L Final     Chloride   Date Value Ref Range Status   03/22/2023 100 95 - 110 mmol/L Final     CO2   Date Value Ref Range Status   03/22/2023 29 22 - 31 mmol/L Final     Glucose   Date Value Ref Range Status   03/22/2023 95 70 - 110 mg/dL Final     Comment:     The ADA recommends the following guidelines for fasting glucose:    Normal:       less than 100 mg/dL    Prediabetes:  100 mg/dL to 125 mg/dL    Diabetes:     126 mg/dL or higher       BUN   Date Value Ref Range Status   03/22/2023 12 7 - 18 mg/dL Final     Creatinine   Date Value Ref Range Status   03/22/2023 0.91 0.50 - 1.40 mg/dL Final     Calcium   Date Value Ref Range Status   03/22/2023 9.4 8.4 - 10.2 mg/dL Final     Total Protein   Date Value Ref Range Status   03/22/2023 6.5 6.0 - 8.4 g/dL Final     Albumin   Date Value Ref Range Status   03/22/2023 4.0 3.5 - 5.2 g/dL Final     Total Bilirubin   Date Value Ref Range Status   03/22/2023 0.8 0.2 - 1.3 mg/dL Final     Alkaline Phosphatase   Date Value Ref Range Status   03/22/2023 93 38 - 145 U/L Final     AST   Date Value Ref Range Status   03/22/2023 49 (H) 14 - 36 U/L Final     ALT   Date Value Ref Range Status   03/22/2023 63 (H) 0 - 35 U/L Final     Anion Gap   Date Value Ref Range Status   03/22/2023 9 8 - 16 mmol/L Final     eGFR if    Date Value Ref Range Status   07/05/2022 56 (A) >60 mL/min/1.73 m^2 Final     eGFR if non    Date Value Ref Range Status   07/05/2022 49 (A) >60 mL/min/1.73 m^2 Final     Comment:     Calculation used to obtain the estimated glomerular filtration  rate (eGFR) is the CKD-EPI equation.        Lab Results   Component Value Date    TSH 1.210 05/31/2022     Reviewed prior medical records including radiology report of 11/16/2021 CTA abdomen pelvis; 7/17/2021 CT abdomen pelvis; 9/25/2020 abdominal ultrasound; labs POCT 6/5/2021 occult positive stool, CBC with H&H 11.7 (L) and 35 (L), & CMP WNL; & endoscopy history (see  surgical history).    Reviewed prior medical records in care everywhere including radiology report of 8/9/2019 swallow study with speech.    Objective:      Physical Exam  Vitals and nursing note reviewed.   Constitutional:       General: She is not in acute distress.     Appearance: Normal appearance. She is well-developed. She is not diaphoretic.   HENT:      Mouth/Throat:      Lips: Pink. No lesions.      Mouth: Mucous membranes are moist. No oral lesions.      Tongue: No lesions.      Pharynx: Oropharynx is clear. No pharyngeal swelling or posterior oropharyngeal erythema.   Eyes:      General: No scleral icterus.     Conjunctiva/sclera: Conjunctivae normal.      Pupils: Pupils are equal, round, and reactive to light.   Pulmonary:      Effort: Pulmonary effort is normal. No respiratory distress.      Breath sounds: Normal breath sounds. No wheezing.   Abdominal:      General: Bowel sounds are normal. There is no distension or abdominal bruit.      Palpations: Abdomen is soft. Abdomen is not rigid. There is no mass.      Tenderness: There is no abdominal tenderness. There is no guarding or rebound. Negative signs include Maldonado's sign and McBurney's sign.   Skin:     General: Skin is warm and dry.      Coloration: Skin is not jaundiced or pale.      Findings: No erythema or rash.   Neurological:      Mental Status: She is alert and oriented to person, place, and time.   Psychiatric:         Behavior: Behavior normal.         Thought Content: Thought content normal.         Judgment: Judgment normal.         Assessment:       1. Constipation, unspecified constipation type    2. Change in bowel habits    3. Lower abdominal pain    4. Elevated ALT measurement    5. Heartburn        Plan:       Constipation, unspecified constipation type & Change in bowel habits  -     TSH; Future; Expected date: 01/25/2024  -     X-Ray Abdomen Flat And Erect; Future; Expected date: 01/25/2024  -   START  lactulose (CHRONULAC) 20  gram/30 mL Soln; Take 15 mLs (10 g total) by mouth 2 (two) times daily.  Dispense: 900 mL; Refill: 0  -Recommend daily exercise as tolerated, adequate water intake (six 8-oz glasses of water daily), and high fiber diet. OTC fiber supplements are recommended if diet does not reach daily fiber goal (20-30 grams daily), such as Metamucil, Citrucel, or FiberCon (take as directed, separate from other oral medications by >2 hours).  - If no improvement with above recommendations, try intermittently dosed Dulcolax OTC as directed (every 5-7  days) PRN to facilitate bowel movements  -If still no improvement with these measures, call/follow-up  - Possible COLONOSCOPY pending results of testing and if symptoms persist    Lower abdominal pain  -     CBC Without Differential; Future; Expected date: 01/25/2024  -     Hepatic Function Panel; Future; Expected date: 01/25/2024  -     Hepatitis Panel, Acute; Future; Expected date: 01/25/2024  -     X-Ray Abdomen Flat And Erect; Future; Expected date: 01/25/2024  - Possible CT SCAN/COLONOSCOPY pending results of testing and if symptoms persist    Elevated ALT measurement  -     Hepatic Function Panel; Future; Expected date: 01/25/2024  -     Hepatitis Panel, Acute; Future; Expected date: 01/25/2024  - minimize/avoid alcohol and tylenol products, & follow-up with PCP for continued evaluation and management; if specialist is needed, recommend seeing hepatology.    Heartburn  - DISCONTINUE PROTONIX DUE TO ALTERNATE THERAPY  -   START  omeprazole (PRILOSEC) 40 MG capsule; Take 1 capsule (40 mg total) by mouth 2 (two) times daily before meals.  Dispense: 60 capsule; Refill: 5  - Possible EGD pending results of testing and if symptoms persist    Follow up in about 1 month (around 2/25/2024), or if symptoms worsen or fail to improve.      If no improvement in symptoms or symptoms worsen, call/follow-up at clinic or go to ER.        32 minutes of total time spent on the encounter, which  includes face to face time and non-face to face time preparing to see the patient (e.g., review of tests), Obtaining and/or reviewing separately obtained history, Documenting clinical information in the electronic or other health record, Independently interpreting results (not separately reported) and communicating results to the patient/family/caregiver, or Care coordination (not separately reported).

## 2024-01-25 NOTE — TELEPHONE ENCOUNTER
----- Message from Lou Cody sent at 1/25/2024 10:58 AM CST -----  Contact: pt  Type: Needs Medical Advice         Who Called: pt  Best Call Back Number:[phone]  Additional Information: Requesting a call back regarding pt would like office to call her about her appt from today   Please Advise- Thank you        Chief complaint:   Chief Complaint   Patient presents with   • Shoulder Pain     Started today       Vitals:  Visit Vitals   • /76   • Pulse 77   • Temp 97.9 °F (36.6 °C) (Temporal Artery)   • Wt 105.8 kg   • SpO2 97%   • BMI 33.22 kg/m2       HISTORY OF PRESENT ILLNESS     HPI 28-year-old male was out drinking last night and slipped on the ice and fell. He does not recall the incident, but must and landed on his shoulder. Has pain to the top of his left shoulder. Pain increases with movement and limits the movement of the arm. He took ibuprofen for it this morning and it has improved a little bit. He is right-hand dominant. No prior history of right shoulder problems. Abraded his knee and wrist. Denies other injuries. Last tetanus booster is unknown.     PAST MEDICAL, FAMILY AND SOCIAL HISTORY     Medications:  Ibuprofen     Allergies:  ALLERGIES:   Allergen Reactions   • Fish ANAPHYLAXIS       Past Medical  History/Surgeries:  No significant medical problems   Family History:  Family History   Problem Relation Age of Onset   • NEGATIVE FAMILY HX OF Mother    • NEGATIVE FAMILY HX OF Father        Social History:  Social History   Substance Use Topics   • Smoking status: Never Smoker   • Smokeless tobacco: Never Used   • Alcohol use Yes      Comment: 12 beer several times a week   Employed as a  worsening all day.   Drinks heavily multiple days per week.  He reports that his alcohol use does not interfere with his daily life, including work.  REVIEW OF SYSTEMS     Review of Systems    PHYSICAL EXAM     Physical Exam   Constitutional: He is oriented to person, place, and time. He appears well-developed and well-nourished. No distress.   Musculoskeletal:        Right shoulder: He exhibits decreased range of motion, tenderness, bony tenderness and pain. He exhibits no effusion, no crepitus, no deformity, no laceration, normal pulse and normal strength.   Maximal tenderness to the right a.c. joint.  Shoulder joint is nontender. Attempts at range of motion produce nausea and lightheadedness.   Neurological: He is alert and oriented to person, place, and time.   Psychiatric: He has a normal mood and affect.   Nursing note and vitals reviewed.      ASSESSMENT/PLAN     Right a.c. joint sprain    Ordered x-ray of the right a.c. joint with weights and x-ray of the right shoulder both x-rays are within normal limits  Recommended decreasing his alcohol use  Intermittent ice bag  Tylenol, ibuprofen or Aleve for pain  To work on shoulder range of motion  Put in physical therapy referral to avoid frozen shoulder   He was given a tetanus booster.  Given work restriction  As he has no primary care provider he is to follow-up here when able to return to full duty    Leonard Crenshaw MD is supervising my care of this patient.

## 2024-01-25 NOTE — TELEPHONE ENCOUNTER
Recommend take as prescribed twice daily. If constipation resolves or starts to have diarrhea, recommend hold it and follow-up with us.  Thanks  JODY

## 2024-01-25 NOTE — PATIENT INSTRUCTIONS
Acid Reflux and GERD in Adults Discharge Instructions   About this topic   GERD stands for gastroesophageal reflux disease. It is sometimes called reflux or acid reflux. Acid reflux happens when your stomach acid backs up into your esophagus, the tube that carries your food from your mouth to your stomach. This can be uncomfortable. You may have stomach or chest pain (heartburn), trouble swallowing, or an upset stomach. Some people have a cough or sore throat.  Most of the time, you can use over-the-counter medicines to help with this problem.       What care is needed at home?   Ask your doctor what you need to do when you go home. Make sure you ask questions if you do not understand what the doctor says.  Raise the head of your bed by 6 to 8 inches (15 to 20 cm). Use wood or rubber blocks under 2 legs or try a foam wedge under your mattress. Just sleeping with your head raised on pillows is not enough.  Avoid beer, wine, and mixed drinks and avoid caffeine.  Keep a healthy weight. If you are too heavy, lose weight.  If you smoke, try to quit. Your doctor or nurse can help.  Keep a diary of your signs. Write down what you had to eat before you had reflux. This will help you learn which foods cause you problems. For some people, they need to avoid coffee, chocolate, alcohol, spicy or fatty foods, or peppermint.  Avoid eating for 2 to 3 hours before bedtime. Lying down after you eat can make reflux worse.  Avoid belts and clothes that are too tight.  What follow-up care is needed?   Your doctor may ask you to make visits to the office to check on your progress. Be sure to keep these visits.  What drugs may be needed?   The doctor may order drugs to:  Relieve heartburn  Prevent reflux  Lessen acid production  Heal the esophageal lining  Will physical activity be limited?   Your physical activities will not be limited.  What problems could happen?   Asthma  Precancerous changes in the food pipe  Long-term cough  Dental  problems  Higher risk of cancer of the food pipe. This is esophageal cancer.  Narrowing of the food pipe. This is a stricture.  Open sore in the food pipe. This is an ulcer.  When do I need to call the doctor?   You have signs of a heart attack, which may include:  Severe chest pain, pressure, or discomfort with:  Breathing trouble, sweating, upset stomach, or cold, clammy skin.  Pain in your arms, back, or jaw.  Worse pain with activity like walking up stairs.  Fast or irregular heartbeat.  Feeling dizzy, faint, or weak.  You have sudden, severe belly pain or the belly pain is constant.  You have blood in the undigested food and acid that comes up, or stool that looks red, black, or like tar.  You feel like your food gets stuck or you have pain when you swallow.  You lose weight when you are not trying to.  You choke when you are eating.  Your reflux is very bad, very frequent, or not helped by over-the-counter medicines.  You keep throwing up.  Teach Back: Helping You Understand   The Teach Back Method helps you understand the information we are giving you. After you talk with the staff, tell them in your own words what you learned. This helps to make sure the staff has described each thing clearly. It also helps to explain things that may have been confusing. Before going home, make sure you can do these:  I can tell you about my condition.  I can tell you what changes I need to make with my eating habits to ease the reflux.  I can tell you what I will do if I am throwing up fluid that looks like blood or coffee grounds.  Where can I learn more?   American Academy of Family Physicians  [email or web address]/refluxacid-[email or web address]   N[facility] Choices  https://www.nhs.uk/conditions/heartburn-and-acid-reflux/   Last Reviewed Date   2021-06-09  Consumer Information Use and Disclaimer   This information is not specific medical advice and does not replace information you receive from your health care provider. This  is only a brief summary of general information. It does NOT include all information about conditions, illnesses, injuries, tests, procedures, treatments, therapies, discharge instructions or life-style choices that may apply to you. You must talk with your health care provider for complete information about your health and treatment options. This information should not be used to decide whether or not to accept your health care providers advice, instructions or recommendations. Only your health care provider has the knowledge and training to provide advice that is right for you.  Copyright   Copyright © 2021 UpToDate, Inc. and its affiliates and/or licensors. All rights reserved. Constipation in Adults   The Basics   Written by the doctors and editors at [facility]   What is constipation? -- Constipation is a common problem that makes it hard to have bowel movements. Your bowel movements might be:  Too hard  Too small  Hard to get out  Happening fewer than 3 times a week  What causes constipation? -- Constipation can be caused by:  Side effects of some medicines  Poor diet  Diseases of the digestive system (figure 1)  What other symptoms should I watch for? -- These symptoms could signal a more serious problem:  Blood in the toilet or on the toilet paper after having a bowel movement  Fever  Weight loss  Feeling weak  It could also be a sign of a problem if you have new constipation without a change in your medicines or diet, and have never had constipation in the past.   Is there anything I can do on my own to get rid of constipation? -- Yes. Try these steps:  Eat foods that have a lot of fiber. Good choices are fruits, vegetables, prune juice, and cereal (table 1).  Drink plenty of water and other fluids.  When you feel the need to go to the bathroom, go to the bathroom. Don't hold it.  Take laxatives. These are medicines that help make bowel movements easier to get out. Some are pills that you swallow. Others go  "into the rectum. These are called "suppositories."  Should I see a doctor or nurse? -- See your doctor or nurse if:   Your symptoms are new or not normal for you  You do not have a bowel movement for a few days  The problem comes and goes, but lasts for longer than 3 weeks  You are in a lot of pain  You have other symptoms that also worry you (for example, bleeding, weakness, weight loss, or fever)  Other people in your family have had colorectal cancer or inflammatory bowel disease  Are there tests I should have? -- Your doctor or nurse will decide which tests you should have based on your age, other symptoms, and individual situation. There are lots of tests, but you might not need any.  Here are the most common tests doctors use to find the cause of constipation:  Rectal exam - Your doctor will look at the outside of your anus. They will also use a finger to feel inside the opening.  Sigmoidoscopy or colonoscopy - For these tests, the doctor puts a thin tube into your anus. Then, they advance the tube into your large intestine. The large intestine is also called the colon. The tube has a camera attached to it, so the doctor can look inside your intestines. During these tests, the doctor can also take samples of tissue to look at under a microscope (figure 2).  X-rays, CT scan, or MRI - These create images of the inside of your body.  Manometry studies - Manometry allows the doctor to measure the pressure inside the rectum at various points. It can help the doctor find out if the muscles that control bowel movements are working right. The test also shows whether the person's rectum can feel normally.  How is constipation treated? -- That depends on what is causing your constipation. First, your doctor will want you to try eating more fiber and drinking more water. If that doesn't help, your doctor might suggest:  Medicines that you swallow or put in your rectum  Changing the medicines you are taking for other " "conditions  A treatment called an "enema" - For this treatment, a doctor or nurse will squirt water into your rectum. They might also use a thin tool to help break up bowel movements that are still inside you.  You might also be able to give yourself enema treatments at home, too. Enemas can be just water, or they can contain medicine to help with constipation.   Biofeedback - This is a technique that teaches you to relax your muscles so you can let go and push bowel movements out.  Can constipation be prevented? -- You can reduce your chances of getting constipation again by:  Eating a diet that is full of fiber (table 1)  Drinking water and other fluids during the day  Going to the bathroom at regular times every day  All topics are updated as new evidence becomes available and our peer review process is complete.  This topic retrieved from [employer] on: Sep 21, 2021.  Topic 34671 Version 8.0  Release: 29.4.2 - C29.263  © 2021 UpToDate, Inc. and/or its affiliates. All rights reserved.  figure 1: Digestive system     This drawing shows the organs in the body that process food. Together these organs are called "the digestive system," or "digestive tract." As food travels through this system, the body absorbs nutrients and water.  Graphic 37944 Version 4.0    table 1: Amount of fiber in different foods  Food  Serving  Grams of fiber    Fruits    Apple (with skin) 1 medium apple 4.4   Banana 1 medium banana 3.1   Oranges 1 orange 3.1   Prunes 1 cup, pitted 12.4   Juices    Apple, unsweetened, with added ascorbic acid 1 cup 0.5   Grapefruit, white, canned, sweetened 1 cup 0.2   Grape, unsweetened, with added ascorbic acid 1 cup 0.5   Orange 1 cup 0.7   Vegetables    Cooked   Green beans 1 cup 4.0   Carrots 1/2 cup sliced 2.3   Peas 1 cup 8.8   Potato (baked, with skin) 1 medium potato 3.8   Raw   Cucumber (with peel) 1 cucumber 1.5   Lettuce 1 cup shredded 0.5   Tomato 1 medium tomato 1.5   Spinach 1 cup 0.7   Legumes "   Baked beans, canned, no salt added 1 cup 13.9   Kidney beans, canned 1 cup 13.6   Lima beans, canned 1 cup 11.6   Lentils, boiled 1 cup 15.6   Breads, pastas, flours    Bran muffins 1 medium muffin 5.2   Oatmeal, cooked 1 cup 4.0   White bread 1 slice 0.6   Whole-wheat bread 1 slice 1.9   Pasta and rice, cooked   Macaroni 1 cup 2.5   Rice, brown 1 cup 3.5   Rice, white 1 cup 0.6   Spaghetti (regular) 1 cup 2.5   Nuts    Almonds 1/2 cup 8.7   Peanuts 1/2 cup 7.9   To learn how much fiber and other nutrients are in different foods, visit the [place] Department of Agriculture ([place] Emergency Registry) FoodData Central website.  Graphic 07229 Version 6.0  figure 2: Colonoscopy     During a colonoscopy, you lie on your side and the doctor puts a thin tube with a camera into your anus (from behind). Then the doctor advances the tube into the rectum and colon. The camera sends pictures from inside your colon to a television screen.  Graphic 11125 Version 6.0    Consumer Information Use and Disclaimer   This information is not specific medical advice and does not replace information you receive from your health care provider. This is only a brief summary of general information. It does NOT include all information about conditions, illnesses, injuries, tests, procedures, treatments, therapies, discharge instructions or life-style choices that may apply to you. You must talk with your health care provider for complete information about your health and treatment options. This information should not be used to decide whether or not to accept your health care provider's advice, instructions or recommendations. Only your health care provider has the knowledge and training to provide advice that is right for you. The use of this information is governed by the [employer] End User License Agreement, available at https://[email or web address]/solutions/[email or web address]/about/[email or web address].The use of UpToDate content is governed by the [employer] Terms of Use.  "©2021 UpToDate, Inc. All rights reserved.  Copyright   © 2021 UpToDate, Inc. and/or its affiliates. All rights reserved. Severe Abdominal Pain   The Basics   Written by the doctors and editors at [employer] [facility]   Are there different types of abdominal pain? -- Yes. "Abdominal pain" means pain in the abdomen (or belly), which is the part of the body between the chest and the genital area. This pain can happen for different reasons. It can be "chronic," which means it develops over time, or "acute," which means it starts suddenly. It can be mild or severe. A person might feel the pain all over their abdomen, or only in 1 part.   Abdominal pain can feel different for different people. It can feel sharp or crampy, or dull and steady. Some people feel better if they curl into a ball, while others need to lie flat and completely still. People often feel sick to their stomach and retch or vomit.  Doctors use the term "acute abdomen" to describe an episode of severe abdominal pain that starts suddenly and lasts for a few hours or longer. It can cause pain so severe that the person has a hard time moving or breathing and it makes them want to go to the hospital or see their doctor or nurse right away. A true acute abdomen is a medical emergency.  What causes abdominal pain? -- Lots of different things can cause abdominal pain. When pain is less severe, it can be due to something like a virus or a stomach inflammation (called "gastritis").  Acute pain that is more severe can be caused by problems with 1 or more organs in the abdomen. Organs in the abdomen can be part of the digestive, urinary, or reproductive systems (figure 1 and figure 2 and figure 3).  Conditions that affect organs in the chest or genital area can also cause pain. Even though these organs aren't in the abdomen, people might still have abdominal pain.  Common causes of acute abdominal pain in adults include:  Appendicitis - Appendicitis is the term for when the " appendix (a long, thin pouch that hangs down from the large intestine) gets infected and inflamed.  Diverticulitis - Diverticulitis is an infection that develops in small pouches that can form in the intestine. This is common in older people.  Gallstones - Gallstones are small stones that form inside an organ called the gallbladder, which stores bile, a fluid that helps the body break down fat.  Abscess - An abscess is a collection of pus. An abscess can form in the abdomen, typically near the intestine.  Kidney stones - Kidney stones can form when salts and minerals that are normally in the urine build up and harden. They can cause pain when they pass through the ureters, which are the tubes that carry urine from the kidney to the bladder.  Bowel perforation - This is a hole in the bowel wall.  Perforated ulcer - This is a hole in the wall of the stomach or intestine.  Pancreatitis - This is the term for when the pancreas gets inflamed.  Ruptured cyst in the ovary - Cysts in the ovary are fluid-filled sacs that can form in some women. They sometimes rupture, which means that they break open and spill out.  Ectopic pregnancy - An ectopic pregnancy is a pregnancy that develops outside the uterus.  Should I see a doctor or nurse? -- Yes. If you have sudden or severe abdominal pain, call your doctor or nurse or go to the hospital right away. Depending on the cause of your pain, you might need immediate treatment.  Will I need tests? -- Probably. The doctor or nurse will ask about your symptoms, including where your pain is and what it feels like. The location of the pain can be an important clue to the cause.  Your doctor will ask about your current and past medical conditions, and do a physical exam. They might do repeat exams over time to follow your symptoms.  Your doctor will decide which tests you should have based on your symptoms and individual situation. The tests might include:  Blood tests  Urine  tests  X-rays  An ultrasound, CT scan, or other imaging test - Imaging tests create pictures of the inside of the body.  How is abdominal pain treated? -- Treatment depends on what's causing the pain. It might include 1 or more of the following:  Fluids given by IV  Pain medicines  Antibiotic medicines to treat an infection  Other medicines to treat other medical conditions  Surgery  All topics are updated as new evidence becomes available and our peer review process is complete.  This topic retrieved from [employer] on: Sep 21, 2021.  Topic 48908 Version 12.0  Release: 29.4.2 - C29.263  © 2021 UpToDate, Inc. and/or its affiliates. All rights reserved.  figure 1: Organs inside the abdomen (belly)     Graphic 33322 Version 6.0    figure 2: Anatomy of the urinary tract     Urine is made by the kidneys. It passes from the kidneys into the bladder through two tubes called the ureters. Then it leaves the bladder through another tube called the urethra.  Graphic 24402 Version 7.0    figure 3: Female reproductive anatomy     These are the internal organs that make up the female reproductive system.  Graphic 92349 Version 6.0    Consumer Information Use and Disclaimer   This information is not specific medical advice and does not replace information you receive from your health care provider. This is only a brief summary of general information. It does NOT include all information about conditions, illnesses, injuries, tests, procedures, treatments, therapies, discharge instructions or life-style choices that may apply to you. You must talk with your health care provider for complete information about your health and treatment options. This information should not be used to decide whether or not to accept your health care provider's advice, instructions or recommendations. Only your health care provider has the knowledge and training to provide advice that is right for you. The use of this information is governed by the  [employer] End User License Agreement, available at https://www.[employer].com/en/solutions/[employer]/about/homar.The use of [employer] content is governed by the [employer] Terms of Use. ©2021 UpToDate, Inc. All rights reserved.  Copyright   © 2021 UpToDate, Inc. and/or its affiliates. All rights reserved.

## 2024-01-25 NOTE — TELEPHONE ENCOUNTER
Patient wants to clarify if she should take the lactulose daily or as needed and should she stop taking it if things get back to normal?

## 2024-01-26 ENCOUNTER — TELEPHONE (OUTPATIENT)
Dept: GASTROENTEROLOGY | Facility: CLINIC | Age: 79
End: 2024-01-26
Payer: MEDICARE

## 2024-01-26 NOTE — TELEPHONE ENCOUNTER
Please call to inform & review the results with the patient- radiology report of the abdominal x-ray showed unremarkable bowel gas pattern, no signs of intestinal obstruction and mild amount of stool in the colon.  Otherwise, unremarkable findings of the GI tract.  Continue with previous recommendations, including lactulose taken as prescribed.  Labs showed stable blood counts (no anemia); normal liver labs; and normal thyroid hormone level.  If no improvement in symptoms or symptoms worsen, call/follow-up at clinic or go to ER.    Thanks,

## 2024-02-11 ENCOUNTER — PATIENT MESSAGE (OUTPATIENT)
Dept: GASTROENTEROLOGY | Facility: CLINIC | Age: 79
End: 2024-02-11
Payer: MEDICARE

## 2024-02-11 DIAGNOSIS — R12 HEARTBURN: ICD-10-CM

## 2024-02-14 RX ORDER — OMEPRAZOLE 40 MG/1
40 CAPSULE, DELAYED RELEASE ORAL
Qty: 180 CAPSULE | Refills: 0 | Status: SHIPPED | OUTPATIENT
Start: 2024-02-14 | End: 2024-02-28

## 2024-02-14 RX ORDER — PANTOPRAZOLE SODIUM 40 MG/1
40 TABLET, DELAYED RELEASE ORAL 2 TIMES DAILY
Qty: 180 TABLET | Refills: 3 | Status: SHIPPED | OUTPATIENT
Start: 2024-02-14 | End: 2024-02-28 | Stop reason: SDUPTHER

## 2024-02-21 ENCOUNTER — TELEPHONE (OUTPATIENT)
Dept: GASTROENTEROLOGY | Facility: CLINIC | Age: 79
End: 2024-02-21
Payer: MEDICARE

## 2024-02-21 NOTE — TELEPHONE ENCOUNTER
----- Message from Lady Draper sent at 2/21/2024 10:23 AM CST -----  Contact: Laith  Type:  Pharmacy Calling to Clarify an RX    Name of Caller:  Humanbrianna  Pharmacy Name:    [facility] - [place], [place] - [address]  [address] [address]  Phone: [phone] Fax: [phone]  Prescription Name:   Disp Refills Start End   pantoprazole (PROTONIX) 40 MG tablet 180 tablet 3 2/14/2024 -   Sig - Route: TAKE 1 TABLET TWICE DAILY - Oral   What do they need to clarify?:  They are needing another clinical question answered. Refe # [id number]  Additional Information:  Please call back at [phone] to advise. Thanks!

## 2024-02-21 NOTE — TELEPHONE ENCOUNTER
Returned call to [employer], all prescription questions answered, notified [employer] that forms were faxed this am.

## 2024-02-28 ENCOUNTER — OFFICE VISIT (OUTPATIENT)
Dept: GASTROENTEROLOGY | Facility: CLINIC | Age: 79
End: 2024-02-28
Payer: MEDICARE

## 2024-02-28 VITALS — HEIGHT: 65 IN | BODY MASS INDEX: 26.12 KG/M2 | WEIGHT: 156.75 LBS

## 2024-02-28 DIAGNOSIS — Z51.81 ENCOUNTER FOR MONITORING LONG-TERM PROTON PUMP INHIBITOR THERAPY: ICD-10-CM

## 2024-02-28 DIAGNOSIS — R12 HEARTBURN: Primary | ICD-10-CM

## 2024-02-28 DIAGNOSIS — Z79.899 ENCOUNTER FOR MONITORING LONG-TERM PROTON PUMP INHIBITOR THERAPY: ICD-10-CM

## 2024-02-28 DIAGNOSIS — Z87.19 HISTORY OF CONSTIPATION: ICD-10-CM

## 2024-02-28 PROCEDURE — 99999 PR PBB SHADOW E&M-EST. PATIENT-LVL III: CPT | Mod: PBBFAC,,, | Performed by: NURSE PRACTITIONER

## 2024-02-28 PROCEDURE — 99214 OFFICE O/P EST MOD 30 MIN: CPT | Mod: S$GLB,,, | Performed by: NURSE PRACTITIONER

## 2024-02-28 PROCEDURE — 1157F ADVNC CARE PLAN IN RCRD: CPT | Mod: CPTII,S$GLB,, | Performed by: NURSE PRACTITIONER

## 2024-02-28 PROCEDURE — 1159F MED LIST DOCD IN RCRD: CPT | Mod: CPTII,S$GLB,, | Performed by: NURSE PRACTITIONER

## 2024-02-28 PROCEDURE — 1160F RVW MEDS BY RX/DR IN RCRD: CPT | Mod: CPTII,S$GLB,, | Performed by: NURSE PRACTITIONER

## 2024-02-28 PROCEDURE — 1101F PT FALLS ASSESS-DOCD LE1/YR: CPT | Mod: CPTII,S$GLB,, | Performed by: NURSE PRACTITIONER

## 2024-02-28 PROCEDURE — 3288F FALL RISK ASSESSMENT DOCD: CPT | Mod: CPTII,S$GLB,, | Performed by: NURSE PRACTITIONER

## 2024-02-28 RX ORDER — PANTOPRAZOLE SODIUM 40 MG/1
40 TABLET, DELAYED RELEASE ORAL 2 TIMES DAILY
Qty: 180 TABLET | Refills: 3 | Status: SHIPPED | OUTPATIENT
Start: 2024-02-28

## 2024-02-28 RX ORDER — LACTULOSE 10 G/15ML
15 SOLUTION ORAL; RECTAL 2 TIMES DAILY
COMMUNITY
Start: 2024-01-25 | End: 2024-02-28 | Stop reason: SDUPTHER

## 2024-02-28 NOTE — PATIENT INSTRUCTIONS
Acid Reflux and GERD in Adults Discharge Instructions   About this topic   GERD stands for gastroesophageal reflux disease. It is sometimes called reflux or acid reflux. Acid reflux happens when your stomach acid backs up into your esophagus, the tube that carries your food from your mouth to your stomach. This can be uncomfortable. You may have stomach or chest pain (heartburn), trouble swallowing, or an upset stomach. Some people have a cough or sore throat.  Most of the time, you can use over-the-counter medicines to help with this problem.       What care is needed at home?   Ask your doctor what you need to do when you go home. Make sure you ask questions if you do not understand what the doctor says.  Raise the head of your bed by 6 to 8 inches (15 to 20 cm). Use wood or rubber blocks under 2 legs or try a foam wedge under your mattress. Just sleeping with your head raised on pillows is not enough.  Avoid beer, wine, and mixed drinks and avoid caffeine.  Keep a healthy weight. If you are too heavy, lose weight.  If you smoke, try to quit. Your doctor or nurse can help.  Keep a diary of your signs. Write down what you had to eat before you had reflux. This will help you learn which foods cause you problems. For some people, they need to avoid coffee, chocolate, alcohol, spicy or fatty foods, or peppermint.  Avoid eating for 2 to 3 hours before bedtime. Lying down after you eat can make reflux worse.  Avoid belts and clothes that are too tight.  What follow-up care is needed?   Your doctor may ask you to make visits to the office to check on your progress. Be sure to keep these visits.  What drugs may be needed?   The doctor may order drugs to:  Relieve heartburn  Prevent reflux  Lessen acid production  Heal the esophageal lining  Will physical activity be limited?   Your physical activities will not be limited.  What problems could happen?   Asthma  Precancerous changes in the food pipe  Long-term cough  Dental  problems  Higher risk of cancer of the food pipe. This is esophageal cancer.  Narrowing of the food pipe. This is a stricture.  Open sore in the food pipe. This is an ulcer.  When do I need to call the doctor?   You have signs of a heart attack, which may include:  Severe chest pain, pressure, or discomfort with:  Breathing trouble, sweating, upset stomach, or cold, clammy skin.  Pain in your arms, back, or jaw.  Worse pain with activity like walking up stairs.  Fast or irregular heartbeat.  Feeling dizzy, faint, or weak.  You have sudden, severe belly pain or the belly pain is constant.  You have blood in the undigested food and acid that comes up, or stool that looks red, black, or like tar.  You feel like your food gets stuck or you have pain when you swallow.  You lose weight when you are not trying to.  You choke when you are eating.  Your reflux is very bad, very frequent, or not helped by over-the-counter medicines.  You keep throwing up.  Teach Back: Helping You Understand   The Teach Back Method helps you understand the information we are giving you. After you talk with the staff, tell them in your own words what you learned. This helps to make sure the staff has described each thing clearly. It also helps to explain things that may have been confusing. Before going home, make sure you can do these:  I can tell you about my condition.  I can tell you what changes I need to make with my eating habits to ease the reflux.  I can tell you what I will do if I am throwing up fluid that looks like blood or coffee grounds.  Where can I learn more?   American Academy of [employer]icians  [email or web address]   [facility] Choices  [email or web address]://[email or web address].uk/conditions/heartburn-[email or web address]-acid-[email or web address]   Last Reviewed Date   2021-06-09  Consumer Information Use and Disclaimer   This information is not specific medical advice and does not replace information you receive from your health care provider. This  is only a brief summary of general information. It does NOT include all information about conditions, illnesses, injuries, tests, procedures, treatments, therapies, discharge instructions or life-style choices that may apply to you. You must talk with your health care provider for complete information about your health and treatment options. This information should not be used to decide whether or not to accept your health care providers advice, instructions or recommendations. Only your health care provider has the knowledge and training to provide advice that is right for you.  Copyright   Copyright © 2021 UpToDate, Inc. and its affiliates and/or licensors. All rights reserved. Constipation in Adults   The Basics   Written by the doctors and editors at [employer]   What is constipation? -- Constipation is a common problem that makes it hard to have bowel movements. Your bowel movements might be:  Too hard  Too small  Hard to get out  Happening fewer than 3 times a week  What causes constipation? -- Constipation can be caused by:  Side effects of some medicines  Poor diet  Diseases of the digestive system (figure 1)  What other symptoms should I watch for? -- These symptoms could signal a more serious problem:  Blood in the toilet or on the toilet paper after having a bowel movement  Fever  Weight loss  Feeling weak  It could also be a sign of a problem if you have new constipation without a change in your medicines or diet, and have never had constipation in the past.   Is there anything I can do on my own to get rid of constipation? -- Yes. Try these steps:  Eat foods that have a lot of fiber. Good choices are fruits, vegetables, prune juice, and cereal (table 1).  Drink plenty of water and other fluids.  When you feel the need to go to the bathroom, go to the bathroom. Don't hold it.  Take laxatives. These are medicines that help make bowel movements easier to get out. Some are pills that you swallow. Others go  "into the rectum. These are called "suppositories."  Should I see a doctor or nurse? -- See your doctor or nurse if:   Your symptoms are new or not normal for you  You do not have a bowel movement for a few days  The problem comes and goes, but lasts for longer than 3 weeks  You are in a lot of pain  You have other symptoms that also worry you (for example, bleeding, weakness, weight loss, or fever)  Other people in your family have had colorectal cancer or inflammatory bowel disease  Are there tests I should have? -- Your doctor or nurse will decide which tests you should have based on your age, other symptoms, and individual situation. There are lots of tests, but you might not need any.  Here are the most common tests doctors use to find the cause of constipation:  Rectal exam - Your doctor will look at the outside of your anus. They will also use a finger to feel inside the opening.  Sigmoidoscopy or colonoscopy - For these tests, the doctor puts a thin tube into your anus. Then, they advance the tube into your large intestine. The large intestine is also called the colon. The tube has a camera attached to it, so the doctor can look inside your intestines. During these tests, the doctor can also take samples of tissue to look at under a microscope (figure 2).  X-rays, CT scan, or MRI - These create images of the inside of your body.  Manometry studies - Manometry allows the doctor to measure the pressure inside the rectum at various points. It can help the doctor find out if the muscles that control bowel movements are working right. The test also shows whether the person's rectum can feel normally.  How is constipation treated? -- That depends on what is causing your constipation. First, your doctor will want you to try eating more fiber and drinking more water. If that doesn't help, your doctor might suggest:  Medicines that you swallow or put in your rectum  Changing the medicines you are taking for other " "conditions  A treatment called an "enema" - For this treatment, a doctor or nurse will squirt water into your rectum. They might also use a thin tool to help break up bowel movements that are still inside you.  You might also be able to give yourself enema treatments at home, too. Enemas can be just water, or they can contain medicine to help with constipation.   Biofeedback - This is a technique that teaches you to relax your muscles so you can let go and push bowel movements out.  Can constipation be prevented? -- You can reduce your chances of getting constipation again by:  Eating a diet that is full of fiber (table 1)  Drinking water and other fluids during the day  Going to the bathroom at regular times every day  All topics are updated as new evidence becomes available and our peer review process is complete.  This topic retrieved from [facility] on: Sep 21, 2021.  Topic 99763 Version 8.0  Release: 29.4.2 - C29.263  © 2021 UpToDate, Inc. and/or its affiliates. All rights reserved.  figure 1: Digestive system     This drawing shows the organs in the body that process food. Together these organs are called "the digestive system," or "digestive tract." As food travels through this system, the body absorbs nutrients and water.  Graphic 58359 Version 4.0    table 1: Amount of fiber in different foods  Food  Serving  Grams of fiber    Fruits    Apple (with skin) 1 medium apple 4.4   Banana 1 medium banana 3.1   Oranges 1 orange 3.1   Prunes 1 cup, pitted 12.4   Juices    Apple, unsweetened, with added ascorbic acid 1 cup 0.5   Grapefruit, white, canned, sweetened 1 cup 0.2   Grape, unsweetened, with added ascorbic acid 1 cup 0.5   Orange 1 cup 0.7   Vegetables    Cooked   Green beans 1 cup 4.0   Carrots 1/2 cup sliced 2.3   Peas 1 cup 8.8   Potato (baked, with skin) 1 medium potato 3.8   Raw   Cucumber (with peel) 1 cucumber 1.5   Lettuce 1 cup shredded 0.5   Tomato 1 medium tomato 1.5   Spinach 1 cup 0.7   Legumes "   Baked beans, canned, no salt added 1 cup 13.9   Kidney beans, canned 1 cup 13.6   Lima beans, canned 1 cup 11.6   Lentils, boiled 1 cup 15.6   Breads, pastas, flours    Bran muffins 1 medium muffin 5.2   Oatmeal, cooked 1 cup 4.0   White bread 1 slice 0.6   Whole-wheat bread 1 slice 1.9   Pasta and rice, cooked   Macaroni 1 cup 2.5   Rice, brown 1 cup 3.5   Rice, white 1 cup 0.6   Spaghetti (regular) 1 cup 2.5   Nuts    Almonds 1/2 cup 8.7   Peanuts 1/2 cup 7.9   To learn how much fiber and other nutrients are in different foods, visit the [place] [employer] of [employer] ([employer]) [employer] website.  Graphic 14780 Version 6.0  figure 2: Colonoscopy     During a colonoscopy, you lie on your side and the doctor puts a thin tube with a camera into your anus (from behind). Then the doctor advances the tube into the rectum and colon. The camera sends pictures from inside your colon to a television screen.  Graphic 02171 Version 6.0    Consumer Information Use and Disclaimer   This information is not specific medical advice and does not replace information you receive from your health care provider. This is only a brief summary of general information. It does NOT include all information about conditions, illnesses, injuries, tests, procedures, treatments, therapies, discharge instructions or life-style choices that may apply to you. You must talk with your health care provider for complete information about your health and treatment options. This information should not be used to decide whether or not to accept your health care provider's advice, instructions or recommendations. Only your health care provider has the knowledge and training to provide advice that is right for you. The use of this information is governed by the [employer] End User License Agreement, available at [email or web address].The use of UpToDate content is governed by the [employer] Terms of Use.  ©2021 [employer] All rights reserved.  Copyright   © 2021 [employer] and/or its affiliates. All rights reserved.

## 2024-02-28 NOTE — PROGRESS NOTES
Subjective:       Patient ID: Albina Armenta is a 78 y.o. female Body mass index is 26.08 kg/m².    Chief Complaint: Other (Medication discussion)    Established patient of Dr. Choi & myself.     Reports she has switched herself back to protonix because the omeprazole became ineffective after 2 weeks of use. Her insurance told her they can't fill the protonix because she has active prescriptions for both medications. She is taking the rest of the protonix she had left from prior prescription. Patient reports she would like to stay on protonix at this time.    GI Problem  The primary symptoms include weight loss (trying to lose weight with exercise, stable lately). Primary symptoms do not include fever, fatigue, abdominal pain, nausea, vomiting, diarrhea, melena, hematemesis, jaundice, hematochezia or dysuria.   The illness is also significant for constipation (started ~12/2023; bowel movements are once every 2 days with taking lactulose 10 grams BID PRN; dulcolax PRN- rare use; increased fiber in diet; PAST TREATMENT: miralax x3 days without relief, metamucil- helped). The illness does not include chills, dysphagia or odynophagia. Significant associated medical issues include GERD (controlled with taking protonix 40 mg BID; PAST TREATMENT: prilosec- helped for 2 weeks but became ineffective thereafter). Associated medical issues do not include inflammatory bowel disease.     Review of Systems   Constitutional:  Positive for weight loss (trying to lose weight with exercise, stable lately). Negative for activity change, appetite change, chills, fatigue and fever.   HENT:  Negative for sore throat and trouble swallowing.    Respiratory:  Negative for cough, choking and shortness of breath.    Cardiovascular:  Negative for chest pain.   Gastrointestinal:  Positive for constipation (started ~12/2023; bowel movements are once every 2 days with taking lactulose 10 grams BID PRN; dulcolax PRN- rare use; increased  "fiber in diet; PAST TREATMENT: miralax x3 days without relief, metamucil- helped). Negative for abdominal pain, anal bleeding, blood in stool, diarrhea, dysphagia, hematemesis, hematochezia, jaundice, melena, nausea, rectal pain and vomiting.   Genitourinary:  Negative for difficulty urinating, dysuria and flank pain.   Neurological:  Negative for weakness.       No LMP recorded (lmp unknown). Patient has had a hysterectomy.  Past Medical History:   Diagnosis Date    Abdominal pain     Anesthesia complication     "stays in my system for weeks, excesive weakness after, frequent falls"    Anxiety     Atherosclerosis of renal artery 8/10/2015    7/27/2015: Renal Duplex: Right: severe - 3.2 m/s. Left: about 60% stenosis - 1.7 m/s.    Carotid bruit 7/16/2015 2005: Carotid bruit heard.    Carotid stent occlusion     Coronary artery disease     CVA (cerebral vascular accident) 8/13/2019    GERD (gastroesophageal reflux disease)     History of stent insertion of renal artery     Hypercholesterolemia 7/16/2015 2005: Diagnosed. Started statin.    Hypertension, benign 7/16/2015 1995: Diagnosed.    Mild obesity 6/9/2017 6/9/2017: 86 kg. BMI 31.    Subclavian artery stenosis, left 8/10/2015    7/2015: Diagnosed. 7/23/2015: Carotid Duplex: LIZETH: moderate plaquing. LICA: moderate plaquing -1.4 m/s - 50-60%. Left vertebral: retrograde flow.     Past Surgical History:   Procedure Laterality Date    ABDOMINAL AORTOGRAPHY N/A 8/11/2020    Procedure: Aortogram, Abdominal;  Surgeon: Joana Celestin MD;  Location: [facility];  Service: Peripheral Vascular;  Laterality: N/A;    ANGIOGRAPHY OF UPPER EXTREMITY N/A 8/11/2020    Procedure: Angiogram Extremity Bilateral;  Surgeon: Joana Celestin MD;  Location: [facility];  Service: Peripheral Vascular;  Laterality: N/A;    ANGIOPLASTY      renal    APPENDECTOMY Right 1959    CAROTID ENDARTERECTOMY Left     CAROTID STENT      8/2019    CARPAL TUNNEL RELEASE Right 1/13/2020    Procedure: " RELEASE, CARPAL TUNNEL;  Surgeon: Mina Mahoney MD;  Location: [facility];  Service: Orthopedics;  Laterality: Right;    COLONOSCOPY  09/29/2020    done at [facility]; sent for scanning: diverticulosis, 2 colonic angiodysplastic lesions, treated with APC    COLONOSCOPY N/A 7/9/2021    Procedure: COLONOSCOPY;  Surgeon: Wu Choi Jr., MD;  Location: [facility];  Service: Endoscopy;  Laterality: N/A;    COLONOSCOPY N/A 7/19/2021    Procedure: COLONOSCOPY;  Surgeon: Olvin Nelson MD;  Location: [facility];  Service: Endoscopy;  Laterality: N/A;    CYSTOSCOPY WITH URETEROSCOPY, RETROGRADE PYELOGRAPHY, AND INSERTION OF STENT Left 10/3/2020    Procedure: CYSTOSCOPY, WITH RETROGRADE PYELOGRAM AND URETERAL STENT INSERTION;  Surgeon: Tony Stanton MD;  Location: [facility];  Service: Urology;  Laterality: Left;    CYSTOURETEROSCOPY WITH RETROGRADE PYELOGRAPHY AND INSERTION OF STENT INTO URETER Left 11/13/2020    Procedure: LEFT CYSTOURETEROSCOPY WITH FLUOROSCOPY, URETERAL STENT REMOVAL, & STONE REMOVAL;  Surgeon: Tony Stanton MD;  Location: [facility];  Service: Urology;  Laterality: Left;    ESOPHAGOGASTRODUODENOSCOPY N/A 7/5/2021    Procedure: EGD (ESOPHAGOGASTRODUODENOSCOPY);  Surgeon: Brandon Hdz MD;  Location: [facility];  Service: Endoscopy;  Laterality: N/A;    ESOPHAGOGASTRODUODENOSCOPY N/A 11/5/2021    Procedure: EGD (ESOPHAGOGASTRODUODENOSCOPY);  Surgeon: Wu Cohi Jr., MD;  Location: [facility];  Service: Endoscopy;  Laterality: N/A;    HYSTERECTOMY      OOPHORECTOMY      OPEN REDUCTION AND INTERNAL FIXATION (ORIF) OF FRACTURE OF RADIUS Right 1/13/2020    Procedure: ORIF, FRACTURE, RADIUS;  Surgeon: Mina Mahoney MD;  Location: [facility];  Service: Orthopedics;  Laterality: Right;  Accumed  pt on aspirin and plavix. Dr. Mahoney aware. Note in chart from cardiology on 1/7/2020    PERCUTANEOUS TRANSLUMINAL BALLOON ANGIOPLASTY OF CORONARY ARTERY  12/10/2020    Procedure: Angioplasty-coronary;   Surgeon: Kb Belcher MD;  Location: [facility];  Service: Cardiology;;    UPPER GASTROINTESTINAL ENDOSCOPY  2020    done at [facility], sent for scanning: widely patent schatzki's ring found in distal esophagus. small hiatal hernia, otherwise unremarkable    VASCULAR SURGERY       Family History   Problem Relation Age of Onset    Hypertension Mother     Hypertension Father     Colon cancer Neg Hx     Crohn's disease Neg Hx     Ulcerative colitis Neg Hx     Stomach cancer Neg Hx     Esophageal cancer Neg Hx      Social History     Tobacco Use    Smoking status: Former     Current packs/day: 0.00     Average packs/day: 1 pack/day for 47.0 years (47.0 ttl pk-yrs)     Types: Cigarettes     Start date: 1961     Quit date: 2008     Years since quittin.1    Smokeless tobacco: Never   Substance Use Topics    Alcohol use: Not Currently     Alcohol/week: 0.0 standard drinks of alcohol     Comment: rarely    Drug use: Not Currently     Wt Readings from Last 20 Encounters:   24 71.1 kg (156 lb 12 oz)   24 69.9 kg (154 lb)   24 70.2 kg (154 lb 12.2 oz)   23 69.9 kg (154 lb)   23 70.8 kg (156 lb)   23 70.1 kg (154 lb 8 oz)   23 72.1 kg (159 lb)   23 72.1 kg (159 lb)   22 72.1 kg (159 lb)   22 77.2 kg (170 lb 3.1 oz)   22 77 kg (169 lb 12.1 oz)   22 76.7 kg (169 lb)   21 77.1 kg (170 lb)   21 77.1 kg (170 lb)   21 77.2 kg (170 lb 3.1 oz)   21 74.8 kg (165 lb)   21 74.4 kg (164 lb)   21 78 kg (171 lb 15.3 oz)   21 78.8 kg (173 lb 11.6 oz)   06/15/21 74.9 kg (165 lb 2 oz)     Lab Results   Component Value Date    WBC 9.55 2024    HGB 13.8 2024    HCT 42.7 2024    MCV 97 2024     2024     Lab Results   Component Value Date    IRON 13 (L) 2021    TIBC 321 2021    FERRITIN 8 (L) 2021     CMP  Sodium   Date Value Ref Range Status   2023 138 136 -  145 mmol/L Final     Potassium   Date Value Ref Range Status   03/22/2023 3.9 3.5 - 5.1 mmol/L Final     Chloride   Date Value Ref Range Status   03/22/2023 100 95 - 110 mmol/L Final     CO2   Date Value Ref Range Status   03/22/2023 29 22 - 31 mmol/L Final     Glucose   Date Value Ref Range Status   03/22/2023 95 70 - 110 mg/dL Final     Comment:     The ADA recommends the following guidelines for fasting glucose:    Normal:       less than 100 mg/dL    Prediabetes:  100 mg/dL to 125 mg/dL    Diabetes:     126 mg/dL or higher       BUN   Date Value Ref Range Status   03/22/2023 12 7 - 18 mg/dL Final     Creatinine   Date Value Ref Range Status   03/22/2023 0.91 0.50 - 1.40 mg/dL Final     Calcium   Date Value Ref Range Status   03/22/2023 9.4 8.4 - 10.2 mg/dL Final     Total Protein   Date Value Ref Range Status   01/25/2024 6.7 6.0 - 8.4 g/dL Final     Albumin   Date Value Ref Range Status   01/25/2024 4.1 3.5 - 5.2 g/dL Final     Total Bilirubin   Date Value Ref Range Status   01/25/2024 0.8 0.1 - 1.0 mg/dL Final     Comment:     For infants and newborns, interpretation of results should be based  on gestational age, weight and in agreement with clinical  observations.    Premature Infant recommended reference ranges:  Up to 24 hours.............<8.0 mg/dL  Up to 48 hours............<12.0 mg/dL  3-5 days..................<15.0 mg/dL  6-29 days.................<15.0 mg/dL       Alkaline Phosphatase   Date Value Ref Range Status   01/25/2024 95 55 - 135 U/L Final     AST   Date Value Ref Range Status   01/25/2024 26 10 - 40 U/L Final     ALT   Date Value Ref Range Status   01/25/2024 21 10 - 44 U/L Final     Anion Gap   Date Value Ref Range Status   03/22/2023 9 8 - 16 mmol/L Final     eGFR if    Date Value Ref Range Status   07/05/2022 56 (A) >60 mL/min/1.73 m^2 Final     eGFR if non    Date Value Ref Range Status   07/05/2022 49 (A) >60 mL/min/1.73 m^2 Final     Comment:      Calculation used to obtain the estimated glomerular filtration  rate (eGFR) is the CKD-EPI equation.        Lab Results   Component Value Date    TSH 1.322 01/25/2024     Lab Results   Component Value Date    HEPAIGM Non-reactive 01/25/2024    HEPBIGM Non-reactive 01/25/2024    HEPCAB Non-reactive 01/25/2024     Reviewed prior medical records including radiology report of 1/25/2024 abdominal x-ray; 11/16/2021 CTA abdomen pelvis; 7/17/2021 CT abdomen pelvis; 9/25/2020 abdominal ultrasound; & endoscopy history (see surgical history).    Reviewed prior medical records in care everywhere including radiology report of 8/9/2019 swallow study with speech.    Objective:      Physical Exam  Vitals and nursing note reviewed.   Constitutional:       General: She is not in acute distress.     Appearance: Normal appearance. She is well-developed. She is not diaphoretic.   HENT:      Mouth/Throat:      Lips: Pink. No lesions.      Mouth: Mucous membranes are moist. No oral lesions.      Tongue: No lesions.      Pharynx: Oropharynx is clear. No pharyngeal swelling or posterior oropharyngeal erythema.   Eyes:      General: No scleral icterus.     Conjunctiva/sclera: Conjunctivae normal.      Pupils: Pupils are equal, round, and reactive to light.   Pulmonary:      Effort: Pulmonary effort is normal. No respiratory distress.      Breath sounds: Normal breath sounds. No wheezing.   Abdominal:      General: Bowel sounds are normal. There is no distension or abdominal bruit.      Palpations: Abdomen is soft. Abdomen is not rigid. There is no mass.      Tenderness: There is no abdominal tenderness. There is no guarding or rebound. Negative signs include Maldonado's sign and McBurney's sign.   Skin:     General: Skin is warm and dry.      Coloration: Skin is not jaundiced or pale.      Findings: No erythema or rash.   Neurological:      Mental Status: She is alert and oriented to person, place, and time.   Psychiatric:         Behavior:  Behavior normal.         Thought Content: Thought content normal.         Judgment: Judgment normal.         Assessment:       1. Heartburn    2. Encounter for monitoring long-term proton pump inhibitor therapy    3. History of constipation          Plan:       Heartburn & Encounter for monitoring long-term proton pump inhibitor therapy  -   REFILL  pantoprazole (PROTONIX) 40 MG tablet; Take 1 tablet (40 mg total) by mouth 2 (two) times daily.  Dispense: 180 tablet; Refill: 3  - discussed with patient about long term use of reflux medications (preference to use lowest effective dose or discontinuing if possible), the risk and benefits of using these medications long term, the risk of untreated GERD such as brown's esophagus, and recommend a diet high in calcium and/or taking OTC calcium and vitamin d supplements as directed (such as Citracal +D), patient verbalized understanding & patient wants to continue protonix at current dosage.  - recommend annual monitoring with blood work to include CMP, CBC, vitamin B12, and magnesium.  -     Vitamin B12; Future; Expected date: 02/28/2024  -     Magnesium; Future; Expected date: 02/28/2024    History of constipation  -   CONTINUE  lactulose (CHRONULAC) 20 gram/30 mL Soln; Take 15 mLs (10 g total) by mouth 2 (two) times daily PRN CONSTIPATION  -Recommend daily exercise as tolerated, adequate water intake (six 8-oz glasses of water daily), and high fiber diet. OTC fiber supplements are recommended if diet does not reach daily fiber goal (20-30 grams daily), such as Metamucil, Citrucel, or FiberCon (take as directed, separate from other oral medications by >2 hours).  - If no improvement with above recommendations, try intermittently dosed Dulcolax OTC as directed (every 5-7  days) PRN to facilitate bowel movements  -If still no improvement with these measures, call/follow-up  - Possible COLONOSCOPY pending results of testing and if symptoms do not continue to  improve    Follow up in about 3 months (around 5/28/2024), or if symptoms worsen or fail to improve.      If no improvement in symptoms or symptoms worsen, call/follow-up at clinic or go to ER.        35 minutes of total time spent on the encounter, which includes face to face time and non-face to face time preparing to see the patient (e.g., review of tests), Obtaining and/or reviewing separately obtained history, Documenting clinical information in the electronic or other health record, Independently interpreting results (not separately reported) and communicating results to the patient/family/caregiver, or Care coordination (not separately reported).

## 2024-05-07 ENCOUNTER — TELEPHONE (OUTPATIENT)
Dept: NEUROLOGY | Facility: CLINIC | Age: 79
End: 2024-05-07
Payer: MEDICARE

## 2024-05-07 NOTE — TELEPHONE ENCOUNTER
Spoke to the pt, appt scheduled on 5/30/24 at 1400 with Raissa Bennett for possible TIA.   Date, time and location discussed.

## 2024-05-30 ENCOUNTER — LAB VISIT (OUTPATIENT)
Dept: LAB | Facility: HOSPITAL | Age: 79
End: 2024-05-30
Attending: NURSE PRACTITIONER
Payer: MEDICARE

## 2024-05-30 ENCOUNTER — OFFICE VISIT (OUTPATIENT)
Dept: NEUROLOGY | Facility: CLINIC | Age: 79
End: 2024-05-30
Payer: MEDICARE

## 2024-05-30 VITALS
HEART RATE: 76 BPM | BODY MASS INDEX: 26.41 KG/M2 | SYSTOLIC BLOOD PRESSURE: 148 MMHG | DIASTOLIC BLOOD PRESSURE: 106 MMHG | HEIGHT: 65 IN | RESPIRATION RATE: 14 BRPM | WEIGHT: 158.5 LBS

## 2024-05-30 DIAGNOSIS — G45.9 TRANSIENT CEREBRAL ISCHEMIA, UNSPECIFIED TYPE: ICD-10-CM

## 2024-05-30 DIAGNOSIS — K92.2 LOWER GI BLEED: ICD-10-CM

## 2024-05-30 DIAGNOSIS — I65.23 BILATERAL CAROTID ARTERY STENOSIS: ICD-10-CM

## 2024-05-30 DIAGNOSIS — R42 DIZZINESS AND GIDDINESS: Primary | ICD-10-CM

## 2024-05-30 DIAGNOSIS — G45.0 VERTEBROBASILAR ARTERY SYNDROME: ICD-10-CM

## 2024-05-30 DIAGNOSIS — Z86.73 HISTORY OF CEREBROVASCULAR ACCIDENT: ICD-10-CM

## 2024-05-30 DIAGNOSIS — I73.9 PAD (PERIPHERAL ARTERY DISEASE): ICD-10-CM

## 2024-05-30 DIAGNOSIS — I77.9 VERTEBRAL ARTERY DISEASE: ICD-10-CM

## 2024-05-30 DIAGNOSIS — Z98.890 HISTORY OF CAROTID ENDARTERECTOMY: ICD-10-CM

## 2024-05-30 LAB
ALBUMIN SERPL BCP-MCNC: 3.6 G/DL (ref 3.5–5.2)
ALP SERPL-CCNC: 93 U/L (ref 55–135)
ALT SERPL W/O P-5'-P-CCNC: 23 U/L (ref 10–44)
ANION GAP SERPL CALC-SCNC: 9 MMOL/L (ref 8–16)
AST SERPL-CCNC: 26 U/L (ref 10–40)
BILIRUB SERPL-MCNC: 0.3 MG/DL (ref 0.1–1)
BUN SERPL-MCNC: 16 MG/DL (ref 8–23)
CALCIUM SERPL-MCNC: 9.4 MG/DL (ref 8.7–10.5)
CHLORIDE SERPL-SCNC: 107 MMOL/L (ref 95–110)
CO2 SERPL-SCNC: 27 MMOL/L (ref 23–29)
CREAT SERPL-MCNC: 1.5 MG/DL (ref 0.5–1.4)
EST. GFR  (NO RACE VARIABLE): 35.2 ML/MIN/1.73 M^2
GLUCOSE SERPL-MCNC: 97 MG/DL (ref 70–110)
POTASSIUM SERPL-SCNC: 4.7 MMOL/L (ref 3.5–5.1)
PROT SERPL-MCNC: 6.3 G/DL (ref 6–8.4)
SODIUM SERPL-SCNC: 143 MMOL/L (ref 136–145)

## 2024-05-30 PROCEDURE — 99999 PR PBB SHADOW E&M-EST. PATIENT-LVL V: CPT | Mod: PBBFAC,,, | Performed by: NURSE PRACTITIONER

## 2024-05-30 PROCEDURE — 3078F DIAST BP <80 MM HG: CPT | Mod: CPTII,S$GLB,, | Performed by: NURSE PRACTITIONER

## 2024-05-30 PROCEDURE — 1157F ADVNC CARE PLAN IN RCRD: CPT | Mod: CPTII,S$GLB,, | Performed by: NURSE PRACTITIONER

## 2024-05-30 PROCEDURE — 1100F PTFALLS ASSESS-DOCD GE2>/YR: CPT | Mod: CPTII,S$GLB,, | Performed by: NURSE PRACTITIONER

## 2024-05-30 PROCEDURE — 36415 COLL VENOUS BLD VENIPUNCTURE: CPT | Mod: PO | Performed by: NURSE PRACTITIONER

## 2024-05-30 PROCEDURE — 3074F SYST BP LT 130 MM HG: CPT | Mod: CPTII,S$GLB,, | Performed by: NURSE PRACTITIONER

## 2024-05-30 PROCEDURE — 99205 OFFICE O/P NEW HI 60 MIN: CPT | Mod: S$GLB,,, | Performed by: NURSE PRACTITIONER

## 2024-05-30 PROCEDURE — 1126F AMNT PAIN NOTED NONE PRSNT: CPT | Mod: CPTII,S$GLB,, | Performed by: NURSE PRACTITIONER

## 2024-05-30 PROCEDURE — 1159F MED LIST DOCD IN RCRD: CPT | Mod: CPTII,S$GLB,, | Performed by: NURSE PRACTITIONER

## 2024-05-30 PROCEDURE — 80053 COMPREHEN METABOLIC PANEL: CPT | Performed by: NURSE PRACTITIONER

## 2024-05-30 PROCEDURE — 3288F FALL RISK ASSESSMENT DOCD: CPT | Mod: CPTII,S$GLB,, | Performed by: NURSE PRACTITIONER

## 2024-05-30 NOTE — PATIENT INSTRUCTIONS
"We will obtain a better scan of the arteries in the head and neck called a CTA -- this uses contrast dye for a better look at the vessels    Continue your efforts to stay well hydrated -- try having at least 6-8 glasses of water per day. You are more sensitive to low blood pressure given that you have several areas of blockage in your arteries.     Continue same cholesterol meds for now given that you recently started these, but I suspect that you may need a higher dose of the rosuvastatin in the future so long as your liver function studies look better. The goal LDL is < 70 --- your was 110 in April     Continue aspirin for now. We may need to consider changing you to a different blood thinner based on what the scans show. We will talk with GI team first.     STROKE EDUCATION:    During a stroke, blood stops flowing to part of the brain. This can damage areas in the brain that control the rest of the body. Symptoms after a stroke depend on which part of the brain has been affected. A TIA is often called a "mini stroke" and can be a warning sign for future strokes.     Stroke Risk Factors:  - Previous stroke  - High blood pressure  - High cholesterol  - Cigarette/cigar smoking  - Diabetes  - Carotid or other artery disease  - Atrial fibrillation/flutter  - Obesity  - Blood clotting disorders  - Excessive alcohol use  - Street drug use  - Family history of stroke    Call 911 right away if you have any of the following symptoms of stroke:  Weakness, tingling, or loss of feeling on one side of your face or body  Sudden double vision or trouble seeing in one or both eyes  Sudden trouble talking or slurred speech  Trouble understanding others  Sudden, severe headache  Dizziness, loss of balance, or a sense of falling  Blackouts or seizures     F.A.S.T. is an easy way to remember the signs of stroke. When you see these signs, you know that you need to call 911 FAST!   F is for face drooping. One side of the face is " drooping or numb. When the person smiles, the smile is uneven.   A is for arm weakness. One arm is weak or numb. When the person lifts both arms at the same time, one arm may drift downward.   S is for speech difficulty. You may notice slurred speech or trouble speaking. The person can't repeat a simple sentence correctly when asked.   T is for time to call 911. If someone shows any of these symptoms, even if they go away, call 911 immediately. Make note of the time the symptoms first appeared.    We will follow up in a few weeks after the scans have been done,.

## 2024-05-30 NOTE — PROGRESS NOTES
NEUROLOGY  Outpatient Consultation Visit     [facility]  [address], [place] [address]  [phone] (office) / [phone] (fax)    Patient Name:  Albina Armenta  :  1945  MR #:  [id number]  Acct #:  [id number]    Date of  Visit: 2024    Other Physicians:  Delmer Morales MD (Primary Care Physician)      CHIEF COMPLAINT: Transient Ischemic Attack      HISTORY OF PRESENT ILLNESS:  Albina Armenta is a 79 y.o. R-handed female seen in consultation for TIA per Key Chou NP    Medical history is significant for R Hurt's palsy, CVA, carotid stenosis s/p L CEA  & subsequent L carotid stenting, CAD s/p PCI, HLD, lower GI bleed, subclavian stenosis, DEBORAH, aortic stenosis, HTN, celiac artery stenosis, MICHELLE, brachiocephalic stenosis s/p stent    About 1 month ago, had symptoms of possible TIA. Had abrupt onset of dysarthria, blurred vision. She felt incoordinated, like her legs didn't want to move. Her daughter had to hold her up. Symptoms lasted about 30 seconds. She did not have any paresthesias, focal weakness, HA. She does think that symptoms occurred after she got up to go into the kitchen from her recliner. She can't recall if she felt dizzy, but endorses chronic positional dizziness. It feels like the room is spinning when she stands up. It gets better if she rests or lays down. She drinks 5-6 glasses of water daily.     She does report a history of CVA. This occurred shortly after having a L CEA in 2019. She went to the ED after a near syncopal event associated with hypotension after being discharged post operatively. She denies significant deficits, fortunately. She hasn't had stroke symptoms since.     Denies any hx of Afib. Established with Dr. Belcher for HTN, CAD / PAD. Takes lisinoril Pm and metoprolol AM.     She is maintained on low dose ASA.     She is a former smoker and quit in .    Was formerly on Atorvastatin, changed to rosuvastatin and  Zetia in April for better control and also due to abnormal LFT.     Has hx of GIB (2021). This occurred while on DAPT. Per EMR, EGD and colonoscopy without major findings aside from 2 angiectasias that were treated. She then resumed DAPT and had recurrence of rectal bleeding, so has remained on ASA therapy since. Has also had erosive ulcer, treated with medications.     Has seen vascular, Dr. Martinez in the past, last in 2022.     Allergies:  Review of patient's allergies indicates:  No Known Allergies    Current Medications:  Current Outpatient Medications   Medication Sig Dispense Refill    acetaminophen (TYLENOL) 500 MG tablet Take 1,000 mg by mouth nightly as needed for Temperature greater than.       aspirin (ECOTRIN) 81 MG EC tablet Take 1 tablet (81 mg total) by mouth once daily. 90 tablet 3    bisacodyL (DULCOLAX) 5 mg EC tablet Take 5 mg by mouth daily as needed for Constipation.      CALCIUM ORAL Take 1 tablet by mouth every evening.      cholecalciferol, vitamin D3, (VITAMIN D3 ORAL) Take 1 capsule by mouth every evening.       citalopram (CELEXA) 20 MG tablet TAKE 1 TABLET ONE TIME DAILY 90 tablet 2    ezetimibe (ZETIA) 10 mg tablet Take 1 tablet (10 mg total) by mouth once daily. 90 tablet 3    ferrous sulfate (FEOSOL) 325 mg (65 mg iron) Tab tablet Take 2 tablets (650 mg total) by mouth every other day. At night  0    lisinopriL (PRINIVIL,ZESTRIL) 2.5 MG tablet Take 1 tablet (2.5 mg total) by mouth every evening. 90 tablet 6    metoprolol succinate (TOPROL-XL) 25 MG 24 hr tablet TAKE 1 TABLET EVERY MORNING 90 tablet 4    nut.tx.impaired digestive fxn (VITAL HIGH PROTEIN ORAL) Take 1 Dose by mouth every morning. 1 dose = 1 scoop      pantoprazole (PROTONIX) 40 MG tablet Take 1 tablet (40 mg total) by mouth 2 (two) times daily. 180 tablet 3    potassium chloride (MICRO-K) 10 MEQ CpSR TAKE 1 CAPSULE EVERY DAY 90 capsule 4    rosuvastatin (CRESTOR) 5 MG tablet Take 1 tablet (5 mg total) by mouth every  "evening. 90 tablet 4    torsemide (DEMADEX) 10 MG Tab TAKE 1 TABLET ONE TIME DAILY 90 tablet 4     No current facility-administered medications for this visit.       Past Medical History:  Past Medical History:   Diagnosis Date    Abdominal pain     Anesthesia complication     "stays in my system for weeks, excesive weakness after, frequent falls"    Anxiety     Atherosclerosis of renal artery 8/10/2015    7/27/2015: Renal Duplex: Right: severe - 3.2 m/s. Left: about 60% stenosis - 1.7 m/s.    Carotid bruit 7/16/2015 2005: Carotid bruit heard.    Carotid stent occlusion     Coronary artery disease     CVA (cerebral vascular accident) 8/13/2019    GERD (gastroesophageal reflux disease)     History of stent insertion of renal artery     Hypercholesterolemia 7/16/2015 2005: Diagnosed. Started statin.    Hypertension, benign 7/16/2015 1995: Diagnosed.    Mild obesity 6/9/2017 6/9/2017: 86 kg. BMI 31.    Subclavian artery stenosis, left 8/10/2015    7/2015: Diagnosed. 7/23/2015: Carotid Duplex: LIZETH: moderate plaquing. LICA: moderate plaquing -1.4 m/s - 50-60%. Left vertebral: retrograde flow.       Past Surgical History:  Past Surgical History:   Procedure Laterality Date    ABDOMINAL AORTOGRAPHY N/A 8/11/2020    Procedure: Aortogram, Abdominal;  Surgeon: Joana Celestin MD;  Location: [facility] CATH;  Service: Peripheral Vascular;  Laterality: N/A;    ANGIOGRAPHY OF UPPER EXTREMITY N/A 8/11/2020    Procedure: Angiogram Extremity Bilateral;  Surgeon: Joana Celestin MD;  Location: [facility] CATH;  Service: Peripheral Vascular;  Laterality: N/A;    ANGIOPLASTY      renal    APPENDECTOMY Right 1959    CAROTID ENDARTERECTOMY Left     CAROTID STENT      8/2019    CARPAL TUNNEL RELEASE Right 1/13/2020    Procedure: RELEASE, CARPAL TUNNEL;  Surgeon: Mina Mahoney MD;  Location: [facility] OR;  Service: Orthopedics;  Laterality: Right;    COLONOSCOPY  09/29/2020    done at [facility]; sent for scanning: diverticulosis, 2 colonic " angiodysplastic lesions, treated with APC    COLONOSCOPY N/A 7/9/2021    Procedure: COLONOSCOPY;  Surgeon: Wu Choi Jr., MD;  Location: [facility] ENDO;  Service: Endoscopy;  Laterality: N/A;    COLONOSCOPY N/A 7/19/2021    Procedure: COLONOSCOPY;  Surgeon: Olvin Nelson MD;  Location: [facility] ENDO;  Service: Endoscopy;  Laterality: N/A;    CYSTOSCOPY WITH URETEROSCOPY, RETROGRADE PYELOGRAPHY, AND INSERTION OF STENT Left 10/3/2020    Procedure: CYSTOSCOPY, WITH RETROGRADE PYELOGRAM AND URETERAL STENT INSERTION;  Surgeon: Tony Stanton MD;  Location: [facility] OR;  Service: Urology;  Laterality: Left;    CYSTOURETEROSCOPY WITH RETROGRADE PYELOGRAPHY AND INSERTION OF STENT INTO URETER Left 11/13/2020    Procedure: LEFT CYSTOURETEROSCOPY WITH FLUOROSCOPY, URETERAL STENT REMOVAL, & STONE REMOVAL;  Surgeon: Tony Stanton MD;  Location: [facility] OR;  Service: Urology;  Laterality: Left;    ESOPHAGOGASTRODUODENOSCOPY N/A 7/5/2021    Procedure: EGD (ESOPHAGOGASTRODUODENOSCOPY);  Surgeon: Brandon Hdz MD;  Location: [facility] ENDO;  Service: Endoscopy;  Laterality: N/A;    ESOPHAGOGASTRODUODENOSCOPY N/A 11/5/2021    Procedure: EGD (ESOPHAGOGASTRODUODENOSCOPY);  Surgeon: Wu Choi Jr., MD;  Location: [facility];  Service: Endoscopy;  Laterality: N/A;    HYSTERECTOMY      OOPHORECTOMY      OPEN REDUCTION AND INTERNAL FIXATION (ORIF) OF FRACTURE OF RADIUS Right 1/13/2020    Procedure: ORIF, FRACTURE, RADIUS;  Surgeon: Mina Mahoney MD;  Location: [facility] OR;  Service: Orthopedics;  Laterality: Right;  Accumed  pt on aspirin and plavix. Dr. Mahoney aware. Note in chart from cardiology on 1/7/2020    PERCUTANEOUS TRANSLUMINAL BALLOON ANGIOPLASTY OF CORONARY ARTERY  12/10/2020    Procedure: Angioplasty-coronary;  Surgeon: Kb Belcher MD;  Location: [facility] CATH;  Service: Cardiology;;    UPPER GASTROINTESTINAL ENDOSCOPY  09/28/2020    done at [facility], sent for scanning: widely patent schatzki's ring found in distal  "esophagus. small hiatal hernia, otherwise unremarkable    VASCULAR SURGERY         Family History:  family history includes Hypertension in her father and mother.    Social History:   reports that she quit smoking about 16 years ago. Her smoking use included cigarettes. She started smoking about 63 years ago. She has a 47 pack-year smoking history. She has never used smokeless tobacco. She reports that she does not currently use alcohol. She reports that she does not currently use drugs.      REVIEW OF SYSTEMS:  As per HPI    PHYSICAL EXAM:  BP (!) 148/106 (BP Location: Left leg, Patient Position: Standing, BP Method: Small (Automatic))   Pulse 76   Resp 14   Ht 5' 5" (1.651 m)   Wt 71.9 kg (158 lb 8.2 oz)   LMP  (LMP Unknown)   BMI 26.38 kg/m²     General: Well groomed. No acute distress.  Cardiovascular: Regular rate and rhythm.    Pulmonary: Normal effort and rate.   Musculoskeletal: No obvious joint deformities, moves all extremities well.  Extremities: No clubbing, cyanosis or edema.     Neurological exam:  Mental status: Awake and alert.  Oriented to person, place, time and situation. Recent and remote memory appear to be intact.  Fund of knowledge normal. Normal attention and concentration.   Speech/Language: Fluent and appropriate. No dysarthria or aphasia on conversation. Able to follow complex commands.   Cranial nerves (II-XII): Visual fields full. Pupils equal round and reactive to light, extraocular movements intact, no ptosis, no nystagmus. Facial sensation and symmetry intact bilaterally. Hearing grossly intact. Palate mobile and midline. Shoulder shrug normal bilaterally. Normal tongue protrusion.   Motor: 5 out of 5 strength throughout the upper and lower extremities bilaterally. Normal bulk and tone. No abnormal movements noted. No drift appreciated.   Sensation: Intact to light touch and vibration.    DTR: 2+ at the knees and biceps bilaterally.  Coordination: Finger-nose-finger testing " intact bilaterally. GERALDO normal bilaterally. No tremor.   Gait: Normal gait.    DIAGNOSTIC DATA:  I have personally reviewed provider notes, labs and imaging made available to me today.     Imaging:  MRA brain 5/29/24:  Impression:  1. Smaller caliber of the right internal carotid artery including the petrous and the cavernous segments.  It is possible this could be related to a stenosis of the proximal internal carotid artery in the neck or could be developmental.  2. No gross stenotic disease or occlusive disease of the middle and the anterior cerebral arteries as above.  There is small caliber of the basilar artery and the intracranial vertebral arteries, that may be related to dominant posterior communicating arteries as discussed above.  This is felt to represent a developmental anomaly.    MRI brain 5/29/24:  Impression:  1. No acute intracranial processes.  2. Remote left parieto-occipital infarction associated with encephalomalacia changes.  3. Few scattered T2 FLAIR hyperintensities in the white matter reflective of chronic microvascular ischemia.    CTA neck 7/2022:  Impression:  1. Marked calcific atherosclerosis at the aortic arch with chronic occlusion of the left subclavian artery origin.  2. Chronic occlusion of the right vertebral artery at its origin.  Suspected high-grade stenosis at the origin of the left vertebral artery.  3. Endovascular stent within the brachiocephalic artery with continued collapse of its proximal portion and associated high-grade luminal stenosis.  4. Left proximal internal carotid artery stent with persistent high-grade intraluminal stent stenosis at its midportion, suboptimally evaluated due to metallic artifact but not significantly changed since the prior study.  5. Moderate calcific atherosclerotic plaque at the right carotid bifurcation and proximal ICA with 50% stenosis of the proximal right internal carotid artery.    CTA AP 11/2021:  Impression:  1. Calcified plaque  deposition within the origin of the celiac artery resulting in at least 50-75% luminal stenosis.  The SMA is widely patent.  2. 50% luminal stenosis at the origin of the left renal artery due to calcified atherosclerotic plaque deposition.  The left kidney is asymmetrically small in size and shows diffuse cortical parenchymal thinning.  3. 50-75% luminal stenosis at the origin of the right common iliac artery due to calcified atherosclerotic plaque deposition.  4. Short segment mid infrarenal abdominal aortic dissection with a patent false lumen, unchanged.  5. Non-obstructing left nephrolithiasis.  6. Stent within the proximal right renal artery which appears widely patent.  No > 50% luminal stenosis observed within the imaged right renal artery.  7. Extensive splenic artery calcifications.  8. Other incidental findings as detailed above.    CTA 1/2021:  Impression:  CTA neck:  1. Interval placement of an endovascular stent within the origin of the right brachiocephalic artery, with severe focal stenosis at the proximal end of the stent.  2. Stable endovascular stent within the left internal carotid artery with approximately 70% in-stent luminal stenosis within its mid segment, similar to the prior study.  3. High-grade stenosis versus focal occlusion at the origin of the left subclavian artery.  4. Moderate focal stenosis at the origin of the left common carotid and right subclavian arteries.  5. Moderate calcific atherosclerotic plaque at the right carotid bifurcation with approximately 41% stenosis of the proximal right internal carotid artery.  6. Chronic occlusion of the right vertebral artery origin with distal reconstitution and diffusely diminished caliber to the skull base.  Moderate stenosis at the origin left vertebral artery, unchanged.  CTA head:  1. No intracranial high-grade stenosis, large vessel occlusion, or aneurysm.    CTA 8/2019:  Impression:     Recent surgical changes within the left side of  the neck in keeping with history of endarterectomy.  A kinked appearance of the left ICA results in approximately 70-80% maximum diameter narrowing.  There are otherwise scattered atheromatous changes in the left ICA.     Approximately 50% maximum diameter stenosis involving the proximal segment of right ICA.     Evidence of hemodynamically significant narrowing involving the origin of the right brachiocephalic artery and left common carotid artery.  There is high-grade stenosis or occlusion of the proximal segment of the left subclavian artery.     Areas of focal occlusion involving the right vertebral artery.  There is atheromatous narrowing of the origin of the left vertebral artery.     Atheromatous changes observed involving the intracranial arteries without evidence of high-grade stenosis or occlusion.  A small right A1 segment and absence of right P1 segment are likely anatomic variance.    MRI brain 8/2019:  FINDINGS:  There are areas of restricted diffusion involving the superficial cortex of the left frontal lobe, deep left frontal white matter, left parietal lobe posteriorly and left occipital lobe.  Additional smaller foci of restricted diffusion involve the the cortex of the right frontal and parietal lobes.  There is a small focal area of restricted diffusion within the right cerebellar hemisphere.  The findings are compatible with multiple foci of recent ischemia/infarction.  The involvement of multiple vascular territories is suspicious for embolic phenomenon.     There is no evidence of hemorrhagic transformation.  There is mild local mass effect without midline shift.     There are additional small scattered subcortical and periventricular foci of white matter hyperintensity, likely a reflection of chronic microvascular ischemia.     The ventricular system is appropriate in size and position for age.  Diffuse prominence of the cortical sulci is compatible with involutional changes.  There are no  extra-axial fluid collections.     Appropriate flow voids are present within the major intracranial arteries.  The skull base and craniocervical junction appear unremarkable.     Impression:     Degradation by motion.     Multiple foci of restricted diffusion within both cerebral hemispheres and right cerebellar hemisphere compatible with regions of ischemia/infarction.  Involvement of multiple vascular distributions is suspicious for embolic phenomenon.  There are associated changes of mild local mass effect.       Cardiac:  EKG 5/2024:  sinus rhythm with PVCs in trigeminy pattern     TTE 4/4/24:   Overall the study quality was technically difficult    Left Ventricle: There is moderately reduced systolic function with a visually estimated ejection fraction of 35 - 40%. Grade I diastolic dysfunction.    Right Ventricle: Right ventricle was not well visualized due to poor acoustic window.    Aortic Valve: Moderately calcified cusps. There is mild stenosis. Aortic valve area by VTI is 1.29 cm². Mean gradient is 9 mmHg. The dimensionless index is 0.41.    Mitral Valve: There is moderate mitral annular calcification present. Moderately calcified subvalvular apparatus. There is moderate stenosis. The mean pressure gradient across the mitral valve is 5 mmHg at a heart rate of  bpm. There is mild regurgitation.    Tricuspid Valve: There is mild regurgitation.    IVC/SVC: Normal venous pressure at 3 mmHg.  Normal left atrial size.     Labs:  Lab Results   Component Value Date    WBC 8.80 05/20/2024    HGB 12.8 05/20/2024    HCT 40.3 05/20/2024     05/20/2024    MCV 96 05/20/2024    RDW 13.2 05/20/2024     Lab Results   Component Value Date     05/30/2024    K 4.7 05/30/2024     05/30/2024    CO2 27 05/30/2024    BUN 16 05/30/2024    CREATININE 1.5 (H) 05/30/2024    GLU 97 05/30/2024    CALCIUM 9.4 05/30/2024    MG 2.2 04/04/2024    PHOS 3.9 07/05/2021     Lab Results   Component Value Date    PROT 6.3  "05/30/2024    ALBUMIN 3.6 05/30/2024    BILITOT 0.3 05/30/2024    AST 26 05/30/2024    ALKPHOS 93 05/30/2024    ALT 23 05/30/2024     Lab Results   Component Value Date    INR 1.2 07/04/2021     Lab Results   Component Value Date    CHOL 215 (H) 04/04/2024    HDL 58 04/04/2024    LDLCALC 110.8 04/04/2024    TRIG 231 (H) 04/04/2024    CHOLHDL 27.0 04/04/2024     Lab Results   Component Value Date    HGBA1C 5.9 08/14/2019      Lab Results   Component Value Date    EUHHEXZQ34 536 04/04/2024     No results found for: "FOLATE"  Lab Results   Component Value Date    TSH 0.913 05/20/2024           ASSESSMENT & PLAN:  Albina Armenta is a 79 y.o. R-handed female seen in consultation for TIA.     Problem List Items Addressed This Visit          Neuro    History of TIA (transient ischemic attack) and stroke    Overview     8/2019 - bilateral embolic strokes post L CEA in the setting of hypotension, significant multifactorial vascular disease -- L frontal, parietoccipital, R cerebellar, R frontal          Current Assessment & Plan     Recent event concerning for TIA  Describes likely posterior circulation event given dysarthria, vision change. Triggered by position change, suspect transient hypotension / VBI. She reports longstanding positional dizziness since L CEA.     Unfortunately, her history of GIB while on DAPT complicates treatment options. Optimally, would treat with DAPT x 21 days, then convert to Plavix monotherapy.     Recent MRI brain and MRA brain reviewed with pt -- chronic findings only, no significant intracranial atherosclerosis. At this point, will update CTA neck as previously recommended by vascular surgery.   Will have to discuss options for treatment with GI team.   In the interim, discussed importance of adequate hydration, caution with position change, etc due to VB disease   Recent  -- recent change in therapy as noted  Based on CTA results, will likely need econsult from vascular " neurology +/- follow up with Dr. Martinez   CVA education provided          Vertebral artery disease    Overview     CTA neck 7/2022: chronic R vert occlusion, suspected high grade stenosis of the L vert             Cardiac/Vascular    Carotid artery stenosis    Overview     L CEA 8/2019, L carotid stent 11/2019  50% R ICA stenosis on CTA 7/2022             History of carotid endarterectomy    PAD (peripheral artery disease)       GI    Lower GI bleed    Overview     7/2021 - occurred on DAPT, recurrence with resuming DAPT           Other Visit Diagnoses       Dizziness and giddiness    -  Primary    Transient cerebral ischemia, unspecified type        Vertebrobasilar artery syndrome                Follow up: 4 weeks     I spent a total of 60 minutes on the day of the visit.    This includes face to face time with the patient, as well as non-face to face time preparing for and completing the visit (review of prior diagnostic testing and clinical notes, obtaining or reviewing history, documenting clinical information in the EMR, independently interpreting and communicating results to the patient/family and coordinating ongoing care).       I appreciate the opportunity to participate in the care of this patient. Please feel free to contact me with any concerns or questions.       Raissa Bennett, ACNPC-AG  [facility]  [address] [facility] [place] [address]

## 2024-05-31 ENCOUNTER — TELEPHONE (OUTPATIENT)
Dept: NEUROLOGY | Facility: CLINIC | Age: 79
End: 2024-05-31
Payer: MEDICARE

## 2024-05-31 NOTE — TELEPHONE ENCOUNTER
----- Message from Raissa Bennett NP sent at 5/31/2024  2:27 PM CDT -----  Dr. Morales - forwarding these to you in light of unexpected decline in GFR noted    [facility] staff -- please cancel previously ordered CTA head and neck due to inadequate kidney function

## 2024-06-05 PROBLEM — K92.2 GASTROINTESTINAL HEMORRHAGE: Status: RESOLVED | Noted: 2021-07-04 | Resolved: 2024-06-05

## 2024-06-05 PROBLEM — I77.9 VERTEBRAL ARTERY DISEASE: Status: ACTIVE | Noted: 2024-06-05

## 2024-06-05 PROBLEM — K92.2 ACUTE LOWER GI BLEEDING: Status: RESOLVED | Noted: 2020-10-07 | Resolved: 2024-06-05

## 2024-06-05 NOTE — ASSESSMENT & PLAN NOTE
Recent event concerning for TIA  Describes likely posterior circulation event given dysarthria, vision change. Triggered by position change, suspect transient hypotension / VBI. She reports longstanding positional dizziness since L CEA.     Unfortunately, her history of GIB while on DAPT complicates treatment options. Optimally, would treat with DAPT x 21 days, then convert to Plavix monotherapy.     Recent MRI brain and MRA brain reviewed with pt -- chronic findings only, no significant intracranial atherosclerosis. At this point, will update CTA neck as previously recommended by vascular surgery.   Will have to discuss options for treatment with GI team.   In the interim, discussed importance of adequate hydration, caution with position change, etc due to VB disease   Recent  -- recent change in therapy as noted  Based on CTA results, will likely need econsult from vascular neurology +/- follow up with Dr. Martinez   CVA education provided

## 2024-06-10 ENCOUNTER — TELEPHONE (OUTPATIENT)
Dept: GASTROENTEROLOGY | Facility: CLINIC | Age: 79
End: 2024-06-10
Payer: MEDICARE

## 2024-06-10 NOTE — TELEPHONE ENCOUNTER
Received a fax from [employer]-  Prior authorization request for Protonix 40 mg is available  at the amount listed in your plan documents and you don't need an authorization.

## 2024-06-14 ENCOUNTER — OFFICE VISIT (OUTPATIENT)
Dept: NEPHROLOGY | Facility: CLINIC | Age: 79
End: 2024-06-14
Payer: MEDICARE

## 2024-06-14 VITALS
DIASTOLIC BLOOD PRESSURE: 66 MMHG | BODY MASS INDEX: 26.38 KG/M2 | SYSTOLIC BLOOD PRESSURE: 118 MMHG | HEART RATE: 79 BPM | OXYGEN SATURATION: 97 % | HEIGHT: 65 IN

## 2024-06-14 DIAGNOSIS — E78.00 HYPERCHOLESTEROLEMIA: ICD-10-CM

## 2024-06-14 DIAGNOSIS — I10 PRIMARY HYPERTENSION: ICD-10-CM

## 2024-06-14 DIAGNOSIS — I25.10 CORONARY ARTERY DISEASE, UNSPECIFIED VESSEL OR LESION TYPE, UNSPECIFIED WHETHER ANGINA PRESENT, UNSPECIFIED WHETHER NATIVE OR TRANSPLANTED HEART: ICD-10-CM

## 2024-06-14 DIAGNOSIS — N17.9 AKI (ACUTE KIDNEY INJURY): Primary | ICD-10-CM

## 2024-06-14 DIAGNOSIS — Z95.5 STENTED CORONARY ARTERY: ICD-10-CM

## 2024-06-14 DIAGNOSIS — I65.23 BILATERAL CAROTID ARTERY STENOSIS: ICD-10-CM

## 2024-06-14 PROBLEM — M54.17 THORACIC AND LUMBOSACRAL NEURITIS: Status: ACTIVE | Noted: 2024-06-14

## 2024-06-14 PROBLEM — L97.513 NON-PRESSURE CHRONIC ULCER OF OTHER PART OF RIGHT FOOT WITH NECROSIS OF MUSCLE: Status: ACTIVE | Noted: 2024-06-14

## 2024-06-14 PROBLEM — M54.14 THORACIC AND LUMBOSACRAL NEURITIS: Status: ACTIVE | Noted: 2024-06-14

## 2024-06-14 PROBLEM — Z78.9 NON-SMOKER: Status: ACTIVE | Noted: 2024-06-14

## 2024-06-14 PROBLEM — H34.8312 TRIBUTARY (BRANCH) RETINAL VEIN OCCLUSION, RIGHT EYE, STABLE: Status: ACTIVE | Noted: 2024-06-14

## 2024-06-14 PROCEDURE — 3078F DIAST BP <80 MM HG: CPT | Mod: CPTII,S$GLB,, | Performed by: STUDENT IN AN ORGANIZED HEALTH CARE EDUCATION/TRAINING PROGRAM

## 2024-06-14 PROCEDURE — 1100F PTFALLS ASSESS-DOCD GE2>/YR: CPT | Mod: CPTII,S$GLB,, | Performed by: STUDENT IN AN ORGANIZED HEALTH CARE EDUCATION/TRAINING PROGRAM

## 2024-06-14 PROCEDURE — 99204 OFFICE O/P NEW MOD 45 MIN: CPT | Mod: S$GLB,,, | Performed by: STUDENT IN AN ORGANIZED HEALTH CARE EDUCATION/TRAINING PROGRAM

## 2024-06-14 PROCEDURE — 99999 PR PBB SHADOW E&M-EST. PATIENT-LVL II: CPT | Mod: PBBFAC,,, | Performed by: STUDENT IN AN ORGANIZED HEALTH CARE EDUCATION/TRAINING PROGRAM

## 2024-06-14 PROCEDURE — 1159F MED LIST DOCD IN RCRD: CPT | Mod: CPTII,S$GLB,, | Performed by: STUDENT IN AN ORGANIZED HEALTH CARE EDUCATION/TRAINING PROGRAM

## 2024-06-14 PROCEDURE — 1157F ADVNC CARE PLAN IN RCRD: CPT | Mod: CPTII,S$GLB,, | Performed by: STUDENT IN AN ORGANIZED HEALTH CARE EDUCATION/TRAINING PROGRAM

## 2024-06-14 PROCEDURE — 3288F FALL RISK ASSESSMENT DOCD: CPT | Mod: CPTII,S$GLB,, | Performed by: STUDENT IN AN ORGANIZED HEALTH CARE EDUCATION/TRAINING PROGRAM

## 2024-06-14 PROCEDURE — 3074F SYST BP LT 130 MM HG: CPT | Mod: CPTII,S$GLB,, | Performed by: STUDENT IN AN ORGANIZED HEALTH CARE EDUCATION/TRAINING PROGRAM

## 2024-06-14 RX ORDER — FLUOCINOLONE ACETONIDE 0.11 MG/ML
OIL AURICULAR (OTIC)
COMMUNITY
Start: 2024-05-17

## 2024-06-14 NOTE — PROGRESS NOTES
Subjective:       Patient ID: Albina Armenta is a 79 y.o. White female who presents for new patient evaluation for acute kidney insufficiency.    Albina Armenta is referred by Delmer Morales MD to be evaluated for acute renal failure. She has a pertinent medical history of CAD, hypertension, history of renal artery stenosis s/p stent placement, HLD, carotid artery stenosis. She recently fell and injuried her left hand. She had been taking multiple doses of ibuprofen for at least two weeks prior to her labs on 5/20/24 with a sCr of 1.5mg/dL (up from her baseline 0.9mg/dL)    In terms of her renal history, she denies hospitalizations for prior SONDRA or SONDRA requiring RRT. She does report a remote single episode of kidney stones req lithotripsy. She does report frequent use of OTC NSAIDS for recent closed fracture of her left arm. No pertinent rheumatologic or AI disease history. Denies any pertinent family history of known kidney disease, or family members with ESRD requiring dialysis.  Other pertinent Uro/gyn history: hysterectomy 25-30 years ago  Smoking history: quit 15 years ago.  Fluid intake: approximately 1500mL of fluids per day    She has no uremic or urinary symptoms and is in her usual state of health.      During this visit, the patient and I reviewed her lab trends, discussed CKD epidemiology and risk factors, as well as general lifestyle and risk factor modifications to reduce her risk of progression to ESRD.       Review of Systems   Constitutional:  Negative for chills, diaphoresis and fever.   Respiratory:  Positive for shortness of breath. Negative for cough.    Cardiovascular:  Negative for chest pain and leg swelling.   Gastrointestinal:  Negative for abdominal pain, diarrhea, nausea and vomiting.   Genitourinary:  Negative for difficulty urinating, dysuria, frequency, hematuria and urgency.   Musculoskeletal:  Negative for myalgias.   Neurological:  Negative for headaches.  "  Hematological:  Does not bruise/bleed easily.   All other systems reviewed and are negative.      The past medical, family and social histories were reviewed for this encounter.     Past Medical History:   Diagnosis Date    Abdominal pain     Anesthesia complication     "stays in my system for weeks, excesive weakness after, frequent falls"    Anxiety     Atherosclerosis of renal artery 8/10/2015    7/27/2015: Renal Duplex: Right: severe - 3.2 m/s. Left: about 60% stenosis - 1.7 m/s.    Carotid bruit 7/16/2015 2005: Carotid bruit heard.    Carotid stent occlusion     Coronary artery disease     CVA (cerebral vascular accident) 8/13/2019    GERD (gastroesophageal reflux disease)     History of stent insertion of renal artery     Hypercholesterolemia 7/16/2015 2005: Diagnosed. Started statin.    Hypertension, benign 7/16/2015 1995: Diagnosed.    Mild obesity 6/9/2017 6/9/2017: 86 kg. BMI 31.    Subclavian artery stenosis, left 8/10/2015    7/2015: Diagnosed. 7/23/2015: Carotid Duplex: LIZETH: moderate plaquing. LICA: moderate plaquing -1.4 m/s - 50-60%. Left vertebral: retrograde flow.     Past Surgical History:   Procedure Laterality Date    ABDOMINAL AORTOGRAPHY N/A 8/11/2020    Procedure: Aortogram, Abdominal;  Surgeon: Joana Celestin MD;  Location: [facility] CATH;  Service: Peripheral Vascular;  Laterality: N/A;    ANGIOGRAPHY OF UPPER EXTREMITY N/A 8/11/2020    Procedure: Angiogram Extremity Bilateral;  Surgeon: Joana Celestin MD;  Location: [facility] CATH;  Service: Peripheral Vascular;  Laterality: N/A;    ANGIOPLASTY      renal    APPENDECTOMY Right 1959    CAROTID ENDARTERECTOMY Left     CAROTID STENT      8/2019    CARPAL TUNNEL RELEASE Right 1/13/2020    Procedure: RELEASE, CARPAL TUNNEL;  Surgeon: Mina Mahoney MD;  Location: [facility];  Service: Orthopedics;  Laterality: Right;    COLONOSCOPY  09/29/2020    done at [facility]; sent for scanning: diverticulosis, 2 colonic angiodysplastic lesions, treated with APC    " COLONOSCOPY N/A 7/9/2021    Procedure: COLONOSCOPY;  Surgeon: Wu Choi Jr., MD;  Location: [facility] ENDO;  Service: Endoscopy;  Laterality: N/A;    COLONOSCOPY N/A 7/19/2021    Procedure: COLONOSCOPY;  Surgeon: Olvin Nelson MD;  Location: [facility] ENDO;  Service: Endoscopy;  Laterality: N/A;    CYSTOSCOPY WITH URETEROSCOPY, RETROGRADE PYELOGRAPHY, AND INSERTION OF STENT Left 10/3/2020    Procedure: CYSTOSCOPY, WITH RETROGRADE PYELOGRAM AND URETERAL STENT INSERTION;  Surgeon: Tony Stanton MD;  Location: [facility] OR;  Service: Urology;  Laterality: Left;    CYSTOURETEROSCOPY WITH RETROGRADE PYELOGRAPHY AND INSERTION OF STENT INTO URETER Left 11/13/2020    Procedure: LEFT CYSTOURETEROSCOPY WITH FLUOROSCOPY, URETERAL STENT REMOVAL, & STONE REMOVAL;  Surgeon: Tony Stanton MD;  Location: [facility] OR;  Service: Urology;  Laterality: Left;    ESOPHAGOGASTRODUODENOSCOPY N/A 7/5/2021    Procedure: EGD (ESOPHAGOGASTRODUODENOSCOPY);  Surgeon: Brandon Hdz MD;  Location: [facility] ENDO;  Service: Endoscopy;  Laterality: N/A;    ESOPHAGOGASTRODUODENOSCOPY N/A 11/5/2021    Procedure: EGD (ESOPHAGOGASTRODUODENOSCOPY);  Surgeon: Wu Choi Jr., MD;  Location: [facility] ENDO;  Service: Endoscopy;  Laterality: N/A;    HYSTERECTOMY      OOPHORECTOMY      OPEN REDUCTION AND INTERNAL FIXATION (ORIF) OF FRACTURE OF RADIUS Right 1/13/2020    Procedure: ORIF, FRACTURE, RADIUS;  Surgeon: Mina Mahoney MD;  Location: [facility] OR;  Service: Orthopedics;  Laterality: Right;  Accumed  pt on aspirin and plavix. Dr. Mahoney aware. Note in chart from cardiology on 1/7/2020    PERCUTANEOUS TRANSLUMINAL BALLOON ANGIOPLASTY OF CORONARY ARTERY  12/10/2020    Procedure: Angioplasty-coronary;  Surgeon: Kb Belcher MD;  Location: [facility] CATH;  Service: Cardiology;;    UPPER GASTROINTESTINAL ENDOSCOPY  09/28/2020    done at [facility], sent for scanning: widely patent schatzki's ring found in distal esophagus. small hiatal hernia, otherwise  unremarkable    VASCULAR SURGERY       Social History     Socioeconomic History    Marital status:    Tobacco Use    Smoking status: Former     Current packs/day: 0.00     Average packs/day: 1 pack/day for 47.0 years (47.0 ttl pk-yrs)     Types: Cigarettes     Start date: 1961     Quit date: 2008     Years since quittin.4    Smokeless tobacco: Never   Substance and Sexual Activity    Alcohol use: Not Currently     Alcohol/week: 0.0 standard drinks of alcohol     Comment: rarely    Drug use: Not Currently    Sexual activity: Not Currently     Social Determinants of Health     Financial Resource Strain: Low Risk  (2024)    Overall Financial Resource Strain (CARDIA)     Difficulty of Paying Living Expenses: Not hard at all   Food Insecurity: No Food Insecurity (2024)    Hunger Vital Sign     Worried About Running Out of Food in the Last Year: Never true     Ran Out of Food in the Last Year: Never true   Transportation Needs: No Transportation Needs (5/3/2024)    PRAPARE - Transportation     Lack of Transportation (Medical): No     Lack of Transportation (Non-Medical): No   Physical Activity: Sufficiently Active (2024)    Exercise Vital Sign     Days of Exercise per Week: 5 days     Minutes of Exercise per Session: 90 min   Stress: No Stress Concern Present (2024)    [place] [employer] - Occupational Stress Questionnaire     Feeling of Stress : Not at all   Housing Stability: Unknown (2024)    Housing Stability Vital Sign     Unable to Pay for Housing in the Last Year: No     Current Outpatient Medications   Medication Sig    aspirin (ECOTRIN) 81 MG EC tablet Take 1 tablet (81 mg total) by mouth once daily.    bisacodyL (DULCOLAX) 5 mg EC tablet Take 5 mg by mouth daily as needed for Constipation.    CALCIUM ORAL Take 1 tablet by mouth every evening.    cholecalciferol, vitamin D3, (VITAMIN D3 ORAL) Take 1 capsule by mouth every evening.     citalopram  "(CELEXA) 20 MG tablet TAKE 1 TABLET ONE TIME DAILY    ezetimibe (ZETIA) 10 mg tablet Take 1 tablet (10 mg total) by mouth once daily.    ferrous sulfate (FEOSOL) 325 mg (65 mg iron) Tab tablet Take 2 tablets (650 mg total) by mouth every other day. At night    lisinopriL (PRINIVIL,ZESTRIL) 2.5 MG tablet TAKE 1 TABLET EVERY EVENING    metoprolol succinate (TOPROL-XL) 25 MG 24 hr tablet TAKE 1 TABLET EVERY MORNING    potassium chloride (MICRO-K) 10 MEQ CpSR TAKE 1 CAPSULE EVERY DAY    rosuvastatin (CRESTOR) 5 MG tablet Take 1 tablet (5 mg total) by mouth every evening.    torsemide (DEMADEX) 10 MG Tab TAKE 1 TABLET ONE TIME DAILY    acetaminophen (TYLENOL) 500 MG tablet Take 1,000 mg by mouth nightly as needed for Temperature greater than.     fluocinolone acetonide oiL 0.01 % Drop Place into both ears. (Patient not taking: Reported on 6/14/2024)    pantoprazole (PROTONIX) 40 MG tablet Take 1 tablet (40 mg total) by mouth 2 (two) times daily. (Patient not taking: Reported on 6/14/2024)     No current facility-administered medications for this visit.     /66 (BP Location: Left leg, Patient Position: Sitting, BP Method: Thigh Cuff (Manual))   Pulse 79   Ht 5' 5" (1.651 m)   LMP  (LMP Unknown)   SpO2 97%   BMI 26.38 kg/m²     Objective:      Physical Exam  Vitals reviewed.   Constitutional:       General: She is not in acute distress.     Appearance: Normal appearance.   HENT:      Head: Normocephalic and atraumatic.      Right Ear: External ear normal.      Left Ear: External ear normal.      Nose: Nose normal. No congestion.      Mouth/Throat:      Mouth: Mucous membranes are moist.      Pharynx: Oropharynx is clear. No oropharyngeal exudate or posterior oropharyngeal erythema.   Eyes:      General: No scleral icterus.     Extraocular Movements: Extraocular movements intact.   Cardiovascular:      Rate and Rhythm: Normal rate and regular rhythm.      Pulses: Normal pulses.      Heart sounds: Normal heart " "sounds.      No friction rub.   Pulmonary:      Effort: Pulmonary effort is normal. No respiratory distress.      Breath sounds: Normal breath sounds.   Abdominal:      General: Abdomen is flat.      Palpations: Abdomen is soft.   Musculoskeletal:         General: No swelling.      Cervical back: Normal range of motion. No tenderness.      Right lower leg: No edema.      Left lower leg: No edema.   Neurological:      General: No focal deficit present.      Mental Status: She is oriented to person, place, and time.      Motor: No weakness.   Psychiatric:         Mood and Affect: Mood normal.         Behavior: Behavior normal.         Assessment:     Lab Results   Component Value Date    CREATININE 1.75 (H) 06/12/2024    BUN 20 (H) 06/12/2024     06/12/2024    K 4.4 06/12/2024     06/12/2024    CO2 27 06/12/2024     Lab Results   Component Value Date    CALCIUM 9.4 06/12/2024    CAION 1.10 08/12/2019    PHOS 3.9 07/05/2021     Lab Results   Component Value Date    HCT 40.3 05/20/2024     No results found for: "UTPCR"    No results found for: "MICALBCREAT"        1. SONDRA (acute kidney injury)    2. Primary hypertension    3. Hypercholesterolemia    4. Bilateral carotid artery stenosis    5. Coronary artery disease, unspecified vessel or lesion type, unspecified whether angina present, unspecified whether native or transplanted heart    6. Stented coronary artery        Plan:   Return to clinic in 2-4 weeks   Labs for next visit include go today for CMP, PTH, Phos, MAG, UA, UACR, UPCR renal utlrasound  Baseline creatinine is <1.0mg/dL    -the patient appears to have acute/subacute renal insufficiency, up from baseline of less than 1.0 mg/dL 1 year ago  -she does not feel well today, suspect this is likely acute versus chronic progression  -send for repeat metabolic panel and contemporary urine studies as well as a renal ultrasound today. Will call patient with results.    -suspect this is a case related to " frequent ibuprofen use after recent left arm fracture.  If full conversion to NSAID-AIN, therapy plan would be just permanent discontinuation of NSAIDs which the patient is okay with.    -Appears well compensated from a cardiopulmonary standpoint at this time.  Can continue her torsemide and low-dose ACE inhibitor through this        Geovany Barron MD  [facility]    Part of this note has been created using [employer] dictation system. Errors in transcription may not be completely avoided.

## 2024-07-02 ENCOUNTER — OFFICE VISIT (OUTPATIENT)
Dept: NEPHROLOGY | Facility: CLINIC | Age: 79
End: 2024-07-02
Payer: MEDICARE

## 2024-07-02 VITALS
OXYGEN SATURATION: 99 % | HEART RATE: 42 BPM | DIASTOLIC BLOOD PRESSURE: 80 MMHG | SYSTOLIC BLOOD PRESSURE: 120 MMHG | HEIGHT: 65 IN | BODY MASS INDEX: 26.38 KG/M2

## 2024-07-02 DIAGNOSIS — E78.00 HYPERCHOLESTEROLEMIA: ICD-10-CM

## 2024-07-02 DIAGNOSIS — R80.9 ALBUMINURIA: ICD-10-CM

## 2024-07-02 DIAGNOSIS — N18.31 STAGE 3A CHRONIC KIDNEY DISEASE: Primary | ICD-10-CM

## 2024-07-02 DIAGNOSIS — R94.4 DECREASED CALCULATED GFR: ICD-10-CM

## 2024-07-02 DIAGNOSIS — I25.10 CORONARY ARTERY DISEASE, UNSPECIFIED VESSEL OR LESION TYPE, UNSPECIFIED WHETHER ANGINA PRESENT, UNSPECIFIED WHETHER NATIVE OR TRANSPLANTED HEART: ICD-10-CM

## 2024-07-02 DIAGNOSIS — N20.0 NEPHROLITHIASIS: ICD-10-CM

## 2024-07-02 DIAGNOSIS — I10 PRIMARY HYPERTENSION: ICD-10-CM

## 2024-07-02 PROCEDURE — 99999 PR PBB SHADOW E&M-EST. PATIENT-LVL III: CPT | Mod: PBBFAC,,, | Performed by: STUDENT IN AN ORGANIZED HEALTH CARE EDUCATION/TRAINING PROGRAM

## 2024-07-02 NOTE — PROGRESS NOTES
Subjective:       Patient ID: Albina Armenta is a 79 y.o. female who returns for ongoing evaluation and management of kidney dysfunction.  She has a pertinent medical history of CAD, hypertension, history of renal artery stenosis s/p stent placement, HLD, carotid artery stenosis. She recently fell and injuried her left hand. She had been taking multiple doses of ibuprofen for at least two weeks prior to her labs on 5/20/24 with a sCr of 1.5mg/dL (up from her baseline 0.9mg/dL)    In terms of her renal history, she denies hospitalizations for prior SONDRA or SONDRA requiring RRT. She does report a remote single episode of kidney stones req lithotripsy. She does report frequent use of OTC NSAIDS for recent closed fracture of her left arm. No pertinent rheumatologic or AI disease history. Denies any pertinent family history of known kidney disease, or family members with ESRD requiring dialysis.  Other pertinent Uro/gyn history: hysterectomy 25-30 years ago  Smoking history: quit 15 years ago.  Fluid intake: approximately 1500mL of fluids per day    Interval history:  7/2/24 clinic visit - doing well today. Denies acute medical complaints. She has no uremic or urinary symptoms and is in her usual state of health.  We reviewed her recent lab trends. sCr has returned to her baseline in the interim (1.0mg/dL). She does have A2 level of albuminuria. She is on ACEI already but at a low dose. She is hesitant to increase this dose at this time. We also discussed ultrasound finding of bilateral nephrolith and dedicated workup for kidney stone evaluation - she also is requesting not to evaluate this right now.       Review of Systems   Constitutional:  Negative for chills, diaphoresis and fever.   Respiratory:  Negative for cough.    Cardiovascular:  Negative for chest pain and leg swelling.   Gastrointestinal:  Negative for abdominal pain, diarrhea, nausea and vomiting.   Genitourinary:  Negative for difficulty urinating,  "dysuria, frequency, hematuria and urgency.   Musculoskeletal:  Negative for myalgias.   Neurological:  Negative for headaches.   Hematological:  Does not bruise/bleed easily.   All other systems reviewed and are negative.      The past medical, family and social histories were reviewed for this encounter.     Past Medical History:   Diagnosis Date    Abdominal pain     Anesthesia complication     "stays in my system for weeks, excesive weakness after, frequent falls"    Anxiety     Atherosclerosis of renal artery 8/10/2015    7/27/2015: Renal Duplex: Right: severe - 3.2 m/s. Left: about 60% stenosis - 1.7 m/s.    Carotid bruit 7/16/2015 2005: Carotid bruit heard.    Carotid stent occlusion     Coronary artery disease     CVA (cerebral vascular accident) 8/13/2019    GERD (gastroesophageal reflux disease)     History of stent insertion of renal artery     Hypercholesterolemia 7/16/2015 2005: Diagnosed. Started statin.    Hypertension, benign 7/16/2015 1995: Diagnosed.    Mild obesity 6/9/2017 6/9/2017: 86 kg. BMI 31.    Subclavian artery stenosis, left 8/10/2015    7/2015: Diagnosed. 7/23/2015: Carotid Duplex: LIZETH: moderate plaquing. LICA: moderate plaquing -1.4 m/s - 50-60%. Left vertebral: retrograde flow.           Current Outpatient Medications   Medication Sig    acetaminophen (TYLENOL) 500 MG tablet Take 1,000 mg by mouth nightly as needed for Temperature greater than.     aspirin (ECOTRIN) 81 MG EC tablet Take 1 tablet (81 mg total) by mouth once daily.    bisacodyL (DULCOLAX) 5 mg EC tablet Take 5 mg by mouth daily as needed for Constipation.    CALCIUM ORAL Take 1 tablet by mouth every evening.    cholecalciferol, vitamin D3, (VITAMIN D3 ORAL) Take 1 capsule by mouth every evening.     citalopram (CELEXA) 20 MG tablet TAKE 1 TABLET ONE TIME DAILY    ferrous sulfate (FEOSOL) 325 mg (65 mg iron) Tab tablet Take 2 tablets (650 mg total) by mouth every other day. At night    lisinopriL " "(PRINIVIL,ZESTRIL) 2.5 MG tablet TAKE 1 TABLET EVERY EVENING    metoprolol succinate (TOPROL-XL) 25 MG 24 hr tablet TAKE 1 TABLET EVERY MORNING    pantoprazole (PROTONIX) 40 MG tablet Take 1 tablet (40 mg total) by mouth 2 (two) times daily.    potassium chloride (MICRO-K) 10 MEQ CpSR TAKE 1 CAPSULE EVERY DAY    torsemide (DEMADEX) 10 MG Tab TAKE 1 TABLET ONE TIME DAILY    ezetimibe (ZETIA) 10 mg tablet Take 1 tablet (10 mg total) by mouth once daily. (Patient not taking: Reported on 7/2/2024)    fluocinolone acetonide oiL 0.01 % Drop Place into both ears. (Patient not taking: Reported on 6/14/2024)    rosuvastatin (CRESTOR) 5 MG tablet Take 1 tablet (5 mg total) by mouth every evening. (Patient not taking: Reported on 7/2/2024)     No current facility-administered medications for this visit.     /80 (BP Location: Left leg, Patient Position: Sitting, BP Method: Large (Manual))   Pulse (!) 42   Ht 5' 5" (1.651 m)   LMP  (LMP Unknown)   SpO2 99%   BMI 26.38 kg/m²     Objective:      Physical Exam  Vitals reviewed.   Constitutional:       General: She is not in acute distress.     Appearance: Normal appearance.   HENT:      Head: Normocephalic and atraumatic.      Right Ear: External ear normal.      Left Ear: External ear normal.      Nose: Nose normal. No congestion.      Mouth/Throat:      Mouth: Mucous membranes are moist.      Pharynx: Oropharynx is clear. No oropharyngeal exudate or posterior oropharyngeal erythema.   Eyes:      General: No scleral icterus.     Extraocular Movements: Extraocular movements intact.   Cardiovascular:      Rate and Rhythm: Normal rate and regular rhythm.      Pulses: Normal pulses.      Heart sounds: Normal heart sounds.      No friction rub.   Pulmonary:      Effort: Pulmonary effort is normal. No respiratory distress.      Breath sounds: Normal breath sounds.   Abdominal:      General: Abdomen is flat.      Palpations: Abdomen is soft.   Musculoskeletal:         General: " No swelling.      Cervical back: Normal range of motion. No tenderness.      Right lower leg: No edema.      Left lower leg: No edema.   Neurological:      General: No focal deficit present.      Mental Status: She is oriented to person, place, and time.      Motor: No weakness.   Psychiatric:         Mood and Affect: Mood normal.         Behavior: Behavior normal.         Assessment:     Lab Results   Component Value Date    CREATININE 1.07 06/14/2024    BUN 17 06/14/2024     06/14/2024    K 4.1 06/14/2024     06/14/2024    CO2 27 06/14/2024     Lab Results   Component Value Date    PTH 57.1 06/14/2024    CALCIUM 9.4 06/14/2024    CAION 1.10 08/12/2019    PHOS 3.8 06/14/2024     Lab Results   Component Value Date    HCT 40.3 05/20/2024     Prot/Creat Ratio, Urine   Date Value Ref Range Status   06/14/2024 123.2 (H) 10.0 - 107.0 mg/g Final       Lab Results   Component Value Date    MICALBCREAT 35.3 (H) 06/14/2024           1. Stage 3a chronic kidney disease    2. Decreased calculated GFR    3. Primary hypertension    4. Hypercholesterolemia    5. Coronary artery disease, unspecified vessel or lesion type, unspecified whether angina present, unspecified whether native or transplanted heart    6. Albuminuria    7. Nephrolithiasis        Plan:   Return to clinic in 6 months  Labs for next visit include go today for uacr, ua, cbc, rfp, pth  Baseline creatinine is 1.0mg/dL    Chronic kidney disease stage IIIA/A2 - renal function has since returned to baseline.  She is on RASI for CKD-MACE risk reduction, proteinuria reduction and allyn-protection.  I would like to increase this dose for more albuminuria control, however she would like to defer this at this time and discuss with Cardiology.  Ultimately, she has at a very low risk of progression based on the current lab findings.    Hypertension- medications reviewed.  She is on beta blocker and very low-dose ACE inhibitor.  See above in terms ACE inhibitor  titration.    Hyperlipidemia-patient can resume her statin    CAD -Appears well compensated from a cardiopulmonary standpoint at this time.  Can continue her torsemide and low-dose ACE inhibitor through this    Albuminuria-follow for now.  Would like to increase ACE inhibitor however she would like to discuss with Cardiology first.    Nephrolithiasis-noted on ultrasound.  I discussed obtaining 24 hour urine study for dedicated workup, however patient feels that she does not want to do this right now and would like to table this for now.      Geovany Barron MD  [facility]    Part of this note has been created using [employer]ation system. Errors in transcription may not be completely avoided.    KFRE 2-Year: 0.1% at 6/14/2024  4:21 PM  Calculated from:  Serum Creatinine: 1.07 mg/dL at 6/14/2024  4:00 PM  Urine Albumin Creatinine Ratio: 35.3 ug/mg at 6/14/2024  4:21 PM  Age: 79 years  Sex: Female at 6/14/2024  4:21 PM  Has CKD-3 to CKD-5: No    KFRE 5-Year: 0.4% at 6/14/2024  4:21 PM  Calculated from:  Serum Creatinine: 1.07 mg/dL at 6/14/2024  4:00 PM  Urine Albumin Creatinine Ratio: 35.3 ug/mg at 6/14/2024  4:21 PM  Age: 79 years  Sex: Female at 6/14/2024  4:21 PM  Has CKD-3 to CKD-5: No

## 2024-07-15 ENCOUNTER — TELEPHONE (OUTPATIENT)
Dept: NEUROSURGERY | Facility: CLINIC | Age: 79
End: 2024-07-15
Payer: MEDICARE

## 2024-07-15 NOTE — TELEPHONE ENCOUNTER
Called patient in regards to basket message received requesting appointment. No answer. LVM to call [phone] for appointment scheduling

## 2024-09-09 PROBLEM — K92.2 LOWER GI BLEED: Status: RESOLVED | Noted: 2021-07-17 | Resolved: 2024-09-09

## 2024-09-12 PROBLEM — L97.513 NON-PRESSURE CHRONIC ULCER OF OTHER PART OF RIGHT FOOT WITH NECROSIS OF MUSCLE: Status: RESOLVED | Noted: 2024-06-14 | Resolved: 2024-09-12

## 2024-12-02 ENCOUNTER — PATIENT MESSAGE (OUTPATIENT)
Dept: GASTROENTEROLOGY | Facility: CLINIC | Age: 79
End: 2024-12-02
Payer: MEDICARE

## 2024-12-02 DIAGNOSIS — Z87.19 HISTORY OF CONSTIPATION: Primary | ICD-10-CM

## 2024-12-03 ENCOUNTER — PATIENT MESSAGE (OUTPATIENT)
Dept: GASTROENTEROLOGY | Facility: CLINIC | Age: 79
End: 2024-12-03
Payer: MEDICARE

## 2024-12-03 RX ORDER — LACTULOSE 10 G/15ML
10 SOLUTION ORAL 2 TIMES DAILY PRN
Qty: 900 ML | Refills: 0 | Status: SHIPPED | OUTPATIENT
Start: 2024-12-03 | End: 2025-01-02

## 2024-12-03 RX ORDER — LACTULOSE 10 G/15ML
SOLUTION ORAL; RECTAL 3 TIMES DAILY
COMMUNITY
End: 2024-12-03 | Stop reason: SDUPTHER

## 2024-12-03 RX ORDER — LACTULOSE 10 G/15ML
20 SOLUTION ORAL; RECTAL
Status: CANCELLED | OUTPATIENT
Start: 2024-12-03

## 2024-12-18 DIAGNOSIS — R12 HEARTBURN: ICD-10-CM

## 2024-12-18 RX ORDER — PANTOPRAZOLE SODIUM 40 MG/1
40 TABLET, DELAYED RELEASE ORAL 2 TIMES DAILY
Qty: 180 TABLET | Refills: 0 | Status: SHIPPED | OUTPATIENT
Start: 2024-12-18

## 2025-03-01 DIAGNOSIS — R12 HEARTBURN: ICD-10-CM

## 2025-03-06 RX ORDER — PANTOPRAZOLE SODIUM 40 MG/1
40 TABLET, DELAYED RELEASE ORAL 2 TIMES DAILY
Qty: 60 TABLET | Refills: 0 | Status: SHIPPED | OUTPATIENT
Start: 2025-03-06

## 2025-03-17 ENCOUNTER — TELEPHONE (OUTPATIENT)
Dept: GASTROENTEROLOGY | Facility: CLINIC | Age: 80
End: 2025-03-17
Payer: MEDICARE

## 2025-03-17 NOTE — TELEPHONE ENCOUNTER
Left message for patient to return phone call. Need to reschedule today's appointment due to provider bookout.

## 2025-03-27 DIAGNOSIS — R12 HEARTBURN: ICD-10-CM

## 2025-03-27 RX ORDER — PANTOPRAZOLE SODIUM 40 MG/1
40 TABLET, DELAYED RELEASE ORAL 2 TIMES DAILY
Qty: 60 TABLET | Refills: 0 | Status: SHIPPED | OUTPATIENT
Start: 2025-03-27

## 2025-04-07 ENCOUNTER — OFFICE VISIT (OUTPATIENT)
Dept: GASTROENTEROLOGY | Facility: CLINIC | Age: 80
End: 2025-04-07
Payer: MEDICARE

## 2025-04-07 VITALS — WEIGHT: 157.63 LBS | HEIGHT: 65 IN | BODY MASS INDEX: 26.26 KG/M2

## 2025-04-07 DIAGNOSIS — Z87.19 HISTORY OF CHRONIC CONSTIPATION: ICD-10-CM

## 2025-04-07 DIAGNOSIS — Z79.899 ENCOUNTER FOR MONITORING LONG-TERM PROTON PUMP INHIBITOR THERAPY: Primary | ICD-10-CM

## 2025-04-07 DIAGNOSIS — Z87.19 HISTORY OF GASTROESOPHAGEAL REFLUX (GERD): ICD-10-CM

## 2025-04-07 DIAGNOSIS — Z51.81 ENCOUNTER FOR MONITORING LONG-TERM PROTON PUMP INHIBITOR THERAPY: Primary | ICD-10-CM

## 2025-04-07 DIAGNOSIS — Z87.11 HISTORY OF PEPTIC ULCER: ICD-10-CM

## 2025-04-07 DIAGNOSIS — R12 HEARTBURN: ICD-10-CM

## 2025-04-07 PROCEDURE — 1160F RVW MEDS BY RX/DR IN RCRD: CPT | Mod: CPTII,S$GLB,, | Performed by: NURSE PRACTITIONER

## 2025-04-07 PROCEDURE — 1159F MED LIST DOCD IN RCRD: CPT | Mod: CPTII,S$GLB,, | Performed by: NURSE PRACTITIONER

## 2025-04-07 PROCEDURE — 3288F FALL RISK ASSESSMENT DOCD: CPT | Mod: CPTII,S$GLB,, | Performed by: NURSE PRACTITIONER

## 2025-04-07 PROCEDURE — 1101F PT FALLS ASSESS-DOCD LE1/YR: CPT | Mod: CPTII,S$GLB,, | Performed by: NURSE PRACTITIONER

## 2025-04-07 PROCEDURE — 1157F ADVNC CARE PLAN IN RCRD: CPT | Mod: CPTII,S$GLB,, | Performed by: NURSE PRACTITIONER

## 2025-04-07 PROCEDURE — 1126F AMNT PAIN NOTED NONE PRSNT: CPT | Mod: CPTII,S$GLB,, | Performed by: NURSE PRACTITIONER

## 2025-04-07 PROCEDURE — 99999 PR PBB SHADOW E&M-EST. PATIENT-LVL III: CPT | Mod: PBBFAC,,, | Performed by: NURSE PRACTITIONER

## 2025-04-07 PROCEDURE — 99214 OFFICE O/P EST MOD 30 MIN: CPT | Mod: S$GLB,,, | Performed by: NURSE PRACTITIONER

## 2025-04-07 RX ORDER — PANTOPRAZOLE SODIUM 40 MG/1
40 TABLET, DELAYED RELEASE ORAL
Qty: 180 TABLET | Refills: 3 | Status: SHIPPED | OUTPATIENT
Start: 2025-04-07

## 2025-04-07 RX ORDER — LACTULOSE 10 G/10G
20 SOLUTION ORAL DAILY PRN
COMMUNITY

## 2025-04-07 NOTE — PATIENT INSTRUCTIONS
Acid Reflux and GERD in Adults Discharge Instructions   About this topic   GERD stands for gastroesophageal reflux disease. It is sometimes called reflux or acid reflux. Acid reflux happens when your stomach acid backs up into your esophagus, the tube that carries your food from your mouth to your stomach. This can be uncomfortable. You may have stomach or chest pain (heartburn), trouble swallowing, or an upset stomach. Some people have a cough or sore throat.  Most of the time, you can use over-the-counter medicines to help with this problem.       What care is needed at home?   Ask your doctor what you need to do when you go home. Make sure you ask questions if you do not understand what the doctor says.  Raise the head of your bed by 6 to 8 inches (15 to 20 cm). Use wood or rubber blocks under 2 legs or try a foam wedge under your mattress. Just sleeping with your head raised on pillows is not enough.  Avoid beer, wine, and mixed drinks and avoid caffeine.  Keep a healthy weight. If you are too heavy, lose weight.  If you smoke, try to quit. Your doctor or nurse can help.  Keep a diary of your signs. Write down what you had to eat before you had reflux. This will help you learn which foods cause you problems. For some people, they need to avoid coffee, chocolate, alcohol, spicy or fatty foods, or peppermint.  Avoid eating for 2 to 3 hours before bedtime. Lying down after you eat can make reflux worse.  Avoid belts and clothes that are too tight.  What follow-up care is needed?   Your doctor may ask you to make visits to the office to check on your progress. Be sure to keep these visits.  What drugs may be needed?   The doctor may order drugs to:  Relieve heartburn  Prevent reflux  Lessen acid production  Heal the esophageal lining  Will physical activity be limited?   Your physical activities will not be limited.  What problems could happen?   Asthma  Precancerous changes in the food pipe  Long-term cough  Dental  problems  Higher risk of cancer of the food pipe. This is esophageal cancer.  Narrowing of the food pipe. This is a stricture.  Open sore in the food pipe. This is an ulcer.  When do I need to call the doctor?   You have signs of a heart attack, which may include:  Severe chest pain, pressure, or discomfort with:  Breathing trouble, sweating, upset stomach, or cold, clammy skin.  Pain in your arms, back, or jaw.  Worse pain with activity like walking up stairs.  Fast or irregular heartbeat.  Feeling dizzy, faint, or weak.  You have sudden, severe belly pain or the belly pain is constant.  You have blood in the undigested food and acid that comes up, or stool that looks red, black, or like tar.  You feel like your food gets stuck or you have pain when you swallow.  You lose weight when you are not trying to.  You choke when you are eating.  Your reflux is very bad, very frequent, or not helped by over-the-counter medicines.  You keep throwing up.  Teach Back: Helping You Understand   The Teach Back Method helps you understand the information we are giving you. After you talk with the staff, tell them in your own words what you learned. This helps to make sure the staff has described each thing clearly. It also helps to explain things that may have been confusing. Before going home, make sure you can do these:  I can tell you about my condition.  I can tell you what changes I need to make with my eating habits to ease the reflux.  I can tell you what I will do if I am throwing up fluid that looks like blood or coffee grounds.  Where can I learn more?   American Academy of [employer]icians  [email or web address]   [facility] Choices  https://[email or web address].uk/conditions/heartburn-and-acid-[email or web address]   Last Reviewed Date   2021-06-09  Consumer Information Use and Disclaimer   This information is not specific medical advice and does not replace information you receive from your health care provider. This  is only a brief summary of general information. It does NOT include all information about conditions, illnesses, injuries, tests, procedures, treatments, therapies, discharge instructions or life-style choices that may apply to you. You must talk with your health care provider for complete information about your health and treatment options. This information should not be used to decide whether or not to accept your health care providers advice, instructions or recommendations. Only your health care provider has the knowledge and training to provide advice that is right for you.  Copyright   Copyright © 2021 UpToDate, Inc. and its affiliates and/or licensors. All rights reserved. Constipation in Adults   The Basics   Written by the doctors and editors at [employer]   What is constipation? -- Constipation is a common problem that makes it hard to have bowel movements. Your bowel movements might be:  Too hard  Too small  Hard to get out  Happening fewer than 3 times a week  What causes constipation? -- Constipation can be caused by:  Side effects of some medicines  Poor diet  Diseases of the digestive system (figure 1)  What other symptoms should I watch for? -- These symptoms could signal a more serious problem:  Blood in the toilet or on the toilet paper after having a bowel movement  Fever  Weight loss  Feeling weak  It could also be a sign of a problem if you have new constipation without a change in your medicines or diet, and have never had constipation in the past.   Is there anything I can do on my own to get rid of constipation? -- Yes. Try these steps:  Eat foods that have a lot of fiber. Good choices are fruits, vegetables, prune juice, and cereal (table 1).  Drink plenty of water and other fluids.  When you feel the need to go to the bathroom, go to the bathroom. Don't hold it.  Take laxatives. These are medicines that help make bowel movements easier to get out. Some are pills that you swallow. Others go  "into the rectum. These are called "suppositories."  Should I see a doctor or nurse? -- See your doctor or nurse if:   Your symptoms are new or not normal for you  You do not have a bowel movement for a few days  The problem comes and goes, but lasts for longer than 3 weeks  You are in a lot of pain  You have other symptoms that also worry you (for example, bleeding, weakness, weight loss, or fever)  Other people in your family have had colorectal cancer or inflammatory bowel disease  Are there tests I should have? -- Your doctor or nurse will decide which tests you should have based on your age, other symptoms, and individual situation. There are lots of tests, but you might not need any.  Here are the most common tests doctors use to find the cause of constipation:  Rectal exam - Your doctor will look at the outside of your anus. They will also use a finger to feel inside the opening.  Sigmoidoscopy or colonoscopy - For these tests, the doctor puts a thin tube into your anus. Then, they advance the tube into your large intestine. The large intestine is also called the colon. The tube has a camera attached to it, so the doctor can look inside your intestines. During these tests, the doctor can also take samples of tissue to look at under a microscope (figure 2).  X-rays, CT scan, or MRI - These create images of the inside of your body.  Manometry studies - Manometry allows the doctor to measure the pressure inside the rectum at various points. It can help the doctor find out if the muscles that control bowel movements are working right. The test also shows whether the person's rectum can feel normally.  How is constipation treated? -- That depends on what is causing your constipation. First, your doctor will want you to try eating more fiber and drinking more water. If that doesn't help, your doctor might suggest:  Medicines that you swallow or put in your rectum  Changing the medicines you are taking for other " "conditions  A treatment called an "enema" - For this treatment, a doctor or nurse will squirt water into your rectum. They might also use a thin tool to help break up bowel movements that are still inside you.  You might also be able to give yourself enema treatments at home, too. Enemas can be just water, or they can contain medicine to help with constipation.   Biofeedback - This is a technique that teaches you to relax your muscles so you can let go and push bowel movements out.  Can constipation be prevented? -- You can reduce your chances of getting constipation again by:  Eating a diet that is full of fiber (table 1)  Drinking water and other fluids during the day  Going to the bathroom at regular times every day  All topics are updated as new evidence becomes available and our peer review process is complete.  This topic retrieved from [employer] on: Sep 21, 2021.  Topic 26183 Version 8.0  Release: 29.4.2 - C29.263  © 2021 UpToDate, Inc. and/or its affiliates. All rights reserved.  figure 1: Digestive system     This drawing shows the organs in the body that process food. Together these organs are called "the digestive system," or "digestive tract." As food travels through this system, the body absorbs nutrients and water.  Graphic 38515 Version 4.0    table 1: Amount of fiber in different foods  Food  Serving  Grams of fiber    Fruits    Apple (with skin) 1 medium apple 4.4   Banana 1 medium banana 3.1   Oranges 1 orange 3.1   Prunes 1 cup, pitted 12.4   Juices    Apple, unsweetened, with added ascorbic acid 1 cup 0.5   Grapefruit, white, canned, sweetened 1 cup 0.2   Grape, unsweetened, with added ascorbic acid 1 cup 0.5   Orange 1 cup 0.7   Vegetables    Cooked   Green beans 1 cup 4.0   Carrots 1/2 cup sliced 2.3   Peas 1 cup 8.8   Potato (baked, with skin) 1 medium potato 3.8   Raw   Cucumber (with peel) 1 cucumber 1.5   Lettuce 1 cup shredded 0.5   Tomato 1 medium tomato 1.5   Spinach 1 cup 0.7   Legumes "   Baked beans, canned, no salt added 1 cup 13.9   Kidney beans, canned 1 cup 13.6   Lima beans, canned 1 cup 11.6   Lentils, boiled 1 cup 15.6   Breads, pastas, flours    Bran muffins 1 medium muffin 5.2   Oatmeal, cooked 1 cup 4.0   White bread 1 slice 0.6   Whole-wheat bread 1 slice 1.9   Pasta and rice, cooked   Macaroni 1 cup 2.5   Rice, brown 1 cup 3.5   Rice, white 1 cup 0.6   Spaghetti (regular) 1 cup 2.5   Nuts    Almonds 1/2 cup 8.7   Peanuts 1/2 cup 7.9   To learn how much fiber and other nutrients are in different foods, visit the [place] [employer] ([employer]) Food[employer] Central website.  Graphic 55862 Version 6.0  figure 2: Colonoscopy     During a colonoscopy, you lie on your side and the doctor puts a thin tube with a camera into your anus (from behind). Then the doctor advances the tube into the rectum and colon. The camera sends pictures from inside your colon to a television screen.  Graphic 19347 Version 6.0    Consumer Information Use and Disclaimer   This information is not specific medical advice and does not replace information you receive from your health care provider. This is only a brief summary of general information. It does NOT include all information about conditions, illnesses, injuries, tests, procedures, treatments, therapies, discharge instructions or life-style choices that may apply to you. You must talk with your health care provider for complete information about your health and treatment options. This information should not be used to decide whether or not to accept your health care provider's advice, instructions or recommendations. Only your health care provider has the knowledge and training to provide advice that is right for you. The use of this information is governed by the [employer] End User License Agreement, available at https://[email or web address]/en/solutions/[email or web address]/about/homar.The use of UpToDate content is governed by the [employer] Terms of Use.  ©2021 [employer] All rights reserved.  Copyright   © 2021 [employer] and/or its affiliates. All rights reserved.

## 2025-04-07 NOTE — PROGRESS NOTES
Subjective:       Patient ID: Albina Armenta is a 79 y.o. female Body mass index is 26.23 kg/m².    Chief Complaint: Follow-up (GI f/u, Rx renewal)    Established patient of Dr. Choi & myself.     Patient is here for annual follow-up for reflux medication.    GI Problem  Primary symptoms do not include fever, weight loss (stable lately), fatigue, abdominal pain, nausea, vomiting, diarrhea, melena, hematemesis, jaundice, hematochezia or dysuria.   The illness is also significant for constipation (started ~12/2023; bowel movements are once every 2-3 days with taking lactulose 10 grams BID PRN; dulcolax PRN- rare use; increased fiber in diet; PAST TREATMENT: miralax x3 days without relief, metamucil- helped). The illness does not include chills, dysphagia or odynophagia. Significant associated medical issues include GERD (controlled with taking protonix 40 mg BID except some breakthrough with exercising; PAST TREATMENT: prilosec- helped for 2 weeks but became ineffective thereafter) and PUD. Associated medical issues do not include inflammatory bowel disease.     Review of Systems   Constitutional:  Negative for activity change, appetite change, chills, fatigue, fever and weight loss (stable lately).        Does water aerobics 3-4 times a week   HENT:  Negative for sore throat and trouble swallowing.    Respiratory:  Negative for cough, choking and shortness of breath.    Cardiovascular:  Negative for chest pain.   Gastrointestinal:  Positive for constipation (started ~12/2023; bowel movements are once every 2-3 days with taking lactulose 10 grams BID PRN; dulcolax PRN- rare use; increased fiber in diet; PAST TREATMENT: miralax x3 days without relief, metamucil- helped). Negative for abdominal pain, anal bleeding, blood in stool, diarrhea, dysphagia, hematemesis, hematochezia, jaundice, melena, nausea, rectal pain and vomiting.   Genitourinary:  Negative for difficulty urinating, dysuria and flank pain.  "  Neurological:  Negative for weakness.       No LMP recorded (lmp unknown). Patient has had a hysterectomy.  Past Medical History:   Diagnosis Date    Abdominal pain     Anesthesia complication     "stays in my system for weeks, excesive weakness after, frequent falls"    Anxiety     Atherosclerosis of renal artery 8/10/2015    7/27/2015: Renal Duplex: Right: severe - 3.2 m/s. Left: about 60% stenosis - 1.7 m/s.    Carotid bruit 7/16/2015 2005: Carotid bruit heard.    Carotid stent occlusion     Coronary artery disease     CVA (cerebral vascular accident) 8/13/2019    GERD (gastroesophageal reflux disease)     History of stent insertion of renal artery     Hypercholesterolemia 7/16/2015 2005: Diagnosed. Started statin.    Hypertension, benign 7/16/2015 1995: Diagnosed.    Mild obesity 6/9/2017 6/9/2017: 86 kg. BMI 31.    Subclavian artery stenosis, left 8/10/2015    7/2015: Diagnosed. 7/23/2015: Carotid Duplex: LIZETH: moderate plaquing. LICA: moderate plaquing -1.4 m/s - 50-60%. Left vertebral: retrograde flow.     Past Surgical History:   Procedure Laterality Date    ABDOMINAL AORTOGRAPHY N/A 08/11/2020    Procedure: Aortogram, Abdominal;  Surgeon: Joana Celestin MD;  Location: [facility] CATH;  Service: Peripheral Vascular;  Laterality: N/A;    ANGIOGRAPHY OF UPPER EXTREMITY N/A 08/11/2020    Procedure: Angiogram Extremity Bilateral;  Surgeon: Joana Celestin MD;  Location: [facility] CATH;  Service: Peripheral Vascular;  Laterality: N/A;    ANGIOPLASTY      renal    APPENDECTOMY Right 1959    CAROTID ENDARTERECTOMY Left     CAROTID STENT      8/2019    CARPAL TUNNEL RELEASE Right 01/13/2020    Procedure: RELEASE, CARPAL TUNNEL;  Surgeon: Mina Mahoney MD;  Location: [facility] OR;  Service: Orthopedics;  Laterality: Right;    CARPAL TUNNEL RELEASE Left 08/06/2024    COLONOSCOPY  09/29/2020    done at [facility]; sent for scanning: diverticulosis, 2 colonic angiodysplastic lesions, treated with APC    COLONOSCOPY N/A 07/09/2021    " Procedure: COLONOSCOPY;  Surgeon: Wu Choi Jr., MD;  Location: [facility] ENDO;  Service: Endoscopy;  Laterality: N/A;    COLONOSCOPY N/A 07/19/2021    Procedure: COLONOSCOPY;  Surgeon: Olvin Nelson MD;  Location: [facility];  Service: Endoscopy;  Laterality: N/A;    CYSTOSCOPY WITH URETEROSCOPY, RETROGRADE PYELOGRAPHY, AND INSERTION OF STENT Left 10/03/2020    Procedure: CYSTOSCOPY, WITH RETROGRADE PYELOGRAM AND URETERAL STENT INSERTION;  Surgeon: Tony Stanton MD;  Location: [facility] OR;  Service: Urology;  Laterality: Left;    CYSTOURETEROSCOPY WITH RETROGRADE PYELOGRAPHY AND INSERTION OF STENT INTO URETER Left 11/13/2020    Procedure: LEFT CYSTOURETEROSCOPY WITH FLUOROSCOPY, URETERAL STENT REMOVAL, & STONE REMOVAL;  Surgeon: Tony Stanton MD;  Location: [facility] OR;  Service: Urology;  Laterality: Left;    ESOPHAGOGASTRODUODENOSCOPY N/A 07/05/2021    Procedure: EGD (ESOPHAGOGASTRODUODENOSCOPY);  Surgeon: Brandon Hdz MD;  Location: [facility];  Service: Endoscopy;  Laterality: N/A;    ESOPHAGOGASTRODUODENOSCOPY N/A 11/05/2021    Procedure: EGD (ESOPHAGOGASTRODUODENOSCOPY);  Surgeon: Wu Choi Jr., MD;  Location: [facility];  Service: Endoscopy;  Laterality: N/A;    HYSTERECTOMY      OOPHORECTOMY      OPEN REDUCTION AND INTERNAL FIXATION (ORIF) OF FRACTURE OF RADIUS Right 01/13/2020    Procedure: ORIF, FRACTURE, RADIUS;  Surgeon: Mina Mahoney MD;  Location: [facility];  Service: Orthopedics;  Laterality: Right;  Accumed  pt on aspirin and plavix. Dr. Mahoney aware. Note in chart from cardiology on 1/7/2020    PERCUTANEOUS TRANSLUMINAL BALLOON ANGIOPLASTY OF CORONARY ARTERY  12/10/2020    Procedure: Angioplasty-coronary;  Surgeon: Kb Belcher MD;  Location: [facility];  Service: Cardiology;;    UPPER GASTROINTESTINAL ENDOSCOPY  09/28/2020    done at [facility], sent for scanning: widely patent schatzki's ring found in distal esophagus. small hiatal hernia, otherwise unremarkable    VASCULAR SURGERY        Family History   Problem Relation Name Age of Onset    Hypertension Mother      Hypertension Father      Colon cancer Neg Hx      Crohn's disease Neg Hx      Ulcerative colitis Neg Hx      Stomach cancer Neg Hx      Esophageal cancer Neg Hx       Social History     Tobacco Use    Smoking status: Former     Current packs/day: 0.00     Average packs/day: 1 pack/day for 47.0 years (47.0 ttl pk-yrs)     Types: Cigarettes     Start date: 1961     Quit date: 2008     Years since quittin.2    Smokeless tobacco: Never   Substance Use Topics    Alcohol use: Not Currently     Alcohol/week: 0.0 standard drinks of alcohol     Comment: rarely    Drug use: Not Currently     Wt Readings from Last 10 Encounters:   25 71.5 kg (157 lb 10.1 oz)   25 70.3 kg (155 lb)   10/18/24 71.8 kg (158 lb 3.2 oz)   24 70.8 kg (156 lb)   24 71.9 kg (158 lb 8.2 oz)   24 71.9 kg (158 lb 9.6 oz)   24 71.5 kg (157 lb 11.2 oz)   24 71.2 kg (157 lb)   24 71.2 kg (157 lb)   24 71.1 kg (156 lb 12 oz)     Lab Results   Component Value Date    WBC 8.80 2024    HGB 12.8 2024    HCT 40.3 2024    MCV 96 2024     2024     Lab Results   Component Value Date    IRON 13 (L) 2021    TIBC 321 2021    FERRITIN 8 (L) 2021     CMP  Sodium   Date Value Ref Range Status   2024 138 136 - 145 mmol/L Final   2024 138 136 - 145 mmol/L Final     Potassium   Date Value Ref Range Status   2024 4.5 3.5 - 5.1 mmol/L Final     Comment:     Anion Gap reference range revised on 2023  Specimen slightly hemolyzed     2024 4.5 3.5 - 5.1 mmol/L Final     Comment:     Anion Gap reference range revised on 2023  Specimen slightly hemolyzed       Chloride   Date Value Ref Range Status   2024 103 95 - 110 mmol/L Final   2024 103 95 - 110 mmol/L Final     CO2   Date Value Ref Range Status   2024 26 22 - 31 mmol/L  Final   07/31/2024 26 22 - 31 mmol/L Final     Glucose   Date Value Ref Range Status   07/31/2024 97 70 - 110 mg/dL Final     Comment:     The ADA recommends the following guidelines for fasting glucose:    Normal:       less than 100 mg/dL    Prediabetes:  100 mg/dL to 125 mg/dL    Diabetes:     126 mg/dL or higher     07/31/2024 97 70 - 110 mg/dL Final     Comment:     The ADA recommends the following guidelines for fasting glucose:    Normal:       less than 100 mg/dL    Prediabetes:  100 mg/dL to 125 mg/dL    Diabetes:     126 mg/dL or higher       BUN   Date Value Ref Range Status   07/31/2024 15 7 - 18 mg/dL Final   07/31/2024 15 7 - 18 mg/dL Final     Creatinine   Date Value Ref Range Status   07/31/2024 0.86 0.50 - 1.40 mg/dL Final   07/31/2024 0.86 0.50 - 1.40 mg/dL Final     Calcium   Date Value Ref Range Status   07/31/2024 9.3 8.4 - 10.2 mg/dL Final   07/31/2024 9.3 8.4 - 10.2 mg/dL Final     Total Protein   Date Value Ref Range Status   07/31/2024 6.5 6.0 - 8.4 g/dL Final   07/31/2024 6.5 6.0 - 8.4 g/dL Final     Albumin   Date Value Ref Range Status   07/31/2024 4.1 3.5 - 5.2 g/dL Final   07/31/2024 4.1 3.5 - 5.2 g/dL Final     Total Bilirubin   Date Value Ref Range Status   07/31/2024 0.8 0.2 - 1.3 mg/dL Final   07/31/2024 0.8 0.2 - 1.3 mg/dL Final     Alkaline Phosphatase   Date Value Ref Range Status   07/31/2024 96 38 - 145 U/L Final   07/31/2024 96 38 - 145 U/L Final     AST   Date Value Ref Range Status   07/31/2024 41 (H) 14 - 36 U/L Final   07/31/2024 41 (H) 14 - 36 U/L Final     ALT   Date Value Ref Range Status   07/31/2024 28 0 - 35 U/L Final   07/31/2024 28 0 - 35 U/L Final     Anion Gap   Date Value Ref Range Status   07/31/2024 9 5 - 12 mmol/L Final     Comment:     Anion Gap reference range revised on 4/28/2023 07/31/2024 9 5 - 12 mmol/L Final     Comment:     Anion Gap reference range revised on 4/28/2023     eGFR if    Date Value Ref Range Status   07/05/2022 56 (A)  >60 mL/min/1.73 m^2 Final     eGFR if non    Date Value Ref Range Status   07/05/2022 49 (A) >60 mL/min/1.73 m^2 Final     Comment:     Calculation used to obtain the estimated glomerular filtration  rate (eGFR) is the CKD-EPI equation.        Lab Results   Component Value Date    TSH 0.913 05/20/2024     Lab Results   Component Value Date    HEPAIGM Non-reactive 01/25/2024    HEPBIGM Non-reactive 01/25/2024    HEPCAB Non-reactive 01/25/2024     Lab Results   Component Value Date    ATIHEWWV75 536 04/04/2024 6/14/2024 magnesium WNL    Reviewed prior medical records including radiology report of 1/25/2024 abdominal x-ray; 11/16/2021 CTA abdomen pelvis; 7/17/2021 CT abdomen pelvis with contrast; 9/25/2020 abdominal ultrasound; & endoscopy history (see surgical history).    Reviewed prior medical records in care everywhere including radiology report of 8/9/2019 swallow study with speech.    Objective:      Physical Exam  Vitals and nursing note reviewed.   Constitutional:       General: She is not in acute distress.     Appearance: Normal appearance. She is well-developed. She is not diaphoretic.   HENT:      Mouth/Throat:      Lips: Pink. No lesions.      Mouth: Mucous membranes are moist. No oral lesions.      Tongue: No lesions.      Pharynx: Oropharynx is clear. No pharyngeal swelling or posterior oropharyngeal erythema.   Eyes:      General: No scleral icterus.     Conjunctiva/sclera: Conjunctivae normal.      Pupils: Pupils are equal, round, and reactive to light.   Pulmonary:      Effort: Pulmonary effort is normal. No respiratory distress.      Breath sounds: Normal breath sounds. No wheezing.   Abdominal:      General: Bowel sounds are normal. There is no distension or abdominal bruit.      Palpations: Abdomen is soft. Abdomen is not rigid. There is no mass.      Tenderness: There is no abdominal tenderness. There is no guarding or rebound. Negative signs include Maldonado's sign and  McBurney's sign.   Skin:     General: Skin is warm and dry.      Coloration: Skin is not jaundiced or pale.      Findings: No erythema or rash.   Neurological:      Mental Status: She is alert and oriented to person, place, and time.   Psychiatric:         Behavior: Behavior normal.         Thought Content: Thought content normal.         Judgment: Judgment normal.         Assessment:       1. Encounter for monitoring long-term proton pump inhibitor therapy    2. Heartburn    3. History of gastroesophageal reflux (GERD)    4. History of peptic ulcer    5. History of chronic constipation        Plan:       Encounter for monitoring long-term proton pump inhibitor therapy, Heartburn, History of gastroesophageal reflux (GERD), & History of peptic ulcer  - discussed with patient about long term use of reflux medications (preference to use lowest effective dose or discontinuing if possible), the risk and benefits of using these medications long term, the risk of untreated GERD such as brown's esophagus, and recommend a diet high in calcium and/or taking OTC calcium and vitamin d supplements as directed (such as Citracal +D). Discussed about treatment options. Patient verbalized understanding & patient wants to continue current medication at current dosage.  - recommend annual monitoring with blood work to include CMP, CBC, vitamin B12, and magnesium.  -     CBC Without Differential; Future; Expected date: 04/07/2025  -   REFILL  pantoprazole (PROTONIX) 40 MG tablet; Take 1 tablet (40 mg total) by mouth 2 (two) times daily before meals.  Dispense: 180 tablet; Refill: 3    History of chronic constipation  -   CONTINUE  lactulose (CHRONULAC) 20 gram/30 mL Soln; Take 15 mLs (10 g total) by mouth 2 (two) times daily PRN CONSTIPATION  -Recommend daily exercise as tolerated, adequate water intake (six 8-oz glasses of water daily), and high fiber diet. OTC fiber supplements are recommended if diet does not reach daily fiber  goal (20-30 grams daily), such as Metamucil, Citrucel, or FiberCon (take as directed, separate from other oral medications by >2 hours).  - If no improvement with above recommendations, try intermittently dosed Dulcolax OTC as directed (every 5-7  days) PRN to facilitate bowel movements  -If still no improvement with these measures, call/follow-up    Follow up in about 1 year (around 4/7/2026), or if symptoms worsen or fail to improve.      If no improvement in symptoms or symptoms worsen, call/follow-up at clinic or go to ER.        25 minutes of total time spent on the encounter, which includes face to face time and non-face to face time preparing to see the patient (e.g., review of tests), Obtaining and/or reviewing separately obtained history, Documenting clinical information in the electronic or other health record, Independently interpreting results (not separately reported) and communicating results to the patient/family/caregiver, or Care coordination (not separately reported).

## 2025-04-11 ENCOUNTER — RESULTS FOLLOW-UP (OUTPATIENT)
Dept: GASTROENTEROLOGY | Facility: CLINIC | Age: 80
End: 2025-04-11

## (undated) DEVICE — SUT 3-0 VICRYL / SH (J416)

## (undated) DEVICE — BIT DRILL QUICK RELEASE 2.8MM

## (undated) DEVICE — BIT DRILL QUICK RELEASE 2.0MM

## (undated) DEVICE — SEE MEDLINE ITEM 146270

## (undated) DEVICE — SEE MEDLINE ITEM 157116

## (undated) DEVICE — SOL 9P NACL IRR PIC IL

## (undated) DEVICE — TOURNIQUET SB QC DP 18X4IN

## (undated) DEVICE — SCREW BONE NONLOCK HEX 3.5X10
Type: IMPLANTABLE DEVICE | Site: WRIST | Status: NON-FUNCTIONAL
Removed: 2020-01-13

## (undated) DEVICE — SEE MEDLINE ITEM 157171

## (undated) DEVICE — GUIDEWIRE BALL TIP 3.0X800MM
Type: IMPLANTABLE DEVICE | Site: WRIST | Status: NON-FUNCTIONAL
Removed: 2020-01-13

## (undated) DEVICE — GUIDEWIRE ORTHO .054 X 6 IN
Type: IMPLANTABLE DEVICE | Site: WRIST | Status: NON-FUNCTIONAL
Removed: 2020-01-13

## (undated) DEVICE — SPONGE SUPER KERLIX 6X6.75IN

## (undated) DEVICE — UNDERGLOVES BIOGEL PI SIZE 8.5

## (undated) DEVICE — ADHESIVE MASTISOL VIAL 48/BX

## (undated) DEVICE — DRESSING N ADH OIL EMUL 3X3

## (undated) DEVICE — SEE MEDLINE ITEM 157131

## (undated) DEVICE — ELECTRODE REM PLYHSV RETURN 9

## (undated) DEVICE — PAD CAST SPECIALIST STRL 4

## (undated) DEVICE — LINER SUCTION 3000CC

## (undated) DEVICE — BANDAGE ESMARK LATEX FREE 4INX

## (undated) DEVICE — SEE MEDLINE ITEM 157117

## (undated) DEVICE — PACK CUSTOM UNIV BASIN SLI

## (undated) DEVICE — CLOSURE SKIN STERI STRIP 1/2X4

## (undated) DEVICE — SLING ORTHOPEDIC MEDIUM

## (undated) DEVICE — GLOVE SURG ULTRA TOUCH 8.5

## (undated) DEVICE — SEE MEDLINE ITEM 146271

## (undated) DEVICE — PACK BASIC

## (undated) DEVICE — DRAPE PLASTIC U 60X72

## (undated) DEVICE — SUT MONOCRYL 4-0 PS-2

## (undated) DEVICE — DRILL QUICK RELEASE 2.8MM 5IN

## (undated) DEVICE — DRESSING GAUZE 6PLY 4X4

## (undated) DEVICE — PAD CAST SPECIALIST STRL 6

## (undated) DEVICE — DRAPE C ARM 42 X 120 10/BX

## (undated) DEVICE — APPLICATOR CHLORAPREP ORN 26ML

## (undated) DEVICE — STRAP OR TABLE 5IN X 72IN

## (undated) DEVICE — SEE MEDLINE ITEM 157216

## (undated) DEVICE — SLEEVE SCD EXPRESS CALF MEDIUM

## (undated) DEVICE — DRAPE STERI-DRAPE 1000 17X11IN